# Patient Record
Sex: FEMALE | NOT HISPANIC OR LATINO
[De-identification: names, ages, dates, MRNs, and addresses within clinical notes are randomized per-mention and may not be internally consistent; named-entity substitution may affect disease eponyms.]

---

## 2017-01-02 ENCOUNTER — FORM ENCOUNTER (OUTPATIENT)
Age: 67
End: 2017-01-02

## 2017-01-03 ENCOUNTER — OUTPATIENT (OUTPATIENT)
Dept: OUTPATIENT SERVICES | Facility: HOSPITAL | Age: 67
LOS: 1 days | End: 2017-01-03
Payer: MEDICARE

## 2017-01-03 ENCOUNTER — APPOINTMENT (OUTPATIENT)
Dept: MRI IMAGING | Facility: CLINIC | Age: 67
End: 2017-01-03

## 2017-01-03 DIAGNOSIS — Z00.8 ENCOUNTER FOR OTHER GENERAL EXAMINATION: ICD-10-CM

## 2017-01-03 PROCEDURE — C8937: CPT

## 2017-01-03 PROCEDURE — C8908: CPT

## 2017-01-03 PROCEDURE — A9585: CPT

## 2017-02-06 ENCOUNTER — FORM ENCOUNTER (OUTPATIENT)
Age: 67
End: 2017-02-06

## 2017-02-07 ENCOUNTER — APPOINTMENT (OUTPATIENT)
Dept: ULTRASOUND IMAGING | Facility: CLINIC | Age: 67
End: 2017-02-07

## 2017-02-07 ENCOUNTER — OUTPATIENT (OUTPATIENT)
Dept: OUTPATIENT SERVICES | Facility: HOSPITAL | Age: 67
LOS: 1 days | End: 2017-02-07
Payer: MEDICARE

## 2017-02-07 ENCOUNTER — APPOINTMENT (OUTPATIENT)
Dept: MAMMOGRAPHY | Facility: CLINIC | Age: 67
End: 2017-02-07

## 2017-02-07 DIAGNOSIS — Z00.8 ENCOUNTER FOR OTHER GENERAL EXAMINATION: ICD-10-CM

## 2017-02-07 PROCEDURE — 77065 DX MAMMO INCL CAD UNI: CPT

## 2017-02-07 PROCEDURE — 76641 ULTRASOUND BREAST COMPLETE: CPT

## 2017-02-07 PROCEDURE — G0279: CPT

## 2017-06-07 ENCOUNTER — APPOINTMENT (OUTPATIENT)
Dept: BREAST CENTER | Facility: CLINIC | Age: 67
End: 2017-06-07

## 2017-06-07 VITALS
SYSTOLIC BLOOD PRESSURE: 122 MMHG | HEIGHT: 63 IN | BODY MASS INDEX: 29.06 KG/M2 | HEART RATE: 72 BPM | DIASTOLIC BLOOD PRESSURE: 80 MMHG | WEIGHT: 164 LBS

## 2017-08-03 ENCOUNTER — FORM ENCOUNTER (OUTPATIENT)
Age: 67
End: 2017-08-03

## 2017-08-04 ENCOUNTER — OUTPATIENT (OUTPATIENT)
Dept: OUTPATIENT SERVICES | Facility: HOSPITAL | Age: 67
LOS: 1 days | End: 2017-08-04
Payer: MEDICARE

## 2017-08-04 ENCOUNTER — APPOINTMENT (OUTPATIENT)
Dept: RADIOLOGY | Facility: CLINIC | Age: 67
End: 2017-08-04
Payer: MEDICARE

## 2017-08-04 DIAGNOSIS — Z00.8 ENCOUNTER FOR OTHER GENERAL EXAMINATION: ICD-10-CM

## 2017-08-04 PROCEDURE — 77080 DXA BONE DENSITY AXIAL: CPT

## 2017-08-04 PROCEDURE — 77080 DXA BONE DENSITY AXIAL: CPT | Mod: 26

## 2017-08-08 ENCOUNTER — FORM ENCOUNTER (OUTPATIENT)
Age: 67
End: 2017-08-08

## 2017-08-09 ENCOUNTER — APPOINTMENT (OUTPATIENT)
Dept: MAMMOGRAPHY | Facility: CLINIC | Age: 67
End: 2017-08-09
Payer: MEDICARE

## 2017-08-09 ENCOUNTER — APPOINTMENT (OUTPATIENT)
Dept: ULTRASOUND IMAGING | Facility: CLINIC | Age: 67
End: 2017-08-09
Payer: MEDICARE

## 2017-08-09 ENCOUNTER — OUTPATIENT (OUTPATIENT)
Dept: OUTPATIENT SERVICES | Facility: HOSPITAL | Age: 67
LOS: 1 days | End: 2017-08-09
Payer: MEDICARE

## 2017-08-09 ENCOUNTER — APPOINTMENT (OUTPATIENT)
Dept: HEMATOLOGY ONCOLOGY | Facility: CLINIC | Age: 67
End: 2017-08-09
Payer: MEDICARE

## 2017-08-09 VITALS
HEART RATE: 62 BPM | TEMPERATURE: 98.5 F | SYSTOLIC BLOOD PRESSURE: 144 MMHG | BODY MASS INDEX: 29.59 KG/M2 | DIASTOLIC BLOOD PRESSURE: 89 MMHG | WEIGHT: 167 LBS | HEIGHT: 63 IN

## 2017-08-09 DIAGNOSIS — Z87.898 PERSONAL HISTORY OF OTHER SPECIFIED CONDITIONS: ICD-10-CM

## 2017-08-09 DIAGNOSIS — Z00.8 ENCOUNTER FOR OTHER GENERAL EXAMINATION: ICD-10-CM

## 2017-08-09 PROCEDURE — 77065 DX MAMMO INCL CAD UNI: CPT

## 2017-08-09 PROCEDURE — 76641 ULTRASOUND BREAST COMPLETE: CPT | Mod: 26,LT

## 2017-08-09 PROCEDURE — 76641 ULTRASOUND BREAST COMPLETE: CPT

## 2017-08-09 PROCEDURE — G0206: CPT | Mod: 26,LT

## 2017-08-09 PROCEDURE — 99214 OFFICE O/P EST MOD 30 MIN: CPT

## 2017-08-09 PROCEDURE — G0279: CPT

## 2017-08-09 PROCEDURE — G0279: CPT | Mod: 26

## 2017-08-16 ENCOUNTER — APPOINTMENT (OUTPATIENT)
Dept: INFUSION THERAPY | Facility: CLINIC | Age: 67
End: 2017-08-16

## 2017-08-16 VITALS
BODY MASS INDEX: 30.12 KG/M2 | TEMPERATURE: 99 F | WEIGHT: 170 LBS | HEIGHT: 63 IN | SYSTOLIC BLOOD PRESSURE: 135 MMHG | HEART RATE: 68 BPM | DIASTOLIC BLOOD PRESSURE: 85 MMHG

## 2017-12-05 ENCOUNTER — APPOINTMENT (OUTPATIENT)
Dept: BREAST CENTER | Facility: CLINIC | Age: 67
End: 2017-12-05
Payer: MEDICARE

## 2017-12-05 VITALS
SYSTOLIC BLOOD PRESSURE: 118 MMHG | HEART RATE: 74 BPM | BODY MASS INDEX: 29.77 KG/M2 | DIASTOLIC BLOOD PRESSURE: 78 MMHG | WEIGHT: 168 LBS | HEIGHT: 63 IN

## 2017-12-05 PROCEDURE — 99213 OFFICE O/P EST LOW 20 MIN: CPT

## 2018-01-05 ENCOUNTER — RX RENEWAL (OUTPATIENT)
Age: 68
End: 2018-01-05

## 2018-01-08 ENCOUNTER — RESULT CHARGE (OUTPATIENT)
Age: 68
End: 2018-01-08

## 2018-01-29 ENCOUNTER — OTHER (OUTPATIENT)
Age: 68
End: 2018-01-29

## 2018-02-13 RX ORDER — NITROFURANTOIN (MONOHYDRATE/MACROCRYSTALS) 25; 75 MG/1; MG/1
100 CAPSULE ORAL
Qty: 14 | Refills: 0 | Status: DISCONTINUED | COMMUNITY
Start: 2018-01-05

## 2018-02-13 RX ORDER — MOMETASONE FUROATE 1 MG/G
0.1 OINTMENT TOPICAL
Qty: 15 | Refills: 0 | Status: DISCONTINUED | COMMUNITY
Start: 2017-11-01

## 2018-02-13 RX ORDER — HYDROCODONE BITARTRATE AND HOMATROPINE METHYLBROMIDE 5; 1.5 MG/5ML; MG/5ML
5-1.5 SYRUP ORAL
Qty: 240 | Refills: 0 | Status: DISCONTINUED | COMMUNITY
Start: 2018-01-09

## 2018-02-13 RX ORDER — AMOXICILLIN 500 MG/1
500 CAPSULE ORAL
Qty: 20 | Refills: 0 | Status: DISCONTINUED | COMMUNITY
Start: 2018-01-09

## 2018-02-13 RX ORDER — AZELASTINE HYDROCHLORIDE 205.5 UG/1
0.15 SPRAY, METERED NASAL
Qty: 30 | Refills: 0 | Status: DISCONTINUED | COMMUNITY
Start: 2018-01-09

## 2018-02-14 ENCOUNTER — APPOINTMENT (OUTPATIENT)
Dept: HEMATOLOGY ONCOLOGY | Facility: CLINIC | Age: 68
End: 2018-02-14
Payer: MEDICARE

## 2018-02-14 ENCOUNTER — APPOINTMENT (OUTPATIENT)
Dept: INFUSION THERAPY | Facility: CLINIC | Age: 68
End: 2018-02-14

## 2018-02-14 VITALS
HEART RATE: 69 BPM | TEMPERATURE: 98.4 F | WEIGHT: 179 LBS | DIASTOLIC BLOOD PRESSURE: 85 MMHG | HEIGHT: 63 IN | SYSTOLIC BLOOD PRESSURE: 136 MMHG | BODY MASS INDEX: 31.71 KG/M2

## 2018-02-14 PROCEDURE — 96372 THER/PROPH/DIAG INJ SC/IM: CPT | Mod: 59

## 2018-02-14 PROCEDURE — 99213 OFFICE O/P EST LOW 20 MIN: CPT | Mod: 25

## 2018-02-25 ENCOUNTER — EMERGENCY (EMERGENCY)
Facility: HOSPITAL | Age: 68
LOS: 0 days | Discharge: HOME | End: 2018-02-26
Attending: EMERGENCY MEDICINE | Admitting: EMERGENCY MEDICINE
Payer: MEDICARE

## 2018-02-25 VITALS
HEIGHT: 63 IN | SYSTOLIC BLOOD PRESSURE: 151 MMHG | HEART RATE: 75 BPM | RESPIRATION RATE: 18 BRPM | DIASTOLIC BLOOD PRESSURE: 92 MMHG | OXYGEN SATURATION: 97 % | WEIGHT: 169.98 LBS | TEMPERATURE: 98 F
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
## 2018-02-25 DIAGNOSIS — I25.2 OLD MYOCARDIAL INFARCTION: ICD-10-CM

## 2018-02-25 DIAGNOSIS — F10.120 ALCOHOL ABUSE WITH INTOXICATION, UNCOMPLICATED: ICD-10-CM

## 2018-02-25 DIAGNOSIS — Z86.73 PERSONAL HISTORY OF TRANSIENT ISCHEMIC ATTACK (TIA), AND CEREBRAL INFARCTION WITHOUT RESIDUAL DEFICITS: ICD-10-CM

## 2018-02-25 DIAGNOSIS — F10.129 ALCOHOL ABUSE WITH INTOXICATION, UNSPECIFIED: ICD-10-CM

## 2018-02-25 DIAGNOSIS — Z98.61 CORONARY ANGIOPLASTY STATUS: ICD-10-CM

## 2018-02-25 DIAGNOSIS — I25.119 ATHEROSCLEROTIC HEART DISEASE OF NATIVE CORONARY ARTERY WITH UNSPECIFIED ANGINA PECTORIS: ICD-10-CM

## 2018-02-25 LAB
APTT BLD: 33.4 SEC — SIGNIFICANT CHANGE UP (ref 27.5–37.4)
BASOPHILS # BLD AUTO: 0.1 K/UL — SIGNIFICANT CHANGE UP (ref 0–0.2)
BASOPHILS NFR BLD AUTO: 1.4 % — SIGNIFICANT CHANGE UP (ref 0–2)
EOSINOPHIL # BLD AUTO: 0.3 K/UL — SIGNIFICANT CHANGE UP (ref 0–0.5)
EOSINOPHIL NFR BLD AUTO: 4.5 % — SIGNIFICANT CHANGE UP (ref 0–6)
HCT VFR BLD CALC: 43.7 % — SIGNIFICANT CHANGE UP (ref 34.5–45)
HGB BLD-MCNC: 14 G/DL — SIGNIFICANT CHANGE UP (ref 11.5–15.5)
INR BLD: 1.1 RATIO — SIGNIFICANT CHANGE UP (ref 0.88–1.16)
LYMPHOCYTES # BLD AUTO: 1.9 K/UL — SIGNIFICANT CHANGE UP (ref 1–3.3)
LYMPHOCYTES # BLD AUTO: 27.6 % — SIGNIFICANT CHANGE UP (ref 13–44)
MCHC RBC-ENTMCNC: 31.3 PG — SIGNIFICANT CHANGE UP (ref 27–34)
MCHC RBC-ENTMCNC: 32.1 GM/DL — SIGNIFICANT CHANGE UP (ref 32–36)
MCV RBC AUTO: 97.5 FL — SIGNIFICANT CHANGE UP (ref 80–100)
MONOCYTES # BLD AUTO: 0.4 K/UL — SIGNIFICANT CHANGE UP (ref 0–0.9)
MONOCYTES NFR BLD AUTO: 6.3 % — SIGNIFICANT CHANGE UP (ref 2–14)
NEUTROPHILS # BLD AUTO: 4.2 K/UL — SIGNIFICANT CHANGE UP (ref 1.8–7.4)
NEUTROPHILS NFR BLD AUTO: 60.3 % — SIGNIFICANT CHANGE UP (ref 43–77)
PLATELET # BLD AUTO: 296 K/UL — SIGNIFICANT CHANGE UP (ref 150–400)
PROTHROM AB SERPL-ACNC: 11.9 SEC — SIGNIFICANT CHANGE UP (ref 9.8–12.7)
RBC # BLD: 4.48 M/UL — SIGNIFICANT CHANGE UP (ref 3.8–5.2)
RBC # FLD: 12.5 % — SIGNIFICANT CHANGE UP (ref 10.3–14.5)
WBC # BLD: 7 K/UL — SIGNIFICANT CHANGE UP (ref 3.8–10.5)
WBC # FLD AUTO: 7 K/UL — SIGNIFICANT CHANGE UP (ref 3.8–10.5)

## 2018-02-25 PROCEDURE — 99284 EMERGENCY DEPT VISIT MOD MDM: CPT | Mod: 25

## 2018-02-25 RX ORDER — ONDANSETRON 8 MG/1
4 TABLET, FILM COATED ORAL ONCE
Qty: 0 | Refills: 0 | Status: COMPLETED | OUTPATIENT
Start: 2018-02-25 | End: 2018-02-25

## 2018-02-25 RX ORDER — SODIUM CHLORIDE 9 MG/ML
1000 INJECTION, SOLUTION INTRAVENOUS
Qty: 0 | Refills: 0 | Status: COMPLETED | OUTPATIENT
Start: 2018-02-25 | End: 2018-02-26

## 2018-02-25 NOTE — ED ADULT NURSE NOTE - CHPI ED SYMPTOMS NEG
no weakness/no pain/no dizziness/no tingling/no fever/no chills/no numbness/no vomiting/no decreased eating/drinking/no nausea

## 2018-02-25 NOTE — ED PROVIDER NOTE - OBJECTIVE STATEMENT
Pt. is a 68 yo F with a hx of CAD, cardiac stents, MI, TIA BIB ambulance for severe alcohol intoxication.  Pt. states for the past few months she has been doing multiple shots of alcohol daily due to stress with  44 yo daughter, money, real estate, will.   at bedside worried due to her medical problems because she couldn't stand, dropped a plate in the kitchen, and had multiple episodes of vomiting.  No falls or trauma to head per .  Pt. states she also has insomnia and takes xanax for anxiety and help with sleep.  Pt. has no complaints at this time. PMJEANIE Hogan

## 2018-02-25 NOTE — ED PROVIDER NOTE - PMH
Coronary artery disease, angina presence unspecified, unspecified vessel or lesion type, unspecified whether native or transplanted heart    Non-ST elevation (NSTEMI) myocardial infarction    Transient cerebral ischemia, unspecified type

## 2018-02-25 NOTE — ED PROVIDER NOTE - CONSTITUTIONAL, MLM
normal... Well appearing, well nourished, awake, alert, oriented to person, place. Slurred speech but cooperative. Vomitus on clothing.

## 2018-02-25 NOTE — ED ADULT NURSE NOTE - OBJECTIVE STATEMENT
pt states that she drank vodka and took xanax today but states "I always do this every night and my  wants me evaluated".  Pt states that she is anxious every night and that's why she drinks every night.  pt alert at this time, vss, ambulatory with  at bedside.  Pt denies si/hi

## 2018-02-26 VITALS
DIASTOLIC BLOOD PRESSURE: 88 MMHG | HEART RATE: 77 BPM | OXYGEN SATURATION: 98 % | SYSTOLIC BLOOD PRESSURE: 133 MMHG | RESPIRATION RATE: 17 BRPM | TEMPERATURE: 98 F

## 2018-02-26 LAB
ALBUMIN SERPL ELPH-MCNC: 3.6 G/DL — SIGNIFICANT CHANGE UP (ref 3.3–5)
ALP SERPL-CCNC: 58 U/L — SIGNIFICANT CHANGE UP (ref 40–120)
ALT FLD-CCNC: 20 U/L — SIGNIFICANT CHANGE UP (ref 12–78)
ANION GAP SERPL CALC-SCNC: 7 MMOL/L — SIGNIFICANT CHANGE UP (ref 5–17)
APAP SERPL-MCNC: <2 UG/ML — LOW (ref 10–30)
AST SERPL-CCNC: 19 U/L — SIGNIFICANT CHANGE UP (ref 15–37)
BILIRUB SERPL-MCNC: 0.2 MG/DL — SIGNIFICANT CHANGE UP (ref 0.2–1.2)
BUN SERPL-MCNC: 10 MG/DL — SIGNIFICANT CHANGE UP (ref 7–23)
CALCIUM SERPL-MCNC: 8.6 MG/DL — SIGNIFICANT CHANGE UP (ref 8.5–10.1)
CHLORIDE SERPL-SCNC: 107 MMOL/L — SIGNIFICANT CHANGE UP (ref 96–108)
CK SERPL-CCNC: 81 U/L — SIGNIFICANT CHANGE UP (ref 26–192)
CO2 SERPL-SCNC: 27 MMOL/L — SIGNIFICANT CHANGE UP (ref 22–31)
CREAT SERPL-MCNC: 0.65 MG/DL — SIGNIFICANT CHANGE UP (ref 0.5–1.3)
ETHANOL SERPL-MCNC: 205 MG/DL — HIGH (ref 0–10)
GLUCOSE SERPL-MCNC: 99 MG/DL — SIGNIFICANT CHANGE UP (ref 70–99)
MAGNESIUM SERPL-MCNC: 1.8 MG/DL — SIGNIFICANT CHANGE UP (ref 1.6–2.6)
POTASSIUM SERPL-MCNC: 3.9 MMOL/L — SIGNIFICANT CHANGE UP (ref 3.5–5.3)
POTASSIUM SERPL-SCNC: 3.9 MMOL/L — SIGNIFICANT CHANGE UP (ref 3.5–5.3)
PROT SERPL-MCNC: 7.6 GM/DL — SIGNIFICANT CHANGE UP (ref 6–8.3)
SALICYLATES SERPL-MCNC: <1.7 MG/DL — LOW (ref 2.8–20)
SODIUM SERPL-SCNC: 141 MMOL/L — SIGNIFICANT CHANGE UP (ref 135–145)
TROPONIN I SERPL-MCNC: <0.015 NG/ML — SIGNIFICANT CHANGE UP (ref 0.01–0.04)

## 2018-02-26 PROCEDURE — 93010 ELECTROCARDIOGRAM REPORT: CPT

## 2018-02-26 RX ADMIN — SODIUM CHLORIDE 125 MILLILITER(S): 9 INJECTION, SOLUTION INTRAVENOUS at 00:34

## 2018-07-01 PROBLEM — H25.011 CORTICAL AGE-RELATED CATARACT OF RIGHT EYE: Status: ACTIVE | Noted: 2018-07-01

## 2018-07-02 PROBLEM — H25.011 CORTICAL AGE-RELATED CATARACT OF RIGHT EYE: Status: RESOLVED | Noted: 2018-07-01 | Resolved: 2018-07-02

## 2018-07-18 ENCOUNTER — RESULT REVIEW (OUTPATIENT)
Age: 68
End: 2018-07-18

## 2018-08-14 ENCOUNTER — APPOINTMENT (OUTPATIENT)
Dept: INFUSION THERAPY | Facility: CLINIC | Age: 68
End: 2018-08-14
Payer: MEDICARE

## 2018-08-14 VITALS
WEIGHT: 168 LBS | HEART RATE: 71 BPM | TEMPERATURE: 98.4 F | HEIGHT: 63 IN | SYSTOLIC BLOOD PRESSURE: 138 MMHG | BODY MASS INDEX: 29.77 KG/M2 | DIASTOLIC BLOOD PRESSURE: 99 MMHG

## 2018-08-14 PROCEDURE — 96372 THER/PROPH/DIAG INJ SC/IM: CPT

## 2018-08-15 LAB
25(OH)D3 SERPL-MCNC: 47.6 NG/ML
ALBUMIN SERPL ELPH-MCNC: 4.1 G/DL
ALP BLD-CCNC: 45 U/L
ALT SERPL-CCNC: 8 U/L
ANION GAP SERPL CALC-SCNC: 17 MMOL/L
AST SERPL-CCNC: 14 U/L
BILIRUB SERPL-MCNC: 0.3 MG/DL
BUN SERPL-MCNC: 18 MG/DL
CALCIUM SERPL-MCNC: 9.3 MG/DL
CHLORIDE SERPL-SCNC: 103 MMOL/L
CO2 SERPL-SCNC: 25 MMOL/L
CREAT SERPL-MCNC: 0.65 MG/DL
GLUCOSE SERPL-MCNC: 105 MG/DL
POTASSIUM SERPL-SCNC: 4.6 MMOL/L
PROT SERPL-MCNC: 6.6 G/DL
SODIUM SERPL-SCNC: 145 MMOL/L

## 2018-09-21 PROBLEM — I25.10 ATHEROSCLEROTIC HEART DISEASE OF NATIVE CORONARY ARTERY WITHOUT ANGINA PECTORIS: Chronic | Status: ACTIVE | Noted: 2018-02-25

## 2018-09-21 PROBLEM — G45.9 TRANSIENT CEREBRAL ISCHEMIC ATTACK, UNSPECIFIED: Chronic | Status: ACTIVE | Noted: 2018-02-25

## 2018-09-21 PROBLEM — I21.4 NON-ST ELEVATION (NSTEMI) MYOCARDIAL INFARCTION: Chronic | Status: ACTIVE | Noted: 2018-02-25

## 2018-10-01 ENCOUNTER — FORM ENCOUNTER (OUTPATIENT)
Age: 68
End: 2018-10-01

## 2018-10-02 ENCOUNTER — OUTPATIENT (OUTPATIENT)
Dept: OUTPATIENT SERVICES | Facility: HOSPITAL | Age: 68
LOS: 1 days | End: 2018-10-02
Payer: MEDICARE

## 2018-10-02 ENCOUNTER — APPOINTMENT (OUTPATIENT)
Dept: MAMMOGRAPHY | Facility: CLINIC | Age: 68
End: 2018-10-02
Payer: MEDICARE

## 2018-10-02 ENCOUNTER — APPOINTMENT (OUTPATIENT)
Dept: ULTRASOUND IMAGING | Facility: CLINIC | Age: 68
End: 2018-10-02
Payer: MEDICARE

## 2018-10-02 DIAGNOSIS — Z00.8 ENCOUNTER FOR OTHER GENERAL EXAMINATION: ICD-10-CM

## 2018-10-02 PROCEDURE — 77065 DX MAMMO INCL CAD UNI: CPT | Mod: 26,LT

## 2018-10-02 PROCEDURE — 76641 ULTRASOUND BREAST COMPLETE: CPT | Mod: 26,LT

## 2018-10-02 PROCEDURE — G0279: CPT

## 2018-10-02 PROCEDURE — 77065 DX MAMMO INCL CAD UNI: CPT

## 2018-10-02 PROCEDURE — G0279: CPT | Mod: 26

## 2018-10-02 PROCEDURE — 76641 ULTRASOUND BREAST COMPLETE: CPT

## 2018-11-08 ENCOUNTER — OUTPATIENT (OUTPATIENT)
Dept: OUTPATIENT SERVICES | Facility: HOSPITAL | Age: 68
LOS: 1 days | Discharge: HOME | End: 2018-11-08

## 2018-11-08 VITALS
OXYGEN SATURATION: 97 % | DIASTOLIC BLOOD PRESSURE: 68 MMHG | RESPIRATION RATE: 16 BRPM | HEART RATE: 72 BPM | SYSTOLIC BLOOD PRESSURE: 141 MMHG

## 2018-11-08 DIAGNOSIS — I25.10 ATHEROSCLEROTIC HEART DISEASE OF NATIVE CORONARY ARTERY WITHOUT ANGINA PECTORIS: ICD-10-CM

## 2018-11-08 DIAGNOSIS — Z01.818 ENCOUNTER FOR OTHER PREPROCEDURAL EXAMINATION: ICD-10-CM

## 2018-11-08 DIAGNOSIS — Z90.11 ACQUIRED ABSENCE OF RIGHT BREAST AND NIPPLE: Chronic | ICD-10-CM

## 2018-11-08 DIAGNOSIS — Z95.5 PRESENCE OF CORONARY ANGIOPLASTY IMPLANT AND GRAFT: Chronic | ICD-10-CM

## 2018-11-08 DIAGNOSIS — H26.9 UNSPECIFIED CATARACT: Chronic | ICD-10-CM

## 2018-11-08 LAB
ANION GAP SERPL CALC-SCNC: 8 MMOL/L — SIGNIFICANT CHANGE UP (ref 5–17)
BUN SERPL-MCNC: 19 MG/DL — SIGNIFICANT CHANGE UP (ref 7–23)
CALCIUM SERPL-MCNC: 8.8 MG/DL — SIGNIFICANT CHANGE UP (ref 8.5–10.1)
CHLORIDE SERPL-SCNC: 104 MMOL/L — SIGNIFICANT CHANGE UP (ref 96–108)
CO2 SERPL-SCNC: 26 MMOL/L — SIGNIFICANT CHANGE UP (ref 22–31)
CREAT SERPL-MCNC: 0.62 MG/DL — SIGNIFICANT CHANGE UP (ref 0.5–1.3)
GLUCOSE SERPL-MCNC: 86 MG/DL — SIGNIFICANT CHANGE UP (ref 70–99)
HCT VFR BLD CALC: 40.1 % — SIGNIFICANT CHANGE UP (ref 34.5–45)
HGB BLD-MCNC: 12.7 G/DL — SIGNIFICANT CHANGE UP (ref 11.5–15.5)
MCHC RBC-ENTMCNC: 28.5 PG — SIGNIFICANT CHANGE UP (ref 27–34)
MCHC RBC-ENTMCNC: 31.7 GM/DL — LOW (ref 32–36)
MCV RBC AUTO: 90.1 FL — SIGNIFICANT CHANGE UP (ref 80–100)
NRBC # BLD: 0 /100 WBCS — SIGNIFICANT CHANGE UP (ref 0–0)
PLATELET # BLD AUTO: 323 K/UL — SIGNIFICANT CHANGE UP (ref 150–400)
POTASSIUM SERPL-MCNC: 4.3 MMOL/L — SIGNIFICANT CHANGE UP (ref 3.5–5.3)
POTASSIUM SERPL-SCNC: 4.3 MMOL/L — SIGNIFICANT CHANGE UP (ref 3.5–5.3)
RBC # BLD: 4.45 M/UL — SIGNIFICANT CHANGE UP (ref 3.8–5.2)
RBC # FLD: 14.3 % — SIGNIFICANT CHANGE UP (ref 10.3–14.5)
SODIUM SERPL-SCNC: 138 MMOL/L — SIGNIFICANT CHANGE UP (ref 135–145)
WBC # BLD: 6.85 K/UL — SIGNIFICANT CHANGE UP (ref 3.8–10.5)
WBC # FLD AUTO: 6.85 K/UL — SIGNIFICANT CHANGE UP (ref 3.8–10.5)

## 2018-11-08 NOTE — H&P CARDIOLOGY - PMH
Anxiety    Breast cancer    Coronary artery disease, angina presence unspecified, unspecified vessel or lesion type, unspecified whether native or transplanted heart    HTN (hypertension)    Non-ST elevation (NSTEMI) myocardial infarction    Transient cerebral ischemia, unspecified type

## 2018-11-08 NOTE — H&P CARDIOLOGY - PSH
Cataract, bilateral    H/O total mastectomy of right breast    S/P drug eluting coronary stent placement  in 2008 and 2010

## 2018-11-08 NOTE — H&P CARDIOLOGY - RADIOLOGY RESULTS AND INTERPRETATION
Nuclear stress test (10/22/18): abnormal perfusion of mild intensity in anterior segment, EF 63% Nuclear stress test (10/22/18): abnormal perfusion of mild intensity in anterior segment, EF 63%  Cath report (4/30/2010): patent LAD stent, stent to RPDA

## 2018-11-08 NOTE — H&P CARDIOLOGY - REVIEW OF SYSTEMS
General: anxious, denies recent weight loss/fever/chills    Neurological: + light headedness, denies numbness or  sensation loss    Cardiovascular: chest pressure/ discomfort     Respiratory and Thorax: + APPLE, denies cough/wheezing    Gastrointestinal: + hiatal hernia, denies abdominal pain/diarrhea/constipation/bloody stool    Genitourinary: denies urinary frequency/urgency/ dysuria    Musculoskeletal: denies joint pain or swelling, denies restricted motion    Hematologic: denies abnormal bleeding

## 2018-11-08 NOTE — H&P CARDIOLOGY - FAMILY HISTORY
Father  Still living? Unknown  Family history of lung cancer, Age at diagnosis: Age Unknown     Mother  Still living? Unknown  Family history of breast cancer, Age at diagnosis: Age Unknown     Sibling  Still living? Unknown  Family history of bladder cancer, Age at diagnosis: Age Unknown  Family history of prostate cancer, Age at diagnosis: Age Unknown  Family history of heart disease, Age at diagnosis: Age Unknown     Grandparent  Still living? Unknown  Family history of heart disease, Age at diagnosis: Age Unknown

## 2018-11-08 NOTE — H&P CARDIOLOGY - HISTORY OF PRESENT ILLNESS
This is a 68 y.o female with PMHx of HTN, breast ca, s/p Rt, Lumpectomy/ Mastectomy/ implantation, TIA (2012, on Xarelto), ASD/ PFO, CAD, MI (in 2008), s/p PCI (x2 in 2008, x2 in 2010) presented to Dr. Hogan's office with hypertensive and c/o increasing in APPLE, light headedness, light chest pressure/ discomfort for 6-8 weeks. Recent nuclear stress test revealed abnormal perfusion in anterior segment. Pt referred to cardiac cath with possible PCI.

## 2018-11-12 ENCOUNTER — OUTPATIENT (OUTPATIENT)
Dept: OUTPATIENT SERVICES | Facility: HOSPITAL | Age: 68
LOS: 1 days | Discharge: HOME | End: 2018-11-12
Payer: MEDICARE

## 2018-11-12 VITALS
SYSTOLIC BLOOD PRESSURE: 127 MMHG | DIASTOLIC BLOOD PRESSURE: 81 MMHG | OXYGEN SATURATION: 97 % | RESPIRATION RATE: 16 BRPM | HEART RATE: 67 BPM

## 2018-11-12 VITALS
DIASTOLIC BLOOD PRESSURE: 76 MMHG | RESPIRATION RATE: 16 BRPM | SYSTOLIC BLOOD PRESSURE: 135 MMHG | OXYGEN SATURATION: 95 % | HEART RATE: 79 BPM

## 2018-11-12 DIAGNOSIS — H26.9 UNSPECIFIED CATARACT: Chronic | ICD-10-CM

## 2018-11-12 DIAGNOSIS — Z90.11 ACQUIRED ABSENCE OF RIGHT BREAST AND NIPPLE: Chronic | ICD-10-CM

## 2018-11-12 DIAGNOSIS — Z95.5 PRESENCE OF CORONARY ANGIOPLASTY IMPLANT AND GRAFT: Chronic | ICD-10-CM

## 2018-11-12 PROCEDURE — 93010 ELECTROCARDIOGRAM REPORT: CPT

## 2018-11-12 NOTE — PROGRESS NOTE ADULT - SUBJECTIVE AND OBJECTIVE BOX
s/p LHC revealing non obstructive CAD      Denies chest pain, shortness of breath, dizziness or palpitations at this time  A+Ox3  CV  Respiratory  Right groin procedure site CDI.  no bleeding, no hematoma, site soft, non tender, positive pedal pulses bilaterally    HPI:This is a 68 y.o female with PMHx of HTN, breast ca, s/p Rt, Lumpectomy/ Mastectomy/ implantation, TIA (2012, on Xarelto), ASD/ PFO, CAD, MI (in 2008), s/p PCI (x2 in 2008, x2 in 2010) presented to Dr. Hogan's office with hypertension and c/o increasing in APPLE, light headedness light chest pressure/ discomfort for 6-8 weeks. Recent nuclear stress test revealed abnormal perfusion in anterior segment. Pt referred to cardiac cath with possible PCI.       now s/p LHC revealing non obstructive CAD, patent    --vital signs, labs, diet and activity per post procedure orders  -ambulate ad trae post bedrest  -encourage PO fluids  -plan of care discussed with patient, family and MD  -Restart Xarelto in AM  -d/c after bedrest if stable  -post procedure and d/c instructions reviewed  -follow up with MD in 1-2 weeks  -Discussed therapeutic lifestyle changes to reduce risk factors such as following a cardiac diet, weight loss, maintaining a healthy weight, exercise, smoking cessation, medication compliance, and regular follow-up  with MD s/p LHC revealing non obstructive CAD      Denies chest pain, shortness of breath, dizziness or palpitations at this time  A+Ox3  CV: S1S2 reg  Respiratory: CTAB  Right groin procedure site CDI.  no bleeding, no hematoma, site soft, non tender, positive pedal pulses bilaterally    HPI:This is a 68 y.o female with PMHx of HTN, breast ca, s/p Rt, Lumpectomy/ Mastectomy/ implantation, TIA (2012, on Xarelto), ASD/ PFO, CAD, MI (in 2008), s/p PCI (x2 in 2008, x2 in 2010) presented to Dr. Hogan's office with hypertension and c/o increasing in APPLE, light headedness light chest pressure/ discomfort for 6-8 weeks. Recent nuclear stress test revealed abnormal perfusion in anterior segment. Pt referred to cardiac cath with possible PCI.       now s/p LHC revealing non obstructive CAD, patent    --vital signs, labs, diet and activity per post procedure orders  -ambulate ad trae post bedrest  -encourage PO fluids  -plan of care discussed with patient, family and MD  -Restart Xarelto in AM  -d/c after bedrest if stable  -post procedure and d/c instructions reviewed  -follow up with MD in 1-2 weeks  -Discussed therapeutic lifestyle changes to reduce risk factors such as following a cardiac diet, weight loss, maintaining a healthy weight, exercise, smoking cessation, medication compliance, and regular follow-up  with MD

## 2018-11-12 NOTE — PACU DISCHARGE NOTE - COMMENTS
Pt. verb. agreement and understanding to and teach back to written and verbal discharge instructions and medication list.   Pt. discharged  to home via private auto accompanied by .

## 2018-11-15 DIAGNOSIS — Z95.5 PRESENCE OF CORONARY ANGIOPLASTY IMPLANT AND GRAFT: ICD-10-CM

## 2018-11-15 DIAGNOSIS — I20.8 OTHER FORMS OF ANGINA PECTORIS: ICD-10-CM

## 2018-11-15 DIAGNOSIS — Z85.3 PERSONAL HISTORY OF MALIGNANT NEOPLASM OF BREAST: ICD-10-CM

## 2018-11-15 DIAGNOSIS — Z86.73 PERSONAL HISTORY OF TRANSIENT ISCHEMIC ATTACK (TIA), AND CEREBRAL INFARCTION WITHOUT RESIDUAL DEFICITS: ICD-10-CM

## 2018-11-15 DIAGNOSIS — I25.10 ATHEROSCLEROTIC HEART DISEASE OF NATIVE CORONARY ARTERY WITHOUT ANGINA PECTORIS: ICD-10-CM

## 2018-11-15 DIAGNOSIS — F41.9 ANXIETY DISORDER, UNSPECIFIED: ICD-10-CM

## 2018-11-15 DIAGNOSIS — Z87.891 PERSONAL HISTORY OF NICOTINE DEPENDENCE: ICD-10-CM

## 2018-11-15 DIAGNOSIS — I25.2 OLD MYOCARDIAL INFARCTION: ICD-10-CM

## 2018-11-15 DIAGNOSIS — I10 ESSENTIAL (PRIMARY) HYPERTENSION: ICD-10-CM

## 2018-12-18 PROBLEM — C50.919 MALIGNANT NEOPLASM OF UNSPECIFIED SITE OF UNSPECIFIED FEMALE BREAST: Chronic | Status: ACTIVE | Noted: 2018-11-08

## 2018-12-18 PROBLEM — F41.9 ANXIETY DISORDER, UNSPECIFIED: Chronic | Status: ACTIVE | Noted: 2018-11-08

## 2018-12-18 PROBLEM — I10 ESSENTIAL (PRIMARY) HYPERTENSION: Chronic | Status: ACTIVE | Noted: 2018-11-08

## 2018-12-19 ENCOUNTER — OTHER (OUTPATIENT)
Age: 68
End: 2018-12-19

## 2018-12-19 ENCOUNTER — APPOINTMENT (OUTPATIENT)
Dept: BREAST CENTER | Facility: CLINIC | Age: 68
End: 2018-12-19
Payer: MEDICARE

## 2018-12-19 VITALS
SYSTOLIC BLOOD PRESSURE: 137 MMHG | DIASTOLIC BLOOD PRESSURE: 90 MMHG | HEART RATE: 80 BPM | WEIGHT: 176 LBS | BODY MASS INDEX: 31.18 KG/M2 | HEIGHT: 63 IN

## 2018-12-19 PROCEDURE — 99213 OFFICE O/P EST LOW 20 MIN: CPT

## 2018-12-20 ENCOUNTER — APPOINTMENT (OUTPATIENT)
Dept: OTOLARYNGOLOGY | Facility: CLINIC | Age: 68
End: 2018-12-20
Payer: MEDICARE

## 2018-12-20 VITALS
BODY MASS INDEX: 31.01 KG/M2 | HEIGHT: 63 IN | DIASTOLIC BLOOD PRESSURE: 81 MMHG | HEART RATE: 80 BPM | WEIGHT: 175 LBS | SYSTOLIC BLOOD PRESSURE: 128 MMHG

## 2018-12-20 DIAGNOSIS — R06.83 SNORING: ICD-10-CM

## 2018-12-20 PROCEDURE — 99214 OFFICE O/P EST MOD 30 MIN: CPT

## 2018-12-22 ENCOUNTER — EMERGENCY (EMERGENCY)
Facility: HOSPITAL | Age: 68
LOS: 0 days | Discharge: HOME | End: 2018-12-22
Attending: EMERGENCY MEDICINE | Admitting: EMERGENCY MEDICINE
Payer: MEDICARE

## 2018-12-22 VITALS
HEART RATE: 77 BPM | SYSTOLIC BLOOD PRESSURE: 146 MMHG | RESPIRATION RATE: 17 BRPM | DIASTOLIC BLOOD PRESSURE: 97 MMHG | TEMPERATURE: 99 F | OXYGEN SATURATION: 95 %

## 2018-12-22 VITALS — WEIGHT: 179.02 LBS | HEIGHT: 63 IN

## 2018-12-22 DIAGNOSIS — Z83.3 FAMILY HISTORY OF DIABETES MELLITUS: ICD-10-CM

## 2018-12-22 DIAGNOSIS — Z95.5 PRESENCE OF CORONARY ANGIOPLASTY IMPLANT AND GRAFT: Chronic | ICD-10-CM

## 2018-12-22 DIAGNOSIS — Z79.01 LONG TERM (CURRENT) USE OF ANTICOAGULANTS: ICD-10-CM

## 2018-12-22 DIAGNOSIS — F41.9 ANXIETY DISORDER, UNSPECIFIED: ICD-10-CM

## 2018-12-22 DIAGNOSIS — Z80.0 FAMILY HISTORY OF MALIGNANT NEOPLASM OF DIGESTIVE ORGANS: ICD-10-CM

## 2018-12-22 DIAGNOSIS — Z85.3 PERSONAL HISTORY OF MALIGNANT NEOPLASM OF BREAST: ICD-10-CM

## 2018-12-22 DIAGNOSIS — Z90.11 ACQUIRED ABSENCE OF RIGHT BREAST AND NIPPLE: Chronic | ICD-10-CM

## 2018-12-22 DIAGNOSIS — R19.7 DIARRHEA, UNSPECIFIED: ICD-10-CM

## 2018-12-22 DIAGNOSIS — Z86.73 PERSONAL HISTORY OF TRANSIENT ISCHEMIC ATTACK (TIA), AND CEREBRAL INFARCTION WITHOUT RESIDUAL DEFICITS: ICD-10-CM

## 2018-12-22 DIAGNOSIS — Z90.11 ACQUIRED ABSENCE OF RIGHT BREAST AND NIPPLE: ICD-10-CM

## 2018-12-22 DIAGNOSIS — I25.10 ATHEROSCLEROTIC HEART DISEASE OF NATIVE CORONARY ARTERY WITHOUT ANGINA PECTORIS: ICD-10-CM

## 2018-12-22 DIAGNOSIS — Z80.3 FAMILY HISTORY OF MALIGNANT NEOPLASM OF BREAST: ICD-10-CM

## 2018-12-22 DIAGNOSIS — Z82.49 FAMILY HISTORY OF ISCHEMIC HEART DISEASE AND OTHER DISEASES OF THE CIRCULATORY SYSTEM: ICD-10-CM

## 2018-12-22 DIAGNOSIS — Z87.19 PERSONAL HISTORY OF OTHER DISEASES OF THE DIGESTIVE SYSTEM: ICD-10-CM

## 2018-12-22 DIAGNOSIS — H26.9 UNSPECIFIED CATARACT: Chronic | ICD-10-CM

## 2018-12-22 DIAGNOSIS — Z80.42 FAMILY HISTORY OF MALIGNANT NEOPLASM OF PROSTATE: ICD-10-CM

## 2018-12-22 DIAGNOSIS — Z79.82 LONG TERM (CURRENT) USE OF ASPIRIN: ICD-10-CM

## 2018-12-22 DIAGNOSIS — I10 ESSENTIAL (PRIMARY) HYPERTENSION: ICD-10-CM

## 2018-12-22 DIAGNOSIS — Z98.61 CORONARY ANGIOPLASTY STATUS: ICD-10-CM

## 2018-12-22 DIAGNOSIS — I25.2 OLD MYOCARDIAL INFARCTION: ICD-10-CM

## 2018-12-22 DIAGNOSIS — Z80.1 FAMILY HISTORY OF MALIGNANT NEOPLASM OF TRACHEA, BRONCHUS AND LUNG: ICD-10-CM

## 2018-12-22 LAB
ALBUMIN SERPL ELPH-MCNC: 3.4 G/DL — SIGNIFICANT CHANGE UP (ref 3.3–5)
ALP SERPL-CCNC: 50 U/L — SIGNIFICANT CHANGE UP (ref 40–120)
ALT FLD-CCNC: 19 U/L — SIGNIFICANT CHANGE UP (ref 12–78)
ANION GAP SERPL CALC-SCNC: 6 MMOL/L — SIGNIFICANT CHANGE UP (ref 5–17)
AST SERPL-CCNC: 15 U/L — SIGNIFICANT CHANGE UP (ref 15–37)
BASOPHILS # BLD AUTO: 0.04 K/UL — SIGNIFICANT CHANGE UP (ref 0–0.2)
BASOPHILS NFR BLD AUTO: 0.5 % — SIGNIFICANT CHANGE UP (ref 0–2)
BILIRUB SERPL-MCNC: 0.3 MG/DL — SIGNIFICANT CHANGE UP (ref 0.2–1.2)
BUN SERPL-MCNC: 17 MG/DL — SIGNIFICANT CHANGE UP (ref 7–23)
CALCIUM SERPL-MCNC: 8.9 MG/DL — SIGNIFICANT CHANGE UP (ref 8.5–10.1)
CHLORIDE SERPL-SCNC: 103 MMOL/L — SIGNIFICANT CHANGE UP (ref 96–108)
CO2 SERPL-SCNC: 31 MMOL/L — SIGNIFICANT CHANGE UP (ref 22–31)
CREAT SERPL-MCNC: 0.65 MG/DL — SIGNIFICANT CHANGE UP (ref 0.5–1.3)
EOSINOPHIL # BLD AUTO: 0.77 K/UL — HIGH (ref 0–0.5)
EOSINOPHIL NFR BLD AUTO: 10 % — HIGH (ref 0–6)
GLUCOSE SERPL-MCNC: 101 MG/DL — HIGH (ref 70–99)
HCT VFR BLD CALC: 40.8 % — SIGNIFICANT CHANGE UP (ref 34.5–45)
HGB BLD-MCNC: 12.9 G/DL — SIGNIFICANT CHANGE UP (ref 11.5–15.5)
IMM GRANULOCYTES NFR BLD AUTO: 0.3 % — SIGNIFICANT CHANGE UP (ref 0–1.5)
LIDOCAIN IGE QN: 82 U/L — SIGNIFICANT CHANGE UP (ref 73–393)
LYMPHOCYTES # BLD AUTO: 1.78 K/UL — SIGNIFICANT CHANGE UP (ref 1–3.3)
LYMPHOCYTES # BLD AUTO: 23.2 % — SIGNIFICANT CHANGE UP (ref 13–44)
MAGNESIUM SERPL-MCNC: 1.3 MG/DL — LOW (ref 1.6–2.6)
MCHC RBC-ENTMCNC: 28.9 PG — SIGNIFICANT CHANGE UP (ref 27–34)
MCHC RBC-ENTMCNC: 31.6 GM/DL — LOW (ref 32–36)
MCV RBC AUTO: 91.3 FL — SIGNIFICANT CHANGE UP (ref 80–100)
MONOCYTES # BLD AUTO: 0.64 K/UL — SIGNIFICANT CHANGE UP (ref 0–0.9)
MONOCYTES NFR BLD AUTO: 8.3 % — SIGNIFICANT CHANGE UP (ref 2–14)
NEUTROPHILS # BLD AUTO: 4.43 K/UL — SIGNIFICANT CHANGE UP (ref 1.8–7.4)
NEUTROPHILS NFR BLD AUTO: 57.7 % — SIGNIFICANT CHANGE UP (ref 43–77)
NRBC # BLD: 0 /100 WBCS — SIGNIFICANT CHANGE UP (ref 0–0)
PLATELET # BLD AUTO: 355 K/UL — SIGNIFICANT CHANGE UP (ref 150–400)
POTASSIUM SERPL-MCNC: 3.9 MMOL/L — SIGNIFICANT CHANGE UP (ref 3.5–5.3)
POTASSIUM SERPL-SCNC: 3.9 MMOL/L — SIGNIFICANT CHANGE UP (ref 3.5–5.3)
PROT SERPL-MCNC: 7.5 GM/DL — SIGNIFICANT CHANGE UP (ref 6–8.3)
RBC # BLD: 4.47 M/UL — SIGNIFICANT CHANGE UP (ref 3.8–5.2)
RBC # FLD: 15.2 % — HIGH (ref 10.3–14.5)
SODIUM SERPL-SCNC: 140 MMOL/L — SIGNIFICANT CHANGE UP (ref 135–145)
WBC # BLD: 7.68 K/UL — SIGNIFICANT CHANGE UP (ref 3.8–10.5)
WBC # FLD AUTO: 7.68 K/UL — SIGNIFICANT CHANGE UP (ref 3.8–10.5)

## 2018-12-22 PROCEDURE — 99285 EMERGENCY DEPT VISIT HI MDM: CPT

## 2018-12-22 RX ORDER — FAMOTIDINE 10 MG/ML
40 INJECTION INTRAVENOUS
Qty: 0 | Refills: 0 | Status: DISCONTINUED | OUTPATIENT
Start: 2018-12-22 | End: 2018-12-22

## 2018-12-22 RX ORDER — ONDANSETRON 8 MG/1
4 TABLET, FILM COATED ORAL ONCE
Qty: 0 | Refills: 0 | Status: COMPLETED | OUTPATIENT
Start: 2018-12-22 | End: 2018-12-22

## 2018-12-22 RX ORDER — DIPHENOXYLATE HCL/ATROPINE 2.5-.025MG
2 TABLET ORAL
Qty: 32 | Refills: 0
Start: 2018-12-22 | End: 2018-12-25

## 2018-12-22 RX ORDER — SODIUM CHLORIDE 9 MG/ML
1000 INJECTION INTRAMUSCULAR; INTRAVENOUS; SUBCUTANEOUS ONCE
Qty: 0 | Refills: 0 | Status: COMPLETED | OUTPATIENT
Start: 2018-12-22 | End: 2018-12-22

## 2018-12-22 RX ORDER — DIPHENOXYLATE HCL/ATROPINE 2.5-.025MG
1 TABLET ORAL ONCE
Qty: 0 | Refills: 0 | Status: DISCONTINUED | OUTPATIENT
Start: 2018-12-22 | End: 2018-12-22

## 2018-12-22 RX ORDER — MAGNESIUM SULFATE 500 MG/ML
1 VIAL (ML) INJECTION ONCE
Qty: 0 | Refills: 0 | Status: COMPLETED | OUTPATIENT
Start: 2018-12-22 | End: 2018-12-22

## 2018-12-22 RX ADMIN — Medication 100 GRAM(S): at 15:21

## 2018-12-22 RX ADMIN — ONDANSETRON 4 MILLIGRAM(S): 8 TABLET, FILM COATED ORAL at 15:16

## 2018-12-22 RX ADMIN — Medication 1 TABLET(S): at 15:39

## 2018-12-22 RX ADMIN — FAMOTIDINE 40 MILLIGRAM(S): 10 INJECTION INTRAVENOUS at 15:39

## 2018-12-22 RX ADMIN — SODIUM CHLORIDE 1000 MILLILITER(S): 9 INJECTION INTRAMUSCULAR; INTRAVENOUS; SUBCUTANEOUS at 14:30

## 2018-12-22 NOTE — ED STATDOCS - ATTENDING CONTRIBUTION TO CARE
I, Jono Corona MD,  performed the initial face to face bedside interview with this patient regarding history of present illness, review of symptoms and relevant past medical, social and family history.  I completed an independent physical examination.  I was the initial provider who evaluated this patient. I have signed out the follow up of any pending tests (i.e. labs, radiological studies) to the ACP.  I have communicated the patient’s plan of care and disposition with the ACP.

## 2018-12-22 NOTE — ED ADULT TRIAGE NOTE - CHIEF COMPLAINT QUOTE
pt ambulatory to ED c/o diarrhea nausea abd pain and dizziness for past 3 days. pt notes sx developed after she received steroid injection into her right knee.

## 2018-12-22 NOTE — ED STATDOCS - PROGRESS NOTE DETAILS
69 y/o F presents with diarrhea. Pt reports frequent loose stool over the past 2 69 y/o F presents with diarrhea. Pt reports frequent loose stool over the past 2 weeks. She has been taking immodium with minimal relief. Complaining of lower abdominal discomfort. Pt report similar sx in july, she had colonoscopy and endoscopy showing hiatal hernia and diverticulosis. Denies fever/chills, n/v, CP, SOB, rectal bleeding, urinary sx or other complaints at this time. -Siva Hinojosa PA-C Results reviewed and discussed with pt. Will dc with lomotil. Repeat abdominal exam NTTP. Discussed importance of close FU with PMD.  Pt asked to return to ED immediately for any new or concerning sx or worsening sx. Pt acknowledges and understands plan. -Siva Hinojosa PA-C

## 2018-12-22 NOTE — ED STATDOCS - NS_ ATTENDINGSCRIBEDETAILS _ED_A_ED_FT
I, Jono Corona MD,  performed the initial face to face bedside interview with this patient regarding history of present illness, review of symptoms and relevant past medical, social and family history.  I completed an independent physical examination.    The history, relevant review of systems, past medical and surgical history, medical decision making, and physical examination was documented by the scribe in my presence and I attest to the accuracy of the documentation.

## 2018-12-22 NOTE — ED ADULT NURSE NOTE - NSIMPLEMENTINTERV_GEN_ALL_ED
Implemented All Universal Safety Interventions:  [place] to call system. Call bell, personal items and telephone within reach. Instruct patient to call for assistance. Room bathroom lighting operational. Non-slip footwear when patient is off stretcher. Physically safe environment: no spills, clutter or unnecessary equipment. Stretcher in lowest position, wheels locked, appropriate side rails in place.

## 2018-12-22 NOTE — ED STATDOCS - PMH
Anxiety    Breast cancer    Coronary artery disease, angina presence unspecified, unspecified vessel or lesion type, unspecified whether native or transplanted heart    Diverticulitis    HTN (hypertension)    Non-ST elevation (NSTEMI) myocardial infarction    Transient cerebral ischemia, unspecified type

## 2018-12-22 NOTE — ED STATDOCS - OBJECTIVE STATEMENT
67 y/o female with a PMHx of breast CA, diverticulitis, NSTEMI, HTN, and CAD presents to the ED c/o diarrhea x2 weeks and abd pain. Pt states she has been experiencing episodes of diarrhea x2 weeks which started as sudden onset episodes; pt states episodes were sometimes so sudden that she could not reach a bathroom quickly enough. Pt was prescribed glucosamine for the symptoms, and reports taking Imodium over the counter without relief of symptoms. Pt still experiencing persistent diarrhea and notes approximately 10 episodes this morning. Today, pt also reports new onset 8/10 abdominal pain today. History of diverticulitis with colonoscopy in July 2018; no biopsy performed at that time. No other acute complaints at time of eval. GI: Dr. Nguyen.

## 2019-02-09 ENCOUNTER — EMERGENCY (EMERGENCY)
Facility: HOSPITAL | Age: 69
LOS: 0 days | Discharge: HOME | End: 2019-02-10
Attending: EMERGENCY MEDICINE | Admitting: EMERGENCY MEDICINE
Payer: MEDICARE

## 2019-02-09 VITALS — HEIGHT: 63 IN | WEIGHT: 149.91 LBS

## 2019-02-09 DIAGNOSIS — Z79.82 LONG TERM (CURRENT) USE OF ASPIRIN: ICD-10-CM

## 2019-02-09 DIAGNOSIS — I10 ESSENTIAL (PRIMARY) HYPERTENSION: ICD-10-CM

## 2019-02-09 DIAGNOSIS — Z85.3 PERSONAL HISTORY OF MALIGNANT NEOPLASM OF BREAST: ICD-10-CM

## 2019-02-09 DIAGNOSIS — H26.9 UNSPECIFIED CATARACT: Chronic | ICD-10-CM

## 2019-02-09 DIAGNOSIS — I25.10 ATHEROSCLEROTIC HEART DISEASE OF NATIVE CORONARY ARTERY WITHOUT ANGINA PECTORIS: ICD-10-CM

## 2019-02-09 DIAGNOSIS — R19.7 DIARRHEA, UNSPECIFIED: ICD-10-CM

## 2019-02-09 DIAGNOSIS — Z79.01 LONG TERM (CURRENT) USE OF ANTICOAGULANTS: ICD-10-CM

## 2019-02-09 DIAGNOSIS — I25.2 OLD MYOCARDIAL INFARCTION: ICD-10-CM

## 2019-02-09 DIAGNOSIS — R10.9 UNSPECIFIED ABDOMINAL PAIN: ICD-10-CM

## 2019-02-09 DIAGNOSIS — Z95.5 PRESENCE OF CORONARY ANGIOPLASTY IMPLANT AND GRAFT: Chronic | ICD-10-CM

## 2019-02-09 DIAGNOSIS — Z98.61 CORONARY ANGIOPLASTY STATUS: ICD-10-CM

## 2019-02-09 DIAGNOSIS — Z86.73 PERSONAL HISTORY OF TRANSIENT ISCHEMIC ATTACK (TIA), AND CEREBRAL INFARCTION WITHOUT RESIDUAL DEFICITS: ICD-10-CM

## 2019-02-09 DIAGNOSIS — F41.9 ANXIETY DISORDER, UNSPECIFIED: ICD-10-CM

## 2019-02-09 DIAGNOSIS — Z90.11 ACQUIRED ABSENCE OF RIGHT BREAST AND NIPPLE: Chronic | ICD-10-CM

## 2019-02-09 LAB
ALBUMIN SERPL ELPH-MCNC: 3 G/DL — LOW (ref 3.3–5)
ALP SERPL-CCNC: 54 U/L — SIGNIFICANT CHANGE UP (ref 40–120)
ALT FLD-CCNC: 16 U/L — SIGNIFICANT CHANGE UP (ref 12–78)
ANION GAP SERPL CALC-SCNC: 5 MMOL/L — SIGNIFICANT CHANGE UP (ref 5–17)
AST SERPL-CCNC: 18 U/L — SIGNIFICANT CHANGE UP (ref 15–37)
BASOPHILS # BLD AUTO: 0.03 K/UL — SIGNIFICANT CHANGE UP (ref 0–0.2)
BASOPHILS NFR BLD AUTO: 0.4 % — SIGNIFICANT CHANGE UP (ref 0–2)
BILIRUB SERPL-MCNC: 0.2 MG/DL — SIGNIFICANT CHANGE UP (ref 0.2–1.2)
BUN SERPL-MCNC: 13 MG/DL — SIGNIFICANT CHANGE UP (ref 7–23)
CALCIUM SERPL-MCNC: 7.6 MG/DL — LOW (ref 8.5–10.1)
CHLORIDE SERPL-SCNC: 103 MMOL/L — SIGNIFICANT CHANGE UP (ref 96–108)
CO2 SERPL-SCNC: 31 MMOL/L — SIGNIFICANT CHANGE UP (ref 22–31)
CREAT SERPL-MCNC: 0.84 MG/DL — SIGNIFICANT CHANGE UP (ref 0.5–1.3)
EOSINOPHIL # BLD AUTO: 0.25 K/UL — SIGNIFICANT CHANGE UP (ref 0–0.5)
EOSINOPHIL NFR BLD AUTO: 3.7 % — SIGNIFICANT CHANGE UP (ref 0–6)
GLUCOSE SERPL-MCNC: 94 MG/DL — SIGNIFICANT CHANGE UP (ref 70–99)
HCT VFR BLD CALC: 38.4 % — SIGNIFICANT CHANGE UP (ref 34.5–45)
HGB BLD-MCNC: 12.2 G/DL — SIGNIFICANT CHANGE UP (ref 11.5–15.5)
IMM GRANULOCYTES NFR BLD AUTO: 0.3 % — SIGNIFICANT CHANGE UP (ref 0–1.5)
LIDOCAIN IGE QN: 772 U/L — HIGH (ref 73–393)
LYMPHOCYTES # BLD AUTO: 1.87 K/UL — SIGNIFICANT CHANGE UP (ref 1–3.3)
LYMPHOCYTES # BLD AUTO: 27.4 % — SIGNIFICANT CHANGE UP (ref 13–44)
MAGNESIUM SERPL-MCNC: 1.4 MG/DL — LOW (ref 1.6–2.6)
MCHC RBC-ENTMCNC: 29.2 PG — SIGNIFICANT CHANGE UP (ref 27–34)
MCHC RBC-ENTMCNC: 31.8 GM/DL — LOW (ref 32–36)
MCV RBC AUTO: 91.9 FL — SIGNIFICANT CHANGE UP (ref 80–100)
MONOCYTES # BLD AUTO: 0.55 K/UL — SIGNIFICANT CHANGE UP (ref 0–0.9)
MONOCYTES NFR BLD AUTO: 8.1 % — SIGNIFICANT CHANGE UP (ref 2–14)
NEUTROPHILS # BLD AUTO: 4.11 K/UL — SIGNIFICANT CHANGE UP (ref 1.8–7.4)
NEUTROPHILS NFR BLD AUTO: 60.1 % — SIGNIFICANT CHANGE UP (ref 43–77)
NRBC # BLD: 0 /100 WBCS — SIGNIFICANT CHANGE UP (ref 0–0)
PLATELET # BLD AUTO: 297 K/UL — SIGNIFICANT CHANGE UP (ref 150–400)
POTASSIUM SERPL-MCNC: 3.2 MMOL/L — LOW (ref 3.5–5.3)
POTASSIUM SERPL-SCNC: 3.2 MMOL/L — LOW (ref 3.5–5.3)
PROT SERPL-MCNC: 7.3 GM/DL — SIGNIFICANT CHANGE UP (ref 6–8.3)
RBC # BLD: 4.18 M/UL — SIGNIFICANT CHANGE UP (ref 3.8–5.2)
RBC # FLD: 14.1 % — SIGNIFICANT CHANGE UP (ref 10.3–14.5)
SODIUM SERPL-SCNC: 139 MMOL/L — SIGNIFICANT CHANGE UP (ref 135–145)
WBC # BLD: 6.83 K/UL — SIGNIFICANT CHANGE UP (ref 3.8–10.5)
WBC # FLD AUTO: 6.83 K/UL — SIGNIFICANT CHANGE UP (ref 3.8–10.5)

## 2019-02-09 PROCEDURE — 99285 EMERGENCY DEPT VISIT HI MDM: CPT | Mod: 25

## 2019-02-09 RX ORDER — POTASSIUM CHLORIDE 20 MEQ
10 PACKET (EA) ORAL ONCE
Qty: 0 | Refills: 0 | Status: COMPLETED | OUTPATIENT
Start: 2019-02-09 | End: 2019-02-09

## 2019-02-09 RX ORDER — SODIUM CHLORIDE 9 MG/ML
1000 INJECTION INTRAMUSCULAR; INTRAVENOUS; SUBCUTANEOUS ONCE
Qty: 0 | Refills: 0 | Status: COMPLETED | OUTPATIENT
Start: 2019-02-09 | End: 2019-02-09

## 2019-02-09 RX ORDER — POTASSIUM CHLORIDE 20 MEQ
40 PACKET (EA) ORAL ONCE
Qty: 0 | Refills: 0 | Status: COMPLETED | OUTPATIENT
Start: 2019-02-09 | End: 2019-02-09

## 2019-02-09 RX ORDER — MAGNESIUM SULFATE 500 MG/ML
2 VIAL (ML) INJECTION ONCE
Qty: 0 | Refills: 0 | Status: COMPLETED | OUTPATIENT
Start: 2019-02-09 | End: 2019-02-09

## 2019-02-09 RX ADMIN — SODIUM CHLORIDE 2000 MILLILITER(S): 9 INJECTION INTRAMUSCULAR; INTRAVENOUS; SUBCUTANEOUS at 22:35

## 2019-02-09 RX ADMIN — Medication 100 MILLIEQUIVALENT(S): at 23:50

## 2019-02-09 NOTE — ED PROVIDER NOTE - ENMT, MLM
Airway patent, Nasal mucosa clear. + dry oral mucous membranes . Throat has no vesicles, no oropharyngeal exudates and uvula is midline.

## 2019-02-09 NOTE — ED ADULT NURSE NOTE - CHPI ED NUR SYMPTOMS NEG
no fever/no hematuria/no nausea/no abdominal distension/no vomiting/no dysuria/no chills/no burning urination

## 2019-02-09 NOTE — ED ADULT NURSE NOTE - OBJECTIVE STATEMENT
__ year old male/female with a past medical history of ___ on ___ presenting to the ED via EMS/walk-in triage for ___. Patient reports that symptoms are __/10,   Patient states  Negative: Syncope/LOC, fevers, chills, headache, dizziness, weakness, lethargy, vision changes, hearing changes, focal/lateralizing neurologic deficits, throat pain, chest pain, palpitations, shortness of breath, wheezing, abdominal pain, nausea, vomiting, constipation, diarrhea, urinary signs/symptoms, or edema.   Upon physical examination patient is A+Ox4/GCS 15 and conversive. PERRLA. S1S2 heard. Lung sounds clear. Abdomen soft/non-tender. FROM/CMS throughout all extremities. 68 year old female with a past medical history of CAD presenting to the ED via walk-in triage for diarrhea. Patient states that she has been having constant diarrhea for the past six days. Went to her PCP and was told to go to ER if it got worse. Patient describes the diarrhea as containing mucous, scant blood, and a liquid consistency. Patient reports decreased PO intake and the sensation of dehydration. Reports low grade fever of 100.3 at home yesterday.   Negative: Syncope/LOC, chills, headache, dizziness, weakness, lethargy, vision changes, hearing changes, focal/lateralizing neurologic deficits, throat pain, chest pain, palpitations, shortness of breath, wheezing, abdominal pain, nausea, vomiting, constipation, urinary signs/symptoms, or edema.   Upon physical examination patient is A+Ox4/GCS 15 and conversive. PERRLA. S1S2 heard. Lung sounds clear. Abdomen soft/non-tender. FROM/CMS throughout all extremities. Dry mucosal membranes noted.

## 2019-02-09 NOTE — ED PROVIDER NOTE - OBJECTIVE STATEMENT
pt presents with 5 days non bloody non biliou vomiting and diarrhea a/w abbdominal cramping after she ate a "bad soup" on monday . no blood in stool no recent abx . denies fever. denies HA or neck pain. no chest pain or sob. no overt abd pain.  no urinary f/u/d. no back pain. no motor or sensory deficits. denies illicit drug use. no recent travel. no rash. no other acute issues symptoms or concerns

## 2019-02-09 NOTE — ED PROVIDER NOTE - MEDICAL DECISION MAKING DETAILS
hydration electrolyte check reassess. feeling much better tolerating po fluids supprotive care advised return for intractable abd pain fever any overall worsening advised pcp f/u mondaty without fail

## 2019-02-09 NOTE — ED ADULT TRIAGE NOTE - CHIEF COMPLAINT QUOTE
Pt c/o abdominal cramping with +nausea, vomiting and diarrhea. Pt called her PCP Monday and was prescribed zofran PO which helped alleviate the nausea and vomiting. Pt still c/o diarrhea and abd cramping, states diarrhea is watery. Pt denies use of abx recently. No hx of C-diff. Pt states she's also feeling weak and has decreased PO intake.

## 2019-02-10 VITALS
SYSTOLIC BLOOD PRESSURE: 121 MMHG | HEART RATE: 71 BPM | DIASTOLIC BLOOD PRESSURE: 70 MMHG | OXYGEN SATURATION: 97 % | RESPIRATION RATE: 18 BRPM

## 2019-02-10 PROBLEM — K57.92 DIVERTICULITIS OF INTESTINE, PART UNSPECIFIED, WITHOUT PERFORATION OR ABSCESS WITHOUT BLEEDING: Chronic | Status: ACTIVE | Noted: 2018-12-23

## 2019-02-10 PROCEDURE — 74177 CT ABD & PELVIS W/CONTRAST: CPT | Mod: 26

## 2019-02-10 RX ADMIN — Medication 50 GRAM(S): at 00:08

## 2019-02-10 RX ADMIN — Medication 40 MILLIEQUIVALENT(S): at 00:10

## 2019-02-10 NOTE — ED ADULT NURSE REASSESSMENT NOTE - NS ED NURSE REASSESS COMMENT FT1
0000: Patient receiving ordered electrolyte replacement, awaiting CT scan. Patient had 1 BM with watery diarrhea. MD Jacobs updated, no sample ordered at this time. Patient and family updated on status and plan of care. No other change in patient's status or condition. Will continue to monitor/reassess.

## 2019-02-13 ENCOUNTER — APPOINTMENT (OUTPATIENT)
Dept: HEMATOLOGY ONCOLOGY | Facility: CLINIC | Age: 69
End: 2019-02-13
Payer: MEDICARE

## 2019-02-22 ENCOUNTER — APPOINTMENT (OUTPATIENT)
Dept: HEMATOLOGY ONCOLOGY | Facility: CLINIC | Age: 69
End: 2019-02-22
Payer: MEDICARE

## 2019-02-22 ENCOUNTER — LABORATORY RESULT (OUTPATIENT)
Age: 69
End: 2019-02-22

## 2019-02-22 ENCOUNTER — APPOINTMENT (OUTPATIENT)
Dept: INFUSION THERAPY | Facility: CLINIC | Age: 69
End: 2019-02-22

## 2019-02-22 VITALS
HEIGHT: 63 IN | SYSTOLIC BLOOD PRESSURE: 119 MMHG | HEART RATE: 80 BPM | WEIGHT: 177 LBS | BODY MASS INDEX: 31.36 KG/M2 | TEMPERATURE: 98.8 F | DIASTOLIC BLOOD PRESSURE: 79 MMHG

## 2019-02-22 LAB
HCT VFR BLD CALC: 37.6 %
HGB BLD-MCNC: 12 G/DL
MCHC RBC-ENTMCNC: 29 PG
MCHC RBC-ENTMCNC: 31.9 GM/DL
MCV RBC AUTO: 90.7 FL
PLATELET # BLD AUTO: 417 K/UL
RBC # BLD: 4.15 M/UL
RBC # FLD: 14.2 %
WBC # FLD AUTO: 5.1 K/UL

## 2019-02-22 PROCEDURE — 85025 COMPLETE CBC W/AUTO DIFF WBC: CPT

## 2019-02-22 PROCEDURE — 99215 OFFICE O/P EST HI 40 MIN: CPT | Mod: 25

## 2019-02-22 PROCEDURE — 96372 THER/PROPH/DIAG INJ SC/IM: CPT | Mod: 59

## 2019-02-22 PROCEDURE — 36415 COLL VENOUS BLD VENIPUNCTURE: CPT

## 2019-02-22 RX ORDER — OFLOXACIN OTIC 3 MG/ML
0.3 SOLUTION AURICULAR (OTIC) TWICE DAILY
Qty: 1 | Refills: 2 | Status: DISCONTINUED | COMMUNITY
Start: 2018-12-20 | End: 2019-02-22

## 2019-02-22 NOTE — HISTORY OF PRESENT ILLNESS
[Disease: _____________________] : Disease: [unfilled] [T: ___] : T[unfilled] [N: ___] : N[unfilled] [M: ___] : M[unfilled] [AJCC Stage: ____] : AJCC Stage: [unfilled] [de-identified] : Presented in Summer 2013 with an abnormal mammogram. In October 2013 she underwent right breast lumpectomy and sentinel LN biopsy, next she had re-excision and mastectomy for positive margins/ DCIS. She had stage I A disease. She started adjuvant Arimidex in March 2014, changed to Aromasin due to arthralgias.\par \par Bone density in 2017 showed osteopenia. Started Prolia in 2017. On Vit D and Calcium.  [de-identified] : ER 95 %  VT 10 %  HER 2 negative  [de-identified] : Oncotype Recurrence score 14 [de-identified] : HAd viral GE recently and stopped Aromasin at that time. Now eating normally but she has not restarted Aromasin  yet. She is finishing 5 years of AI adjuvant therapy and does not want to continue extended therapy. \par \par Overall feels well.

## 2019-02-22 NOTE — PHYSICAL EXAM
[Normal] : affect appropriate [de-identified] : looks well [de-identified] : s/p right mastectomy with implant , left breast - fibrotic scar periareolar no  masses , no axillary adenopathy

## 2019-02-22 NOTE — REVIEW OF SYSTEMS
[Patient Intake Form Reviewed] : Patient intake form was reviewed [Fatigue] : fatigue [Joint Stiffness] : joint stiffness [Negative] : Endocrine [Recent Change In Weight] : ~T no recent weight change [Hot Flashes] : no hot flashes

## 2019-02-22 NOTE — ASSESSMENT
[FreeTextEntry1] : 67 y/o woman with history of  right  breast cancer in 2013, s/p mastectomy, on adjuvant hormonal therapy since March 2014- Arimidex changed to Aromasin. She  was  tolerating Aromasin well. \par She completed 5 years  of adjuvant therapy.\par \par Long discussion- answered multiple questions re: benefits of adjuvant therapy, explained statistically minimal benefit of extended adjuvant therapy up to 7 years for low risk stage I patients. \par At this point she is not interested in continuing AI up to total 7 years. \par \par She will stop Aromasin.\par \par Continue Prolia. \par Bone density due August 2019. \par If bone density improved - might be able to stop Prolia next year.\par Return for Prolia in August.\par Follow up visit/ exam in one year.\par She also continues follow up with Dr Reddy. mammo/ sono due Oct 2019.

## 2019-02-25 LAB
ALBUMIN SERPL ELPH-MCNC: 4.2 G/DL
ALP BLD-CCNC: 59 U/L
ALT SERPL-CCNC: 15 U/L
ANION GAP SERPL CALC-SCNC: 16 MMOL/L
AST SERPL-CCNC: 17 U/L
BILIRUB SERPL-MCNC: 0.3 MG/DL
BUN SERPL-MCNC: 15 MG/DL
CALCIUM SERPL-MCNC: 9.3 MG/DL
CHLORIDE SERPL-SCNC: 103 MMOL/L
CO2 SERPL-SCNC: 25 MMOL/L
CREAT SERPL-MCNC: 0.54 MG/DL
GLUCOSE SERPL-MCNC: 110 MG/DL
POTASSIUM SERPL-SCNC: 3.6 MMOL/L
PROT SERPL-MCNC: 6.7 G/DL
SODIUM SERPL-SCNC: 144 MMOL/L

## 2019-03-28 ENCOUNTER — EMERGENCY (EMERGENCY)
Facility: HOSPITAL | Age: 69
LOS: 0 days | Discharge: HOME | End: 2019-03-28
Attending: EMERGENCY MEDICINE | Admitting: EMERGENCY MEDICINE
Payer: MEDICARE

## 2019-03-28 VITALS
OXYGEN SATURATION: 96 % | TEMPERATURE: 98 F | DIASTOLIC BLOOD PRESSURE: 79 MMHG | RESPIRATION RATE: 17 BRPM | SYSTOLIC BLOOD PRESSURE: 128 MMHG | HEART RATE: 66 BPM

## 2019-03-28 VITALS
SYSTOLIC BLOOD PRESSURE: 115 MMHG | WEIGHT: 169.98 LBS | TEMPERATURE: 97 F | HEART RATE: 62 BPM | HEIGHT: 63 IN | DIASTOLIC BLOOD PRESSURE: 74 MMHG | OXYGEN SATURATION: 97 % | RESPIRATION RATE: 16 BRPM

## 2019-03-28 DIAGNOSIS — Z90.11 ACQUIRED ABSENCE OF RIGHT BREAST AND NIPPLE: Chronic | ICD-10-CM

## 2019-03-28 DIAGNOSIS — F10.120 ALCOHOL ABUSE WITH INTOXICATION, UNCOMPLICATED: ICD-10-CM

## 2019-03-28 DIAGNOSIS — I25.10 ATHEROSCLEROTIC HEART DISEASE OF NATIVE CORONARY ARTERY WITHOUT ANGINA PECTORIS: ICD-10-CM

## 2019-03-28 DIAGNOSIS — Z79.82 LONG TERM (CURRENT) USE OF ASPIRIN: ICD-10-CM

## 2019-03-28 DIAGNOSIS — F43.23 ADJUSTMENT DISORDER WITH MIXED ANXIETY AND DEPRESSED MOOD: ICD-10-CM

## 2019-03-28 DIAGNOSIS — I25.2 OLD MYOCARDIAL INFARCTION: ICD-10-CM

## 2019-03-28 DIAGNOSIS — F32.9 MAJOR DEPRESSIVE DISORDER, SINGLE EPISODE, UNSPECIFIED: ICD-10-CM

## 2019-03-28 DIAGNOSIS — I10 ESSENTIAL (PRIMARY) HYPERTENSION: ICD-10-CM

## 2019-03-28 DIAGNOSIS — F41.9 ANXIETY DISORDER, UNSPECIFIED: ICD-10-CM

## 2019-03-28 DIAGNOSIS — H26.9 UNSPECIFIED CATARACT: Chronic | ICD-10-CM

## 2019-03-28 DIAGNOSIS — R69 ILLNESS, UNSPECIFIED: ICD-10-CM

## 2019-03-28 DIAGNOSIS — F10.929 ALCOHOL USE, UNSPECIFIED WITH INTOXICATION, UNSPECIFIED: ICD-10-CM

## 2019-03-28 DIAGNOSIS — Z95.5 PRESENCE OF CORONARY ANGIOPLASTY IMPLANT AND GRAFT: Chronic | ICD-10-CM

## 2019-03-28 LAB
ALBUMIN SERPL ELPH-MCNC: 3.4 G/DL — SIGNIFICANT CHANGE UP (ref 3.3–5)
ALP SERPL-CCNC: 66 U/L — SIGNIFICANT CHANGE UP (ref 40–120)
ALT FLD-CCNC: 19 U/L — SIGNIFICANT CHANGE UP (ref 12–78)
AMPHET UR-MCNC: NEGATIVE — SIGNIFICANT CHANGE UP
ANION GAP SERPL CALC-SCNC: 8 MMOL/L — SIGNIFICANT CHANGE UP (ref 5–17)
APAP SERPL-MCNC: 14 UG/ML — SIGNIFICANT CHANGE UP (ref 10–30)
APPEARANCE UR: CLEAR — SIGNIFICANT CHANGE UP
AST SERPL-CCNC: 23 U/L — SIGNIFICANT CHANGE UP (ref 15–37)
BACTERIA # UR AUTO: ABNORMAL
BARBITURATES UR SCN-MCNC: NEGATIVE — SIGNIFICANT CHANGE UP
BASOPHILS # BLD AUTO: 0.06 K/UL — SIGNIFICANT CHANGE UP (ref 0–0.2)
BASOPHILS NFR BLD AUTO: 0.5 % — SIGNIFICANT CHANGE UP (ref 0–2)
BENZODIAZ UR-MCNC: POSITIVE — SIGNIFICANT CHANGE UP
BILIRUB SERPL-MCNC: 0.1 MG/DL — LOW (ref 0.2–1.2)
BILIRUB UR-MCNC: NEGATIVE — SIGNIFICANT CHANGE UP
BUN SERPL-MCNC: 14 MG/DL — SIGNIFICANT CHANGE UP (ref 7–23)
CALCIUM SERPL-MCNC: 8.1 MG/DL — LOW (ref 8.5–10.1)
CHLORIDE SERPL-SCNC: 108 MMOL/L — SIGNIFICANT CHANGE UP (ref 96–108)
CO2 SERPL-SCNC: 24 MMOL/L — SIGNIFICANT CHANGE UP (ref 22–31)
COCAINE METAB.OTHER UR-MCNC: NEGATIVE — SIGNIFICANT CHANGE UP
COLOR SPEC: YELLOW — SIGNIFICANT CHANGE UP
COMMENT - URINE: SIGNIFICANT CHANGE UP
CREAT SERPL-MCNC: 0.55 MG/DL — SIGNIFICANT CHANGE UP (ref 0.5–1.3)
DIFF PNL FLD: ABNORMAL
EOSINOPHIL # BLD AUTO: 0.32 K/UL — SIGNIFICANT CHANGE UP (ref 0–0.5)
EOSINOPHIL NFR BLD AUTO: 2.8 % — SIGNIFICANT CHANGE UP (ref 0–6)
EPI CELLS # UR: SIGNIFICANT CHANGE UP
ETHANOL SERPL-MCNC: 182 MG/DL — HIGH (ref 0–10)
GLUCOSE SERPL-MCNC: 101 MG/DL — HIGH (ref 70–99)
GLUCOSE UR QL: NEGATIVE MG/DL — SIGNIFICANT CHANGE UP
HCT VFR BLD CALC: 35.9 % — SIGNIFICANT CHANGE UP (ref 34.5–45)
HGB BLD-MCNC: 11.2 G/DL — LOW (ref 11.5–15.5)
IMM GRANULOCYTES NFR BLD AUTO: 0.6 % — SIGNIFICANT CHANGE UP (ref 0–1.5)
KETONES UR-MCNC: NEGATIVE — SIGNIFICANT CHANGE UP
LEUKOCYTE ESTERASE UR-ACNC: ABNORMAL
LYMPHOCYTES # BLD AUTO: 1.64 K/UL — SIGNIFICANT CHANGE UP (ref 1–3.3)
LYMPHOCYTES # BLD AUTO: 14.4 % — SIGNIFICANT CHANGE UP (ref 13–44)
MCHC RBC-ENTMCNC: 28.9 PG — SIGNIFICANT CHANGE UP (ref 27–34)
MCHC RBC-ENTMCNC: 31.2 GM/DL — LOW (ref 32–36)
MCV RBC AUTO: 92.5 FL — SIGNIFICANT CHANGE UP (ref 80–100)
METHADONE UR-MCNC: NEGATIVE — SIGNIFICANT CHANGE UP
MONOCYTES # BLD AUTO: 0.63 K/UL — SIGNIFICANT CHANGE UP (ref 0–0.9)
MONOCYTES NFR BLD AUTO: 5.5 % — SIGNIFICANT CHANGE UP (ref 2–14)
NEUTROPHILS # BLD AUTO: 8.68 K/UL — HIGH (ref 1.8–7.4)
NEUTROPHILS NFR BLD AUTO: 76.2 % — SIGNIFICANT CHANGE UP (ref 43–77)
NITRITE UR-MCNC: NEGATIVE — SIGNIFICANT CHANGE UP
NRBC # BLD: 0 /100 WBCS — SIGNIFICANT CHANGE UP (ref 0–0)
OPIATES UR-MCNC: NEGATIVE — SIGNIFICANT CHANGE UP
PCP SPEC-MCNC: SIGNIFICANT CHANGE UP
PCP UR-MCNC: NEGATIVE — SIGNIFICANT CHANGE UP
PH UR: 5 — SIGNIFICANT CHANGE UP (ref 5–8)
PLATELET # BLD AUTO: 303 K/UL — SIGNIFICANT CHANGE UP (ref 150–400)
POTASSIUM SERPL-MCNC: 3.3 MMOL/L — LOW (ref 3.5–5.3)
POTASSIUM SERPL-SCNC: 3.3 MMOL/L — LOW (ref 3.5–5.3)
PROT SERPL-MCNC: 6.9 GM/DL — SIGNIFICANT CHANGE UP (ref 6–8.3)
PROT UR-MCNC: NEGATIVE MG/DL — SIGNIFICANT CHANGE UP
RBC # BLD: 3.88 M/UL — SIGNIFICANT CHANGE UP (ref 3.8–5.2)
RBC # FLD: 15.3 % — HIGH (ref 10.3–14.5)
RBC CASTS # UR COMP ASSIST: ABNORMAL /HPF (ref 0–4)
SALICYLATES SERPL-MCNC: <1.7 MG/DL — LOW (ref 2.8–20)
SODIUM SERPL-SCNC: 140 MMOL/L — SIGNIFICANT CHANGE UP (ref 135–145)
SP GR SPEC: 1.01 — SIGNIFICANT CHANGE UP (ref 1.01–1.02)
THC UR QL: NEGATIVE — SIGNIFICANT CHANGE UP
TSH SERPL-MCNC: 0.8 UU/ML — SIGNIFICANT CHANGE UP (ref 0.34–4.82)
UROBILINOGEN FLD QL: NEGATIVE MG/DL — SIGNIFICANT CHANGE UP
WBC # BLD: 11.4 K/UL — HIGH (ref 3.8–10.5)
WBC # FLD AUTO: 11.4 K/UL — HIGH (ref 3.8–10.5)
WBC UR QL: ABNORMAL

## 2019-03-28 PROCEDURE — 90792 PSYCH DIAG EVAL W/MED SRVCS: CPT

## 2019-03-28 PROCEDURE — 93010 ELECTROCARDIOGRAM REPORT: CPT

## 2019-03-28 PROCEDURE — 99284 EMERGENCY DEPT VISIT MOD MDM: CPT | Mod: 25

## 2019-03-28 NOTE — ED PROVIDER NOTE - CONSTITUTIONAL, MLM
normal... Well appearing, well nourished, awake, alert, oriented to person, place, time/situation and in no apparent distress.  tearful, rambling and intoxicated

## 2019-03-28 NOTE — ED PROVIDER NOTE - NSFOLLOWUPINSTRUCTIONS_ED_ALL_ED_FT
Follow-up as arranged by psychiatry    Alcohol Abuse    Alcohol intoxication occurs when the amount of alcohol that a person has consumed impairs his or her ability to mentally and physically function. Chronic alcohol consumption can also lead to a variety of health issues including neurological disease, stomach disease, heart disease, liver disease, etc. Do not drive after drinking alcohol. Drinking enough alcohol to end up in an Emergency Room suggests you may have an alcohol abuse problem. Seek help at a drug addiction center.    SEEK IMMEDIATE MEDICAL CARE IF YOU HAVE ANY OF THE FOLLOWING SYMPTOMS: seizures, vomiting blood, blood in your stool, lightheadedness/dizziness, or becoming shaky to tremulous when you stop drinking.     Depression    Depression is a mental illness that usually causes feelings of sadness, hopelessness, or helplessness. Some people with this disorder do not feel particularly sad but lose interest in doing things they used to enjoy. Major depressive disorder also can cause physical symptoms. It can interfere with work, school, relationships, and other normal everyday activities. If you were started on a medication, make sure to take exactly as prescribed and follow up with a psychiatrist.    SEEK IMMEDIATE MEDICAL CARE IF YOU HAVE ANY OF THE FOLLOWING SYMPTOMS: thoughts about hurting or killing yourself, thoughts about hurting or killing somebody else, hallucinations, or worsening depression.

## 2019-03-28 NOTE — ED PROVIDER NOTE - CARE PLAN
Principal Discharge DX:	Alcohol intoxication Principal Discharge DX:	Alcohol intoxication  Secondary Diagnosis:	Depression

## 2019-03-28 NOTE — ED BEHAVIORAL HEALTH ASSESSMENT NOTE - SUMMARY
70 yowmm, lives with  and 41 yo daughter who is  and an "alcoholic" and verbally abusive, no formal PPH, but reports "Anxiety" for which her cardiologist prescribed Xanax, Ambien for sleep and her medical meds, no h/o SA, SIB, violence or aggression, psych admissions or treatment, admits to increased alcohol use in past year, denies abuse, PMH Cardiac stents, HTN, TIA, bib E<S due to AMS and statement in ED, "I cant go on like this".  Pt denies acute psychiatric condition which would require inpt psych admission.  Denies SI/HI/AH and no evidence of luis carlos or psychosis.  Pt would benefit from substance abuse tx and follow up with psych provider for follow up assessment for depression and anxiety disorder.  Pt referred to SBIRT for referral to substance abuse tx.  Pt is not an imminent danger to self or others and is cleared psychiatrically for discharge,.

## 2019-03-28 NOTE — ED ADULT NURSE REASSESSMENT NOTE - DESCRIPTION
pt feeling stressed at home. pt states "daughter is an alcoholic and moved back home. my mother is guilty me about seeing her." pt states "I just wanted to leave reality." but denies HI/SI. 1:1 Olya at bedside. pt offered to toilet, denies. pt given food and water. awaiting for psych c/s will ctm

## 2019-03-28 NOTE — ED ADULT NURSE NOTE - OBJECTIVE STATEMENT
pt presents to ED with complaints of ETOH intoxication. pt states she took 1 ambian, 2 extra strength tylenol, drank 3 glasses of wine last night.  called 911 due to concern for overdose. pt tearful during exam by MD. pt states "I just want to escape." and "I am not trying to kill myself". denies SI and HI at this time.

## 2019-03-28 NOTE — ED ADULT NURSE REASSESSMENT NOTE - NS ED NURSE REASSESS COMMENT FT1
psych speaking with pt, pt cleared for d/c. education performed at bedside
psych c/s at bedside, pt comfortable, diet order in. will ctm

## 2019-03-28 NOTE — ED ADULT NURSE NOTE - NSIMPLEMENTINTERV_GEN_ALL_ED
Implemented All Fall Risk Interventions:  [place] to call system. Call bell, personal items and telephone within reach. Instruct patient to call for assistance. Room bathroom lighting operational. Non-slip footwear when patient is off stretcher. Physically safe environment: no spills, clutter or unnecessary equipment. Stretcher in lowest position, wheels locked, appropriate side rails in place. Provide visual cue, wrist band, yellow gown, etc. Monitor gait and stability. Monitor for mental status changes and reorient to person, place, and time. Review medications for side effects contributing to fall risk. Reinforce activity limits and safety measures with patient and family.

## 2019-03-28 NOTE — ED BEHAVIORAL HEALTH ASSESSMENT NOTE - DESCRIPTION
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

## 2019-03-28 NOTE — ED PROVIDER NOTE - OBJECTIVE STATEMENT
70 yo female with h/o HTN, CAD, MI, TIA, anxiety and depression biba.  Patient is intoxicated and a poor historian.  Patient rambling and illogical.  Reports "I can't live my life anymore"  and "I need an escape".  She is "overtired" and stressed over her daughter who is an alcoholic and her elderly mother.  She was fighting with her  and he called 911. Patient reports she took a xanax yesterday afternoon, none since.  She took an ambien last night, and then drank 3 glasses of red wine, followed by two tylenol tablets for 'arthritis' pain.  Patient denies being suicidal but also states she can't live her life any longer.

## 2019-03-28 NOTE — ED ADULT TRIAGE NOTE - CHIEF COMPLAINT QUOTE
As per EMS patient took her Ambien last night and was unable to sleep due to life stressors so she took another Ambien and drank some red wine.  concerned that patient took too much Ambien. Appears intoxicated at triage. VS stable. Denies any complaints. Denies SI or any intent to harm self.

## 2019-03-28 NOTE — ED BEHAVIORAL HEALTH ASSESSMENT NOTE - PERSONAL COLLATERAL RELATIONSHIP
Pt reports no safety concerns.  He states he called 911 because he thought she took too much Ambien

## 2019-03-28 NOTE — SBIRT NOTE. - NSSBIRTSERVICES_GEN_A_ED_FT
Provided SBIRT services: Full screen positive. Brief Intervention Performed. Screening results were reviewed with the patient and patient was provided information about healthy guidelines and potential negative consequences associated with level of risk. Motivation and readiness to reduce or stop use was discussed and goals and activities to make changes were suggested/offered.  Audit Score: 15  DAST Score: 0    Referral for complete assessment and level of care determination with [facility] program; 24/7 case management for treatment and recovery support; patient consented to participation and will be contacted by a [facility] Clinical  within the next 24 hours.

## 2019-03-28 NOTE — ED PROVIDER NOTE - CLINICAL SUMMARY MEDICAL DECISION MAKING FREE TEXT BOX
intoxicated, depressed, will medically clear, allow to sober, and [facility]

## 2019-03-28 NOTE — SBIRT NOTE. - NSSBIRTFULLSCREEN_GEN_A_ED_FT
Meeting with patient attempted however Full Screen Not Performed due to  ED Physician's Order: Consult - Psychiatry, Adult; Health  will wait for clearance from [facility] Psychiatry Team Cleared by Psych NP for SBIRT Service.

## 2019-03-28 NOTE — ED BEHAVIORAL HEALTH ASSESSMENT NOTE - RISK ASSESSMENT
min risk or self harm, no h/o self harm, no SI, in therapy, supportive family.  Risk factors, family stressors, substance abuse, maladaptive coping skills

## 2019-03-28 NOTE — ED BEHAVIORAL HEALTH ASSESSMENT NOTE - HPI (INCLUDE ILLNESS QUALITY, SEVERITY, DURATION, TIMING, CONTEXT, MODIFYING FACTORS, ASSOCIATED SIGNS AND SYMPTOMS)
70 yowmm, lives with  and 39 yo daughter who is  and an "alcoholic" and verbally abusive, no formal PPH, but reports "Anxiety" for which her cardiologist prescribed Xanax, Ambien for sleep and her medical meds, no h/o SA, SIB, violence or aggression, psych admissions or treatment, admits to increased alcohol use in past year, denies abuse, PMH Cardiac stents, HTN, TIA, bib E<S due to AMS and statement in ED, "I cant go on like this".  Pt denies SI or suicidal statement.  Admits to  SI in past, but never acted on it.  Pt lives with alcoholic daughter who just return from 3 months being away and has been verbally abusive.  Pt attends [facility] and wants daughter to take responsibility for drinking, however is managing her own anxiety with alcohol and Xanax.  Pt in therapy but does not think she is alcoholic, despite her increased intoxication.  Last night  called 911, as he reported he was worried she took too much Ambien.   denies safety concerns and supports Pt in that there is a lot of family discord at the moment, in addition to stress of Pt care taking her 97 yo mother(who has 24/7 aid).  Pt presents anxious and depressed but no acute symptoms reported.  Endorsed difficulty sleeping.  Pt denies SI/HI/AH/delusions.  Speech with mild dysarthria.   at 6am and tox pos Benzo.    Pt presented clinically sober. Well related, good eye contact, cooperative and agreeable to recommendations for substance abuse tx.

## 2019-03-28 NOTE — ED BEHAVIORAL HEALTH ASSESSMENT NOTE - SUICIDE PROTECTIVE FACTORS
Identifies reasons for living/Future oriented/Supportive social network or family/Positive therapeutic relationships/Responsibility to family and others

## 2019-06-19 ENCOUNTER — APPOINTMENT (OUTPATIENT)
Dept: DERMATOLOGY | Facility: CLINIC | Age: 69
End: 2019-06-19

## 2019-08-13 ENCOUNTER — FORM ENCOUNTER (OUTPATIENT)
Age: 69
End: 2019-08-13

## 2019-08-14 ENCOUNTER — APPOINTMENT (OUTPATIENT)
Dept: RADIOLOGY | Facility: CLINIC | Age: 69
End: 2019-08-14
Payer: MEDICARE

## 2019-08-14 ENCOUNTER — OUTPATIENT (OUTPATIENT)
Dept: OUTPATIENT SERVICES | Facility: HOSPITAL | Age: 69
LOS: 1 days | End: 2019-08-14
Payer: MEDICARE

## 2019-08-14 DIAGNOSIS — Z00.8 ENCOUNTER FOR OTHER GENERAL EXAMINATION: ICD-10-CM

## 2019-08-14 DIAGNOSIS — Z95.5 PRESENCE OF CORONARY ANGIOPLASTY IMPLANT AND GRAFT: Chronic | ICD-10-CM

## 2019-08-14 DIAGNOSIS — H26.9 UNSPECIFIED CATARACT: Chronic | ICD-10-CM

## 2019-08-14 DIAGNOSIS — Z90.11 ACQUIRED ABSENCE OF RIGHT BREAST AND NIPPLE: Chronic | ICD-10-CM

## 2019-08-14 PROCEDURE — 77080 DXA BONE DENSITY AXIAL: CPT

## 2019-08-14 PROCEDURE — 77080 DXA BONE DENSITY AXIAL: CPT | Mod: 26

## 2019-08-23 ENCOUNTER — OUTPATIENT (OUTPATIENT)
Dept: OUTPATIENT SERVICES | Facility: HOSPITAL | Age: 69
LOS: 1 days | End: 2019-08-23
Payer: MEDICARE

## 2019-08-23 ENCOUNTER — APPOINTMENT (OUTPATIENT)
Dept: INFUSION THERAPY | Facility: CLINIC | Age: 69
End: 2019-08-23

## 2019-08-23 VITALS — TEMPERATURE: 98 F | SYSTOLIC BLOOD PRESSURE: 133 MMHG | DIASTOLIC BLOOD PRESSURE: 81 MMHG | HEART RATE: 69 BPM

## 2019-08-23 DIAGNOSIS — Z95.5 PRESENCE OF CORONARY ANGIOPLASTY IMPLANT AND GRAFT: Chronic | ICD-10-CM

## 2019-08-23 DIAGNOSIS — H26.9 UNSPECIFIED CATARACT: Chronic | ICD-10-CM

## 2019-08-23 DIAGNOSIS — R69 ILLNESS, UNSPECIFIED: ICD-10-CM

## 2019-08-23 DIAGNOSIS — Z90.11 ACQUIRED ABSENCE OF RIGHT BREAST AND NIPPLE: Chronic | ICD-10-CM

## 2019-08-23 PROCEDURE — 96372 THER/PROPH/DIAG INJ SC/IM: CPT

## 2019-08-23 RX ORDER — DENOSUMAB 60 MG/ML
60 INJECTION SUBCUTANEOUS ONCE
Refills: 0 | Status: COMPLETED | OUTPATIENT
Start: 2019-08-23 | End: 2019-08-23

## 2019-08-23 RX ADMIN — DENOSUMAB 60 MILLIGRAM(S): 60 INJECTION SUBCUTANEOUS at 12:57

## 2019-08-24 DIAGNOSIS — R69 ILLNESS, UNSPECIFIED: ICD-10-CM

## 2019-08-26 DIAGNOSIS — Z79.811 LONG TERM (CURRENT) USE OF AROMATASE INHIBITORS: ICD-10-CM

## 2019-08-26 DIAGNOSIS — C50.911 MALIGNANT NEOPLASM OF UNSPECIFIED SITE OF RIGHT FEMALE BREAST: ICD-10-CM

## 2019-08-26 DIAGNOSIS — M85.80 OTHER SPECIFIED DISORDERS OF BONE DENSITY AND STRUCTURE, UNSPECIFIED SITE: ICD-10-CM

## 2019-09-18 ENCOUNTER — APPOINTMENT (OUTPATIENT)
Dept: DERMATOLOGY | Facility: CLINIC | Age: 69
End: 2019-09-18

## 2019-10-13 ENCOUNTER — EMERGENCY (EMERGENCY)
Facility: HOSPITAL | Age: 69
LOS: 0 days | Discharge: HOME | End: 2019-10-14
Attending: EMERGENCY MEDICINE
Payer: MEDICARE

## 2019-10-13 VITALS
TEMPERATURE: 99 F | SYSTOLIC BLOOD PRESSURE: 121 MMHG | HEART RATE: 68 BPM | OXYGEN SATURATION: 95 % | RESPIRATION RATE: 18 BRPM | DIASTOLIC BLOOD PRESSURE: 81 MMHG

## 2019-10-13 VITALS — WEIGHT: 169.98 LBS | HEIGHT: 63 IN

## 2019-10-13 DIAGNOSIS — Z95.5 PRESENCE OF CORONARY ANGIOPLASTY IMPLANT AND GRAFT: Chronic | ICD-10-CM
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
## 2019-10-13 DIAGNOSIS — Z86.73 PERSONAL HISTORY OF TRANSIENT ISCHEMIC ATTACK (TIA), AND CEREBRAL INFARCTION WITHOUT RESIDUAL DEFICITS: ICD-10-CM
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
## 2019-10-13 DIAGNOSIS — I25.2 OLD MYOCARDIAL INFARCTION: ICD-10-CM

## 2019-10-13 DIAGNOSIS — S30.861A INSECT BITE (NONVENOMOUS) OF ABDOMINAL WALL, INITIAL ENCOUNTER: ICD-10-CM

## 2019-10-13 DIAGNOSIS — Z79.01 LONG TERM (CURRENT) USE OF ANTICOAGULANTS: ICD-10-CM

## 2019-10-13 DIAGNOSIS — I25.10 ATHEROSCLEROTIC HEART DISEASE OF NATIVE CORONARY ARTERY WITHOUT ANGINA PECTORIS: ICD-10-CM

## 2019-10-13 DIAGNOSIS — I10 ESSENTIAL (PRIMARY) HYPERTENSION: ICD-10-CM

## 2019-10-13 DIAGNOSIS — Z79.82 LONG TERM (CURRENT) USE OF ASPIRIN: ICD-10-CM

## 2019-10-13 DIAGNOSIS — Z90.11 ACQUIRED ABSENCE OF RIGHT BREAST AND NIPPLE: Chronic | ICD-10-CM
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
## 2019-10-13 DIAGNOSIS — H26.9 UNSPECIFIED CATARACT: Chronic | ICD-10-CM

## 2019-10-13 PROCEDURE — 86753 PROTOZOA ANTIBODY NOS: CPT

## 2019-10-13 PROCEDURE — 86618 LYME DISEASE ANTIBODY: CPT

## 2019-10-13 PROCEDURE — 99283 EMERGENCY DEPT VISIT LOW MDM: CPT

## 2019-10-13 PROCEDURE — 86666 EHRLICHIA ANTIBODY: CPT | Mod: 59

## 2019-10-13 PROCEDURE — 36415 COLL VENOUS BLD VENIPUNCTURE: CPT

## 2019-10-13 NOTE — ED ADULT TRIAGE NOTE - CHIEF COMPLAINT QUOTE
tick bite to left hip. felt something earlier in day and realized it was a tick this evening. pulled out from skin 45 minutes ago

## 2019-10-14 RX ADMIN — Medication 200 MILLIGRAM(S): at 01:21

## 2019-10-14 NOTE — ED ADULT NURSE NOTE - NS ED NOTE ABUSE RESPONSE YN
----- Message from Zeina Jennings sent at 3/28/2019  8:27 AM CDT -----  Contact: Marissa- mother  Pt's mother is calling to speak with nurse in regards to pt coming in today to see doctor. Pt was seen on 03/25. Marissa states pt's symptoms are not improving. Pt is still running fever, coughing and has no appetite. Please call Marissa back at [phone].      Thanks,   Zeina Jennings     Yes

## 2019-10-14 NOTE — ED PROVIDER NOTE - PROGRESS NOTE DETAILS
Pt with engorged deer tick on for unknown period of time. Will give prophylactic dose of doxy and baseline lyme titer. Discussed s/sx that would prompt immediate return, otherwise will FU with PMD. -Siva Hinojosa PA-C

## 2019-10-14 NOTE — ED ADULT NURSE NOTE - PAIN RATING/NUMBER SCALE (0-10): ACTIVITY
No pain, no shortness of breath      VITAL:  T(C): , Max: 36.9 (10-19-17 @ 14:13)  T(F): , Max: 98.4 (10-19-17 @ 14:13)  HR: 91 (10-20-17 @ 08:45)  BP: 135/80 (10-20-17 @ 04:23)  BP(mean): --  RR: 22 (10-20-17 @ 04:23)  SpO2: 100% (10-20-17 @ 08:45)      PHYSICAL EXAM:  Constitutional: NAD, frail; lying flat on NCO2  HEENT: NCAT, DMM  Neck: Supple, No JVD  Respiratory: distant BS b/l  Cardiovascular: RRR s1s2, no m/r/g  Gastrointestinal: BS+, soft, NT/ND  Extremities: No peripheral edema b/l  Neurological: no focal deficits; strength grossly intact  Psychiatric: Normal mood, normal affect  Back: no CVAT b/l  Skin: No rashes, no nevi      LABS:                        8.8    5.55  )-----------( 189      ( 20 Oct 2017 08:24 )             29.6     Na(141)/K(3.9)/Cl(108)/HCO3(24)/BUN(46)/Cr(1.71)Glu(542)/Ca(7.9)/Mg(--)/PO4(--)    10-20 @ 08:28  Na(146)/K(4.0)/Cl(110)/HCO3(23)/BUN(51)/Cr(1.78)Glu(40)/Ca(7.6)/Mg(--)/PO4(--)    10-19 @ 09:33  Na(144)/K(5.1)/Cl(106)/HCO3(27)/BUN(50)/Cr(2.00)Glu(90)/Ca(8.8)/Mg(--)/PO4(--)    10-18 @ 07:39      Sodium, Random Urine: 36 mmol/L (10-19 @ 01:36)  Chloride, Random Urine: 74 mmol/L (10-19 @ 01:36)  Potassium, Random Urine: 49 mmol/L (10-19 @ 01:36)  Creatinine, Random Urine: 48 mg/dL (10-19 @ 01:36)        IMPRESSION: 85F w/ past breast and uterine CA, HTN, AFib, and AS-TAVR, 10/16/17 a/w hypercapnic respiratory failure/AMS, as well as NANCY     (1)Renal - 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 Prerenal, +/- component of ischemic ATN. Slowly resolving with gentle volume administration    (2)Hyperkalemia - improved    (3)Hypernatremia - improved    (4)Acid-base - primary respiratory acidosis - ?kyphoscoliosis/respiratory muscle weakness      RECOMMEND:  (1)Follow up renal ultrasound  (2)Follow up SPEP/immunofixation  (3)IVF + abx as ordered              Quinten Parker MD  [facility]  [phone] No pain, no shortness of breath      VITAL:  T(C): , Max: 36.9 (10-19-17 @ 14:13)  T(F): , Max: 98.4 (10-19-17 @ 14:13)  HR: 91 (10-20-17 @ 08:45)  BP: 135/80 (10-20-17 @ 04:23)  BP(mean): --  RR: 22 (10-20-17 @ 04:23)  SpO2: 100% (10-20-17 @ 08:45)      PHYSICAL EXAM:  Constitutional: NAD, frail; lying flat on NCO2  HEENT: NCAT, DMM  Neck: Supple, No JVD  Respiratory: distant BS b/l  Cardiovascular: RRR s1s2, no m/r/g  Gastrointestinal: BS+, soft, NT/ND  Extremities: No peripheral edema b/l  Neurological: no focal deficits; strength grossly intact  Psychiatric: Normal mood, normal affect  Back: no CVAT b/l  Skin: No rashes, no nevi      LABS:                        8.8    5.55  )-----------( 189      ( 20 Oct 2017 08:24 )             29.6     Na(141)/K(3.9)/Cl(108)/HCO3(24)/BUN(46)/Cr(1.71)Glu(542)/Ca(7.9)/Mg(--)/PO4(--)    10-20 @ 08:28  Na(146)/K(4.0)/Cl(110)/HCO3(23)/BUN(51)/Cr(1.78)Glu(40)/Ca(7.6)/Mg(--)/PO4(--)    10-19 @ 09:33  Na(144)/K(5.1)/Cl(106)/HCO3(27)/BUN(50)/Cr(2.00)Glu(90)/Ca(8.8)/Mg(--)/PO4(--)    10-18 @ 07:39      Sodium, Random Urine: 36 mmol/L (10-19 @ 01:36)  Chloride, Random Urine: 74 mmol/L (10-19 @ 01:36)  Potassium, Random Urine: 49 mmol/L (10-19 @ 01:36)  Creatinine, Random Urine: 48 mg/dL (10-19 @ 01:36)        IMPRESSION: 85F w/ past breast and uterine CA, HTN, AFib, and AS-TAVR, 10/16/17 a/w hypercapnic respiratory failure/AMS, as well as NANCY     (1)Renal - 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 Prerenal, +/- component of ischemic ATN. Slowly resolving with gentle volume administration    (2)Hyperkalemia - improved    (3)Hypernatremia - improved    (4)Acid-base - primary respiratory acidosis - ?kyphoscoliosis/respiratory muscle weakness. Pulmonary on board; on intermittent noninvasive ventilation      RECOMMEND:  (1)Follow up renal ultrasound  (2)Follow up SPEP/immunofixation  (3)IVF + abx as ordered  (4)NIV per Pulmonary            Quinten Parker MD  [facility]  [phone] No pain, no shortness of breath      VITAL:  T(C): , Max: 36.9 (10-19-17 @ 14:13)  T(F): , Max: 98.4 (10-19-17 @ 14:13)  HR: 91 (10-20-17 @ 08:45)  BP: 135/80 (10-20-17 @ 04:23)  BP(mean): --  RR: 22 (10-20-17 @ 04:23)  SpO2: 100% (10-20-17 @ 08:45)      PHYSICAL EXAM:  Constitutional: NAD, frail; lying flat on NCO2  HEENT: NCAT, DMM  Neck: Supple, No JVD  Respiratory: distant BS b/l  Cardiovascular: RRR s1s2, no m/r/g  Gastrointestinal: BS+, soft, NT/ND  Extremities: 2+ b/l LE edema  Neurological: no focal deficits; strength grossly intact  Psychiatric: Normal mood, normal affect  Back: no CVAT b/l  Skin: No rashes, no nevi      LABS:                        8.8    5.55  )-----------( 189      ( 20 Oct 2017 08:24 )             29.6     Na(141)/K(3.9)/Cl(108)/HCO3(24)/BUN(46)/Cr(1.71)Glu(542)/Ca(7.9)/Mg(--)/PO4(--)    10-20 @ 08:28  Na(146)/K(4.0)/Cl(110)/HCO3(23)/BUN(51)/Cr(1.78)Glu(40)/Ca(7.6)/Mg(--)/PO4(--)    10-19 @ 09:33  Na(144)/K(5.1)/Cl(106)/HCO3(27)/BUN(50)/Cr(2.00)Glu(90)/Ca(8.8)/Mg(--)/PO4(--)    10-18 @ 07:39      Sodium, Random Urine: 36 mmol/L (10-19 @ 01:36)  Chloride, Random Urine: 74 mmol/L (10-19 @ 01:36)  Potassium, Random Urine: 49 mmol/L (10-19 @ 01:36)  Creatinine, Random Urine: 48 mg/dL (10-19 @ 01:36)        IMPRESSION: 85F w/ past breast and uterine CA, HTN, AFib, and AS-TAVR, 10/16/17 a/w hypercapnic respiratory failure/AMS, as well as NANCY     (1)Renal - 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 Prerenal, +/- component of ischemic ATN. Slowly resolving with gentle volume administration    (2)Hyperkalemia - improved    (3)Hypernatremia - improved    (4)Acid-base - primary respiratory acidosis - ?kyphoscoliosis/respiratory muscle weakness. Pulmonary on board; on intermittent noninvasive ventilation      RECOMMEND:  (1)Follow up renal ultrasound  (2)Follow up SPEP/immunofixation  (3)IVF + abx as ordered  (4)NIV per Pulmonary  (5)Could d/c IVF at this point          Quinten Parker MD  [facility]  [phone]

## 2019-10-14 NOTE — ED PROVIDER NOTE - OBJECTIVE STATEMENT
68 y/o F presents with tickbite. Pt states this morning she felt something on the left side of her abdomen but didn't think anything of it, later in the evening she realized the area was larger. Her  saw it was a tick and removed it. Denies fever/chills, rashes, body aches, CP, SOB, or other complaints at this time. -Siva Hinojosa PA-C

## 2019-10-14 NOTE — ED ADULT NURSE NOTE - OBJECTIVE STATEMENT
Pt presents to ER c/o tick bite. Pt reports feeling tick on left hip yesterday morning and removed the tick yesterday after noon. 1cm red boarder around tick  bite site. Pt denies any complaints/fever/chills. AO x 3

## 2019-10-14 NOTE — ED PROVIDER NOTE - PHYSICAL EXAMINATION
Constitutional: NAD AAOx3  Eyes: PERRLA EOMI  Head: NCAT  Mouth: MMM  Cardiac: s1s2. rrr  Lungs: CTA B/L. No accessory muscle use  Abd: soft, nd. NTTP. no r/g/r  Neuro: CN2-12 intact  Skin: 1cm area of erythema to L lower abdomen. NTTP

## 2019-10-14 NOTE — ED PROVIDER NOTE - PATIENT PORTAL LINK FT
You can access the FollowMyHealth Patient Portal offered by [facility] by registering at the following website: [email or web address]. By joining [facility]’s FollowMyHealth portal, you will also be able to view your health information using other applications (apps) compatible with our system.

## 2019-10-14 NOTE — ED PROVIDER NOTE - ATTENDING CONTRIBUTION TO CARE
Pt seen and examined; agree with SAM Hinojosa regarding H/P and plan.  Pt first noticed the non-engroged tick this morning, engorged by tonight, pulled off at home.  Localized mild erythema, no erythema migrans.  Will send baseline tick panel and give doxy x 1.  Return and f/u d/w pt.

## 2019-10-15 ENCOUNTER — FORM ENCOUNTER (OUTPATIENT)
Age: 69
End: 2019-10-15

## 2019-10-16 ENCOUNTER — APPOINTMENT (OUTPATIENT)
Dept: ULTRASOUND IMAGING | Facility: CLINIC | Age: 69
End: 2019-10-16
Payer: MEDICARE

## 2019-10-16 ENCOUNTER — APPOINTMENT (OUTPATIENT)
Dept: MAMMOGRAPHY | Facility: CLINIC | Age: 69
End: 2019-10-16
Payer: MEDICARE

## 2019-10-16 ENCOUNTER — OUTPATIENT (OUTPATIENT)
Dept: OUTPATIENT SERVICES | Facility: HOSPITAL | Age: 69
LOS: 1 days | End: 2019-10-16
Payer: MEDICARE

## 2019-10-16 DIAGNOSIS — Z95.5 PRESENCE OF CORONARY ANGIOPLASTY IMPLANT AND GRAFT: Chronic | ICD-10-CM

## 2019-10-16 DIAGNOSIS — Z00.8 ENCOUNTER FOR OTHER GENERAL EXAMINATION: ICD-10-CM

## 2019-10-16 DIAGNOSIS — Z90.11 ACQUIRED ABSENCE OF RIGHT BREAST AND NIPPLE: Chronic | ICD-10-CM

## 2019-10-16 DIAGNOSIS — H26.9 UNSPECIFIED CATARACT: Chronic | ICD-10-CM

## 2019-10-16 LAB
A PHAGOCYTOPH IGG TITR SER IF: SIGNIFICANT CHANGE UP TITER
B BURGDOR AB SER QL IA: NEGATIVE — SIGNIFICANT CHANGE UP
B MICROTI IGG TITR SER: SIGNIFICANT CHANGE UP TITER
E CHAFFEENSIS IGG TITR SER IF: SIGNIFICANT CHANGE UP TITER

## 2019-10-16 PROCEDURE — 77063 BREAST TOMOSYNTHESIS BI: CPT | Mod: 26,52

## 2019-10-16 PROCEDURE — 77067 SCR MAMMO BI INCL CAD: CPT | Mod: 26,LT,52

## 2019-10-16 PROCEDURE — 77067 SCR MAMMO BI INCL CAD: CPT

## 2019-10-16 PROCEDURE — 76641 ULTRASOUND BREAST COMPLETE: CPT

## 2019-10-16 PROCEDURE — 76641 ULTRASOUND BREAST COMPLETE: CPT | Mod: 26,LT

## 2019-10-16 PROCEDURE — 77063 BREAST TOMOSYNTHESIS BI: CPT

## 2019-11-05 ENCOUNTER — APPOINTMENT (OUTPATIENT)
Dept: GASTROENTEROLOGY | Facility: CLINIC | Age: 69
End: 2019-11-05
Payer: MEDICARE

## 2019-11-05 VITALS
HEART RATE: 74 BPM | WEIGHT: 174 LBS | HEIGHT: 63 IN | BODY MASS INDEX: 30.83 KG/M2 | DIASTOLIC BLOOD PRESSURE: 87 MMHG | SYSTOLIC BLOOD PRESSURE: 129 MMHG

## 2019-11-05 DIAGNOSIS — K21.0 GASTRO-ESOPHAGEAL REFLUX DISEASE WITH ESOPHAGITIS: ICD-10-CM

## 2019-11-05 PROCEDURE — 99214 OFFICE O/P EST MOD 30 MIN: CPT

## 2019-11-05 PROCEDURE — 99204 OFFICE O/P NEW MOD 45 MIN: CPT

## 2019-11-06 PROBLEM — K21.0 REGURGITANT ESOPHAGITIS: Status: ACTIVE | Noted: 2019-11-06

## 2019-11-06 NOTE — REVIEW OF SYSTEMS
[Feeling Tired] : feeling tired [As Noted in HPI] : as noted in HPI [Anxiety] : anxiety [Negative] : Neurological

## 2019-11-06 NOTE — PHYSICAL EXAM
[General Appearance - Well-Appearing] : healthy appearing [Sclera] : the sclera and conjunctiva were normal [PERRL With Normal Accommodation] : pupils were equal in size, round, and reactive to light [Extraocular Movements] : extraocular movements were intact [Neck Appearance] : the appearance of the neck was normal [Neck Cervical Mass (___cm)] : no neck mass was observed [Jugular Venous Distention Increased] : there was no jugular-venous distention [Thyroid Diffuse Enlargement] : the thyroid was not enlarged [Thyroid Nodule] : there were no palpable thyroid nodules [] : no respiratory distress [Auscultation Breath Sounds / Voice Sounds] : lungs were clear to auscultation bilaterally [FreeTextEntry1] : Nonpitting ankle edema [Cervical Lymph Nodes Enlarged Posterior Bilaterally] : posterior cervical [Cervical Lymph Nodes Enlarged Anterior Bilaterally] : anterior cervical [Supraclavicular Lymph Nodes Enlarged Bilaterally] : supraclavicular [Axillary Lymph Nodes Enlarged Bilaterally] : axillary [Femoral Lymph Nodes Enlarged Bilaterally] : femoral [Inguinal Lymph Nodes Enlarged Bilaterally] : inguinal [No CVA Tenderness] : no ~M costovertebral angle tenderness [No Spinal Tenderness] : no spinal tenderness [Abnormal Walk] : normal gait [Nail Clubbing] : no clubbing  or cyanosis of the fingernails [Musculoskeletal - Swelling] : no joint swelling seen [Motor Tone] : muscle strength and tone were normal [Oriented To Time, Place, And Person] : oriented to person, place, and time

## 2019-11-06 NOTE — ASSESSMENT
[FreeTextEntry1] : Patient recently found to have new onset anemia and a occasional rectal bleeding associated with diarrhea normal colonoscopy to the CAT scan was performed which demonstrated a paraesophageal hernia large compressing on the long which likely explains her shortness of breath likely etiology on anticoagulation R. camera on erosions from the large hiatal hernia we'll schedule upper endoscopy to evaluate if negative consider a colonoscopy or sigmoidoscopy

## 2019-11-08 PROBLEM — J96.02 ACUTE HYPERCAPNIC RESPIRATORY FAILURE (HCC): Status: ACTIVE | Noted: 2019-11-08

## 2019-11-18 ENCOUNTER — APPOINTMENT (OUTPATIENT)
Dept: GASTROENTEROLOGY | Facility: AMBULATORY MEDICAL SERVICES | Age: 69
End: 2019-11-18
Payer: MEDICARE

## 2019-11-18 ENCOUNTER — RESULT REVIEW (OUTPATIENT)
Age: 69
End: 2019-11-18

## 2019-11-18 PROCEDURE — 45330 DIAGNOSTIC SIGMOIDOSCOPY: CPT

## 2019-11-18 PROCEDURE — 43239 EGD BIOPSY SINGLE/MULTIPLE: CPT

## 2019-12-13 ENCOUNTER — TRANSCRIPTION ENCOUNTER (OUTPATIENT)
Age: 69
End: 2019-12-13

## 2019-12-13 ENCOUNTER — APPOINTMENT (OUTPATIENT)
Dept: GASTROENTEROLOGY | Facility: CLINIC | Age: 69
End: 2019-12-13
Payer: MEDICARE

## 2019-12-13 VITALS
HEIGHT: 63 IN | BODY MASS INDEX: 31.18 KG/M2 | SYSTOLIC BLOOD PRESSURE: 133 MMHG | WEIGHT: 176 LBS | HEART RATE: 72 BPM | DIASTOLIC BLOOD PRESSURE: 89 MMHG

## 2019-12-13 DIAGNOSIS — K62.5 HEMORRHAGE OF ANUS AND RECTUM: ICD-10-CM

## 2019-12-13 PROCEDURE — 99214 OFFICE O/P EST MOD 30 MIN: CPT | Mod: 25

## 2019-12-13 PROCEDURE — 82274 ASSAY TEST FOR BLOOD FECAL: CPT | Mod: QW

## 2019-12-13 NOTE — ASSESSMENT
[FreeTextEntry1] : #1 large hiatal hernia with a paraesophageal component and camera on erosions continue PPI therapy b.i.d. as well as anti-reflux diet check blood work for persistent anemia prior to stopping the iron therapy

## 2019-12-13 NOTE — PHYSICAL EXAM
[General Appearance - Well-Appearing] : healthy appearing [PERRL With Normal Accommodation] : pupils were equal in size, round, and reactive to light [Sclera] : the sclera and conjunctiva were normal [Extraocular Movements] : extraocular movements were intact [Neck Cervical Mass (___cm)] : no neck mass was observed [Neck Appearance] : the appearance of the neck was normal [Thyroid Nodule] : there were no palpable thyroid nodules [Jugular Venous Distention Increased] : there was no jugular-venous distention [Thyroid Diffuse Enlargement] : the thyroid was not enlarged [Auscultation Breath Sounds / Voice Sounds] : lungs were clear to auscultation bilaterally [] : no respiratory distress [No Rectal Mass] : no rectal mass [Normal Sphincter Tone] : normal sphincter tone [Supraclavicular Lymph Nodes Enlarged Bilaterally] : supraclavicular [Cervical Lymph Nodes Enlarged Anterior Bilaterally] : anterior cervical [Cervical Lymph Nodes Enlarged Posterior Bilaterally] : posterior cervical [Inguinal Lymph Nodes Enlarged Bilaterally] : inguinal [Femoral Lymph Nodes Enlarged Bilaterally] : femoral [Axillary Lymph Nodes Enlarged Bilaterally] : axillary [No Spinal Tenderness] : no spinal tenderness [No CVA Tenderness] : no ~M costovertebral angle tenderness [Nail Clubbing] : no clubbing  or cyanosis of the fingernails [Abnormal Walk] : normal gait [Motor Tone] : muscle strength and tone were normal [Musculoskeletal - Swelling] : no joint swelling seen [Oriented To Time, Place, And Person] : oriented to person, place, and time [Occult Blood Positive] : stool was negative for occult blood [FreeTextEntry1] : Stools are black from iron Hemoccult-negative

## 2019-12-13 NOTE — HISTORY OF PRESENT ILLNESS
[FreeTextEntry1] : Recently the patient had an upper endoscopy which demonstrated a large hiatal hernia which had a\par \par Esophageal component probably compressing on the left lower lung. She was started on pantoprazole 40 b.i.d. as well as iron therapy for anemia likely secondary to bleeding from camera on erosions she has been feeling significantly better and has been avoiding eating close to that time except for one time after which he did not feel well she also has soft frequent stools which has been a component of her irritable bowel syndrome and has used Levsin p.r.n. fairly successfully

## 2019-12-18 ENCOUNTER — APPOINTMENT (OUTPATIENT)
Dept: BREAST CENTER | Facility: CLINIC | Age: 69
End: 2019-12-18
Payer: MEDICARE

## 2019-12-18 VITALS
BODY MASS INDEX: 31.4 KG/M2 | DIASTOLIC BLOOD PRESSURE: 80 MMHG | WEIGHT: 175 LBS | HEART RATE: 66 BPM | SYSTOLIC BLOOD PRESSURE: 130 MMHG | HEIGHT: 62.5 IN

## 2019-12-18 PROCEDURE — 99213 OFFICE O/P EST LOW 20 MIN: CPT

## 2019-12-18 RX ORDER — EXEMESTANE 25 MG/1
25 TABLET ORAL
Refills: 0 | Status: DISCONTINUED | COMMUNITY
End: 2019-12-18

## 2019-12-18 NOTE — HISTORY OF PRESENT ILLNESS
[FreeTextEntry1] : This is a 69 year old  female of [place], non-[place] ancestry who was diagnosed with right breast cancer in 9/2013.  She had a lumpectomy and sentinel node biopsy (3 SLN removed) at [facility]. The margin was close and she had a re-excision.  The margin was again close and she then went to Dr. Springer at [place].  She had a mastectomy with expander reconstruction. She wasn't satisfied with the appearance with the expander and she saw Dr. Osborn for another opinion.  She then had the expander exchanged with a reduction on the left side for symmetry in 10/2014.  That surgery was complicated by a hematoma on the right requiring reoperation.  She then had the right expander replaced with a left revision in January 2015. The permanent right implant was then inserted with alloderm and fat grafting in September 2015.\par \par She has chronic discomfort and tightness in the right reconstruction.  She finds it more comfortable to sleep in a sports bra.  (She would have liked to have a flap procedure, but her cardiologist did not feel it was safe).\par \par She continues on exemestane.\par \par

## 2019-12-18 NOTE — CONSULT LETTER
[Dear  ___] : Dear  [unfilled], [Courtesy Letter:] : I had the pleasure of seeing your patient, [unfilled], in my office today. [Sincerely,] : Sincerely, [DrBernie  ___] : Dr. JOAQUIN

## 2019-12-18 NOTE — DATA REVIEWED
[FreeTextEntry1] : Left mammogram and ultrasound 10/16/2019:  No mammographic or sonographic evidence of malignancy.\par \par Bilateral breast MRI 1/3/2017:  No MRI evidence of malignancy.\par \par

## 2019-12-18 NOTE — PHYSICAL EXAM
[Sclera nonicteric] : sclera nonicteric [Supple] : supple [No Supraclavicular Adenopathy] : no supraclavicular adenopathy [Clear to Auscultation Bilat] : clear to auscultation bilaterally [No Thyromegaly] : no thyromegaly [No Cervical Adenopathy] : no cervical adenopathy [Normal Sinus Rhythm] : normal sinus rhythm [Normal S1, S2] : normal S1 and S2 [No dominant masses] : no dominant masses in right breast  [Examined in the supine and seated position] : examined in the supine and seated position [No dominant masses] : no dominant masses left breast [No Nipple Retraction] : no left nipple retraction [No Nipple Discharge] : no left nipple discharge [No Axillary Lymphadenopathy] : no right axillary lymphadenopathy [Soft] : abdomen soft [No Palpable Masses] : no abdominal mass palpated [Not Tender] : non-tender [de-identified] : Implant.  Vertical scar 6:00 curved laterally. [de-identified] : Reduction pattern scars.

## 2019-12-18 NOTE — PAST MEDICAL HISTORY
[Menopause Age____] : age at menopause was [unfilled] [Menarche Age ____] : age at menarche was [unfilled] [Live Births ___] : P[unfilled]  [Total Preg ___] : G[unfilled] [Age At Live Birth ___] : Age at live birth: [unfilled] [FreeTextEntry7] : x 8 years

## 2020-01-02 ENCOUNTER — APPOINTMENT (OUTPATIENT)
Dept: DERMATOLOGY | Facility: CLINIC | Age: 70
End: 2020-01-02
Payer: MEDICARE

## 2020-01-02 DIAGNOSIS — L82.1 OTHER SEBORRHEIC KERATOSIS: ICD-10-CM

## 2020-01-02 PROCEDURE — ZZZZZ: CPT

## 2020-01-06 ENCOUNTER — RX RENEWAL (OUTPATIENT)
Age: 70
End: 2020-01-06

## 2020-01-06 DIAGNOSIS — K21.9 GASTRO-ESOPHAGEAL REFLUX DISEASE W/OUT ESOPHAGITIS: ICD-10-CM

## 2020-02-05 DIAGNOSIS — H61.21 IMPACTED CERUMEN, RIGHT EAR: ICD-10-CM

## 2020-02-05 DIAGNOSIS — H60.311 DIFFUSE OTITIS EXTERNA, RIGHT EAR: ICD-10-CM

## 2020-02-19 ENCOUNTER — OUTPATIENT (OUTPATIENT)
Dept: OUTPATIENT SERVICES | Facility: HOSPITAL | Age: 70
LOS: 1 days | Discharge: HOME | End: 2020-02-19

## 2020-02-19 DIAGNOSIS — H26.9 UNSPECIFIED CATARACT: Chronic | ICD-10-CM

## 2020-02-19 DIAGNOSIS — Z90.11 ACQUIRED ABSENCE OF RIGHT BREAST AND NIPPLE: Chronic | ICD-10-CM

## 2020-02-19 DIAGNOSIS — M85.80 OTHER SPECIFIED DISORDERS OF BONE DENSITY AND STRUCTURE, UNSPECIFIED SITE: ICD-10-CM

## 2020-02-19 DIAGNOSIS — Z95.5 PRESENCE OF CORONARY ANGIOPLASTY IMPLANT AND GRAFT: Chronic | ICD-10-CM

## 2020-02-24 ENCOUNTER — APPOINTMENT (OUTPATIENT)
Dept: INFUSION THERAPY | Facility: CLINIC | Age: 70
End: 2020-02-24

## 2020-02-24 ENCOUNTER — APPOINTMENT (OUTPATIENT)
Dept: HEMATOLOGY ONCOLOGY | Facility: CLINIC | Age: 70
End: 2020-02-24
Payer: MEDICARE

## 2020-02-24 VITALS
SYSTOLIC BLOOD PRESSURE: 150 MMHG | DIASTOLIC BLOOD PRESSURE: 93 MMHG | HEART RATE: 73 BPM | WEIGHT: 180 LBS | HEIGHT: 62 IN | BODY MASS INDEX: 33.13 KG/M2 | TEMPERATURE: 98 F

## 2020-02-24 PROCEDURE — 99214 OFFICE O/P EST MOD 30 MIN: CPT

## 2020-02-24 RX ORDER — OMEPRAZOLE 40 MG/1
40 CAPSULE, DELAYED RELEASE ORAL TWICE DAILY
Qty: 180 | Refills: 3 | Status: DISCONTINUED | COMMUNITY
Start: 2019-11-06 | End: 2020-02-24

## 2020-02-24 NOTE — REVIEW OF SYSTEMS
[Patient Intake Form Reviewed] : Patient intake form was reviewed [Negative] : Endocrine [Fatigue] : no fatigue [Hot Flashes] : no hot flashes [Recent Change In Weight] : ~T no recent weight change [Joint Stiffness] : no joint stiffness [FreeTextEntry2] : gained weight  [FreeTextEntry9] : Rt knee pains- had cortisol injection

## 2020-02-24 NOTE — HISTORY OF PRESENT ILLNESS
[Disease: _____________________] : Disease: [unfilled] [T: ___] : T[unfilled] [N: ___] : N[unfilled] [M: ___] : M[unfilled] [AJCC Stage: ____] : AJCC Stage: [unfilled] [de-identified] : Presented in Summer 2013 with an abnormal mammogram. In October 2013 she underwent right breast lumpectomy and sentinel LN biopsy, next she had re-excision and mastectomy for positive margins/ DCIS. She had stage I A disease. She started adjuvant Arimidex in March 2014, changed to Aromasin due to arthralgias.\par \par Bone density in 2017 showed osteopenia. Started Prolia in 2017. On Vit D and Calcium. \par \par Arimidex / changed to exemestane March 2014- Feb 2019. [de-identified] : ER 95 %  AR 10 %  HER 2 negative  [de-identified] : Oncotype Recurrence score 14 [de-identified] : Stopped exemestane one year ago.\par Bone density August 2019- improvement, mild osteopenia Lt fem neck.\par \par Mammo/ sono Oct 2019- OK.  Has Rx for MRI to eval Rt breast implant. \par \par Was very fatigued last year. Diagnosed with iron def anemia. EGD//colonoscopy ( Dr Nguyen)- hiatal hernia. omeprazole dose increased. On oral iron. Blood work Dec 2019- much better, anemia resolved, iron studies normal. Feels much better.

## 2020-02-24 NOTE — PHYSICAL EXAM
[Normal] : affect appropriate [de-identified] : looks well [de-identified] : s/p right mastectomy with implant , left breast - fibrotic scar periareolar no  masses , no axillary adenopathy

## 2020-02-24 NOTE — ASSESSMENT
[FreeTextEntry1] : 70 y/o woman with history of  stage I  A right  breast cancer in 2013, s/p mastectomy, s/p adjuvant hormonal therapy since March 2014- Feb 2019.\par Clinically KIZZY. Mammo/ sono Lt breast yearly.\par Continue clinical follow up with yearly exam/ PRN.\par Mild osteopenia- s/p Prolia, improved.\par \par Can stop Prolia. Exercise, weights, cont Vit D.\par Follow up bone density August 2021.\par \par Hx of SHARMAINE due to GI blleding ( ? gastritis/ hernia)- resolved. Monitored by GI / PCP.\par \par \par RV in one year/ sooner PRN.

## 2020-02-25 DIAGNOSIS — D50.8 OTHER IRON DEFICIENCY ANEMIAS: ICD-10-CM

## 2020-03-17 ENCOUNTER — APPOINTMENT (OUTPATIENT)
Dept: GASTROENTEROLOGY | Facility: CLINIC | Age: 70
End: 2020-03-17

## 2020-03-17 NOTE — ASSESSMENT
[FreeTextEntry1] : Phone conversation regarding large hiatal hernia and increased bowel movements, consistency or color of stools. Therefore, the use, Colace b.i.d., and she will make a followup in one month

## 2020-03-17 NOTE — HISTORY OF PRESENT ILLNESS
[FreeTextEntry1] : Patient with large hiatal hernia-induced anemia, secondary to roberto carlos erosions could not come in today due to her concerns of rotavirus, but she is asked to speak to me. She feels significantly better on pantoprazole b.i.d. for reflux symptoms. Her chest pain has resolved. She was given hyoscyamine in the event it were to recur. She has not had recent blood work that her CBC in December was normal

## 2020-04-14 ENCOUNTER — APPOINTMENT (OUTPATIENT)
Dept: GASTROENTEROLOGY | Facility: CLINIC | Age: 70
End: 2020-04-14

## 2020-05-20 ENCOUNTER — INPATIENT (INPATIENT)
Facility: HOSPITAL | Age: 70
LOS: 0 days | Discharge: LEFT BEFORE DISCHARGE | End: 2020-05-20
Attending: INTERNAL MEDICINE | Admitting: INTERNAL MEDICINE
Payer: MEDICARE

## 2020-05-20 VITALS
OXYGEN SATURATION: 91 % | HEART RATE: 92 BPM | RESPIRATION RATE: 18 BRPM | TEMPERATURE: 98 F | DIASTOLIC BLOOD PRESSURE: 82 MMHG | SYSTOLIC BLOOD PRESSURE: 142 MMHG

## 2020-05-20 VITALS — TEMPERATURE: 99 F | OXYGEN SATURATION: 92 % | HEART RATE: 91 BPM

## 2020-05-20 DIAGNOSIS — H26.9 UNSPECIFIED CATARACT: Chronic | ICD-10-CM

## 2020-05-20 DIAGNOSIS — Z90.11 ACQUIRED ABSENCE OF RIGHT BREAST AND NIPPLE: Chronic | ICD-10-CM

## 2020-05-20 DIAGNOSIS — R06.00 DYSPNEA, UNSPECIFIED: ICD-10-CM

## 2020-05-20 DIAGNOSIS — Z79.01 LONG TERM (CURRENT) USE OF ANTICOAGULANTS: ICD-10-CM

## 2020-05-20 DIAGNOSIS — Z95.5 PRESENCE OF CORONARY ANGIOPLASTY IMPLANT AND GRAFT: Chronic | ICD-10-CM

## 2020-05-20 DIAGNOSIS — Z20.828 CONTACT WITH AND (SUSPECTED) EXPOSURE TO OTHER VIRAL COMMUNICABLE DISEASES: ICD-10-CM

## 2020-05-20 LAB
ADD ON TEST-SPECIMEN IN LAB: SIGNIFICANT CHANGE UP
ADD ON TEST-SPECIMEN IN LAB: SIGNIFICANT CHANGE UP
ANION GAP SERPL CALC-SCNC: 7 MMOL/L — SIGNIFICANT CHANGE UP (ref 5–17)
BASOPHILS # BLD AUTO: 0.05 K/UL — SIGNIFICANT CHANGE UP (ref 0–0.2)
BASOPHILS NFR BLD AUTO: 0.5 % — SIGNIFICANT CHANGE UP (ref 0–2)
BUN SERPL-MCNC: 14 MG/DL — SIGNIFICANT CHANGE UP (ref 7–23)
CALCIUM SERPL-MCNC: 8.7 MG/DL — SIGNIFICANT CHANGE UP (ref 8.5–10.1)
CHLORIDE SERPL-SCNC: 102 MMOL/L — SIGNIFICANT CHANGE UP (ref 96–108)
CO2 SERPL-SCNC: 32 MMOL/L — HIGH (ref 22–31)
CREAT SERPL-MCNC: 0.54 MG/DL — SIGNIFICANT CHANGE UP (ref 0.5–1.3)
EOSINOPHIL # BLD AUTO: 0.3 K/UL — SIGNIFICANT CHANGE UP (ref 0–0.5)
EOSINOPHIL NFR BLD AUTO: 2.9 % — SIGNIFICANT CHANGE UP (ref 0–6)
GLUCOSE SERPL-MCNC: 107 MG/DL — HIGH (ref 70–99)
HCT VFR BLD CALC: 41 % — SIGNIFICANT CHANGE UP (ref 34.5–45)
HGB BLD-MCNC: 13.7 G/DL — SIGNIFICANT CHANGE UP (ref 11.5–15.5)
IMM GRANULOCYTES NFR BLD AUTO: 0.2 % — SIGNIFICANT CHANGE UP (ref 0–1.5)
LYMPHOCYTES # BLD AUTO: 1.23 K/UL — SIGNIFICANT CHANGE UP (ref 1–3.3)
LYMPHOCYTES # BLD AUTO: 11.9 % — LOW (ref 13–44)
MCHC RBC-ENTMCNC: 33.4 GM/DL — SIGNIFICANT CHANGE UP (ref 32–36)
MCHC RBC-ENTMCNC: 33.8 PG — SIGNIFICANT CHANGE UP (ref 27–34)
MCV RBC AUTO: 101.2 FL — HIGH (ref 80–100)
MONOCYTES # BLD AUTO: 0.61 K/UL — SIGNIFICANT CHANGE UP (ref 0–0.9)
MONOCYTES NFR BLD AUTO: 5.9 % — SIGNIFICANT CHANGE UP (ref 2–14)
NEUTROPHILS # BLD AUTO: 8.12 K/UL — HIGH (ref 1.8–7.4)
NEUTROPHILS NFR BLD AUTO: 78.6 % — HIGH (ref 43–77)
PLATELET # BLD AUTO: 269 K/UL — SIGNIFICANT CHANGE UP (ref 150–400)
POTASSIUM SERPL-MCNC: 3.6 MMOL/L — SIGNIFICANT CHANGE UP (ref 3.5–5.3)
POTASSIUM SERPL-SCNC: 3.6 MMOL/L — SIGNIFICANT CHANGE UP (ref 3.5–5.3)
RBC # BLD: 4.05 M/UL — SIGNIFICANT CHANGE UP (ref 3.8–5.2)
RBC # FLD: 12.4 % — SIGNIFICANT CHANGE UP (ref 10.3–14.5)
SODIUM SERPL-SCNC: 141 MMOL/L — SIGNIFICANT CHANGE UP (ref 135–145)
WBC # BLD: 10.33 K/UL — SIGNIFICANT CHANGE UP (ref 3.8–10.5)
WBC # FLD AUTO: 10.33 K/UL — SIGNIFICANT CHANGE UP (ref 3.8–10.5)

## 2020-05-20 PROCEDURE — 71045 X-RAY EXAM CHEST 1 VIEW: CPT | Mod: 26

## 2020-05-20 PROCEDURE — 93010 ELECTROCARDIOGRAM REPORT: CPT

## 2020-05-20 PROCEDURE — 71275 CT ANGIOGRAPHY CHEST: CPT | Mod: 26

## 2020-05-20 RX ORDER — EXEMESTANE 25 MG/1
1 TABLET, SUGAR COATED ORAL
Qty: 0 | Refills: 0 | DISCHARGE

## 2020-05-20 RX ORDER — DENOSUMAB 60 MG/ML
0 INJECTION SUBCUTANEOUS
Qty: 0 | Refills: 0 | DISCHARGE

## 2020-05-20 RX ORDER — SODIUM CHLORIDE 9 MG/ML
3 INJECTION INTRAMUSCULAR; INTRAVENOUS; SUBCUTANEOUS ONCE
Refills: 0 | Status: COMPLETED | OUTPATIENT
Start: 2020-05-20 | End: 2020-05-20

## 2020-05-20 RX ADMIN — SODIUM CHLORIDE 3 MILLILITER(S): 9 INJECTION INTRAMUSCULAR; INTRAVENOUS; SUBCUTANEOUS at 21:33

## 2020-05-20 NOTE — ED PROVIDER NOTE - PMH
Anemia    Anxiety    Breast cancer    Coronary artery disease, angina presence unspecified, unspecified vessel or lesion type, unspecified whether native or transplanted heart    Diverticulitis    HTN (hypertension)    Non-ST elevation (NSTEMI) myocardial infarction    Transient cerebral ischemia, unspecified type

## 2020-05-20 NOTE — ED ADULT TRIAGE NOTE - CHIEF COMPLAINT QUOTE
pt c/o sob , chills , anxiety since this am , temperature at home 100.1,  chronic anemia x 4 months , hx 4 stents on xarelto

## 2020-05-20 NOTE — ED ADULT NURSE REASSESSMENT NOTE - NS ED NURSE REASSESS COMMENT FT1
Pt requesting to go home, Monika VARGAS, pt will go home with pulse-ox and education given by provider.

## 2020-05-20 NOTE — ED ADULT NURSE REASSESSMENT NOTE - NS ED NURSE REASSESS COMMENT FT1
Pt declining t6o be admitted at this time,  and Monika barrett pa notified, education resources for COVID-19 given to pt with return verbal understanding of all tools given, pt understands education about decrease pulse-ox and risks associated with leaving and having low pulse-ox and still declines to stay, pt A&OX4 and ambulatory with steady gait, no observable respiratory compromise and/or difficulty observed at this time.

## 2020-05-20 NOTE — ED PROVIDER NOTE - PATIENT PORTAL LINK FT
You can access the FollowMyHealth Patient Portal offered by [facility] by registering at the following website: [email or web address]. By joining [employer]’s FollowMyHealth portal, you will also be able to view your health information using other applications (apps) compatible with our system.

## 2020-05-20 NOTE — ED PROVIDER NOTE - OBJECTIVE STATEMENT
71 y/o f with PMHx of CAD s/p stent, CVA, anemia, anxiety, breast CA s/p mastectomy, diverticulitis, HTN, NSTEMI, TIA presenting to the ED c/o APPLE, fevers for last few weeks. Intermittent temps to 100.1F. Denies cough, CP, runny nose. No known sick contacts. Sent to ER for COVID test today by PMD, Dr. Hogan.

## 2020-05-20 NOTE — ED PROVIDER NOTE - NSFOLLOWUPINSTRUCTIONS_ED_ALL_ED_FT
How to get your Coronavirus (COVID-19) Testing Results:   Please be advised that you were tested for the coronavirus (COVID-19) in the Emergency Department at [facility].  You are to maintain self-quarantine procedures for 14 days until instructed otherwise by one of our healthcare agents. Please note that the test may take up to 2-4 days to result.  If you do not hear from us within 72 hours and you'd like to check on your results, you can call on of our coronavirus specialists at [address] [place] [address] (available 24/7).  Please DO NOT call the site where you received the test to obtain your results.     Shortness of Breath    WHAT YOU NEED TO KNOW:    Shortness of breath is a feeling that you cannot get enough air when you breathe in. You may have this feeling only during activity, or all the time. Your symptoms can range from mild to severe. Shortness of breath may be a sign of a serious health condition that needs immediate care.    DISCHARGE INSTRUCTIONS:    Return to the emergency department if:     Your signs and symptoms are the same or worse within 24 hours of treatment.       The skin over your ribs or on your neck sinks in when you breathe.       You feel confused or dizzy.    Contact your healthcare provider if:     You have new or worsening symptoms.      You have questions or concerns about your condition or care.    Medicines:     Medicines may be used to treat the cause of your symptoms. You may need medicine to treat a bacterial infection or reduce anxiety. Other medicines may be used to open your airway, reduce swelling, or remove extra fluid. If you have a heart condition, you may need medicine to help your heart beat more strongly or regularly.      Take your medicine as directed. Contact your healthcare provider if you think your medicine is not helping or if you have side effects. Tell him or her if you are allergic to any medicine. Keep a list of the medicines, vitamins, and herbs you take. Include the amounts, and when and why you take them. Bring the list or the pill bottles to follow-up visits. Carry your medicine list with you in case of an emergency.    Manage shortness of breath:     Create an action plan. You and your healthcare provider can work together to create a plan for how to handle shortness of breath. The plan can include daily activities, treatment changes, and what to do if you have severe breathing problems.      Lean forward on your elbows when you sit. This helps your lungs expand and may make it easier to breathe.      Use pursed-lip breathing any time you feel short of breath. Breathe in through your nose and then slowly breathe out through your mouth with your lips slightly puckered. It should take you twice as long to breathe out as it did to breathe in.Breathe in Breathe out           Do not smoke. Nicotine and other chemicals in cigarettes and cigars can cause lung damage and make shortness of breath worse. Ask your healthcare provider for information if you currently smoke and need help to quit. E-cigarettes or smokeless tobacco still contain nicotine. Talk to your healthcare provider before you use these products.      Reach or maintain a healthy weight. Your healthcare provider can help you create a safe weight loss plan if you are overweight.      Exercise as directed. Exercise can help your lungs work more easily. Exercise can also help you lose weight if needed. Try to get at least 30 minutes of exercise most days of the week.    Follow up with your healthcare provider or specialist as directed: Write down your questions so you remember to ask them during your visits.

## 2020-05-20 NOTE — ED PROVIDER NOTE - ATTENDING CONTRIBUTION TO CARE
I, Susana Denton MD,  performed the initial face to face bedside interview with this patient regarding history of present illness, review of symptoms and relevant past medical, social and family history.  I completed an independent physical examination.  I was the initial provider who evaluated this patient. I have signed out the follow up of any pending tests (i.e. labs, radiological studies) to the ACP.  I have communicated the patient’s plan of care and disposition with the ACP.  The history, relevant review of systems, past medical and surgical history, medical decision making, and physical examination was documented by the scribe in my presence and I attest to the accuracy of the documentation.

## 2020-05-20 NOTE — ED ADULT NURSE REASSESSMENT NOTE - NS ED NURSE REASSESS COMMENT FT1
Assumed care of pt at this time from previous erasto weir, safety maintained, will continue to monitor.

## 2020-05-21 LAB — SARS-COV-2 RNA SPEC QL NAA+PROBE: SIGNIFICANT CHANGE UP

## 2020-05-23 PROBLEM — K92.2 GI BLEED: Status: ACTIVE | Noted: 2020-05-23

## 2020-05-26 DIAGNOSIS — Z11.59 ENCOUNTER FOR SCREENING FOR OTHER VIRAL DISEASES: ICD-10-CM

## 2020-05-26 DIAGNOSIS — R06.02 SHORTNESS OF BREATH: ICD-10-CM

## 2020-05-26 DIAGNOSIS — K57.90 DIVERTICULOSIS OF INTESTINE, PART UNSPECIFIED, WITHOUT PERFORATION OR ABSCESS WITHOUT BLEEDING: ICD-10-CM

## 2020-05-26 DIAGNOSIS — Z95.5 PRESENCE OF CORONARY ANGIOPLASTY IMPLANT AND GRAFT: ICD-10-CM

## 2020-05-26 DIAGNOSIS — I25.2 OLD MYOCARDIAL INFARCTION: ICD-10-CM

## 2020-05-26 DIAGNOSIS — D64.9 ANEMIA, UNSPECIFIED: ICD-10-CM

## 2020-05-26 DIAGNOSIS — I10 ESSENTIAL (PRIMARY) HYPERTENSION: ICD-10-CM

## 2020-05-26 DIAGNOSIS — Z90.10 ACQUIRED ABSENCE OF UNSPECIFIED BREAST AND NIPPLE: ICD-10-CM

## 2020-05-26 DIAGNOSIS — Z79.82 LONG TERM (CURRENT) USE OF ASPIRIN: ICD-10-CM

## 2020-05-26 DIAGNOSIS — I25.10 ATHEROSCLEROTIC HEART DISEASE OF NATIVE CORONARY ARTERY WITHOUT ANGINA PECTORIS: ICD-10-CM

## 2020-05-26 DIAGNOSIS — F41.9 ANXIETY DISORDER, UNSPECIFIED: ICD-10-CM

## 2020-05-26 DIAGNOSIS — Z86.73 PERSONAL HISTORY OF TRANSIENT ISCHEMIC ATTACK (TIA), AND CEREBRAL INFARCTION WITHOUT RESIDUAL DEFICITS: ICD-10-CM

## 2020-05-26 DIAGNOSIS — Z85.3 PERSONAL HISTORY OF MALIGNANT NEOPLASM OF BREAST: ICD-10-CM

## 2020-05-30 NOTE — ED ADULT TRIAGE NOTE - HEART RATE (BEATS/MIN)
62
[FreeTextEntry1] : Chest x-ray PA and lateral views performed in my office today showed several tiny lung nodules seen in bilateral lower lobes, no evidence of infiltrates or pleural effusions.\par

## 2020-06-02 PROBLEM — J44.1 COPD EXACERBATION (HCC): Status: ACTIVE | Noted: 2020-06-02

## 2020-06-02 PROBLEM — R09.02 HYPOXIA: Status: ACTIVE | Noted: 2020-06-02

## 2020-06-02 PROBLEM — I48.21 PERMANENT ATRIAL FIBRILLATION (HCC): Chronic | Status: ACTIVE | Noted: 2020-06-02

## 2020-06-02 PROBLEM — I50.9 CHF (CONGESTIVE HEART FAILURE) (HCC): Status: ACTIVE | Noted: 2020-06-02

## 2020-06-04 PROBLEM — D64.9 ANEMIA, UNSPECIFIED: Chronic | Status: ACTIVE | Noted: 2020-05-25

## 2020-07-01 ENCOUNTER — APPOINTMENT (OUTPATIENT)
Dept: INTERNAL MEDICINE | Facility: CLINIC | Age: 70
End: 2020-07-01
Payer: MEDICARE

## 2020-07-01 VITALS
HEART RATE: 87 BPM | OXYGEN SATURATION: 95 % | BODY MASS INDEX: 32.25 KG/M2 | DIASTOLIC BLOOD PRESSURE: 76 MMHG | SYSTOLIC BLOOD PRESSURE: 114 MMHG | TEMPERATURE: 98.8 F | HEIGHT: 63 IN | RESPIRATION RATE: 18 BRPM | WEIGHT: 182 LBS

## 2020-07-01 PROCEDURE — 99205 OFFICE O/P NEW HI 60 MIN: CPT

## 2020-07-01 NOTE — HISTORY OF PRESENT ILLNESS
[TextBox_4] : Mrs. Gillespie is a 70-year-old female who presents for initial pulmonary valuation complaining of shortness of breath for the past 2-3 years. Mrs. Gillespie states that starting approximately 2017 she developed shortness of breath with exertion. Prior to that she was able to be very active and walk her dogs at least 1 mile. She now cannot walk her dogs more than an eighth of a mile without becoming short of breath. Activities such as going up steps, going up an incline or carrying groceries, shortness of breath. Patient denies any nocturnal symptoms of cough or shortness of breath. There is no history of asthma or seasonal allergic rhinitis. Mrs. Gillespie has a remote smoking history. She went to [facility] on 5/20 because of shortness of breath. She was tested for the corona virus which was negative. CT scan of the chest shows multiple reticular nodular infiltrates and nodules predominantly in the left upper but also in the left lower lobe. The right lung is spared. She has no associated fevers or chills.

## 2020-07-01 NOTE — PHYSICAL EXAM
[No Acute Distress] : no acute distress [No Neck Mass] : no neck mass [Normal Oropharynx] : normal oropharynx [Normal Appearance] : normal appearance [No Murmurs] : no murmurs [Normal Rate/Rhythm] : normal rate/rhythm [No Resp Distress] : no resp distress [Normal S1, S2] : normal s1, s2 [No Abnormalities] : no abnormalities [Benign] : benign [No Clubbing] : no clubbing [Normal Gait] : normal gait [No Cyanosis] : no cyanosis [No Edema] : no edema [Normal Color/ Pigmentation] : normal color/ pigmentation [FROM] : FROM [No Focal Deficits] : no focal deficits [Normal Affect] : normal affect [Oriented x3] : oriented x3 [TextBox_68] : left midlung crackles

## 2020-07-01 NOTE — ASSESSMENT
[FreeTextEntry1] : Mrs. Gillespie is a 70-year-old female presents for initial pulmonary evaluation. She is complaining of progressive shortness of breath over the past 2-3 years. CT scan of the chest without contrast on 5/20 shows left-sided reticular nodular infiltrates. There is some atelectasis of the lingula. The right lung is spared. There is no significant adenopathy. These CAT scan findings seem to be more consistent with infection or inflammation. She does have a remote smoking history. Mrs. Gillespie will be put on Levaquin 500 milligrams daily x10 days. She will then wait approximately 2 weeks and repeat a CAT scan of chest. There is no significant change she may require fiberoptic bronchoscopy. Although the Mariella unilateral this could be Mycobacterium avium complex.

## 2020-07-01 NOTE — REASON FOR VISIT
[Initial] : an initial visit [Shortness of Breath] : shortness of breath [Abnormal CXR/ Chest CT] : an abnormal CXR/ chest CT

## 2020-09-28 ENCOUNTER — APPOINTMENT (OUTPATIENT)
Dept: INTERNAL MEDICINE | Facility: CLINIC | Age: 70
End: 2020-09-28

## 2020-11-07 ENCOUNTER — EMERGENCY (EMERGENCY)
Facility: HOSPITAL | Age: 70
LOS: 0 days | Discharge: HOME | End: 2020-11-07
Attending: EMERGENCY MEDICINE
Payer: MEDICARE

## 2020-11-07 VITALS
RESPIRATION RATE: 18 BRPM | OXYGEN SATURATION: 96 % | DIASTOLIC BLOOD PRESSURE: 75 MMHG | TEMPERATURE: 98 F | SYSTOLIC BLOOD PRESSURE: 133 MMHG | HEART RATE: 89 BPM

## 2020-11-07 VITALS
SYSTOLIC BLOOD PRESSURE: 120 MMHG | RESPIRATION RATE: 18 BRPM | HEART RATE: 89 BPM | HEIGHT: 64 IN | OXYGEN SATURATION: 93 % | TEMPERATURE: 98 F | DIASTOLIC BLOOD PRESSURE: 83 MMHG | WEIGHT: 184.97 LBS

## 2020-11-07 DIAGNOSIS — Z95.5 PRESENCE OF CORONARY ANGIOPLASTY IMPLANT AND GRAFT: Chronic | ICD-10-CM

## 2020-11-07 DIAGNOSIS — I10 ESSENTIAL (PRIMARY) HYPERTENSION: ICD-10-CM

## 2020-11-07 DIAGNOSIS — R51.9 HEADACHE, UNSPECIFIED: ICD-10-CM

## 2020-11-07 DIAGNOSIS — I25.10 ATHEROSCLEROTIC HEART DISEASE OF NATIVE CORONARY ARTERY WITHOUT ANGINA PECTORIS: ICD-10-CM

## 2020-11-07 DIAGNOSIS — R42 DIZZINESS AND GIDDINESS: ICD-10-CM

## 2020-11-07 DIAGNOSIS — R11.11 VOMITING WITHOUT NAUSEA: ICD-10-CM

## 2020-11-07 DIAGNOSIS — H26.9 UNSPECIFIED CATARACT: Chronic | ICD-10-CM

## 2020-11-07 DIAGNOSIS — Z86.73 PERSONAL HISTORY OF TRANSIENT ISCHEMIC ATTACK (TIA), AND CEREBRAL INFARCTION WITHOUT RESIDUAL DEFICITS: ICD-10-CM

## 2020-11-07 DIAGNOSIS — Z90.11 ACQUIRED ABSENCE OF RIGHT BREAST AND NIPPLE: Chronic | ICD-10-CM

## 2020-11-07 DIAGNOSIS — I25.2 OLD MYOCARDIAL INFARCTION: ICD-10-CM

## 2020-11-07 DIAGNOSIS — Z85.3 PERSONAL HISTORY OF MALIGNANT NEOPLASM OF BREAST: ICD-10-CM

## 2020-11-07 LAB
ALBUMIN SERPL ELPH-MCNC: 3.5 G/DL — SIGNIFICANT CHANGE UP (ref 3.3–5)
ALP SERPL-CCNC: 104 U/L — SIGNIFICANT CHANGE UP (ref 40–120)
ALT FLD-CCNC: 25 U/L — SIGNIFICANT CHANGE UP (ref 12–78)
ANION GAP SERPL CALC-SCNC: 10 MMOL/L — SIGNIFICANT CHANGE UP (ref 5–17)
AST SERPL-CCNC: 40 U/L — HIGH (ref 15–37)
BASOPHILS # BLD AUTO: 0.06 K/UL — SIGNIFICANT CHANGE UP (ref 0–0.2)
BASOPHILS NFR BLD AUTO: 0.5 % — SIGNIFICANT CHANGE UP (ref 0–2)
BILIRUB SERPL-MCNC: 0.5 MG/DL — SIGNIFICANT CHANGE UP (ref 0.2–1.2)
BUN SERPL-MCNC: 18 MG/DL — SIGNIFICANT CHANGE UP (ref 7–23)
CALCIUM SERPL-MCNC: 8.7 MG/DL — SIGNIFICANT CHANGE UP (ref 8.5–10.1)
CHLORIDE SERPL-SCNC: 107 MMOL/L — SIGNIFICANT CHANGE UP (ref 96–108)
CO2 SERPL-SCNC: 24 MMOL/L — SIGNIFICANT CHANGE UP (ref 22–31)
CREAT SERPL-MCNC: 0.53 MG/DL — SIGNIFICANT CHANGE UP (ref 0.5–1.3)
EOSINOPHIL # BLD AUTO: 0.06 K/UL — SIGNIFICANT CHANGE UP (ref 0–0.5)
EOSINOPHIL NFR BLD AUTO: 0.5 % — SIGNIFICANT CHANGE UP (ref 0–6)
GLUCOSE SERPL-MCNC: 110 MG/DL — HIGH (ref 70–99)
HCT VFR BLD CALC: 41.3 % — SIGNIFICANT CHANGE UP (ref 34.5–45)
HGB BLD-MCNC: 13.3 G/DL — SIGNIFICANT CHANGE UP (ref 11.5–15.5)
IMM GRANULOCYTES NFR BLD AUTO: 0.4 % — SIGNIFICANT CHANGE UP (ref 0–1.5)
LYMPHOCYTES # BLD AUTO: 0.98 K/UL — LOW (ref 1–3.3)
LYMPHOCYTES # BLD AUTO: 8.8 % — LOW (ref 13–44)
MCHC RBC-ENTMCNC: 32 PG — SIGNIFICANT CHANGE UP (ref 27–34)
MCHC RBC-ENTMCNC: 32.2 GM/DL — SIGNIFICANT CHANGE UP (ref 32–36)
MCV RBC AUTO: 99.3 FL — SIGNIFICANT CHANGE UP (ref 80–100)
MONOCYTES # BLD AUTO: 0.2 K/UL — SIGNIFICANT CHANGE UP (ref 0–0.9)
MONOCYTES NFR BLD AUTO: 1.8 % — LOW (ref 2–14)
NEUTROPHILS # BLD AUTO: 9.76 K/UL — HIGH (ref 1.8–7.4)
NEUTROPHILS NFR BLD AUTO: 88 % — HIGH (ref 43–77)
PLATELET # BLD AUTO: 311 K/UL — SIGNIFICANT CHANGE UP (ref 150–400)
POTASSIUM SERPL-MCNC: 3 MMOL/L — LOW (ref 3.5–5.3)
POTASSIUM SERPL-SCNC: 3 MMOL/L — LOW (ref 3.5–5.3)
PROT SERPL-MCNC: 7.3 GM/DL — SIGNIFICANT CHANGE UP (ref 6–8.3)
RBC # BLD: 4.16 M/UL — SIGNIFICANT CHANGE UP (ref 3.8–5.2)
RBC # FLD: 13.1 % — SIGNIFICANT CHANGE UP (ref 10.3–14.5)
SODIUM SERPL-SCNC: 141 MMOL/L — SIGNIFICANT CHANGE UP (ref 135–145)
TROPONIN I SERPL-MCNC: <0.015 NG/ML — SIGNIFICANT CHANGE UP (ref 0.01–0.04)
WBC # BLD: 11.1 K/UL — HIGH (ref 3.8–10.5)
WBC # FLD AUTO: 11.1 K/UL — HIGH (ref 3.8–10.5)

## 2020-11-07 PROCEDURE — U0003: CPT

## 2020-11-07 PROCEDURE — 96374 THER/PROPH/DIAG INJ IV PUSH: CPT

## 2020-11-07 PROCEDURE — 71045 X-RAY EXAM CHEST 1 VIEW: CPT

## 2020-11-07 PROCEDURE — 99284 EMERGENCY DEPT VISIT MOD MDM: CPT | Mod: 25

## 2020-11-07 PROCEDURE — 87040 BLOOD CULTURE FOR BACTERIA: CPT | Mod: 91

## 2020-11-07 PROCEDURE — 96361 HYDRATE IV INFUSION ADD-ON: CPT

## 2020-11-07 PROCEDURE — 36415 COLL VENOUS BLD VENIPUNCTURE: CPT

## 2020-11-07 PROCEDURE — 70450 CT HEAD/BRAIN W/O DYE: CPT

## 2020-11-07 PROCEDURE — 71045 X-RAY EXAM CHEST 1 VIEW: CPT | Mod: 26

## 2020-11-07 PROCEDURE — 80053 COMPREHEN METABOLIC PANEL: CPT

## 2020-11-07 PROCEDURE — 85025 COMPLETE CBC W/AUTO DIFF WBC: CPT

## 2020-11-07 PROCEDURE — 93010 ELECTROCARDIOGRAM REPORT: CPT

## 2020-11-07 PROCEDURE — 70450 CT HEAD/BRAIN W/O DYE: CPT | Mod: 26

## 2020-11-07 PROCEDURE — 99285 EMERGENCY DEPT VISIT HI MDM: CPT

## 2020-11-07 PROCEDURE — 93005 ELECTROCARDIOGRAM TRACING: CPT

## 2020-11-07 PROCEDURE — 84484 ASSAY OF TROPONIN QUANT: CPT

## 2020-11-07 RX ORDER — SODIUM CHLORIDE 9 MG/ML
1000 INJECTION INTRAMUSCULAR; INTRAVENOUS; SUBCUTANEOUS ONCE
Refills: 0 | Status: COMPLETED | OUTPATIENT
Start: 2020-11-07 | End: 2020-11-07

## 2020-11-07 RX ORDER — MECLIZINE HCL 12.5 MG
50 TABLET ORAL ONCE
Refills: 0 | Status: COMPLETED | OUTPATIENT
Start: 2020-11-07 | End: 2020-11-07

## 2020-11-07 RX ORDER — ONDANSETRON 8 MG/1
4 TABLET, FILM COATED ORAL ONCE
Refills: 0 | Status: COMPLETED | OUTPATIENT
Start: 2020-11-07 | End: 2020-11-07

## 2020-11-07 RX ORDER — POTASSIUM CHLORIDE 20 MEQ
40 PACKET (EA) ORAL ONCE
Refills: 0 | Status: COMPLETED | OUTPATIENT
Start: 2020-11-07 | End: 2020-11-07

## 2020-11-07 RX ADMIN — ONDANSETRON 4 MILLIGRAM(S): 8 TABLET, FILM COATED ORAL at 20:33

## 2020-11-07 RX ADMIN — SODIUM CHLORIDE 1000 MILLILITER(S): 9 INJECTION INTRAMUSCULAR; INTRAVENOUS; SUBCUTANEOUS at 20:25

## 2020-11-07 RX ADMIN — ONDANSETRON 4 MILLIGRAM(S): 8 TABLET, FILM COATED ORAL at 20:26

## 2020-11-07 RX ADMIN — SODIUM CHLORIDE 1000 MILLILITER(S): 9 INJECTION INTRAMUSCULAR; INTRAVENOUS; SUBCUTANEOUS at 21:25

## 2020-11-07 RX ADMIN — Medication 40 MILLIEQUIVALENT(S): at 22:39

## 2020-11-07 RX ADMIN — Medication 50 MILLIGRAM(S): at 21:09

## 2020-11-07 NOTE — ED ADULT NURSE NOTE - OBJECTIVE STATEMENT
pt brought in by ambulance c/o dizziness, SOB onset 1 hour ago. States she was tired after cleaning the kitchen, ate some pistachios and took a nap. Woke up to uncontrollable shaking. Pt states she feels like the room is spinning. Pt alert and oriented x4, anxious appearing. Pt denies fever/chills. Pt states she vomited 2x prior to arrival to ED. Cardiac monitoring in progress, EKG done, safety maintained

## 2020-11-07 NOTE — ED ADULT TRIAGE NOTE - CHIEF COMPLAINT QUOTE
Pt presents to ER c/o abd pain and vomiting. Onset of symptoms began one hour PTA. Pt reports she felt weakness in the morning

## 2020-11-07 NOTE — ED PROVIDER NOTE - PROGRESS NOTE DETAILS
pt feels much better. no longer pale. no longer nauseous or with vertigo. pt states exact same thing happened after eating pistachios approx 3 years ago. pt also states she has had a lot of anxiety lately and has had diarrhea as a result. has had low potassium before as a result of the diarrhea. pt given results of ct and labs. pt has no questions. pt to followup with pmd  after ambulation trial and pox check MD MATT

## 2020-11-07 NOTE — ED PROVIDER NOTE - OBJECTIVE STATEMENT
69 y/o female with PMHx of anemia, diverticulitis, breast CA, anxiety, HTN, N-STEMI, TIA, CAD s/p 2 stents presents to the ED BIBEMS from home c/o dizziness, SOB onset 1 hour ago. States she was tired after cleaning the kitchen, ate some pistachios and took a nap. Woke up to uncontrollable shaking. Associated dizziness, SOB. Had normal BM, no urinary complaints. States she tried to vomit but could not. Dizziness described as the room spinning, also c/o frontal HA and pain to the back of her neck. Took Tylenol at 5pm. History limited due to pt's vomiting, inability to speak. NKDA.

## 2020-11-07 NOTE — ED ADULT NURSE NOTE - NSIMPLEMENTINTERV_GEN_ALL_ED
Implemented All Fall with Harm Risk Interventions:  [place] to call system. Call bell, personal items and telephone within reach. Instruct patient to call for assistance. Room bathroom lighting operational. Non-slip footwear when patient is off stretcher. Physically safe environment: no spills, clutter or unnecessary equipment. Stretcher in lowest position, wheels locked, appropriate side rails in place. Provide visual cue, wrist band, yellow gown, etc. Monitor gait and stability. Monitor for mental status changes and reorient to person, place, and time. Review medications for side effects contributing to fall risk. Reinforce activity limits and safety measures with patient and family. Provide visual clues: red socks.

## 2020-11-07 NOTE — ED PROVIDER NOTE - PATIENT PORTAL LINK FT
You can access the FollowMyHealth Patient Portal offered by [facility] by registering at the following website: [email or web address]. By joining [employer]’s FollowMyHealth portal, you will also be able to view your health information using other applications (apps) compatible with our system.

## 2020-11-07 NOTE — ED PROVIDER NOTE - CHPI ED SYMPTOMS NEG
Blood pressures are well controlled.     Effexor refilled.     Screening labs today.     Colonoscopy ordered  - they will call with date/time of appointment.      Return in approximately 1 year, or sooner as needed for follow-up with Dr. Galo.    Clinic : [phone]  Appointment line: [phone]    
no fever

## 2020-11-07 NOTE — ED PROVIDER NOTE - CONSTITUTIONAL, MLM
normal... Well appearing, awake, alert, oriented to person, place, time/situation and in no apparent distress. pale and in moderate distress due to nausea and vertigo

## 2020-11-07 NOTE — ED ADULT NURSE NOTE - ALCOHOL PRE SCREEN (AUDIT - C)
----- Message from Juan David Gonzales MD sent at 1/22/2019  9:00 AM CST -----  Polyp was premalignant and completely resected. No need to repeat colonoscopy given age and comorbidities   Statement Selected

## 2020-11-08 LAB — SARS-COV-2 RNA SPEC QL NAA+PROBE: SIGNIFICANT CHANGE UP

## 2020-11-11 ENCOUNTER — APPOINTMENT (OUTPATIENT)
Dept: OTOLARYNGOLOGY | Facility: CLINIC | Age: 70
End: 2020-11-11
Payer: MEDICARE

## 2020-11-11 VITALS — BODY MASS INDEX: 32.78 KG/M2 | TEMPERATURE: 97.1 F | HEIGHT: 63 IN | WEIGHT: 185 LBS

## 2020-11-11 DIAGNOSIS — R42 DIZZINESS AND GIDDINESS: ICD-10-CM

## 2020-11-11 DIAGNOSIS — R26.89 OTHER ABNORMALITIES OF GAIT AND MOBILITY: ICD-10-CM

## 2020-11-11 PROCEDURE — 92557 COMPREHENSIVE HEARING TEST: CPT

## 2020-11-11 PROCEDURE — 99213 OFFICE O/P EST LOW 20 MIN: CPT | Mod: 25

## 2020-11-11 PROCEDURE — 92567 TYMPANOMETRY: CPT

## 2020-11-11 NOTE — REASON FOR VISIT
[Subsequent Evaluation] : a subsequent evaluation for [Vertigo] : vertigo [FreeTextEntry2] : dizziness

## 2020-11-11 NOTE — ASSESSMENT
[FreeTextEntry1] : exam unremarkable\par audio wnl  mild c/c tymps\par imbalance, recent single episode vertigo\par rec vng

## 2020-11-11 NOTE — PHYSICAL EXAM
[Midline] : trachea located in midline position [Normal] : no rashes [de-identified] : cerumen cleared ad [] : Romberg test is negative [de-identified] : head shake neg, gait steady

## 2020-11-13 LAB
CULTURE RESULTS: SIGNIFICANT CHANGE UP
CULTURE RESULTS: SIGNIFICANT CHANGE UP
SPECIMEN SOURCE: SIGNIFICANT CHANGE UP
SPECIMEN SOURCE: SIGNIFICANT CHANGE UP

## 2020-12-03 ENCOUNTER — OUTPATIENT (OUTPATIENT)
Dept: OUTPATIENT SERVICES | Facility: HOSPITAL | Age: 70
LOS: 1 days | End: 2020-12-03
Payer: MEDICARE

## 2020-12-03 DIAGNOSIS — H26.9 UNSPECIFIED CATARACT: Chronic | ICD-10-CM

## 2020-12-03 DIAGNOSIS — Z90.11 ACQUIRED ABSENCE OF RIGHT BREAST AND NIPPLE: Chronic | ICD-10-CM

## 2020-12-03 DIAGNOSIS — Z95.5 PRESENCE OF CORONARY ANGIOPLASTY IMPLANT AND GRAFT: Chronic | ICD-10-CM

## 2020-12-03 DIAGNOSIS — Z11.59 ENCOUNTER FOR SCREENING FOR OTHER VIRAL DISEASES: ICD-10-CM

## 2020-12-03 PROCEDURE — U0003: CPT

## 2020-12-04 ENCOUNTER — APPOINTMENT (OUTPATIENT)
Dept: OTOLARYNGOLOGY | Facility: CLINIC | Age: 70
End: 2020-12-04

## 2020-12-04 DIAGNOSIS — Z11.59 ENCOUNTER FOR SCREENING FOR OTHER VIRAL DISEASES: ICD-10-CM

## 2020-12-04 LAB — SARS-COV-2 RNA SPEC QL NAA+PROBE: SIGNIFICANT CHANGE UP

## 2020-12-06 PROBLEM — H25.012 CORTICAL AGE-RELATED CATARACT OF LEFT EYE: Status: ACTIVE | Noted: 2020-12-06

## 2020-12-07 PROBLEM — H25.012 CORTICAL AGE-RELATED CATARACT OF LEFT EYE: Status: RESOLVED | Noted: 2020-12-06 | Resolved: 2020-12-07

## 2020-12-09 ENCOUNTER — OUTPATIENT (OUTPATIENT)
Dept: OUTPATIENT SERVICES | Facility: HOSPITAL | Age: 70
LOS: 1 days | End: 2020-12-09
Payer: MEDICARE

## 2020-12-09 ENCOUNTER — RESULT REVIEW (OUTPATIENT)
Age: 70
End: 2020-12-09

## 2020-12-09 ENCOUNTER — APPOINTMENT (OUTPATIENT)
Dept: ULTRASOUND IMAGING | Facility: CLINIC | Age: 70
End: 2020-12-09
Payer: MEDICARE

## 2020-12-09 ENCOUNTER — APPOINTMENT (OUTPATIENT)
Dept: MAMMOGRAPHY | Facility: CLINIC | Age: 70
End: 2020-12-09
Payer: MEDICARE

## 2020-12-09 DIAGNOSIS — Z90.11 ACQUIRED ABSENCE OF RIGHT BREAST AND NIPPLE: Chronic | ICD-10-CM

## 2020-12-09 DIAGNOSIS — Z90.10 ACQUIRED ABSENCE OF UNSPECIFIED BREAST AND NIPPLE: ICD-10-CM

## 2020-12-09 DIAGNOSIS — Z98.890 OTHER SPECIFIED POSTPROCEDURAL STATES: ICD-10-CM

## 2020-12-09 DIAGNOSIS — Z95.5 PRESENCE OF CORONARY ANGIOPLASTY IMPLANT AND GRAFT: Chronic | ICD-10-CM

## 2020-12-09 DIAGNOSIS — H26.9 UNSPECIFIED CATARACT: Chronic | ICD-10-CM

## 2020-12-09 DIAGNOSIS — Z85.3 PERSONAL HISTORY OF MALIGNANT NEOPLASM OF BREAST: ICD-10-CM

## 2020-12-09 PROCEDURE — 77067 SCR MAMMO BI INCL CAD: CPT | Mod: 26,LT,52

## 2020-12-09 PROCEDURE — 77063 BREAST TOMOSYNTHESIS BI: CPT

## 2020-12-09 PROCEDURE — 77063 BREAST TOMOSYNTHESIS BI: CPT | Mod: 26,52

## 2020-12-09 PROCEDURE — 76641 ULTRASOUND BREAST COMPLETE: CPT | Mod: 26,LT

## 2020-12-09 PROCEDURE — 77067 SCR MAMMO BI INCL CAD: CPT

## 2020-12-09 PROCEDURE — 76641 ULTRASOUND BREAST COMPLETE: CPT

## 2021-02-05 ENCOUNTER — APPOINTMENT (OUTPATIENT)
Dept: OTOLARYNGOLOGY | Facility: CLINIC | Age: 71
End: 2021-02-05
Payer: MEDICARE

## 2021-02-05 PROCEDURE — 92567 TYMPANOMETRY: CPT

## 2021-02-05 PROCEDURE — 92540 BASIC VESTIBULAR EVALUATION: CPT

## 2021-02-05 PROCEDURE — 92537 CALORIC VSTBLR TEST W/REC: CPT

## 2021-02-08 PROBLEM — R77.8 ELEVATED TROPONIN: Status: ACTIVE | Noted: 2021-02-08

## 2021-02-08 PROBLEM — I50.30 DIASTOLIC CONGESTIVE HEART FAILURE (HCC): Status: ACTIVE | Noted: 2021-02-08

## 2021-02-08 PROBLEM — I50.9 ACUTE EXACERBATION OF CHF (CONGESTIVE HEART FAILURE) (HCC): Status: ACTIVE | Noted: 2021-02-08

## 2021-03-01 PROBLEM — R53.1 ACUTE RIGHT-SIDED WEAKNESS: Status: ACTIVE | Noted: 2021-03-01

## 2021-03-03 PROBLEM — U07.1 COVID-19: Status: ACTIVE | Noted: 2021-03-03

## 2021-03-08 ENCOUNTER — APPOINTMENT (OUTPATIENT)
Dept: HEMATOLOGY ONCOLOGY | Facility: CLINIC | Age: 71
End: 2021-03-08

## 2021-03-11 PROBLEM — U07.1 PNEUMONIA DUE TO COVID-19 VIRUS: Status: ACTIVE | Noted: 2021-03-11

## 2021-03-11 PROBLEM — I50.9 ACUTE CHF (CONGESTIVE HEART FAILURE) (HCC): Status: ACTIVE | Noted: 2021-03-11

## 2021-03-11 PROBLEM — J12.82 PNEUMONIA DUE TO COVID-19 VIRUS: Status: ACTIVE | Noted: 2021-03-11

## 2021-03-18 ENCOUNTER — APPOINTMENT (OUTPATIENT)
Dept: OTOLARYNGOLOGY | Facility: CLINIC | Age: 71
End: 2021-03-18

## 2021-03-23 ENCOUNTER — HOSPITAL ENCOUNTER (OUTPATIENT)
Dept: LAB | Age: 71
Discharge: HOME | End: 2021-03-23

## 2021-03-23 LAB
ANION GAP SERPL CALC-SCNC: 11 MMOL/L (ref 3–18)
BASOPHILS # BLD: 0 K/UL (ref 0–0.1)
BASOPHILS NFR BLD: 0 % (ref 0–2)
BUN SERPL-MCNC: 56 MG/DL (ref 7–18)
BUN/CREAT SERPL: 40 (ref 12–20)
CALCIUM SERPL-MCNC: 8.2 MG/DL (ref 8.5–10.1)
CHLORIDE SERPL-SCNC: 97 MMOL/L (ref 100–111)
CO2 SERPL-SCNC: 27 MMOL/L (ref 21–32)
CREAT SERPL-MCNC: 1.4 MG/DL (ref 0.6–1.3)
DIFFERENTIAL METHOD BLD: ABNORMAL
EOSINOPHIL # BLD: 0 K/UL (ref 0–0.4)
EOSINOPHIL NFR BLD: 0 % (ref 0–5)
ERYTHROCYTE [DISTWIDTH] IN BLOOD BY AUTOMATED COUNT: 16.2 % (ref 11.6–14.5)
GLUCOSE SERPL-MCNC: 260 MG/DL (ref 74–99)
HCT VFR BLD AUTO: 30.2 % (ref 35–45)
HGB BLD-MCNC: 9.6 G/DL (ref 12–16)
LYMPHOCYTES # BLD: 0.3 K/UL (ref 0.9–3.6)
LYMPHOCYTES NFR BLD: 3 % (ref 21–52)
MCH RBC QN AUTO: 26.4 PG (ref 24–34)
MCHC RBC AUTO-ENTMCNC: 31.8 G/DL (ref 31–37)
MCV RBC AUTO: 83.2 FL (ref 74–97)
MONOCYTES # BLD: 0.4 K/UL (ref 0.05–1.2)
MONOCYTES NFR BLD: 3 % (ref 3–10)
NEUTS SEG # BLD: 10 K/UL (ref 1.8–8)
NEUTS SEG NFR BLD: 94 % (ref 40–73)
PLATELET # BLD AUTO: 260 K/UL (ref 135–420)
PMV BLD AUTO: 9.9 FL (ref 9.2–11.8)
POTASSIUM SERPL-SCNC: 4.4 MMOL/L (ref 3.5–5.5)
RBC # BLD AUTO: 3.63 M/UL (ref 4.2–5.3)
SODIUM SERPL-SCNC: 135 MMOL/L (ref 136–145)
WBC # BLD AUTO: 10.7 K/UL (ref 4.6–13.2)

## 2021-03-23 PROCEDURE — 85025 COMPLETE CBC W/AUTO DIFF WBC: CPT

## 2021-03-23 PROCEDURE — 80048 BASIC METABOLIC PNL TOTAL CA: CPT

## 2021-04-15 PROBLEM — D62 ACUTE BLOOD LOSS ANEMIA: Status: ACTIVE | Noted: 2021-04-15

## 2021-05-01 ENCOUNTER — NON-APPOINTMENT (OUTPATIENT)
Age: 71
End: 2021-05-01

## 2021-05-04 ENCOUNTER — APPOINTMENT (OUTPATIENT)
Dept: GASTROENTEROLOGY | Facility: CLINIC | Age: 71
End: 2021-05-04
Payer: MEDICARE

## 2021-05-04 VITALS
HEART RATE: 85 BPM | BODY MASS INDEX: 31.71 KG/M2 | SYSTOLIC BLOOD PRESSURE: 131 MMHG | HEIGHT: 63 IN | WEIGHT: 179 LBS | DIASTOLIC BLOOD PRESSURE: 83 MMHG

## 2021-05-04 DIAGNOSIS — F41.9 ANXIETY DISORDER, UNSPECIFIED: ICD-10-CM

## 2021-05-04 PROCEDURE — 99214 OFFICE O/P EST MOD 30 MIN: CPT

## 2021-05-04 NOTE — PHYSICAL EXAM
[General Appearance - Well-Appearing] : healthy appearing [Sclera] : the sclera and conjunctiva were normal [PERRL With Normal Accommodation] : pupils were equal in size, round, and reactive to light [Extraocular Movements] : extraocular movements were intact [Neck Cervical Mass (___cm)] : no neck mass was observed [Neck Appearance] : the appearance of the neck was normal [Jugular Venous Distention Increased] : there was no jugular-venous distention [Thyroid Diffuse Enlargement] : the thyroid was not enlarged [Thyroid Nodule] : there were no palpable thyroid nodules [] : no respiratory distress [Auscultation Breath Sounds / Voice Sounds] : lungs were clear to auscultation bilaterally [Normal Sphincter Tone] : normal sphincter tone [No Rectal Mass] : no rectal mass [Occult Blood Positive] : stool was negative for occult blood [FreeTextEntry1] : Stools are black from iron Hemoccult-negative [Cervical Lymph Nodes Enlarged Posterior Bilaterally] : posterior cervical [Cervical Lymph Nodes Enlarged Anterior Bilaterally] : anterior cervical [Supraclavicular Lymph Nodes Enlarged Bilaterally] : supraclavicular [Axillary Lymph Nodes Enlarged Bilaterally] : axillary [Femoral Lymph Nodes Enlarged Bilaterally] : femoral [Inguinal Lymph Nodes Enlarged Bilaterally] : inguinal [No CVA Tenderness] : no ~M costovertebral angle tenderness [No Spinal Tenderness] : no spinal tenderness [Nail Clubbing] : no clubbing  or cyanosis of the fingernails [Abnormal Walk] : normal gait [Musculoskeletal - Swelling] : no joint swelling seen [Motor Tone] : muscle strength and tone were normal [Oriented To Time, Place, And Person] : oriented to person, place, and time

## 2021-05-04 NOTE — HISTORY OF PRESENT ILLNESS
[FreeTextEntry1] : She was doing quite well on Librax b.i.d., but ran out of it in January and since then has been having alternating constipation and diarrhea and anxiety as well as depression. She was able to restart it. Several days ago, and the symptoms have improved already

## 2021-05-12 ENCOUNTER — APPOINTMENT (OUTPATIENT)
Dept: BREAST CENTER | Facility: CLINIC | Age: 71
End: 2021-05-12
Payer: MEDICARE

## 2021-05-12 VITALS
HEIGHT: 62 IN | WEIGHT: 178 LBS | SYSTOLIC BLOOD PRESSURE: 120 MMHG | HEART RATE: 81 BPM | DIASTOLIC BLOOD PRESSURE: 76 MMHG | BODY MASS INDEX: 32.76 KG/M2

## 2021-05-12 PROCEDURE — 99213 OFFICE O/P EST LOW 20 MIN: CPT

## 2021-05-12 NOTE — HISTORY OF PRESENT ILLNESS
[FreeTextEntry1] : This is a 71 year old  female of [place],Pashto, non-Yazidism ancestry who was diagnosed with right breast cancer in 9/2013.  She had a lumpectomy and sentinel node biopsy (3 SLN removed) [place] [facility] The margin was close and she had a re-excision.  The margin was again close and she then went to Dr. Springer at [facility].  She had a mastectomy with expander reconstruction. She wasn't satisfied with the appearance with the expander and she saw Dr. Osborn for another opinion.  She then had the expander exchanged with a reduction on the left side for symmetry in 10/2014.  That surgery was complicated by a hematoma on the right requiring reoperation.  She then had the right expander replaced with a left revision in January 2015. The permanent right implant was then inserted with alloderm and fat grafting in September 2015.\par \par She has chronic discomfort and tightness in the right reconstruction.  She finds it more comfortable to sleep in a sports bra.  (She would have liked to have a flap procedure, but her cardiologist did not feel it was safe).\par \par \par She took exemestane until 2/2019.\par \par \par

## 2021-05-12 NOTE — PAST MEDICAL HISTORY
[Menarche Age ____] : age at menarche was [unfilled] [Menopause Age____] : age at menopause was [unfilled] [Total Preg ___] : G[unfilled] [Live Births ___] : P[unfilled]  [Age At Live Birth ___] : Age at live birth: [unfilled] [FreeTextEntry7] : x 8 years

## 2021-05-12 NOTE — PHYSICAL EXAM
[Sclera nonicteric] : sclera nonicteric [Supple] : supple [No Supraclavicular Adenopathy] : no supraclavicular adenopathy [No Cervical Adenopathy] : no cervical adenopathy [No Thyromegaly] : no thyromegaly [Clear to Auscultation Bilat] : clear to auscultation bilaterally [Normal Sinus Rhythm] : normal sinus rhythm [Normal S1, S2] : normal S1 and S2 [Examined in the supine and seated position] : examined in the supine and seated position [No dominant masses] : no dominant masses in right breast  [No dominant masses] : no dominant masses left breast [No Nipple Retraction] : no left nipple retraction [No Nipple Discharge] : no left nipple discharge [No Axillary Lymphadenopathy] : no left axillary lymphadenopathy [Soft] : abdomen soft [Not Tender] : non-tender [No Palpable Masses] : no abdominal mass palpated [de-identified] : Implant.  Vertical scar 6:00 curved laterally. [de-identified] : Reduction pattern scars.

## 2021-05-18 ENCOUNTER — OUTPATIENT (OUTPATIENT)
Dept: OUTPATIENT SERVICES | Facility: HOSPITAL | Age: 71
LOS: 1 days | Discharge: HOME | End: 2021-05-18

## 2021-05-18 DIAGNOSIS — H26.9 UNSPECIFIED CATARACT: Chronic | ICD-10-CM

## 2021-05-18 DIAGNOSIS — D50.9 IRON DEFICIENCY ANEMIA, UNSPECIFIED: ICD-10-CM

## 2021-05-18 DIAGNOSIS — Z95.5 PRESENCE OF CORONARY ANGIOPLASTY IMPLANT AND GRAFT: Chronic | ICD-10-CM

## 2021-05-18 DIAGNOSIS — M85.80 OTHER SPECIFIED DISORDERS OF BONE DENSITY AND STRUCTURE, UNSPECIFIED SITE: ICD-10-CM

## 2021-05-18 DIAGNOSIS — Z90.11 ACQUIRED ABSENCE OF RIGHT BREAST AND NIPPLE: Chronic | ICD-10-CM

## 2021-05-19 ENCOUNTER — APPOINTMENT (OUTPATIENT)
Dept: HEMATOLOGY ONCOLOGY | Facility: CLINIC | Age: 71
End: 2021-05-19
Payer: MEDICARE

## 2021-05-19 VITALS
TEMPERATURE: 97.5 F | BODY MASS INDEX: 31.83 KG/M2 | WEIGHT: 174 LBS | DIASTOLIC BLOOD PRESSURE: 77 MMHG | SYSTOLIC BLOOD PRESSURE: 121 MMHG

## 2021-05-19 DIAGNOSIS — M85.80 OTHER SPECIFIED DISORDERS OF BONE DENSITY AND STRUCTURE, UNSPECIFIED SITE: ICD-10-CM

## 2021-05-19 PROCEDURE — 99213 OFFICE O/P EST LOW 20 MIN: CPT

## 2021-05-19 NOTE — PHYSICAL EXAM
[Normal] : affect appropriate [de-identified] : looks well [de-identified] : s/p right mastectomy with implant , left breast - fibrotic scar periareolar no  masses , no axillary adenopathy

## 2021-05-19 NOTE — HISTORY OF PRESENT ILLNESS
[Disease: _____________________] : Disease: [unfilled] [T: ___] : T[unfilled] [N: ___] : N[unfilled] [M: ___] : M[unfilled] [AJCC Stage: ____] : AJCC Stage: [unfilled] [de-identified] : Presented in Summer 2013 with an abnormal mammogram. In October 2013 she underwent right breast lumpectomy and sentinel LN biopsy, next she had re-excision and mastectomy for positive margins/ DCIS. She had stage I A disease. She started adjuvant Arimidex in March 2014, changed to Aromasin due to arthralgias.\par \par Bone density in 2017 showed osteopenia. Started Prolia in 2017. On Vit D and Calcium. \par \par Arimidex / changed to exemestane March 2014- Feb 2019. Completed hormonal therapy Feb 2019  [de-identified] : ER 95 %  VT 10 %  HER 2 negative  [de-identified] : Oncotype Recurrence score 14 [de-identified] : L Mammo/ sono Dec 2020 OK.   Has Rx for MRI to eval Rt breast implant. \par \par feels well Has no complaints.

## 2021-05-19 NOTE — ASSESSMENT
[FreeTextEntry1] : 72 y/o woman with history of  stage I  A right  breast cancer in 2013, s/p mastectomy, s/p adjuvant hormonal therapy since March 2014- Feb 2019.\par \par Clinically [facility]. Mammo/ sono Lt breast yearly  due Dec 2021 .\par \par has Rx for MRI of R breast to evaluate implant.\par \par Continue clinical follow up with yearly exam/ PRN.\par Mild osteopenia- s/p Prolia, improved.\par Follow up bone density August 2021.\par Continue Vit D exercise. \par \par \par RV in one year  PRN.

## 2021-05-19 NOTE — REVIEW OF SYSTEMS
[Patient Intake Form Reviewed] : Patient intake form was reviewed [Fatigue] : no fatigue [Recent Change In Weight] : ~T no recent weight change [Joint Stiffness] : no joint stiffness [Hot Flashes] : no hot flashes [Negative] : Musculoskeletal

## 2021-05-20 DIAGNOSIS — Z85.3 PERSONAL HISTORY OF MALIGNANT NEOPLASM OF BREAST: ICD-10-CM

## 2021-05-26 ENCOUNTER — APPOINTMENT (OUTPATIENT)
Dept: OTOLARYNGOLOGY | Facility: CLINIC | Age: 71
End: 2021-05-26

## 2021-06-07 ENCOUNTER — APPOINTMENT (OUTPATIENT)
Dept: MRI IMAGING | Facility: CLINIC | Age: 71
End: 2021-06-07
Payer: MEDICARE

## 2021-06-07 ENCOUNTER — OUTPATIENT (OUTPATIENT)
Dept: OUTPATIENT SERVICES | Facility: HOSPITAL | Age: 71
LOS: 1 days | End: 2021-06-07
Payer: MEDICARE

## 2021-06-07 DIAGNOSIS — Z90.10 ACQUIRED ABSENCE OF UNSPECIFIED BREAST AND NIPPLE: ICD-10-CM

## 2021-06-07 DIAGNOSIS — Z90.11 ACQUIRED ABSENCE OF RIGHT BREAST AND NIPPLE: Chronic | ICD-10-CM

## 2021-06-07 DIAGNOSIS — Z95.5 PRESENCE OF CORONARY ANGIOPLASTY IMPLANT AND GRAFT: Chronic | ICD-10-CM

## 2021-06-07 DIAGNOSIS — Z85.3 PERSONAL HISTORY OF MALIGNANT NEOPLASM OF BREAST: ICD-10-CM

## 2021-06-07 DIAGNOSIS — H26.9 UNSPECIFIED CATARACT: Chronic | ICD-10-CM

## 2021-06-07 PROCEDURE — 77049 MRI BREAST C-+ W/CAD BI: CPT | Mod: 26,ME

## 2021-06-07 PROCEDURE — C8937: CPT

## 2021-06-07 PROCEDURE — G1004: CPT

## 2021-06-07 PROCEDURE — C8908: CPT

## 2021-06-12 NOTE — ED BEHAVIORAL HEALTH ASSESSMENT NOTE - PERCEPTIONS
OBSTETRICS TRIAGE ASSESSMENT NOTE  Lucinda Ji is a 28 y.o.  at 31w0d gestation based on LMP and dating ultrasound who has presented to maternity care via EMS for further evaluation of possible spontaneous rupture of membranes.    CC: Possible SROM    HPI: Patient states around noon today she felt leakage of fluid, she believes it was clear fluid and denies any bleeding. She states the fluid was running down her leg and she thought she was peeing. She states she was seen at clinic yesterday where it was difficult to find fetal heart rate however it was eventually found and was reassuring. Patient states she has felt fetal movement today. Her pregnancy has been complicated by hyperemesis gravidarum and persistent abdominal pain for which she has been evaluated in the ED or in OB triage at [facility]. Patient has a history of 2 prior spontaneous abortions.     PRENATAL CARE  Seen by Dr. Hand at [facility].    OB History        7    Para   1    Term   1            AB   5    Living   1       SAB   2    TAB   3    Ectopic        Multiple        Live Births   1                 PAST MEDICAL HISTORY  Past Medical History:   Diagnosis Date     Asthma      Bacterial vaginosis in pregnancy 2020     Gastritis      GBS bacteriuria 8/10/2020     Nausea & vomiting 2020     Obesity      Pregnancy 2020       PAST SURGICAL HISTORY   Past Surgical History:   Procedure Laterality Date     NO PAST SURGERIES  2020       MEDICATIONS    Current Facility-Administered Medications:      lactated Ringers, 0-500 mL/hr, Intravenous, Continuous, Hilaria Elliott MD    ALLERGIES:    SOCIAL HISTORY:   Social History     Socioeconomic History     Marital status: Single     Spouse name: Not on file     Number of children: Not on file     Years of education: Not on file     Highest education level: Not on file   Occupational History     Not on file   Social Needs     Financial resource strain:  "Not on file     Food insecurity     Worry: Not on file     Inability: Not on file     Transportation needs     Medical: Not on file     Non-medical: Not on file   Tobacco Use     Smoking status: Former Smoker     Smokeless tobacco: Never Used     Tobacco comment: last week 9-3-20   Substance and Sexual Activity     Alcohol use: Not Currently     Frequency: Never     Drug use: Not Currently     Types: Marijuana     Comment: States she stopped smoking marijuana 9/05/2020     Sexual activity: Not Currently     Partners: Male   Lifestyle     Physical activity     Days per week: Not on file     Minutes per session: Not on file     Stress: Not on file   Relationships     Social connections     Talks on phone: Not on file     Gets together: Not on file     Attends [employer] service: Not on file     Active member of club or organization: Not on file     Attends meetings of clubs or organizations: Not on file     Relationship status: Not on file     Intimate partner violence     Fear of current or ex partner: Not on file     Emotionally abused: Not on file     Physically abused: Not on file     Forced sexual activity: Not on file   Other Topics Concern     Not on file   Social History Narrative     Not on file       FAMILY HISTORY:    PHYSICAL EXAMINATION   /70 (Patient Position: Semi-hoffman, Cuff Size: Adult Regular)   Pulse 80   Resp 20   Ht 5' 6\" (1.676 m)   Wt (!) 258 lb (117 kg)   BMI 41.64 kg/m    Gen: appears comfortable in no acute distress  HEENT: Atraumatic, normocephalic, conjunctiva non-injected, sclera anicteric, moist mucosal membranes  Lungs: No increased work of breathing.  Abdomen: Gravid, non-tender  Cervix: closed  Extremities: no lower extremity edema    FETAL HEART MONITORING   Unable to attain fetal heart tones with external monitor. OB attending was called who assessed was ultrasound and heart activity was not seen. A formal ultrasound was done which confirmed no fetal heart activity. "     CONTRACTIONS  Patient endorses intermittent abdominal pain.     LAB RESULTS  Personally reviewed.  No results found for this or any previous visit (from the past 24 hour(s)).    ASSESSMENT/PLAN:   28 y.o.  at 31w0d gestation presenting to labor & delivery via EMS for possible spontaneous rupture of membranes.   1. Fetal Demise  Fetal heart tones were not able to be found with external monitor. OB was paged who came to bedside to assess with ultrasound and no heart activity was seen. A formal ultrasound was done which confirmed no fetal heart activity and confirmed fetal demise. Patient has history of previous spontaneous abortions  - ROM plus negative, although patient story concerning for SROM  - Patient would like a second opinion and wishes to go to the [facility] for a second ultrasound as she endorses feeling fetal movement  - Placed call to Labor and Delivery unit at [facility] and spoke to provider who said they will be able to see patient and would be expecting her  - Prior to patient leaving spoke to her about the risks of prolonging induction if she is ruptured, patient vocalized understanding of risk of infection, she still wished to leave to get a second opinion at [facility]     Ely Toro MD  [facility] Res[facility], PGY-1      Precepted patient with  Dr. Brennon Leija.     No abnormalities

## 2021-06-23 ENCOUNTER — APPOINTMENT (OUTPATIENT)
Dept: OTOLARYNGOLOGY | Facility: CLINIC | Age: 71
End: 2021-06-23
Payer: MEDICARE

## 2021-06-23 VITALS — HEIGHT: 63 IN | BODY MASS INDEX: 31.54 KG/M2 | WEIGHT: 178 LBS | TEMPERATURE: 97.3 F

## 2021-06-23 DIAGNOSIS — H69.83 OTHER SPECIFIED DISORDERS OF EUSTACHIAN TUBE, BILATERAL: ICD-10-CM

## 2021-06-23 DIAGNOSIS — H90.3 SENSORINEURAL HEARING LOSS, BILATERAL: ICD-10-CM

## 2021-06-23 PROCEDURE — 92567 TYMPANOMETRY: CPT

## 2021-06-23 PROCEDURE — 99213 OFFICE O/P EST LOW 20 MIN: CPT | Mod: 25

## 2021-06-23 PROCEDURE — G0268 REMOVAL OF IMPACTED WAX MD: CPT | Mod: RT

## 2021-06-23 PROCEDURE — 92557 COMPREHENSIVE HEARING TEST: CPT

## 2021-06-23 NOTE — PROCEDURE
[Cerumen Impaction] : Cerumen Impaction [FreeTextEntry6] : \par \par \par \par \par \par \par \par \par \par \par \par \par \par Large amount cerumen cleared right ear instrumentation with curettes, forceps and suction.\par Ear canals and tympanic membranes  unremarkable.\par \par dry flaking skin

## 2021-06-23 NOTE — ASSESSMENT
[FreeTextEntry1] : cerumen cleared ad\par ear eczema-mometasone ointment prn\par audio wnl\par eust tube dys\par trial fluticasone and levocitirizine

## 2021-06-23 NOTE — HISTORY OF PRESENT ILLNESS
[de-identified] : co fullness rt ear\par last rx\par continued rt ear pressure\par audio and tymp wnl 11/20\par co weeping fluid rt ear canal and dry skin

## 2021-06-25 NOTE — ED STATDOCS - CROS ED CONS ALL NEG
Final Anesthesia Post-op Assessment    Patient: Derrek Ramirez  Procedure(s) Performed: NASAL SEPTUM RECONSTRUCTION. BILATERAL SUBMUCOUS RESECTION OF TURBINATES. BILATERAL CLARIFIX CRYOTHERAPY  Anesthesia type: General    Vitals Value Taken Time   Temp 36 °C (96.8 °F) 06/25/21 1725   Pulse 90 06/25/21 1725   Resp 12 06/25/21 1725   SpO2 100 % 06/25/21 1725   /87 06/25/21 1725         Patient Location: Phase II  Post-op Vital Signs:stable  Level of Consciousness: awake, alert, participates in exam and oriented  Respiratory Status: spontaneous ventilation  Cardiovascular stable and blood pressure returned to baseline  Hydration: euvolemic  Pain Management: well controlled  Handoff: Handoff to receiving nurse was performed and questions were answered  Vomiting: none  Nausea: None  Airway Patency:patent  Post-op Assessment: awake, alert, appropriately conversant, or baseline, no complications, patient tolerated procedure well with no complications, no evidence of recall, dentition within defined limits, moving all extremities and No Corneal Abrasion  Comments: Ok to discharge home per criteria.        No complications documented.   
negative...

## 2021-08-16 ENCOUNTER — APPOINTMENT (OUTPATIENT)
Dept: RADIOLOGY | Facility: CLINIC | Age: 71
End: 2021-08-16
Payer: MEDICARE

## 2021-08-16 ENCOUNTER — OUTPATIENT (OUTPATIENT)
Dept: OUTPATIENT SERVICES | Facility: HOSPITAL | Age: 71
LOS: 1 days | End: 2021-08-16
Payer: MEDICARE

## 2021-08-16 DIAGNOSIS — Z90.11 ACQUIRED ABSENCE OF RIGHT BREAST AND NIPPLE: Chronic | ICD-10-CM

## 2021-08-16 DIAGNOSIS — Z95.5 PRESENCE OF CORONARY ANGIOPLASTY IMPLANT AND GRAFT: Chronic | ICD-10-CM

## 2021-08-16 DIAGNOSIS — M85.80 OTHER SPECIFIED DISORDERS OF BONE DENSITY AND STRUCTURE, UNSPECIFIED SITE: ICD-10-CM

## 2021-08-16 DIAGNOSIS — H26.9 UNSPECIFIED CATARACT: Chronic | ICD-10-CM

## 2021-08-16 PROCEDURE — 77085 DXA BONE DENSITY AXL VRT FX: CPT

## 2021-08-16 PROCEDURE — 77085 DXA BONE DENSITY AXL VRT FX: CPT | Mod: 26

## 2021-08-27 ENCOUNTER — APPOINTMENT (OUTPATIENT)
Dept: INTERNAL MEDICINE | Facility: CLINIC | Age: 71
End: 2021-08-27
Payer: MEDICARE

## 2021-08-27 VITALS
HEIGHT: 63 IN | BODY MASS INDEX: 31.89 KG/M2 | SYSTOLIC BLOOD PRESSURE: 120 MMHG | HEART RATE: 112 BPM | WEIGHT: 180 LBS | TEMPERATURE: 98.8 F | RESPIRATION RATE: 18 BRPM | OXYGEN SATURATION: 94 % | DIASTOLIC BLOOD PRESSURE: 80 MMHG

## 2021-08-27 DIAGNOSIS — R91.8 OTHER NONSPECIFIC ABNORMAL FINDING OF LUNG FIELD: ICD-10-CM

## 2021-08-27 PROCEDURE — 99214 OFFICE O/P EST MOD 30 MIN: CPT

## 2021-08-27 RX ORDER — LEVOCETIRIZINE DIHYDROCHLORIDE 5 MG/1
5 TABLET ORAL DAILY
Qty: 30 | Refills: 4 | Status: DISCONTINUED | COMMUNITY
Start: 2021-06-23 | End: 2021-08-27

## 2021-08-27 RX ORDER — FERROUS SULFATE 325(65) MG
325 (65 FE) TABLET ORAL
Refills: 0 | Status: DISCONTINUED | COMMUNITY
End: 2021-08-27

## 2021-08-27 RX ORDER — LEVOFLOXACIN 500 MG/1
500 TABLET, FILM COATED ORAL DAILY
Qty: 10 | Refills: 0 | Status: DISCONTINUED | COMMUNITY
Start: 2020-07-01 | End: 2021-08-27

## 2021-08-27 RX ORDER — MOMETASONE FUROATE 1 MG/G
0.1 OINTMENT TOPICAL
Qty: 1 | Refills: 2 | Status: DISCONTINUED | COMMUNITY
Start: 2021-06-23 | End: 2021-08-27

## 2021-08-27 RX ORDER — FLUTICASONE PROPIONATE 50 UG/1
50 SPRAY, METERED NASAL DAILY
Qty: 1 | Refills: 5 | Status: DISCONTINUED | COMMUNITY
Start: 2021-06-23 | End: 2021-08-27

## 2021-08-27 NOTE — HISTORY OF PRESENT ILLNESS
[TextBox_4] : Mrs. Gillespie presents for followup evaluation. She was initially seen in this office on July 1, 2020. At that time she was complaining of shortness of breath. A CT scan of the chest showed bilateral nodular infiltrates left greater than right. She was given a prescription for antibiotics and was posterior repeat CAT scan a prostitute 4 weeks later. Mrs. Gillespie did not followup after her initial visit. She now presents complaining of persistent shortness of breath with very minimal exertion. She states that activities such as walking upstairs, going up an incline or doing household chores cause shortness of breath. She had cardiac workup which was negative. Mrs. Gillespie does have a.m. 30-pack-year smoking history but stopped smoking 10 years ago. She has no hemoptysis, anorexia weight loss.

## 2021-08-27 NOTE — DISCUSSION/SUMMARY
[FreeTextEntry1] : Ms. iGllespie presents for a followup evaluation.\par \par #1. Patient has significant shortness of breath with minimal exertion. She be scheduled for complete coag function tests to be done as soon as possible.\par \par #2. Patient will have a repeat CT scan of his chest. Previous CT scan did show bilateral nodular infiltrates. There was no evidence of significant emphysema on CT scan however the patient without Pulmicort and has. She had some mild bronchiectatic changes. It is possible she may have Mycobacterium avium complex. The degree of her dyspnea is most likely related to cardiovascular deconditioning.\par \par #3. Followup in 6 weeks.

## 2021-09-07 ENCOUNTER — NON-APPOINTMENT (OUTPATIENT)
Age: 71
End: 2021-09-07

## 2021-09-10 ENCOUNTER — APPOINTMENT (OUTPATIENT)
Dept: INTERNAL MEDICINE | Facility: CLINIC | Age: 71
End: 2021-09-10
Payer: MEDICARE

## 2021-09-10 VITALS
BODY MASS INDEX: 31.4 KG/M2 | HEIGHT: 62.5 IN | SYSTOLIC BLOOD PRESSURE: 122 MMHG | OXYGEN SATURATION: 96 % | RESPIRATION RATE: 16 BRPM | DIASTOLIC BLOOD PRESSURE: 82 MMHG | WEIGHT: 175 LBS | HEART RATE: 85 BPM | TEMPERATURE: 98.2 F

## 2021-09-10 LAB — SARS-COV-2 N GENE NPH QL NAA+PROBE: NOT DETECTED

## 2021-09-10 PROCEDURE — 94729 DIFFUSING CAPACITY: CPT

## 2021-09-10 PROCEDURE — 94010 BREATHING CAPACITY TEST: CPT

## 2021-09-10 PROCEDURE — 94727 GAS DIL/WSHOT DETER LNG VOL: CPT

## 2021-09-13 ENCOUNTER — EMERGENCY (EMERGENCY)
Facility: HOSPITAL | Age: 71
LOS: 0 days | Discharge: HOME | End: 2021-09-14
Attending: EMERGENCY MEDICINE
Payer: MEDICARE

## 2021-09-13 VITALS
WEIGHT: 177.91 LBS | HEIGHT: 64 IN | DIASTOLIC BLOOD PRESSURE: 65 MMHG | SYSTOLIC BLOOD PRESSURE: 110 MMHG | TEMPERATURE: 99 F | RESPIRATION RATE: 24 BRPM | HEART RATE: 85 BPM | OXYGEN SATURATION: 92 %

## 2021-09-13 DIAGNOSIS — Z86.2 PERSONAL HISTORY OF DISEASES OF THE BLOOD AND BLOOD-FORMING ORGANS AND CERTAIN DISORDERS INVOLVING THE IMMUNE MECHANISM: ICD-10-CM

## 2021-09-13 DIAGNOSIS — Z95.5 PRESENCE OF CORONARY ANGIOPLASTY IMPLANT AND GRAFT: Chronic | ICD-10-CM

## 2021-09-13 DIAGNOSIS — Z95.5 PRESENCE OF CORONARY ANGIOPLASTY IMPLANT AND GRAFT: ICD-10-CM

## 2021-09-13 DIAGNOSIS — R07.89 OTHER CHEST PAIN: ICD-10-CM

## 2021-09-13 DIAGNOSIS — Z79.01 LONG TERM (CURRENT) USE OF ANTICOAGULANTS: ICD-10-CM

## 2021-09-13 DIAGNOSIS — R53.1 WEAKNESS: ICD-10-CM

## 2021-09-13 DIAGNOSIS — R06.02 SHORTNESS OF BREATH: ICD-10-CM

## 2021-09-13 DIAGNOSIS — Z79.899 OTHER LONG TERM (CURRENT) DRUG THERAPY: ICD-10-CM

## 2021-09-13 DIAGNOSIS — I25.10 ATHEROSCLEROTIC HEART DISEASE OF NATIVE CORONARY ARTERY WITHOUT ANGINA PECTORIS: ICD-10-CM

## 2021-09-13 DIAGNOSIS — Z86.73 PERSONAL HISTORY OF TRANSIENT ISCHEMIC ATTACK (TIA), AND CEREBRAL INFARCTION WITHOUT RESIDUAL DEFICITS: ICD-10-CM

## 2021-09-13 DIAGNOSIS — Z98.41 CATARACT EXTRACTION STATUS, RIGHT EYE: ICD-10-CM

## 2021-09-13 DIAGNOSIS — I10 ESSENTIAL (PRIMARY) HYPERTENSION: ICD-10-CM

## 2021-09-13 DIAGNOSIS — Z98.42 CATARACT EXTRACTION STATUS, LEFT EYE: ICD-10-CM

## 2021-09-13 DIAGNOSIS — Z86.59 PERSONAL HISTORY OF OTHER MENTAL AND BEHAVIORAL DISORDERS: ICD-10-CM

## 2021-09-13 DIAGNOSIS — H26.9 UNSPECIFIED CATARACT: Chronic | ICD-10-CM

## 2021-09-13 DIAGNOSIS — Z90.11 ACQUIRED ABSENCE OF RIGHT BREAST AND NIPPLE: ICD-10-CM

## 2021-09-13 DIAGNOSIS — I25.2 OLD MYOCARDIAL INFARCTION: ICD-10-CM

## 2021-09-13 DIAGNOSIS — Z20.822 CONTACT WITH AND (SUSPECTED) EXPOSURE TO COVID-19: ICD-10-CM

## 2021-09-13 DIAGNOSIS — Z79.82 LONG TERM (CURRENT) USE OF ASPIRIN: ICD-10-CM

## 2021-09-13 DIAGNOSIS — Z90.11 ACQUIRED ABSENCE OF RIGHT BREAST AND NIPPLE: Chronic | ICD-10-CM

## 2021-09-13 DIAGNOSIS — Z85.3 PERSONAL HISTORY OF MALIGNANT NEOPLASM OF BREAST: ICD-10-CM

## 2021-09-13 DIAGNOSIS — Z87.19 PERSONAL HISTORY OF OTHER DISEASES OF THE DIGESTIVE SYSTEM: ICD-10-CM

## 2021-09-13 LAB
ALBUMIN SERPL ELPH-MCNC: 3.5 G/DL — SIGNIFICANT CHANGE UP (ref 3.3–5)
ALP SERPL-CCNC: 60 U/L — SIGNIFICANT CHANGE UP (ref 40–120)
ALT FLD-CCNC: 17 U/L — SIGNIFICANT CHANGE UP (ref 12–78)
ANION GAP SERPL CALC-SCNC: 8 MMOL/L — SIGNIFICANT CHANGE UP (ref 5–17)
APTT BLD: 32.2 SEC — SIGNIFICANT CHANGE UP (ref 27.5–35.5)
AST SERPL-CCNC: 18 U/L — SIGNIFICANT CHANGE UP (ref 15–37)
BASOPHILS # BLD AUTO: 0.05 K/UL — SIGNIFICANT CHANGE UP (ref 0–0.2)
BASOPHILS NFR BLD AUTO: 0.5 % — SIGNIFICANT CHANGE UP (ref 0–2)
BILIRUB SERPL-MCNC: 0.2 MG/DL — SIGNIFICANT CHANGE UP (ref 0.2–1.2)
BUN SERPL-MCNC: 19 MG/DL — SIGNIFICANT CHANGE UP (ref 7–23)
CALCIUM SERPL-MCNC: 8.6 MG/DL — SIGNIFICANT CHANGE UP (ref 8.5–10.1)
CHLORIDE SERPL-SCNC: 103 MMOL/L — SIGNIFICANT CHANGE UP (ref 96–108)
CO2 SERPL-SCNC: 26 MMOL/L — SIGNIFICANT CHANGE UP (ref 22–31)
CREAT SERPL-MCNC: 0.7 MG/DL — SIGNIFICANT CHANGE UP (ref 0.5–1.3)
EOSINOPHIL # BLD AUTO: 0.21 K/UL — SIGNIFICANT CHANGE UP (ref 0–0.5)
EOSINOPHIL NFR BLD AUTO: 2.3 % — SIGNIFICANT CHANGE UP (ref 0–6)
GLUCOSE SERPL-MCNC: 91 MG/DL — SIGNIFICANT CHANGE UP (ref 70–99)
HCT VFR BLD CALC: 32.8 % — LOW (ref 34.5–45)
HGB BLD-MCNC: 9.7 G/DL — LOW (ref 11.5–15.5)
IMM GRANULOCYTES NFR BLD AUTO: 0.3 % — SIGNIFICANT CHANGE UP (ref 0–1.5)
INR BLD: 1.83 RATIO — HIGH (ref 0.88–1.16)
LYMPHOCYTES # BLD AUTO: 2.2 K/UL — SIGNIFICANT CHANGE UP (ref 1–3.3)
LYMPHOCYTES # BLD AUTO: 23.9 % — SIGNIFICANT CHANGE UP (ref 13–44)
MCHC RBC-ENTMCNC: 25.5 PG — LOW (ref 27–34)
MCHC RBC-ENTMCNC: 29.6 GM/DL — LOW (ref 32–36)
MCV RBC AUTO: 86.1 FL — SIGNIFICANT CHANGE UP (ref 80–100)
MONOCYTES # BLD AUTO: 0.79 K/UL — SIGNIFICANT CHANGE UP (ref 0–0.9)
MONOCYTES NFR BLD AUTO: 8.6 % — SIGNIFICANT CHANGE UP (ref 2–14)
NEUTROPHILS # BLD AUTO: 5.93 K/UL — SIGNIFICANT CHANGE UP (ref 1.8–7.4)
NEUTROPHILS NFR BLD AUTO: 64.4 % — SIGNIFICANT CHANGE UP (ref 43–77)
PLATELET # BLD AUTO: 383 K/UL — SIGNIFICANT CHANGE UP (ref 150–400)
POTASSIUM SERPL-MCNC: 3.3 MMOL/L — LOW (ref 3.5–5.3)
POTASSIUM SERPL-SCNC: 3.3 MMOL/L — LOW (ref 3.5–5.3)
PROT SERPL-MCNC: 7.3 GM/DL — SIGNIFICANT CHANGE UP (ref 6–8.3)
PROTHROM AB SERPL-ACNC: 20.6 SEC — HIGH (ref 10.6–13.6)
RBC # BLD: 3.81 M/UL — SIGNIFICANT CHANGE UP (ref 3.8–5.2)
RBC # FLD: 17.6 % — HIGH (ref 10.3–14.5)
SODIUM SERPL-SCNC: 137 MMOL/L — SIGNIFICANT CHANGE UP (ref 135–145)
WBC # BLD: 9.21 K/UL — SIGNIFICANT CHANGE UP (ref 3.8–10.5)
WBC # FLD AUTO: 9.21 K/UL — SIGNIFICANT CHANGE UP (ref 3.8–10.5)

## 2021-09-13 PROCEDURE — 99284 EMERGENCY DEPT VISIT MOD MDM: CPT | Mod: 25

## 2021-09-13 PROCEDURE — 85730 THROMBOPLASTIN TIME PARTIAL: CPT

## 2021-09-13 PROCEDURE — 71045 X-RAY EXAM CHEST 1 VIEW: CPT | Mod: 26

## 2021-09-13 PROCEDURE — 93005 ELECTROCARDIOGRAM TRACING: CPT

## 2021-09-13 PROCEDURE — 83880 ASSAY OF NATRIURETIC PEPTIDE: CPT

## 2021-09-13 PROCEDURE — 71045 X-RAY EXAM CHEST 1 VIEW: CPT

## 2021-09-13 PROCEDURE — 80053 COMPREHEN METABOLIC PANEL: CPT

## 2021-09-13 PROCEDURE — 93010 ELECTROCARDIOGRAM REPORT: CPT

## 2021-09-13 PROCEDURE — 99284 EMERGENCY DEPT VISIT MOD MDM: CPT

## 2021-09-13 PROCEDURE — 84484 ASSAY OF TROPONIN QUANT: CPT

## 2021-09-13 PROCEDURE — 85025 COMPLETE CBC W/AUTO DIFF WBC: CPT

## 2021-09-13 PROCEDURE — 36415 COLL VENOUS BLD VENIPUNCTURE: CPT

## 2021-09-13 PROCEDURE — 85610 PROTHROMBIN TIME: CPT

## 2021-09-13 RX ORDER — POTASSIUM CHLORIDE 20 MEQ
40 PACKET (EA) ORAL ONCE
Refills: 0 | Status: COMPLETED | OUTPATIENT
Start: 2021-09-13 | End: 2021-09-13

## 2021-09-13 NOTE — ED PROVIDER NOTE - OBJECTIVE STATEMENT
70y/o Female with a PMHx of Anemia, Anxiety, Breast cancer, CAD, Diverticulosis, HTN, NSTEMI, Transient cerebral ischemia, presents to the ED c/o SOB. Pt states she went to the supermarket, 30 mins later felt weak and SOB. Pt states she has been feeling this discomfort for 4 months. Pt states shes had 4 stents, TIA, and GERD  Cardiologist : Dr. Scott

## 2021-09-13 NOTE — ED PROVIDER NOTE - PATIENT PORTAL LINK FT
You can access the FollowMyHealth Patient Portal offered by [facility] by registering at the following website: [email or web address]. By joining [employer]’s FollowMyHealth portal, you will also be able to view your health information using other applications (apps) compatible with our system.

## 2021-09-13 NOTE — ED PROVIDER NOTE - NSICDXPASTSURGICALHX_GEN_ALL_CORE_FT
PAST SURGICAL HISTORY:  Cataract, bilateral     H/O total mastectomy of right breast     S/P drug eluting coronary stent placement in 2008 and 2010

## 2021-09-13 NOTE — ED ADULT TRIAGE NOTE - CHIEF COMPLAINT QUOTE
sent in by dr. leung for weakness and chest "heaviness" for past few months, " I feel foggy", decreased visual acuity for past few months intermittently, patient has recent history of anemia and low B12, ctscan last week showing linear opacities in dependent lower lobes likely to be related to aspiration pneumonia, hiatal hernia, secretion throughout trachea HX: right mastectomy with implant, left pelvocaliectasis,   patient's spouse Clarisse Gillespie home#[phone] cell#[phone]

## 2021-09-13 NOTE — ED PROVIDER NOTE - NSICDXPASTMEDICALHX_GEN_ALL_CORE_FT
PAST MEDICAL HISTORY:  Anemia     Anxiety     Breast cancer     Coronary artery disease, angina presence unspecified, unspecified vessel or lesion type, unspecified whether native or transplanted heart     Diverticulitis     HTN (hypertension)     Non-ST elevation (NSTEMI) myocardial infarction     Transient cerebral ischemia, unspecified type

## 2021-09-13 NOTE — ED PROVIDER NOTE - CARE PROVIDER_API CALL
Tam Hogan)  Cardiovascular Disease; Internal Medicine  [address], [address]  [place] [address]  Phone: [phone]  Fax: [phone]  Follow Up Time:

## 2021-09-13 NOTE — ED PROVIDER NOTE - NSFOLLOWUPINSTRUCTIONS_ED_ALL_ED_FT
please follow up with your doctor in 2-3 days.   continue with your medications as prescribed by your doctor.   return to ED for any concerns

## 2021-09-13 NOTE — ED PROVIDER NOTE - NSICDXFAMILYHX_GEN_ALL_CORE_FT
FAMILY HISTORY:  Family history of breast cancer  Family history of lung cancer    Sibling  Still living? Unknown  Family history of bladder cancer, Age at diagnosis: Age Unknown  Family history of heart disease, Age at diagnosis: Age Unknown  Family history of prostate cancer, Age at diagnosis: Age Unknown    Grandparent  Still living? Unknown  Family history of heart disease, Age at diagnosis: Age Unknown

## 2021-09-14 VITALS
DIASTOLIC BLOOD PRESSURE: 86 MMHG | RESPIRATION RATE: 18 BRPM | HEART RATE: 72 BPM | TEMPERATURE: 98 F | OXYGEN SATURATION: 95 % | SYSTOLIC BLOOD PRESSURE: 138 MMHG

## 2021-09-14 LAB — TROPONIN I SERPL-MCNC: <0.015 NG/ML — SIGNIFICANT CHANGE UP (ref 0.01–0.04)

## 2021-09-14 RX ADMIN — Medication 40 MILLIEQUIVALENT(S): at 01:16

## 2021-09-14 NOTE — ED ADULT NURSE NOTE - CHIEF COMPLAINT QUOTE
sent in by dr. leung for weakness and chest "heaviness" for past few months, " I feel foggy", decreased visual acuity for past few months intermittently, patient has recent history of anemia and low B12, ctscan last week showing linear opacities in dependent lower lobes likely to be related to aspiration pneumonia, hiatal hernia, secretion throughout trachea HX: right mastectomy with implant, left pelvocaliectasis,   patient's spouse Clarisse Gillespie home#[phone] cell#[phone]

## 2021-09-14 NOTE — ED ADULT NURSE NOTE - NSIMPLEMENTINTERV_GEN_ALL_ED
Implemented All Fall with Harm Risk Interventions:  [place] to call system. Call bell, personal items and telephone within reach. Instruct patient to call for assistance. Room bathroom lighting operational. Non-slip footwear when patient is off stretcher. Physically safe environment: no spills, clutter or unnecessary equipment. Stretcher in lowest position, wheels locked, appropriate side rails in place. Provide visual cue, wrist band, yellow gown, etc. Monitor gait and stability. Monitor for mental status changes and reorient to person, place, and time. Review medications for side effects contributing to fall risk. Reinforce activity limits and safety measures with patient and family. Provide visual clues: red socks.

## 2021-09-14 NOTE — ED ADULT NURSE NOTE - OBJECTIVE STATEMENT
patient from home as per ems and daughter at bedside patient felt dizzy and almost sycopized, patient guided to floor by family. daughter at bedside states that patient was at dialysis today and the session was ended 30 minutes early due to patient feeling dizzy. patient states that his only pain is to bilateral feet, denies any complaints of chest pain or discomfort, patient pale appearing, Dr Arreaga at bedside for eval
Pt ambulatory to ED for c/o generalized weakness. Pt states increased of fatigue with symptoms of SOB and fatigue. Denies fever, nausea or vomiting. Monitors in place. No acute distress noted at this time.

## 2021-09-19 ENCOUNTER — LABORATORY RESULT (OUTPATIENT)
Age: 71
End: 2021-09-19

## 2021-09-19 ENCOUNTER — APPOINTMENT (OUTPATIENT)
Dept: DISASTER EMERGENCY | Facility: CLINIC | Age: 71
End: 2021-09-19

## 2021-09-21 NOTE — H&P ADULT - HISTORY OF PRESENT ILLNESS
71yr old female PMH HTN, HLD, MI, CAD, valvular heart disease ASD/PFO TIA, depression with c/o intermittent SOB chest tightness, weakness with minimal exertion. She was seen at  after experiencing these same symptoms in the grocery store, but was discharged after having a normal EKG and labs.  Last PET myocardial perfusion imaging in 2019 revealed no evidence of myocardial infarction or ischemia. Pt. now referred for [facility] for further evaluation.    71yr old female PMH HTN, HLD, MI, CAD, valvular heart disease ASD/PFO TIA, depression with c/o intermittent SOB chest tightness, weakness with minimal exertion. She was seen at  on 9/13 after experiencing these same symptoms in the grocery store, but was discharged after having a normal EKG and labs.  Last PET myocardial perfusion imaging in 2019 revealed no evidence of myocardial infarction or ischemia. Pt remained with progressive symptoms and  is  now referred for [facility] for further evaluation

## 2021-09-21 NOTE — H&P ADULT - ASSESSMENT
71yr old female PMH HTN, HLD, MI, CAD, valvular heart disease ASD/PFO TIA, depression with c/o intermittent SOB chest tightness, weakness with minimal exertion. Last PET myocardial perfusion imaging in 2019 revealed no evidence of myocardial infarction or ischemia. Pt. now referred for C for further evaluation.     C risks, benefits and alternatives discussed with patient. Risk discussed included, but not limited to MI, stroke, mortality, major bleeding, arrythmia, or infection. Consent obtained and signed educational material provided. Pt. verbalizes and understands pre-procedural instructions.  ASA:  Bleeding Risk Score:  Creatinine:  GFR:     71yr old female PMH HTN, HLD, MI, CAD, valvular heart disease ASD/PFO TIA, depression with c/o intermittent SOB chest tightness, weakness with minimal exertion. Last PET myocardial perfusion imaging in 2019 revealed no evidence of myocardial infarction or ischemia. Pt. now referred for C for further evaluation.     [facility] risks, benefits and alternatives discussed with patient. Risk discussed included, but not limited to MI, stroke, mortality, major bleeding, arrythmia, or infection. Consent obtained and signed educational material provided. Pt. verbalizes and understands pre-procedural instructions.  ASA:II  Bleeding Risk Score:4.5  Creatinine:0.70  GFR: 86

## 2021-09-21 NOTE — H&P ADULT - NSHPREVIEWOFSYSTEMS_GEN_ALL_CORE
CONSTITUTIONAL: +weakness  EYES: No eye pain, visual disturbances, or discharge  ENMT:  No difficulty hearing, tinnitus, vertigo; No sinus or throat pain  NECK: No pain or stiffness  BREASTS: No pain, masses, or nipple discharge  RESPIRATORY: +shortness of breath  CARDIOVASCULAR:  +chest pain, palpitations, dizziness, or leg swelling  GASTROINTESTINAL: No abdominal or epigastric pain. No nausea, vomiting, or hematemesis; No diarrhea or constipation. No melena or hematochezia.  GENITOURINARY: No dysuria, frequency, hematuria, or incontinence  NEUROLOGICAL: No headaches, memory loss, loss of strength, numbness, or tremors  SKIN: No itching, burning, rashes, or lesions   ENDOCRINE: No heat or cold intolerance; No hair loss  MUSCULOSKELETAL: No joint pain or swelling; No muscle, back, or extremity pain  PSYCHIATRIC: No depression, anxiety, mood swings, or difficulty sleeping CONSTITUTIONAL: +weakness  EYES: No eye pain, visual disturbances, or discharge  ENMT:  No difficulty hearing, tinnitus, vertigo; No sinus or throat pain  NECK: No pain or stiffness  BREASTS: No pain, masses, or nipple discharge  RESPIRATORY: +shortness of breath on exertion  CARDIOVASCULAR:  +chest pain on exertion. Denies palpitations, dizziness, or leg swelling  GASTROINTESTINAL: No abdominal or epigastric pain. No nausea, vomiting, or hematemesis; No diarrhea or constipation. No melena or hematochezia.  GENITOURINARY: No dysuria, frequency, hematuria, or incontinence  NEUROLOGICAL: No headaches, memory loss, loss of strength, numbness, or tremors. + postural dizziness  SKIN: No itching, burning, rashes, or lesions   ENDOCRINE: No heat or cold intolerance; No hair loss  MUSCULOSKELETAL: No joint pain or swelling; No muscle, back, or extremity pain  PSYCHIATRIC: No depression, anxiety, mood swings, or difficulty sleeping

## 2021-09-21 NOTE — H&P ADULT - NSHPLABSRESULTS_GEN_ALL_CORE
Last PET myocardial perfusion imaging in 2019 revealed no evidence of myocardial infarction or ischemia.

## 2021-09-22 ENCOUNTER — OUTPATIENT (OUTPATIENT)
Dept: OUTPATIENT SERVICES | Facility: HOSPITAL | Age: 71
LOS: 1 days | Discharge: HOME | End: 2021-09-22
Payer: MEDICARE

## 2021-09-22 VITALS
RESPIRATION RATE: 16 BRPM | DIASTOLIC BLOOD PRESSURE: 64 MMHG | HEART RATE: 58 BPM | SYSTOLIC BLOOD PRESSURE: 101 MMHG | OXYGEN SATURATION: 97 %

## 2021-09-22 VITALS
WEIGHT: 175.05 LBS | OXYGEN SATURATION: 92 % | HEIGHT: 62 IN | SYSTOLIC BLOOD PRESSURE: 105 MMHG | DIASTOLIC BLOOD PRESSURE: 82 MMHG | HEART RATE: 87 BPM | TEMPERATURE: 97 F | RESPIRATION RATE: 16 BRPM

## 2021-09-22 DIAGNOSIS — Z90.11 ACQUIRED ABSENCE OF RIGHT BREAST AND NIPPLE: Chronic | ICD-10-CM

## 2021-09-22 DIAGNOSIS — I25.10 ATHEROSCLEROTIC HEART DISEASE OF NATIVE CORONARY ARTERY WITHOUT ANGINA PECTORIS: ICD-10-CM

## 2021-09-22 DIAGNOSIS — H26.9 UNSPECIFIED CATARACT: Chronic | ICD-10-CM

## 2021-09-22 DIAGNOSIS — Z95.5 PRESENCE OF CORONARY ANGIOPLASTY IMPLANT AND GRAFT: Chronic | ICD-10-CM

## 2021-09-22 PROCEDURE — C1894: CPT

## 2021-09-22 PROCEDURE — 99152 MOD SED SAME PHYS/QHP 5/>YRS: CPT

## 2021-09-22 PROCEDURE — C1769: CPT

## 2021-09-22 PROCEDURE — C1887: CPT

## 2021-09-22 PROCEDURE — 93454 CORONARY ARTERY ANGIO S&I: CPT

## 2021-09-22 NOTE — PACU DISCHARGE NOTE - COMMENTS
VS Stable. Right groin site benign. Discharge instructions reviewed with patient and she verbalizes understanding. Patient discharged home at this time and pending transport to the lobby where her  is waiting

## 2021-09-22 NOTE — PROGRESS NOTE ADULT - SUBJECTIVE AND OBJECTIVE BOX
s/p LHC revealing non obstructive CAD   Denies chest pain, shortness of breath, dizziness or palpitations at this time      T(C): 36.3 (22 Sep 2021 07:38), Max: 36.3 (22 Sep 2021 07:38)  T(F): 97.3 (22 Sep 2021 07:38), Max: 97.3 (22 Sep 2021 07:38)  HR: 59 (22 Sep 2021 10:00) (59 - 87)  BP: 104/76 (22 Sep 2021 10:00) (104/76 - 131/73)  RR: 16 (22 Sep 2021 10:00) (16 - 16)  SpO2: 96% (22 Sep 2021 10:00) (90% - 96%)    A+Ox3  CV:S1S2 reg  Respiratory: CTAB  Right groin procedure sheath pulled by RN; no bleeding, no hematoma, site soft, non tender, positive pedal pulses bilaterally    HPI:71yr old female PMH HTN, HLD, MI, CAD, valvular heart disease ASD/PFO TIA, depression with c/o intermittent SOB chest tightness, weakness with minimal exertion. She was seen at  on 9/13 after experiencing these same symptoms in the grocery store, but was discharged after having a normal EKG and labs.  Last PET myocardial perfusion imaging in 2019 revealed no evidence of myocardial infarction or ischemia. Pt remained with progressive symptoms and  is  now referred for C for further evaluation  (21 Sep 2021 16:26)    now s/p LHC revealing non obstructive CAD and patent stents    --encourage PO fluids  -plan of care discussed with patient and MD  -d/c after bedrest if stable  -post procedure and d/c instructions reviewed  -follow up with MD in 1-2 weeks  -Discussed therapeutic lifestyle changes to reduce risk factors such as following a cardiac diet, weight loss, maintaining a healthy weight, exercise, smoking cessation, medication compliance, and regular follow-up  with MD  s/p LHC revealing non obstructive CAD   Denies chest pain, shortness of breath, dizziness or palpitations at this time      T(C): 36.3 (22 Sep 2021 07:38), Max: 36.3 (22 Sep 2021 07:38)  T(F): 97.3 (22 Sep 2021 07:38), Max: 97.3 (22 Sep 2021 07:38)  HR: 59 (22 Sep 2021 10:00) (59 - 87)  BP: 104/76 (22 Sep 2021 10:00) (104/76 - 131/73)  RR: 16 (22 Sep 2021 10:00) (16 - 16)  SpO2: 96% (22 Sep 2021 10:00) (90% - 96%)    A+Ox3  CV:S1S2 reg  Respiratory: CTAB  Right groin procedure sheath pulled by RN; no bleeding, no hematoma, site soft, non tender, positive pedal pulses bilaterally    HPI:71yr old female PMH HTN, HLD, MI, CAD, valvular heart disease ASD/PFO TIA, depression with c/o intermittent SOB chest tightness, weakness with minimal exertion. She was seen at  on 9/13 after experiencing these same symptoms in the grocery store, but was discharged after having a normal EKG and labs.  Last PET myocardial perfusion imaging in 2019 revealed no evidence of myocardial infarction or ischemia. Pt remained with progressive symptoms and  is  now referred for [facility] for further evaluation  (21 Sep 2021 16:26)  On Xeralto " for TIA and hole in the heart" per patient. Last dose was 9/19    now s/p C revealing non obstructive CAD and patent stents    --encourage PO fluids  -plan of care discussed with patient and MD  -d/c after bedrest if stable  -post procedure and d/c instructions reviewed  -Resume Xarelto tomorrow  -follow up with MD in 1-2 weeks  -Discussed therapeutic lifestyle changes to reduce risk factors such as following a cardiac diet, weight loss, maintaining a healthy weight, exercise, smoking cessation, medication compliance, and regular follow-up  with MD

## 2021-09-27 DIAGNOSIS — Z79.01 LONG TERM (CURRENT) USE OF ANTICOAGULANTS: ICD-10-CM

## 2021-09-27 DIAGNOSIS — R06.02 SHORTNESS OF BREATH: ICD-10-CM

## 2021-09-27 DIAGNOSIS — Z85.828 PERSONAL HISTORY OF OTHER MALIGNANT NEOPLASM OF SKIN: ICD-10-CM

## 2021-09-27 DIAGNOSIS — I25.2 OLD MYOCARDIAL INFARCTION: ICD-10-CM

## 2021-09-27 DIAGNOSIS — E78.00 PURE HYPERCHOLESTEROLEMIA, UNSPECIFIED: ICD-10-CM

## 2021-09-27 DIAGNOSIS — Z90.49 ACQUIRED ABSENCE OF OTHER SPECIFIED PARTS OF DIGESTIVE TRACT: ICD-10-CM

## 2021-09-27 DIAGNOSIS — I10 ESSENTIAL (PRIMARY) HYPERTENSION: ICD-10-CM

## 2021-09-27 DIAGNOSIS — E61.1 IRON DEFICIENCY: ICD-10-CM

## 2021-09-27 DIAGNOSIS — Z95.5 PRESENCE OF CORONARY ANGIOPLASTY IMPLANT AND GRAFT: ICD-10-CM

## 2021-09-27 DIAGNOSIS — Z79.82 LONG TERM (CURRENT) USE OF ASPIRIN: ICD-10-CM

## 2021-09-27 DIAGNOSIS — E53.8 DEFICIENCY OF OTHER SPECIFIED B GROUP VITAMINS: ICD-10-CM

## 2021-09-27 DIAGNOSIS — Z85.3 PERSONAL HISTORY OF MALIGNANT NEOPLASM OF BREAST: ICD-10-CM

## 2021-09-27 DIAGNOSIS — I25.10 ATHEROSCLEROTIC HEART DISEASE OF NATIVE CORONARY ARTERY WITHOUT ANGINA PECTORIS: ICD-10-CM

## 2021-09-27 DIAGNOSIS — Q21.1 ATRIAL SEPTAL DEFECT: ICD-10-CM

## 2021-09-27 DIAGNOSIS — Z86.73 PERSONAL HISTORY OF TRANSIENT ISCHEMIC ATTACK (TIA), AND CEREBRAL INFARCTION WITHOUT RESIDUAL DEFICITS: ICD-10-CM

## 2021-09-27 DIAGNOSIS — Z90.11 ACQUIRED ABSENCE OF RIGHT BREAST AND NIPPLE: ICD-10-CM

## 2021-10-03 ENCOUNTER — APPOINTMENT (OUTPATIENT)
Dept: DISASTER EMERGENCY | Facility: CLINIC | Age: 71
End: 2021-10-03

## 2021-10-04 LAB — SARS-COV-2 N GENE NPH QL NAA+PROBE: NOT DETECTED

## 2021-10-06 ENCOUNTER — OUTPATIENT (OUTPATIENT)
Dept: OUTPATIENT SERVICES | Facility: HOSPITAL | Age: 71
LOS: 1 days | End: 2021-10-06
Payer: MEDICARE

## 2021-10-06 DIAGNOSIS — Z95.5 PRESENCE OF CORONARY ANGIOPLASTY IMPLANT AND GRAFT: Chronic | ICD-10-CM

## 2021-10-06 DIAGNOSIS — Z90.11 ACQUIRED ABSENCE OF RIGHT BREAST AND NIPPLE: Chronic | ICD-10-CM

## 2021-10-06 DIAGNOSIS — H26.9 UNSPECIFIED CATARACT: Chronic | ICD-10-CM

## 2021-10-06 DIAGNOSIS — R13.10 DYSPHAGIA, UNSPECIFIED: ICD-10-CM

## 2021-10-06 PROCEDURE — 74230 X-RAY XM SWLNG FUNCJ C+: CPT

## 2021-10-06 PROCEDURE — 74230 X-RAY XM SWLNG FUNCJ C+: CPT | Mod: 26

## 2021-10-06 PROCEDURE — 92611 MOTION FLUOROSCOPY/SWALLOW: CPT | Mod: GN

## 2021-10-06 NOTE — SWALLOW VFSS/MBS ASSESSMENT ADULT - COMMENTS
The patient was referred for an outpatient Modified Barium Swallowing Study(MBS) by Dr Hogan. She has a history of anxiety, anemia, CAD s/p MI-stent placement, HTN, diverticulosis, GERD, past breast cancer status post right mastectomy and prior TIA. Additionally, the patient reported episodic dyspnea on exertion. She is currently on a regular texture diet.

## 2021-10-06 NOTE — SWALLOW VFSS/MBS ASSESSMENT ADULT - DIAGNOSTIC IMPRESSIONS
THE PATIENT DEMONSTRATES FUNCTIONAL OROPHARYNGEAL SWALLOWING ABILITIES FOR AGE. NO LARYNGEAL PENETRATION, ASPIRATION, FLAVIO PHARYNGEAL RETENTION OR LARYNGOPHARYNGEAL REFLUX WERE IDENTIFIED UNDER FLUOROSCOPIC CONTROL.

## 2021-10-06 NOTE — SWALLOW VFSS/MBS ASSESSMENT ADULT - ORAL PHASE COMMENTS
Bolus formation/transfer were achieved via functional AP lingual actions and functional rotary masticatory motions that were age acceptable. No oral pooling was evident.

## 2021-10-06 NOTE — SWALLOW VFSS/MBS ASSESSMENT ADULT - ADDITIONAL INFORMATION
The patient was postured upright in a MARY chair for testing and was studied in the lateral plane. Preliminary fluoroscopy was notable for the presence of multiple cervical osteophytes.

## 2021-10-06 NOTE — SWALLOW VFSS/MBS ASSESSMENT ADULT - PHARYNGEAL PHASE COMMENTS
Swallow triggered in an acceptable time frame for age. Hyolaryngeal excursion and epiglottic retroflexion during the swallow were within functional parameters for age with adequate prandial airway protection. Cricopharyngeal sphincter opening was acceptable/functional for age as well. NO LARYNGEAL PENETRATION, ASPIRATION, FLAVIO PHARYNGEAL RETENTION OR LARYNGOPHARYNGEAL REFLUX WERE IDENTIFIED UNDER FLUOROSCOPIC CONTROL.

## 2021-10-07 ENCOUNTER — APPOINTMENT (OUTPATIENT)
Dept: GASTROENTEROLOGY | Facility: CLINIC | Age: 71
End: 2021-10-07

## 2021-10-07 DIAGNOSIS — R13.10 DYSPHAGIA, UNSPECIFIED: ICD-10-CM

## 2021-10-08 ENCOUNTER — APPOINTMENT (OUTPATIENT)
Dept: INTERNAL MEDICINE | Facility: CLINIC | Age: 71
End: 2021-10-08
Payer: MEDICARE

## 2021-10-08 VITALS
SYSTOLIC BLOOD PRESSURE: 110 MMHG | HEART RATE: 76 BPM | TEMPERATURE: 99.3 F | OXYGEN SATURATION: 97 % | BODY MASS INDEX: 31.4 KG/M2 | RESPIRATION RATE: 16 BRPM | WEIGHT: 175 LBS | HEIGHT: 62.5 IN | DIASTOLIC BLOOD PRESSURE: 73 MMHG

## 2021-10-08 DIAGNOSIS — R06.00 DYSPNEA, UNSPECIFIED: ICD-10-CM

## 2021-10-08 DIAGNOSIS — R91.8 OTHER NONSPECIFIC ABNORMAL FINDING OF LUNG FIELD: ICD-10-CM

## 2021-10-08 DIAGNOSIS — R06.89 DYSPNEA, UNSPECIFIED: ICD-10-CM

## 2021-10-08 PROCEDURE — 99214 OFFICE O/P EST MOD 30 MIN: CPT

## 2021-10-08 RX ORDER — ENALAPRIL MALEATE 20 MG/1
20 TABLET ORAL
Refills: 0 | Status: DISCONTINUED | COMMUNITY
End: 2021-10-08

## 2021-10-08 RX ORDER — CHOLESTYRAMINE 4 G/9G
4 POWDER, FOR SUSPENSION ORAL
Qty: 60 | Refills: 0 | Status: DISCONTINUED | COMMUNITY
Start: 2021-07-01 | End: 2021-10-08

## 2021-10-08 NOTE — HISTORY OF PRESENT ILLNESS
[TextBox_4] : Mrs. Gillespie presents for followup evaluation. She is she presented complaining of shortness of breath. Subsequently the patient found to be significant anemia could with low-lying stores. Complete cord function tests show mild obstructive lung disease with normal FEV1 and FVC. Diffusing capacity was disproportionately low which may be related to her anemia. It also could be indicative of pulmonary vessel disease. Patient did have a cardiac catheterization which showed nonobstructive coronary disease and patent stents.

## 2021-10-08 NOTE — DISCUSSION/SUMMARY
[FreeTextEntry1] : Mrs. Gillespie presents for followup evaluation. She had episodes of dyspnea. Patient is found to have iron deficiency anemia which is most likely related to chronic blood loss. Complete point function tests show only mild obstructive lung disease. FEV1 and FVC are normal. Diffusing capacity is decreased which may be seen in anemia. Patient will not require meter dose inhaler therapy. She is scheduled for gastroenterology evaluation which will most likely include endoscopy and colonoscopy.

## 2021-10-14 ENCOUNTER — APPOINTMENT (OUTPATIENT)
Dept: GASTROENTEROLOGY | Facility: CLINIC | Age: 71
End: 2021-10-14
Payer: MEDICARE

## 2021-10-14 VITALS
DIASTOLIC BLOOD PRESSURE: 77 MMHG | BODY MASS INDEX: 32.2 KG/M2 | HEIGHT: 62 IN | HEART RATE: 55 BPM | SYSTOLIC BLOOD PRESSURE: 111 MMHG | WEIGHT: 175 LBS

## 2021-10-14 PROCEDURE — 99214 OFFICE O/P EST MOD 30 MIN: CPT

## 2021-10-14 NOTE — PHYSICAL EXAM
[General Appearance - Well-Appearing] : healthy appearing [Sclera] : the sclera and conjunctiva were normal [PERRL With Normal Accommodation] : pupils were equal in size, round, and reactive to light [Extraocular Movements] : extraocular movements were intact [Neck Appearance] : the appearance of the neck was normal [Neck Cervical Mass (___cm)] : no neck mass was observed [Jugular Venous Distention Increased] : there was no jugular-venous distention [Thyroid Diffuse Enlargement] : the thyroid was not enlarged [Thyroid Nodule] : there were no palpable thyroid nodules [] : no respiratory distress [Auscultation Breath Sounds / Voice Sounds] : lungs were clear to auscultation bilaterally [Normal Sphincter Tone] : normal sphincter tone [No Rectal Mass] : no rectal mass [Cervical Lymph Nodes Enlarged Posterior Bilaterally] : posterior cervical [Cervical Lymph Nodes Enlarged Anterior Bilaterally] : anterior cervical [Supraclavicular Lymph Nodes Enlarged Bilaterally] : supraclavicular [Axillary Lymph Nodes Enlarged Bilaterally] : axillary [Femoral Lymph Nodes Enlarged Bilaterally] : femoral [Inguinal Lymph Nodes Enlarged Bilaterally] : inguinal [No CVA Tenderness] : no ~M costovertebral angle tenderness [No Spinal Tenderness] : no spinal tenderness [Abnormal Walk] : normal gait [Nail Clubbing] : no clubbing  or cyanosis of the fingernails [Musculoskeletal - Swelling] : no joint swelling seen [Motor Tone] : muscle strength and tone were normal [Oriented To Time, Place, And Person] : oriented to person, place, and time [Occult Blood Positive] : stool was negative for occult blood [FreeTextEntry1] : Stools are black from iron Hemoccult-negative

## 2021-10-14 NOTE — REVIEW OF SYSTEMS
[Feeling Poorly] : feeling poorly [Feeling Tired] : feeling tired [Joint Stiffness] : joint stiffness [Anxiety] : anxiety [Feelings Of Weakness] : feelings of weakness [As Noted in HPI] : as noted in HPI [Negative] : Heme/Lymph

## 2021-10-14 NOTE — HISTORY OF PRESENT ILLNESS
[FreeTextEntry1] : Patient is here today, as she has profound fatigue associated with shortness of breath and workup so far has been unrevealing. She does have an ongoing anemia on anticoagulation and baby aspirin daily in the past. There was some assumption that this was due to roberto carlos erosions and a large hiatal hernia, which may be contributing to her shortness of breath as well. She denies melena or obvious bleeding. She denies heartburn or dysphagia but does sometime to time. Waterbrash despite being on PPI therapy b.i.d. she has been having diarrhea which has subsided mostly with Librax
Statement Selected

## 2021-10-14 NOTE — ASSESSMENT
[FreeTextEntry1] : Anemia which is likely multifactorial. A combination of large hiatal hernia leading tocameron erosions, which are exacerbated by baby aspirin and anti-coagulation at the same time. She is on PPI therapy, which may result in some malabsorption of B12 and iron and her large hiatal hernia. May be contributing to her shortness of breath. Will repeat upper endoscopy for evaluation as well as a capsule endoscopy for her anemia

## 2021-10-19 ENCOUNTER — APPOINTMENT (OUTPATIENT)
Dept: GASTROENTEROLOGY | Facility: CLINIC | Age: 71
End: 2021-10-19
Payer: MEDICARE

## 2021-10-19 PROCEDURE — 91110 GI TRC IMG INTRAL ESOPH-ILE: CPT

## 2021-11-04 ENCOUNTER — APPOINTMENT (OUTPATIENT)
Dept: DISASTER EMERGENCY | Facility: CLINIC | Age: 71
End: 2021-11-04

## 2021-11-05 LAB — SARS-COV-2 N GENE NPH QL NAA+PROBE: NOT DETECTED

## 2021-11-08 ENCOUNTER — APPOINTMENT (OUTPATIENT)
Dept: GASTROENTEROLOGY | Facility: AMBULATORY MEDICAL SERVICES | Age: 71
End: 2021-11-08
Payer: MEDICARE

## 2021-11-08 ENCOUNTER — RESULT REVIEW (OUTPATIENT)
Age: 71
End: 2021-11-08

## 2021-11-08 PROCEDURE — 43239 EGD BIOPSY SINGLE/MULTIPLE: CPT

## 2021-11-30 ENCOUNTER — RX RENEWAL (OUTPATIENT)
Age: 71
End: 2021-11-30

## 2021-12-02 ENCOUNTER — RX RENEWAL (OUTPATIENT)
Age: 71
End: 2021-12-02

## 2021-12-14 ENCOUNTER — APPOINTMENT (OUTPATIENT)
Dept: OTOLARYNGOLOGY | Facility: CLINIC | Age: 71
End: 2021-12-14

## 2022-02-17 PROBLEM — K92.2 LOWER GI BLEED: Status: ACTIVE | Noted: 2022-02-17

## 2022-02-17 PROBLEM — D64.9 ANEMIA: Status: ACTIVE | Noted: 2022-02-17

## 2022-02-17 PROBLEM — Z79.01 CHRONIC ANTICOAGULATION: Status: ACTIVE | Noted: 2022-02-17

## 2022-02-17 PROBLEM — Z86.79 HISTORY OF CORONARY ARTERY DISEASE: Status: ACTIVE | Noted: 2022-02-17

## 2022-03-03 ENCOUNTER — APPOINTMENT (OUTPATIENT)
Dept: GASTROENTEROLOGY | Facility: CLINIC | Age: 72
End: 2022-03-03
Payer: MEDICARE

## 2022-03-03 VITALS
HEART RATE: 63 BPM | WEIGHT: 176 LBS | BODY MASS INDEX: 32.39 KG/M2 | HEIGHT: 62 IN | DIASTOLIC BLOOD PRESSURE: 80 MMHG | SYSTOLIC BLOOD PRESSURE: 109 MMHG

## 2022-03-03 PROCEDURE — 99214 OFFICE O/P EST MOD 30 MIN: CPT

## 2022-03-03 RX ORDER — PANTOPRAZOLE 40 MG/1
40 TABLET, DELAYED RELEASE ORAL TWICE DAILY
Qty: 180 | Refills: 3 | Status: ACTIVE | OUTPATIENT
Start: 2020-01-06

## 2022-03-03 NOTE — PHYSICAL EXAM
[General Appearance - Well-Appearing] : healthy appearing [Sclera] : the sclera and conjunctiva were normal [PERRL With Normal Accommodation] : pupils were equal in size, round, and reactive to light [Extraocular Movements] : extraocular movements were intact [Neck Appearance] : the appearance of the neck was normal [Neck Cervical Mass (___cm)] : no neck mass was observed [Jugular Venous Distention Increased] : there was no jugular-venous distention [Thyroid Diffuse Enlargement] : the thyroid was not enlarged [Thyroid Nodule] : there were no palpable thyroid nodules [Auscultation Breath Sounds / Voice Sounds] : lungs were clear to auscultation bilaterally [Heart Sounds] : normal S1 and S2 [Edema] : there was no peripheral edema [Bowel Sounds] : normal bowel sounds [Abdomen Soft] : soft [Abdomen Tenderness] : non-tender [] : no hepato-splenomegaly [Abdomen Mass (___ Cm)] : no abdominal mass palpated [Normal Sphincter Tone] : normal sphincter tone [No Rectal Mass] : no rectal mass [Cervical Lymph Nodes Enlarged Posterior Bilaterally] : posterior cervical [Cervical Lymph Nodes Enlarged Anterior Bilaterally] : anterior cervical [Supraclavicular Lymph Nodes Enlarged Bilaterally] : supraclavicular [Axillary Lymph Nodes Enlarged Bilaterally] : axillary [Femoral Lymph Nodes Enlarged Bilaterally] : femoral [Inguinal Lymph Nodes Enlarged Bilaterally] : inguinal [No CVA Tenderness] : no ~M costovertebral angle tenderness [No Spinal Tenderness] : no spinal tenderness [Abnormal Walk] : normal gait [Nail Clubbing] : no clubbing  or cyanosis of the fingernails [Musculoskeletal - Swelling] : no joint swelling seen [Oriented To Time, Place, And Person] : oriented to person, place, and time [Motor Tone] : muscle strength and tone were normal [Occult Blood Positive] : stool was negative for occult blood [FreeTextEntry1] : Stools are black from iron Hemoccult-negative

## 2022-03-03 NOTE — HISTORY OF PRESENT ILLNESS
[FreeTextEntry1] : Patient had less diarrhea on Librax, but did have incomplete evacuation persist, without diarrhea. She has been on iron therapy. Despite a normal workup, including capsule endoscopy. It is unclear whether her hemoglobin has returned to normal or not. She denies dysphasia, but does have epigastric discomfort, especially with large meals she is on pantoprazole 40 mg once a day, which he reduce from twice a day on her. She denies any rectal bleeding or melena

## 2022-03-03 NOTE — ASSESSMENT
[FreeTextEntry1] : #1 irritable bowel diarrhea predominant undercooked controlled with Librax, but insurance it is no longer, covering. We'll change to Bentyl, 10 mg b.i.d. #2 paraesophageal hiatal hernia. We'll evaluate for repair as she is very happy with having to continue these restrictions of multiple small meals and PPI therapy b.i.d.\par

## 2022-05-12 ENCOUNTER — APPOINTMENT (OUTPATIENT)
Dept: GASTROENTEROLOGY | Facility: CLINIC | Age: 72
End: 2022-05-12

## 2022-06-07 ENCOUNTER — APPOINTMENT (OUTPATIENT)
Dept: GASTROENTEROLOGY | Facility: CLINIC | Age: 72
End: 2022-06-07

## 2022-07-13 ENCOUNTER — NON-APPOINTMENT (OUTPATIENT)
Age: 72
End: 2022-07-13

## 2022-07-28 ENCOUNTER — APPOINTMENT (OUTPATIENT)
Dept: OTOLARYNGOLOGY | Facility: CLINIC | Age: 72
End: 2022-07-28

## 2022-07-28 VITALS — TEMPERATURE: 97.3 F | WEIGHT: 178 LBS | BODY MASS INDEX: 31.54 KG/M2 | HEIGHT: 63 IN

## 2022-07-28 DIAGNOSIS — H93.8X3 OTHER SPECIFIED DISORDERS OF EAR, BILATERAL: ICD-10-CM

## 2022-07-28 PROCEDURE — 99213 OFFICE O/P EST LOW 20 MIN: CPT | Mod: 25

## 2022-07-28 PROCEDURE — 69210 REMOVE IMPACTED EAR WAX UNI: CPT

## 2022-07-28 NOTE — ASSESSMENT
[FreeTextEntry1] : Patient with occ clogged ears and occ ear itch and dry eac.  Bilateral cerumen removed and tm normal after .  Does have eczema of eac.  Recommended fluocinolone ointment and followup in one year and as necessary

## 2022-07-28 NOTE — PHYSICAL EXAM
[Midline] : trachea located in midline position [Normal] : no rashes [de-identified] : dry eac bilat and bilat cerumen impaction  [de-identified] : afrer cerumen removal

## 2022-07-28 NOTE — HISTORY OF PRESENT ILLNESS
[de-identified] : Hx of occ clogged ears.  Also hx of dry skin.  Problem now for about 1-2 weeks.

## 2022-08-24 ENCOUNTER — RESULT REVIEW (OUTPATIENT)
Age: 72
End: 2022-08-24

## 2022-10-14 ENCOUNTER — APPOINTMENT (OUTPATIENT)
Dept: MAMMOGRAPHY | Facility: CLINIC | Age: 72
End: 2022-10-14

## 2022-10-14 ENCOUNTER — RESULT REVIEW (OUTPATIENT)
Age: 72
End: 2022-10-14

## 2022-10-14 ENCOUNTER — OUTPATIENT (OUTPATIENT)
Dept: OUTPATIENT SERVICES | Facility: HOSPITAL | Age: 72
LOS: 1 days | End: 2022-10-14
Payer: MEDICARE

## 2022-10-14 ENCOUNTER — APPOINTMENT (OUTPATIENT)
Dept: ULTRASOUND IMAGING | Facility: CLINIC | Age: 72
End: 2022-10-14

## 2022-10-14 DIAGNOSIS — H26.9 UNSPECIFIED CATARACT: Chronic | ICD-10-CM

## 2022-10-14 DIAGNOSIS — Z95.5 PRESENCE OF CORONARY ANGIOPLASTY IMPLANT AND GRAFT: Chronic | ICD-10-CM

## 2022-10-14 DIAGNOSIS — Z90.11 ACQUIRED ABSENCE OF RIGHT BREAST AND NIPPLE: Chronic | ICD-10-CM

## 2022-10-14 DIAGNOSIS — Z90.10 ACQUIRED ABSENCE OF UNSPECIFIED BREAST AND NIPPLE: ICD-10-CM

## 2022-10-14 PROCEDURE — 77063 BREAST TOMOSYNTHESIS BI: CPT | Mod: 26,52

## 2022-10-14 PROCEDURE — 76641 ULTRASOUND BREAST COMPLETE: CPT | Mod: 26,LT

## 2022-10-14 PROCEDURE — 77063 BREAST TOMOSYNTHESIS BI: CPT

## 2022-10-14 PROCEDURE — 77067 SCR MAMMO BI INCL CAD: CPT

## 2022-10-14 PROCEDURE — 77067 SCR MAMMO BI INCL CAD: CPT | Mod: 26,LT,52

## 2022-10-14 PROCEDURE — 76641 ULTRASOUND BREAST COMPLETE: CPT

## 2022-10-17 ENCOUNTER — NON-APPOINTMENT (OUTPATIENT)
Age: 72
End: 2022-10-17

## 2022-10-18 ENCOUNTER — NON-APPOINTMENT (OUTPATIENT)
Age: 72
End: 2022-10-18

## 2022-10-19 ENCOUNTER — OUTPATIENT (OUTPATIENT)
Dept: OUTPATIENT SERVICES | Facility: HOSPITAL | Age: 72
LOS: 1 days | End: 2022-10-19
Payer: MEDICARE

## 2022-10-19 ENCOUNTER — RESULT REVIEW (OUTPATIENT)
Age: 72
End: 2022-10-19

## 2022-10-19 VITALS
TEMPERATURE: 98 F | RESPIRATION RATE: 14 BRPM | HEART RATE: 71 BPM | HEIGHT: 62 IN | OXYGEN SATURATION: 97 % | DIASTOLIC BLOOD PRESSURE: 80 MMHG | SYSTOLIC BLOOD PRESSURE: 143 MMHG | WEIGHT: 171.96 LBS

## 2022-10-19 DIAGNOSIS — I10 ESSENTIAL (PRIMARY) HYPERTENSION: ICD-10-CM

## 2022-10-19 DIAGNOSIS — I21.4 NON-ST ELEVATION (NSTEMI) MYOCARDIAL INFARCTION: ICD-10-CM

## 2022-10-19 DIAGNOSIS — Z90.11 ACQUIRED ABSENCE OF RIGHT BREAST AND NIPPLE: Chronic | ICD-10-CM

## 2022-10-19 DIAGNOSIS — H26.9 UNSPECIFIED CATARACT: Chronic | ICD-10-CM

## 2022-10-19 DIAGNOSIS — I25.10 ATHEROSCLEROTIC HEART DISEASE OF NATIVE CORONARY ARTERY WITHOUT ANGINA PECTORIS: ICD-10-CM

## 2022-10-19 DIAGNOSIS — M25.561 PAIN IN RIGHT KNEE: ICD-10-CM

## 2022-10-19 DIAGNOSIS — Z95.5 PRESENCE OF CORONARY ANGIOPLASTY IMPLANT AND GRAFT: Chronic | ICD-10-CM

## 2022-10-19 DIAGNOSIS — Z01.818 ENCOUNTER FOR OTHER PREPROCEDURAL EXAMINATION: ICD-10-CM

## 2022-10-19 LAB
ALBUMIN SERPL ELPH-MCNC: 3.5 G/DL — SIGNIFICANT CHANGE UP (ref 3.3–5)
ANION GAP SERPL CALC-SCNC: 5 MMOL/L — SIGNIFICANT CHANGE UP (ref 5–17)
APTT BLD: 37.5 SEC — HIGH (ref 27.5–35.5)
BASOPHILS # BLD AUTO: 0.06 K/UL — SIGNIFICANT CHANGE UP (ref 0–0.2)
BASOPHILS NFR BLD AUTO: 1.2 % — SIGNIFICANT CHANGE UP (ref 0–2)
BUN SERPL-MCNC: 17 MG/DL — SIGNIFICANT CHANGE UP (ref 7–23)
CALCIUM SERPL-MCNC: 8.7 MG/DL — SIGNIFICANT CHANGE UP (ref 8.5–10.1)
CHLORIDE SERPL-SCNC: 105 MMOL/L — SIGNIFICANT CHANGE UP (ref 96–108)
CO2 SERPL-SCNC: 32 MMOL/L — HIGH (ref 22–31)
CREAT SERPL-MCNC: 0.61 MG/DL — SIGNIFICANT CHANGE UP (ref 0.5–1.3)
EGFR: 94 ML/MIN/1.73M2 — SIGNIFICANT CHANGE UP
EOSINOPHIL # BLD AUTO: 0.36 K/UL — SIGNIFICANT CHANGE UP (ref 0–0.5)
EOSINOPHIL NFR BLD AUTO: 7.3 % — HIGH (ref 0–6)
GLUCOSE SERPL-MCNC: 97 MG/DL — SIGNIFICANT CHANGE UP (ref 70–99)
HCT VFR BLD CALC: 42.5 % — SIGNIFICANT CHANGE UP (ref 34.5–45)
HGB BLD-MCNC: 13.8 G/DL — SIGNIFICANT CHANGE UP (ref 11.5–15.5)
IMM GRANULOCYTES NFR BLD AUTO: 0.2 % — SIGNIFICANT CHANGE UP (ref 0–0.9)
INR BLD: 1.74 RATIO — HIGH (ref 0.88–1.16)
LYMPHOCYTES # BLD AUTO: 1.63 K/UL — SIGNIFICANT CHANGE UP (ref 1–3.3)
LYMPHOCYTES # BLD AUTO: 32.9 % — SIGNIFICANT CHANGE UP (ref 13–44)
MCHC RBC-ENTMCNC: 31.5 PG — SIGNIFICANT CHANGE UP (ref 27–34)
MCHC RBC-ENTMCNC: 32.5 GM/DL — SIGNIFICANT CHANGE UP (ref 32–36)
MCV RBC AUTO: 97 FL — SIGNIFICANT CHANGE UP (ref 80–100)
MONOCYTES # BLD AUTO: 0.44 K/UL — SIGNIFICANT CHANGE UP (ref 0–0.9)
MONOCYTES NFR BLD AUTO: 8.9 % — SIGNIFICANT CHANGE UP (ref 2–14)
NEUTROPHILS # BLD AUTO: 2.46 K/UL — SIGNIFICANT CHANGE UP (ref 1.8–7.4)
NEUTROPHILS NFR BLD AUTO: 49.5 % — SIGNIFICANT CHANGE UP (ref 43–77)
PLATELET # BLD AUTO: 253 K/UL — SIGNIFICANT CHANGE UP (ref 150–400)
POTASSIUM SERPL-MCNC: 2.9 MMOL/L — CRITICAL LOW (ref 3.5–5.3)
POTASSIUM SERPL-SCNC: 2.9 MMOL/L — CRITICAL LOW (ref 3.5–5.3)
PROTHROM AB SERPL-ACNC: 20.3 SEC — HIGH (ref 10.5–13.4)
RBC # BLD: 4.38 M/UL — SIGNIFICANT CHANGE UP (ref 3.8–5.2)
RBC # FLD: 15.8 % — HIGH (ref 10.3–14.5)
SODIUM SERPL-SCNC: 142 MMOL/L — SIGNIFICANT CHANGE UP (ref 135–145)
WBC # BLD: 4.96 K/UL — SIGNIFICANT CHANGE UP (ref 3.8–10.5)
WBC # FLD AUTO: 4.96 K/UL — SIGNIFICANT CHANGE UP (ref 3.8–10.5)

## 2022-10-19 PROCEDURE — 93010 ELECTROCARDIOGRAM REPORT: CPT

## 2022-10-19 PROCEDURE — 87640 STAPH A DNA AMP PROBE: CPT

## 2022-10-19 PROCEDURE — 71046 X-RAY EXAM CHEST 2 VIEWS: CPT | Mod: 26

## 2022-10-19 PROCEDURE — 93005 ELECTROCARDIOGRAM TRACING: CPT

## 2022-10-19 PROCEDURE — 87641 MR-STAPH DNA AMP PROBE: CPT

## 2022-10-19 PROCEDURE — 80048 BASIC METABOLIC PNL TOTAL CA: CPT

## 2022-10-19 PROCEDURE — 86900 BLOOD TYPING SEROLOGIC ABO: CPT

## 2022-10-19 PROCEDURE — 85610 PROTHROMBIN TIME: CPT

## 2022-10-19 PROCEDURE — 85025 COMPLETE CBC W/AUTO DIFF WBC: CPT

## 2022-10-19 PROCEDURE — 85730 THROMBOPLASTIN TIME PARTIAL: CPT

## 2022-10-19 PROCEDURE — 99214 OFFICE O/P EST MOD 30 MIN: CPT | Mod: 25

## 2022-10-19 PROCEDURE — 71046 X-RAY EXAM CHEST 2 VIEWS: CPT

## 2022-10-19 PROCEDURE — 86850 RBC ANTIBODY SCREEN: CPT

## 2022-10-19 PROCEDURE — 82040 ASSAY OF SERUM ALBUMIN: CPT

## 2022-10-19 PROCEDURE — 36415 COLL VENOUS BLD VENIPUNCTURE: CPT

## 2022-10-19 PROCEDURE — 86901 BLOOD TYPING SEROLOGIC RH(D): CPT

## 2022-10-19 RX ORDER — CHLORDIAZEPOXIDE/CLIDINIUM BR 5 MG-2.5MG
0 CAPSULE ORAL
Qty: 0 | Refills: 0 | DISCHARGE

## 2022-10-19 RX ORDER — ACETAMINOPHEN 500 MG
2 TABLET ORAL
Qty: 0 | Refills: 0 | DISCHARGE

## 2022-10-19 NOTE — H&P PST ADULT - HISTORY OF PRESENT ILLNESS
73 y/o female presents to PST for scheduled right knee replacement on 11/1/22. Patient reports pain to right knee pain for several years delayed treatement due to care of mother. She states the pain has worsen over the last few years ago. Patient reports receiving steroid injection and PT with no relief.

## 2022-10-19 NOTE — H&P PST ADULT - NSICDXPASTSURGICALHX_GEN_ALL_CORE_FT
PAST SURGICAL HISTORY:  Cataract, bilateral     H/O total mastectomy of right breast     S/P drug eluting coronary stent placement in 2008 and 2010 - total x4

## 2022-10-19 NOTE — H&P PST ADULT - NSICDXPASTMEDICALHX_GEN_ALL_CORE_FT
no abdominal pain, no bloating, no constipation, no diarrhea, no nausea and no vomiting. PAST MEDICAL HISTORY:  Anemia     Anxiety     Breast cancer     Coronary artery disease, angina presence unspecified, unspecified vessel or lesion type, unspecified whether native or transplanted heart     Diverticulitis     HTN (hypertension)     Non-ST elevation (NSTEMI) myocardial infarction     Transient cerebral ischemia, unspecified type

## 2022-10-19 NOTE — H&P PST ADULT - ASSESSMENT
71 y/o female presents to PST for scheduled right knee replacement on 22.     CAPRINI SCORE    AGE RELATED RISK FACTORS                                                       MOBILITY RELATED FACTORS  [ ] Age 41-60 years                                            (1 Point)                  [ ] Bed rest                                                        (1 Point)  [ x] Age: 61-74 years                                           (2 Points)                [ ] Plaster cast                                                   (2 Points)  [ ] Age= 75 years                                              (3 Points)                 [ ] Bed bound for more than 72 hours                   (2 Points)    DISEASE RELATED RISK FACTORS                                               GENDER SPECIFIC FACTORS  [ ] Edema in the lower extremities                       (1 Point)                  [ ] Pregnancy                                                     (1 Point)  [ ] Varicose veins                                               (1 Point)                  [ ] Post-partum < 6 weeks                                   (1 Point)             [ ] BMI > 25 Kg/m2                                            (1 Point)                  [ ] Hormonal therapy  or oral contraception            (1 Point)                 [ ] Sepsis (in the previous month)                        (1 Point)                  [ ] History of pregnancy complications  [ ] Pneumonia or serious lung disease                                               [ ] Unexplained or recurrent                       (1 Point)           (in the previous month)                               (1 Point)  [ ] Abnormal pulmonary function test                     (1 Point)                 SURGERY RELATED RISK FACTORS  [ ] Acute myocardial infarction                              (1 Point)                 [ ]  Section                                            (1 Point)  [ ] Congestive heart failure (in the previous month)  (1 Point)                 [ ] Minor surgery                                                 (1 Point)   [ ] Inflammatory bowel disease                             (1 Point)                 [ ] Arthroscopic surgery                                        (2 Points)  [ ] Central venous access                                    (2 Points)                [ ] General surgery lasting more than 45 minutes   (2 Points)       [ ] Stroke (in the previous month)                          (5 Points)               [ x] Elective arthroplasty                                        (5 Points)            [ ] malignancy                                                             (2 points)                                                                                                                                 HEMATOLOGY RELATED FACTORS                                                 TRAUMA RELATED RISK FACTORS  [ ] Prior episodes of VTE                                     (3 Points)                 [ ] Fracture of the hip, pelvis, or leg                       (5 Points)  [ ] Positive family history for VTE                         (3 Points)                 [ ] Acute spinal cord injury (in the previous month)  (5 Points)  [ ] Prothrombin 26687 A                                      (3 Points)                 [ ] Paralysis  (less than 1 month)                          (5 Points)  [ ] Factor V Leiden                                             (3 Points)                 [ ] Multiple Trauma within 1 month                         (5 Points)  [ ] Lupus anticoagulants                                     (3 Points)                                                           [ ] Anticardiolipin antibodies                                (3 Points)                                                       [ ] High homocysteine in the blood                      (3 Points)                                             [ ] Other congenital or acquired thrombophilia       (3 Points)                                                [ ] Heparin induced thrombocytopenia                  (3 Points)                                          Total Score [ 7    The Caprini score indicates that this patient is at high risk for a VTE event (score 6 or greater). Surgical patients in this group will benefit from both pharmacologic prophylaxis and intermittent compression devices.  The surgical team will determine the balance between VTE risk and bleeding risk, and other clinical considerations

## 2022-10-19 NOTE — H&P PST ADULT - PROBLEM SELECTOR PLAN 2
Cardiac consult scheduled pending reports  Xarelto as per cardiology. Continue ASA.   On  the DOS  with sip of water take cardiac meds -Bystolic   Patient verbalized understanding.

## 2022-10-19 NOTE — H&P PST ADULT - HEALTH CARE MAINTENANCE
no recent travels outside of the country or states within the last 14 days, no fever, URI, SOB or recent change /loss in smell or taste   recent hx of covid19 9/2022 positive with home test

## 2022-10-19 NOTE — H&P PST ADULT - PROBLEM SELECTOR PLAN 1
Pre op and chlorhexidine instructions given and explained.  Avoid NSAIDs and OTC supplements.   Patient verbalized understanding  medical consult requested by surgeon  covid 19 swab scheduled, advised to quarantine after test

## 2022-10-20 DIAGNOSIS — Z01.818 ENCOUNTER FOR OTHER PREPROCEDURAL EXAMINATION: ICD-10-CM

## 2022-10-20 LAB
MRSA PCR RESULT.: SIGNIFICANT CHANGE UP
S AUREUS DNA NOSE QL NAA+PROBE: DETECTED

## 2022-10-20 NOTE — CHART NOTE - NSCHARTNOTEFT_GEN_A_CORE
MSSA detected from nasal swab done in [facility] 10/19/2022. Patient instructed to apply Mupirocin to nasal passage 2 times per day for 5 days starting 10/27/2022. Questions answered. Patient also with a Blood Potassium level of 2.9. Dr. Hogan and Dr. Dugan alerted by  lab. Patient placed on Potassium supplement by Dr. Hogan which she started the evening of 10/19/2022.

## 2022-10-25 ENCOUNTER — APPOINTMENT (OUTPATIENT)
Dept: BREAST CENTER | Facility: CLINIC | Age: 72
End: 2022-10-25

## 2022-10-25 VITALS
SYSTOLIC BLOOD PRESSURE: 143 MMHG | DIASTOLIC BLOOD PRESSURE: 90 MMHG | WEIGHT: 171 LBS | HEIGHT: 63 IN | HEART RATE: 71 BPM | BODY MASS INDEX: 30.3 KG/M2

## 2022-10-25 PROCEDURE — 99213 OFFICE O/P EST LOW 20 MIN: CPT

## 2022-10-25 RX ORDER — FLUOCINOLONE ACETONIDE 0.25 MG/G
0.03 OINTMENT TOPICAL TWICE DAILY
Qty: 1 | Refills: 0 | Status: DISCONTINUED | COMMUNITY
Start: 2022-07-28 | End: 2022-10-25

## 2022-10-25 RX ORDER — TRAMADOL HYDROCHLORIDE 50 MG/1
50 TABLET, COATED ORAL
Qty: 30 | Refills: 0 | Status: DISCONTINUED | COMMUNITY
Start: 2022-03-31 | End: 2022-10-25

## 2022-10-25 RX ORDER — DICYCLOMINE HYDROCHLORIDE 10 MG/1
10 CAPSULE ORAL
Qty: 120 | Refills: 1 | Status: DISCONTINUED | COMMUNITY
Start: 2022-03-03 | End: 2022-10-25

## 2022-10-25 NOTE — HISTORY OF PRESENT ILLNESS
[FreeTextEntry1] : This is a 72 year old  female of [place]namese, non-Quaker ancestry who was diagnosed with right breast cancer in 9/2013.  She had a lumpectomy and sentinel node biopsy (3 SLN removed) [place] [facility] [place] The margin was close and she had a re-excision.  The margin was again close and she then went to Dr. Springer at [place].  She had a mastectomy with expander reconstruction. She wasn't satisfied with the appearance with the expander and she saw Dr. Osborn for another opinion.  She then had the expander exchanged with a reduction on the left side for symmetry in 10/2014.  That surgery was complicated by a hematoma on the right requiring reoperation.  She then had the right expander replaced with a left revision in January 2015. The permanent right implant was then inserted with alloderm and fat grafting in September 2015.\par \par She has chronic discomfort and tightness in the right reconstruction.  She finds it more comfortable to sleep in a sports bra. It doesn't bother her as much or all the time anymore.\par \par She took exemestane until 2/2019.\par \par \par She is going to have a right knee replacement with Dr. Galvez.\par \par

## 2022-10-25 NOTE — PHYSICAL EXAM
[Sclera nonicteric] : sclera nonicteric [Supple] : supple [No Supraclavicular Adenopathy] : no supraclavicular adenopathy [No Cervical Adenopathy] : no cervical adenopathy [Clear to Auscultation Bilat] : clear to auscultation bilaterally [No Thyromegaly] : no thyromegaly [Normal Sinus Rhythm] : normal sinus rhythm [Normal S1, S2] : normal S1 and S2 [Examined in the supine and seated position] : examined in the supine and seated position [No dominant masses] : no dominant masses in right breast  [No dominant masses] : no dominant masses left breast [No Nipple Retraction] : no left nipple retraction [No Nipple Discharge] : no left nipple discharge [No Axillary Lymphadenopathy] : no left axillary lymphadenopathy [Soft] : abdomen soft [Not Tender] : non-tender [No Palpable Masses] : no abdominal mass palpated [de-identified] : Implant.  Vertical scar 6:00 curved laterally. [de-identified] : Reduction pattern scars.

## 2022-10-25 NOTE — DATA REVIEWED
[FreeTextEntry1] : Left mammogram and ultrasound  10/4/2022:  No mammographic or sonographic evidence of malignancy.\par \par Bilateral breast MRI  6/7/2021:  No MRI evidence of malignancy.\par \par

## 2022-10-29 ENCOUNTER — LABORATORY RESULT (OUTPATIENT)
Age: 72
End: 2022-10-29

## 2022-11-01 ENCOUNTER — INPATIENT (INPATIENT)
Facility: HOSPITAL | Age: 72
LOS: 1 days | Discharge: HOME | DRG: 470 | End: 2022-11-03
Attending: ORTHOPAEDIC SURGERY | Admitting: ORTHOPAEDIC SURGERY
Payer: MEDICARE

## 2022-11-01 ENCOUNTER — APPOINTMENT (OUTPATIENT)
Dept: ORTHOPEDIC SURGERY | Facility: HOSPITAL | Age: 72
End: 2022-11-01

## 2022-11-01 ENCOUNTER — TRANSCRIPTION ENCOUNTER (OUTPATIENT)
Age: 72
End: 2022-11-01

## 2022-11-01 ENCOUNTER — APPOINTMENT (OUTPATIENT)
Dept: CARDIOLOGY | Facility: CLINIC | Age: 72
End: 2022-11-01

## 2022-11-01 ENCOUNTER — RESULT REVIEW (OUTPATIENT)
Age: 72
End: 2022-11-01

## 2022-11-01 VITALS
TEMPERATURE: 99 F | HEIGHT: 62 IN | WEIGHT: 171.96 LBS | OXYGEN SATURATION: 97 % | SYSTOLIC BLOOD PRESSURE: 150 MMHG | DIASTOLIC BLOOD PRESSURE: 98 MMHG | HEART RATE: 95 BPM | RESPIRATION RATE: 16 BRPM

## 2022-11-01 DIAGNOSIS — M17.11 UNILATERAL PRIMARY OSTEOARTHRITIS, RIGHT KNEE: ICD-10-CM

## 2022-11-01 DIAGNOSIS — H26.9 UNSPECIFIED CATARACT: Chronic | ICD-10-CM

## 2022-11-01 DIAGNOSIS — Z95.5 PRESENCE OF CORONARY ANGIOPLASTY IMPLANT AND GRAFT: Chronic | ICD-10-CM

## 2022-11-01 DIAGNOSIS — Z90.11 ACQUIRED ABSENCE OF RIGHT BREAST AND NIPPLE: Chronic | ICD-10-CM

## 2022-11-01 LAB
ANION GAP SERPL CALC-SCNC: 6 MMOL/L — SIGNIFICANT CHANGE UP (ref 5–17)
BUN SERPL-MCNC: 12 MG/DL — SIGNIFICANT CHANGE UP (ref 7–23)
CALCIUM SERPL-MCNC: 8.8 MG/DL — SIGNIFICANT CHANGE UP (ref 8.5–10.1)
CHLORIDE SERPL-SCNC: 106 MMOL/L — SIGNIFICANT CHANGE UP (ref 96–108)
CO2 SERPL-SCNC: 28 MMOL/L — SIGNIFICANT CHANGE UP (ref 22–31)
CREAT SERPL-MCNC: 0.64 MG/DL — SIGNIFICANT CHANGE UP (ref 0.5–1.3)
EGFR: 93 ML/MIN/1.73M2 — SIGNIFICANT CHANGE UP
GLUCOSE SERPL-MCNC: 110 MG/DL — HIGH (ref 70–99)
HCT VFR BLD CALC: 39.2 % — SIGNIFICANT CHANGE UP (ref 34.5–45)
HGB BLD-MCNC: 12.7 G/DL — SIGNIFICANT CHANGE UP (ref 11.5–15.5)
MCHC RBC-ENTMCNC: 32.4 GM/DL — SIGNIFICANT CHANGE UP (ref 32–36)
MCHC RBC-ENTMCNC: 32.5 PG — SIGNIFICANT CHANGE UP (ref 27–34)
MCV RBC AUTO: 100.3 FL — HIGH (ref 80–100)
PLATELET # BLD AUTO: 276 K/UL — SIGNIFICANT CHANGE UP (ref 150–400)
POTASSIUM SERPL-MCNC: 4.4 MMOL/L — SIGNIFICANT CHANGE UP (ref 3.5–5.3)
POTASSIUM SERPL-SCNC: 4.4 MMOL/L — SIGNIFICANT CHANGE UP (ref 3.5–5.3)
RBC # BLD: 3.91 M/UL — SIGNIFICANT CHANGE UP (ref 3.8–5.2)
RBC # FLD: 15.6 % — HIGH (ref 10.3–14.5)
SODIUM SERPL-SCNC: 140 MMOL/L — SIGNIFICANT CHANGE UP (ref 135–145)
WBC # BLD: 8.68 K/UL — SIGNIFICANT CHANGE UP (ref 3.8–10.5)
WBC # FLD AUTO: 8.68 K/UL — SIGNIFICANT CHANGE UP (ref 3.8–10.5)

## 2022-11-01 PROCEDURE — 97116 GAIT TRAINING THERAPY: CPT | Mod: GP

## 2022-11-01 PROCEDURE — 99223 1ST HOSP IP/OBS HIGH 75: CPT

## 2022-11-01 PROCEDURE — 73560 X-RAY EXAM OF KNEE 1 OR 2: CPT | Mod: RT

## 2022-11-01 PROCEDURE — 97162 PT EVAL MOD COMPLEX 30 MIN: CPT | Mod: GP

## 2022-11-01 PROCEDURE — 20985 CPTR-ASST DIR MS PX: CPT | Mod: RT

## 2022-11-01 PROCEDURE — 88305 TISSUE EXAM BY PATHOLOGIST: CPT

## 2022-11-01 PROCEDURE — C1776: CPT

## 2022-11-01 PROCEDURE — 99221 1ST HOSP IP/OBS SF/LOW 40: CPT

## 2022-11-01 PROCEDURE — 97530 THERAPEUTIC ACTIVITIES: CPT | Mod: GP

## 2022-11-01 PROCEDURE — C1713: CPT

## 2022-11-01 PROCEDURE — 73560 X-RAY EXAM OF KNEE 1 OR 2: CPT | Mod: 26,RT

## 2022-11-01 PROCEDURE — 82962 GLUCOSE BLOOD TEST: CPT

## 2022-11-01 PROCEDURE — 27447 TOTAL KNEE ARTHROPLASTY: CPT | Mod: AS

## 2022-11-01 PROCEDURE — 27447 TOTAL KNEE ARTHROPLASTY: CPT

## 2022-11-01 PROCEDURE — 80048 BASIC METABOLIC PNL TOTAL CA: CPT

## 2022-11-01 PROCEDURE — 36415 COLL VENOUS BLD VENIPUNCTURE: CPT

## 2022-11-01 PROCEDURE — 88305 TISSUE EXAM BY PATHOLOGIST: CPT | Mod: 26

## 2022-11-01 PROCEDURE — 85027 COMPLETE CBC AUTOMATED: CPT

## 2022-11-01 RX ORDER — OXYCODONE HYDROCHLORIDE 5 MG/1
5 TABLET ORAL EVERY 4 HOURS
Refills: 0 | Status: DISCONTINUED | OUTPATIENT
Start: 2022-11-01 | End: 2022-11-03

## 2022-11-01 RX ORDER — SENNA PLUS 8.6 MG/1
2 TABLET ORAL AT BEDTIME
Refills: 0 | Status: DISCONTINUED | OUTPATIENT
Start: 2022-11-01 | End: 2022-11-03

## 2022-11-01 RX ORDER — RIVAROXABAN 15 MG-20MG
1 KIT ORAL
Qty: 0 | Refills: 0 | DISCHARGE

## 2022-11-01 RX ORDER — HEPARIN SODIUM 5000 [USP'U]/ML
5000 INJECTION INTRAVENOUS; SUBCUTANEOUS ONCE
Refills: 0 | Status: COMPLETED | OUTPATIENT
Start: 2022-11-02 | End: 2022-11-02

## 2022-11-01 RX ORDER — NEBIVOLOL HYDROCHLORIDE 5 MG/1
10 TABLET ORAL DAILY
Refills: 0 | Status: DISCONTINUED | OUTPATIENT
Start: 2022-11-01 | End: 2022-11-03

## 2022-11-01 RX ORDER — SODIUM CHLORIDE 9 MG/ML
1000 INJECTION, SOLUTION INTRAVENOUS
Refills: 0 | Status: DISCONTINUED | OUTPATIENT
Start: 2022-11-01 | End: 2022-11-01

## 2022-11-01 RX ORDER — ACETAMINOPHEN 500 MG
975 TABLET ORAL EVERY 8 HOURS
Refills: 0 | Status: DISCONTINUED | OUTPATIENT
Start: 2022-11-01 | End: 2022-11-03

## 2022-11-01 RX ORDER — PANTOPRAZOLE SODIUM 20 MG/1
40 TABLET, DELAYED RELEASE ORAL ONCE
Refills: 0 | Status: COMPLETED | OUTPATIENT
Start: 2022-11-01 | End: 2022-11-01

## 2022-11-01 RX ORDER — POLYETHYLENE GLYCOL 3350 17 G/17G
17 POWDER, FOR SOLUTION ORAL
Qty: 1 | Refills: 0
Start: 2022-11-01

## 2022-11-01 RX ORDER — AMLODIPINE BESYLATE 2.5 MG/1
2.5 TABLET ORAL DAILY
Refills: 0 | Status: DISCONTINUED | OUTPATIENT
Start: 2022-11-01 | End: 2022-11-02

## 2022-11-01 RX ORDER — HYDROMORPHONE HYDROCHLORIDE 2 MG/ML
0.5 INJECTION INTRAMUSCULAR; INTRAVENOUS; SUBCUTANEOUS EVERY 4 HOURS
Refills: 0 | Status: DISCONTINUED | OUTPATIENT
Start: 2022-11-01 | End: 2022-11-03

## 2022-11-01 RX ORDER — TRANEXAMIC ACID 100 MG/ML
1000 INJECTION, SOLUTION INTRAVENOUS ONCE
Refills: 0 | Status: DISCONTINUED | OUTPATIENT
Start: 2022-11-01 | End: 2022-11-01

## 2022-11-01 RX ORDER — PANTOPRAZOLE SODIUM 20 MG/1
40 TABLET, DELAYED RELEASE ORAL
Refills: 0 | Status: DISCONTINUED | OUTPATIENT
Start: 2022-11-01 | End: 2022-11-03

## 2022-11-01 RX ORDER — ACETAMINOPHEN 500 MG
975 TABLET ORAL ONCE
Refills: 0 | Status: COMPLETED | OUTPATIENT
Start: 2022-11-01 | End: 2022-11-01

## 2022-11-01 RX ORDER — OXYCODONE HYDROCHLORIDE 5 MG/1
5 TABLET ORAL ONCE
Refills: 0 | Status: DISCONTINUED | OUTPATIENT
Start: 2022-11-01 | End: 2022-11-01

## 2022-11-01 RX ORDER — ONDANSETRON 8 MG/1
8 TABLET, FILM COATED ORAL EVERY 8 HOURS
Refills: 0 | Status: DISCONTINUED | OUTPATIENT
Start: 2022-11-01 | End: 2022-11-03

## 2022-11-01 RX ORDER — PROCHLORPERAZINE MALEATE 5 MG
10 TABLET ORAL EVERY 8 HOURS
Refills: 0 | Status: DISCONTINUED | OUTPATIENT
Start: 2022-11-01 | End: 2022-11-03

## 2022-11-01 RX ORDER — CEFAZOLIN SODIUM 1 G
2000 VIAL (EA) INJECTION EVERY 8 HOURS
Refills: 0 | Status: COMPLETED | OUTPATIENT
Start: 2022-11-01 | End: 2022-11-02

## 2022-11-01 RX ORDER — SENNA PLUS 8.6 MG/1
1 TABLET ORAL
Qty: 7 | Refills: 0
Start: 2022-11-01 | End: 2022-11-07

## 2022-11-01 RX ORDER — ONDANSETRON 8 MG/1
4 TABLET, FILM COATED ORAL ONCE
Refills: 0 | Status: DISCONTINUED | OUTPATIENT
Start: 2022-11-01 | End: 2022-11-01

## 2022-11-01 RX ORDER — FENTANYL CITRATE 50 UG/ML
50 INJECTION INTRAVENOUS
Refills: 0 | Status: DISCONTINUED | OUTPATIENT
Start: 2022-11-01 | End: 2022-11-01

## 2022-11-01 RX ORDER — FERROUS SULFATE 325(65) MG
325 TABLET ORAL DAILY
Refills: 0 | Status: DISCONTINUED | OUTPATIENT
Start: 2022-11-01 | End: 2022-11-03

## 2022-11-01 RX ORDER — PANTOPRAZOLE SODIUM 20 MG/1
1 TABLET, DELAYED RELEASE ORAL
Qty: 0 | Refills: 0 | DISCHARGE

## 2022-11-01 RX ORDER — PANTOPRAZOLE SODIUM 20 MG/1
1 TABLET, DELAYED RELEASE ORAL
Qty: 30 | Refills: 0
Start: 2022-11-01 | End: 2022-11-30

## 2022-11-01 RX ORDER — OXYCODONE HYDROCHLORIDE 5 MG/1
10 TABLET ORAL EVERY 4 HOURS
Refills: 0 | Status: DISCONTINUED | OUTPATIENT
Start: 2022-11-01 | End: 2022-11-03

## 2022-11-01 RX ORDER — FOLIC ACID 0.8 MG
1 TABLET ORAL DAILY
Refills: 0 | Status: DISCONTINUED | OUTPATIENT
Start: 2022-11-01 | End: 2022-11-03

## 2022-11-01 RX ORDER — POLYETHYLENE GLYCOL 3350 17 G/17G
17 POWDER, FOR SOLUTION ORAL AT BEDTIME
Refills: 0 | Status: DISCONTINUED | OUTPATIENT
Start: 2022-11-01 | End: 2022-11-03

## 2022-11-01 RX ORDER — RIVAROXABAN 15 MG-20MG
20 KIT ORAL
Refills: 0 | Status: DISCONTINUED | OUTPATIENT
Start: 2022-11-02 | End: 2022-11-03

## 2022-11-01 RX ORDER — PANTOPRAZOLE SODIUM 20 MG/1
1 TABLET, DELAYED RELEASE ORAL
Qty: 30 | Refills: 0 | DISCHARGE
Start: 2022-11-01 | End: 2022-11-30

## 2022-11-01 RX ORDER — ACETAMINOPHEN 500 MG
2 TABLET ORAL
Qty: 84 | Refills: 0
Start: 2022-11-01 | End: 2022-11-14

## 2022-11-01 RX ORDER — ALPRAZOLAM 0.25 MG
1 TABLET ORAL
Qty: 0 | Refills: 0 | DISCHARGE

## 2022-11-01 RX ORDER — ASCORBIC ACID 60 MG
500 TABLET,CHEWABLE ORAL
Refills: 0 | Status: DISCONTINUED | OUTPATIENT
Start: 2022-11-01 | End: 2022-11-03

## 2022-11-01 RX ORDER — ATORVASTATIN CALCIUM 80 MG/1
40 TABLET, FILM COATED ORAL AT BEDTIME
Refills: 0 | Status: DISCONTINUED | OUTPATIENT
Start: 2022-11-01 | End: 2022-11-03

## 2022-11-01 RX ORDER — DEXAMETHASONE 0.5 MG/5ML
8 ELIXIR ORAL ONCE
Refills: 0 | Status: COMPLETED | OUTPATIENT
Start: 2022-11-02 | End: 2022-11-02

## 2022-11-01 RX ORDER — CELECOXIB 200 MG/1
200 CAPSULE ORAL EVERY 12 HOURS
Refills: 0 | Status: DISCONTINUED | OUTPATIENT
Start: 2022-11-02 | End: 2022-11-03

## 2022-11-01 RX ORDER — ALPRAZOLAM 0.25 MG
1 TABLET ORAL THREE TIMES A DAY
Refills: 0 | Status: DISCONTINUED | OUTPATIENT
Start: 2022-11-01 | End: 2022-11-03

## 2022-11-01 RX ORDER — TRANEXAMIC ACID 100 MG/ML
1 INJECTION, SOLUTION INTRAVENOUS ONCE
Refills: 0 | Status: DISCONTINUED | OUTPATIENT
Start: 2022-11-01 | End: 2022-11-03

## 2022-11-01 RX ORDER — SODIUM CHLORIDE 9 MG/ML
1000 INJECTION, SOLUTION INTRAVENOUS
Refills: 0 | Status: DISCONTINUED | OUTPATIENT
Start: 2022-11-01 | End: 2022-11-03

## 2022-11-01 RX ORDER — OXYCODONE HYDROCHLORIDE 5 MG/1
1 TABLET ORAL
Qty: 30 | Refills: 0
Start: 2022-11-01 | End: 2022-11-05

## 2022-11-01 RX ADMIN — Medication 10 MILLIGRAM(S): at 22:58

## 2022-11-01 RX ADMIN — PANTOPRAZOLE SODIUM 40 MILLIGRAM(S): 20 TABLET, DELAYED RELEASE ORAL at 12:19

## 2022-11-01 RX ADMIN — Medication 100 MILLIGRAM(S): at 21:20

## 2022-11-01 RX ADMIN — SODIUM CHLORIDE 75 MILLILITER(S): 9 INJECTION, SOLUTION INTRAVENOUS at 17:26

## 2022-11-01 RX ADMIN — Medication 975 MILLIGRAM(S): at 22:58

## 2022-11-01 RX ADMIN — HYDROMORPHONE HYDROCHLORIDE 0.5 MILLIGRAM(S): 2 INJECTION INTRAMUSCULAR; INTRAVENOUS; SUBCUTANEOUS at 22:04

## 2022-11-01 RX ADMIN — Medication 975 MILLIGRAM(S): at 12:20

## 2022-11-01 RX ADMIN — Medication 500 MILLIGRAM(S): at 22:05

## 2022-11-01 RX ADMIN — Medication 975 MILLIGRAM(S): at 12:19

## 2022-11-01 RX ADMIN — HYDROMORPHONE HYDROCHLORIDE 0.5 MILLIGRAM(S): 2 INJECTION INTRAMUSCULAR; INTRAVENOUS; SUBCUTANEOUS at 22:19

## 2022-11-01 NOTE — DISCHARGE NOTE PROVIDER - NSDCHHASSISTDEVIC_GEN_ALL_CORE_FT
ataxic gait secondary to right total knee replacement  Ataxic gait secondary to recent Right Total Knee Arthroplasty

## 2022-11-01 NOTE — DISCHARGE NOTE PROVIDER - NSDCCPGOAL_GEN_ALL_CORE_FT
To get better and follow your care plan as instructed. Improved pain, Improved function, Return to ADLs

## 2022-11-01 NOTE — DISCHARGE NOTE PROVIDER - NSDCMRMEDTOKEN_GEN_ALL_CORE_FT
acetaminophen 500 mg oral tablet: 2 tab(s) orally every 8 hours, As Needed -for mild pain MDD:6  Ambien 10 mg oral tablet: 1 tab(s) orally once a day (at bedtime), As Needed  amLODIPine 2.5 mg oral tablet: 1 tab(s) orally once a day  aspirin 81 mg oral tablet: 1 tab(s) orally once a day  Bystolic 10 mg oral tablet: 1 tab(s) orally once a day  Calcium 500+D oral tablet, chewable: 1 tab(s) orally 2 times a day  Crestor 10 mg oral tablet: 1 tab(s) orally once a day (at bedtime)  enalapril 10 mg oral tablet: 1 tab(s) orally 2 times a day  ferrous sulfate 325 mg (65 mg elemental iron) oral tablet: 1 tab(s) orally 2 times a day  MiraLax oral powder for reconstitution: 17 gram(s) orally once a day   oxyCODONE 5 mg oral tablet: 1 tab(s) orally every 4 hours, As Needed -for severe pain MDD:6  pantoprazole 40 mg oral delayed release tablet: 1 tab(s) orally once a day   pantoprazole 40 mg oral delayed release tablet: 1 tab(s) orally once a day  senna oral tablet: 1 tab(s) orally once a day   Vitamin B-12 1000 mcg/mL injectable solution: injectable once a week  Vitamin D2 2000 intl units oral capsule: 1 cap(s) orally once a day  Xanax 1 mg oral tablet: 1 tab(s) orally 3 times a day, As Needed  Xarelto 20 mg oral tablet: 1 tab(s) orally once a day (in the evening)       acetaminophen 500 mg oral tablet: 2 tab(s) orally every 8 hours, As Needed -for mild pain MDD:6  Ambien 10 mg oral tablet: 1 tab(s) orally once a day (at bedtime), As Needed  amLODIPine 2.5 mg oral tablet: 1 tab(s) orally once a day  aspirin 81 mg oral tablet: 1 tab(s) orally once a day  Bystolic 10 mg oral tablet: 1 tab(s) orally once a day  Calcium 500+D oral tablet, chewable: 1 tab(s) orally 2 times a day  Crestor 10 mg oral tablet: 1 tab(s) orally once a day (at bedtime)  enalapril 10 mg oral tablet: 1 tab(s) orally 2 times a day  ferrous sulfate 325 mg (65 mg elemental iron) oral tablet: 1 tab(s) orally 2 times a day  MiraLax oral powder for reconstitution: 17 gram(s) orally once a day   oxyCODONE 5 mg oral tablet: 1 tab(s) orally every 4 hours, As Needed -for severe pain MDD:6  pantoprazole 40 mg oral delayed release tablet: 1 tab(s) orally once a day   pantoprazole 40 mg oral delayed release tablet: 1 tab(s) orally once a day  senna oral tablet: 1 tab(s) orally once a day   Vitamin B-12 1000 mcg/mL injectable solution: injectable once a week  Vitamin D2 2000 intl units oral capsule: 1 cap(s) orally once a day  Xanax 1 mg oral tablet: 1 tab(s) orally 3 times a day, As Needed; DO NOT TAKE with Narcotic pain medication  Xarelto 20 mg oral tablet: 1 tab(s) orally once a day (in the evening)       acetaminophen 500 mg oral tablet: 2 tab(s) orally every 8 hours, As Needed -for mild pain MDD:6  amLODIPine 2.5 mg oral tablet: 1 tab(s) orally once a day  Bystolic 10 mg oral tablet: 1 tab(s) orally once a day  Crestor 10 mg oral tablet: 1 tab(s) orally once a day (at bedtime)  enalapril 10 mg oral tablet: 1 tab(s) orally 2 times a day  ferrous sulfate 325 mg (65 mg elemental iron) oral tablet: 1 tab(s) orally 2 times a day  MiraLax oral powder for reconstitution: 17 gram(s) orally once a day   oxyCODONE 5 mg oral tablet: 1 tab(s) orally every 4 hours, As Needed -for severe pain MDD:6  pantoprazole 40 mg oral delayed release tablet: 1 tab(s) orally once a day   senna oral tablet: 1 tab(s) orally once a day   Vitamin B-12 1000 mcg/mL injectable solution: injectable once a week  Vitamin D2 2000 intl units oral capsule: 1 cap(s) orally once a day  Xanax 1 mg oral tablet: 1 tab(s) orally 3 times a day, As Needed; DO NOT TAKE with Narcotic pain medication   acetaminophen 500 mg oral tablet: 2 tab(s) orally every 8 hours   amLODIPine 2.5 mg oral tablet: 1 tab(s) orally once a day  Bystolic 10 mg oral tablet: 1 tab(s) orally once a day  Crestor 10 mg oral tablet: 1 tab(s) orally once a day (at bedtime)  enalapril 10 mg oral tablet: 1 tab(s) orally 2 times a day  ferrous sulfate 325 mg (65 mg elemental iron) oral tablet: 1 tab(s) orally 2 times a day  MiraLax oral powder for reconstitution: 17 gram(s) orally once a day   oxyCODONE 5 mg oral tablet: 1 tab(s) orally every 4 hours, As Needed -for severe pain MDD:6  pantoprazole 40 mg oral delayed release tablet: 1 tab(s) orally once a day   senna oral tablet: 1 tab(s) orally once a day   Vitamin B-12 1000 mcg/mL injectable solution: injectable once a week  Vitamin D2 2000 intl units oral capsule: 1 cap(s) orally once a day  Xanax 1 mg oral tablet: 1 tab(s) orally 3 times a day, As Needed; DO NOT TAKE with Narcotic pain medication   acetaminophen 500 mg oral tablet: 2 tab(s) orally every 8 hours   amLODIPine 2.5 mg oral tablet: 1 tab(s) orally once a day  Bystolic 10 mg oral tablet: 1 tab(s) orally once a day  Crestor 10 mg oral tablet: 1 tab(s) orally once a day (at bedtime)  enalapril 10 mg oral tablet: 1 tab(s) orally 2 times a day  ferrous sulfate 325 mg (65 mg elemental iron) oral tablet: 1 tab(s) orally 2 times a day  MiraLax oral powder for reconstitution: 17 gram(s) orally once a day   oxyCODONE 5 mg oral tablet: 1 tab(s) orally every 4 hours, As Needed -for severe pain MDD:6  pantoprazole 40 mg oral delayed release tablet: 1 tab(s) orally once a day   rivaroxaban 20 mg oral tablet: 1 tab(s) orally once a day with dinner always take with food   senna oral tablet: 1 tab(s) orally once a day   Vitamin B-12 1000 mcg/mL injectable solution: injectable once a week  Vitamin D2 2000 intl units oral capsule: 1 cap(s) orally once a day  Xanax 1 mg oral tablet: 1 tab(s) orally 3 times a day, As Needed; DO NOT TAKE with Narcotic pain medication

## 2022-11-01 NOTE — CONSULT NOTE ADULT - ASSESSMENT
this is a 72 year ol female with pmh: MI X2, TIA  and PFA on Xarelto , osteoarthritis,, now s/p rt tkr on 11-1-2022. Pt has high thrombosis risk and requries anticoagulation prophylaxis.       plan:   Heparin 5,000 units 6am tomorrow then resume her xarelto 20mg po daily with dinner.  :daily cbc/bmp  :le venodynes  :oob as per ortho  Thanks for consult will f/u.

## 2022-11-01 NOTE — DISCHARGE NOTE PROVIDER - NSDCACTIVITY_GEN_ALL_CORE
Walking - Indoors allowed/Walking - Outdoors allowed/Follow Instructions Provided by your Surgical Team Showering allowed/Stairs allowed/Walking - Indoors allowed/No heavy lifting/straining/Walking - Outdoors allowed/Follow Instructions Provided by your Surgical Team

## 2022-11-01 NOTE — PROGRESS NOTE ADULT - ASSESSMENT
A: 72F s/p Right TKA     P;  WBAT RLE   therapy   DVT ppx  post op abx  venodynes  incentive spirometer  pain control   follow labs

## 2022-11-01 NOTE — DISCHARGE NOTE PROVIDER - NSDCFUADDINST_GEN_ALL_CORE_FT
Discharge Instructions for right Total Knee Arthroplasty:    1. ACTIVITY: WBAT, Rolling walker, Daily PT. Gentle ROM 0-full as tolerated. Walk plenty.  Knee Immobilizer at night time only for 3 weeks.  2. CALL FOR: fever over 101, wound redness, drainage or open area, calf pain/calf swelling.  3. BANDAGE: Change dressing to a new Mepilex Ag bandage POD7 (11/8/22). May change sooner if dressing saturated or falling off. DO NOT REMOVE BANDAGE TO CHECK WOUND ON INTAKE.  4. SHOWER: Okay to shower. Do not scrub over dressing or submerge. No baths or hot tub.   5. STAPLES: RN Remove Staples POD14 (11/15/22).  6. DVT PE PROPHYLAXIS: Managed by Anticoag Team. See Med Rec.  7. GI: Continue Protonix daily while on Anticoagulant. an eRx has been sent to your pharmacy.  8. LABS:  INR if on Coumadin.  9. FOLLOW UP: Dr. Dugan in 1 month. Call to schedule.  10. MEDICATIONS:  If going home, eRX sent to your pharmacy for .   11.**Call office if medications not covered under your insurance, especially BLOOD CLOT PREVENTION/anticoagulant medication.    2-point gait Discharge Instructions for Right Total Knee Arthroplasty:    1. ACTIVITY: WBAT, Rolling walker, Daily PT. Gentle ROM 0-full as tolerated. Walk plenty.  Knee Immobilizer at night time only for 3 weeks.  2. CALL FOR: fever over 101, wound redness, drainage or open area, calf pain/calf swelling.  3. BANDAGE: Change dressing to a new Mepilex Ag bandage POD7 (11/8/22). May change sooner if dressing saturated or falling off. DO NOT REMOVE BANDAGE TO CHECK WOUND ON INTAKE.  4. SHOWER: Okay to shower. Do not scrub over dressing or submerge. No baths or hot tub.   5. STAPLES: RN Remove Staples POD14 (11/15/22).  6. DVT PE PROPHYLAXIS: Managed by Anticoag Team. See Med Rec.  7. GI: Continue Protonix daily while on Anticoagulant. an eRx has been sent to your pharmacy.  8. LABS:  INR if on Coumadin.  9. FOLLOW UP: Dr. Dugan in 1 month. Call to schedule.  10. MEDICATIONS:  If going home, eRX sent to your pharmacy for .   11.**Call office if medications not covered under your insurance, especially BLOOD CLOT PREVENTION/anticoagulant medication.

## 2022-11-01 NOTE — PHYSICAL THERAPY INITIAL EVALUATION ADULT - MODALITIES TREATMENT COMMENTS
Mobility portion of exam deferred due to no sensation or motor control in arthur BLEs due to nn block. Pt educated on total knee replacement movement quality, falls risk reduction, therex review and the overall impact on the pt's ADLs and safety.

## 2022-11-01 NOTE — CONSULT NOTE ADULT - SUBJECTIVE AND OBJECTIVE BOX
HPI:  This is a 71 y/o female presents to [facility] for scheduled right knee replacement on 22. Patient reports pain to right knee pain for several years delayed treatement due to care of mother. She states the pain has worsen over the last few years ago. Patient reports receiving steroid injection and PT with no relief.      Patient is a 72y old  Female who presents with a chief complaint of right knee OA, Planned right TKR (2022 15:27) pt s/p rt tkr on 2022      Consulted by Dr. Danyel Dugan  for VTE prophylaxis, risk stratification, and anticoagulation management.    PAST MEDICAL & SURGICAL HISTORY:  Coronary artery disease, angina presence unspecified, unspecified vessel or lesion type, unspecified whether native or transplanted heart  PFO on xarelto     Transient cerebral ischemia, unspecified type      Non-ST elevation (NSTEMI) myocardial infarction      HTN (hypertension)      Anxiety      Breast cancer      Diverticulitis      Anemia      S/P drug eluting coronary stent placement  in  and  - total x4      H/O total mastectomy of right breast      Cataract, bilateral          FAMILY HISTORY:  Family history of lung cancer    Family history of breast cancer    Family history of bladder cancer (Sibling)    Family history of prostate cancer (Sibling)    Family history of heart disease (Sibling, Grandparent)  brother and grand mother        Interval Note:   1022  Pt seen at bedside in PACU.  Discussed why she is on Xarelto and she states she had 2 mis, tia and a PFO and her MD  put her on Xarelto.  Discussed resuming her Xarelto tomorrow pm and heparin in the a.m. she has no concerns.          CAPRINI SCORE  AGE RELATED RISK FACTORS                                                       MOBILITY RELATED FACTORS  [ ] Age 41-60 years                                            (1 Point)                  [ ] Bed rest                                                        (1 Point)  [x ] Age: 61-74 years                                           (2 Points)                [ ] Plaster cast                                                   (2 Points)  [ ] Age= 75 years                                              (3 Points)                 [ ] Bed bound for more than 72 hours                   (2 Points)    DISEASE RELATED RISK FACTORS                                               GENDER SPECIFIC FACTORS  [ ] Edema in the lower extremities                       (1 Point)           [ ] Pregnancy                                                            (1 Point)  [ ] Varicose veins                                               (1 Point)                  [ ] Post-partum < 6 weeks                                      (1 Point)             [x ] BMI > 25 Kg/m2                                            (1 Point)                  [ ] Hormonal therapy or oral contraception       (1 Point)                 [ ] Sepsis (in the previous month)                        (1 Point)             [ ] History of pregnancy complications                (1Point)  [ ] Pneumonia or serious lung disease                                             [ ] Unexplained or recurrent  (=/>3), premature                                 (In the previous month)                               (1 Point)                birth with toxemia or growth-restricted infant (1 Point)  [ ] Abnormal pulmonary function test            (1 Point)                                   SURGERY RELATED RISK FACTORS  [ ] Acute myocardial infarction                       (1 Point)                  [ ]  Section                                         (1 Point)  [ ] Congestive heart failure (in the previous month) (1 Point)   [ ] Minor surgery   lasting <45 minutes       (1 Point)   [ ] Inflammatory bowel disease                             (1 Point)          [ ] Arthroscopic surgery                                  (2 Points)  [ ] Central venous access                                    (2 Points)            [ ] General surgery lasting >45 minutes      (2 Points)       [ ] Stroke (in the previous month)                  (5 Points)            [ x] Elective major lower extremity arthroplasty (5 Points)                                   [x  ] Malignancy (present or past include skin melanoma                                          but exclude  basal skin cell)    (2 points)                                      TRAUMA RELATED RISK FACTORS                HEMATOLOGY RELATED FACTORS                                  [ ] Fracture of the hip, pelvis, or leg                       (5 Points)  [ ] Prior episodes of VTE                                     (3 Points)          [ ] Acute spinal cord injury (in the previous month)  (5 Points)  [ ] Positive family history for VTE                         (3 Points)       [ ] Paralysis (less than 1 month)                          (5 Points)  [ ] Prothrombin 24335 A                                      (3 Points)         [ ] Multiple Trauma (within 1month)                 (5Points)                                                                                                                                                                [ ] Factor V Leiden                                          (3 Points)                                OTHER RISK FACTORS                          [ ] Lupus anticoagulants                                     (3 Points)                       [ ] BMI > 40                          (1 Point)                                                         [ ] Anticardiolipin antibodies                                (3 Points)                   [ ] Smoking                              (1Point)                                                [ ] High homocysteine in the blood                      (3 Points)                [  ] Diabetes requiring insulin (1point)                         [ ] Other congenital or acquired thrombophilia       (3 Points)          [  ] Chemotherapy                   (1 Point)  [ ] Heparin induced thrombocytopenia                  (3 Points)             [  ] Blood Transfusion                (1 point)                                                                                                             Total Score [  10 ]                                                                                                                                                                                                                                                                                                                                                                                                                                            IMPROVE Bleeding Risk Score: 1.5    Falls Risk:   High (x  )  Mod (  )  Low (  )  crcl: 94.1         cr:   .64       BMI  31.5           EBL:  150 ml  Time procedure    : starts: 1407             :ends: 15:16          Denies any personal or familial history of clotting or bleeding disorders.    Allergies    No Known Allergies    Intolerances        REVIEW OF SYSTEMS    (  )Fever	     (  )Constipation	(  )SOB				(  )Headache	(  )Dysuria  (  )Chills	     (  )Melena	(  )Dyspnea present on exertion	                    (  )Dizziness                    (  )Polyuria  (  )Nausea	     (  )Hematochezia	(  )Cough			                    (  )Syncope   	(  )Hematuria  (  )Vomiting    (  )Chest Pain	(  )Wheezing			(  )Weakness  (  )Diarrhea     (  )Palpitations	(  )Anorexia			(  )Myalgia  (x) knee pain    Pertinent positives in HPI and daily subjective.  All other ROS negative.      Vital Signs Last 24 Hrs  T(C): 36.3 (2022 15:28), Max: 37.1 (2022 11:49)  T(F): 97.3 (2022 15:28), Max: 98.7 (2022 11:49)  HR: 73 (2022 16:45) (65 - 95)  BP: 119/67 (2022 16:45) (111/71 - 150/98)  BP(mean): --  RR: 18 (2022 16:45) (14 - 18)  SpO2: 96% (2022 16:45) (96% - 97%)    Parameters below as of 2022 16:15  Patient On (Oxygen Delivery Method): nasal cannula  O2 Flow (L/min): 2      PHYSICAL EXAM:    Constitutional: Appears Well    Neurological: A& O x 3    Skin: Warm    Respiratory and Thorax: normal effort; Breath sounds: normal; No rales/wheezing/rhonchi  	  Cardiovascular: S1, S2, regular, NMBR	    Gastrointestinal: BS + x 4Q, nontender	    Genitourinary:  Bladder nondistended, nontender    Musculoskeletal:   General Right:   no muscle/joint tenderness,   normal tone, no joint swelling,   ROM: limited	    General Left:   no muscle/joint tenderness,   normal tone, no joint swelling,   ROM: full        Knee:  Right: Incision: ; Dressing CDI; decreased sensation      Lower extrems:   Right: no calf tenderness              negative mart's sign               + pedal pulses    Left:   no calf tenderness              negative mart's sign               + pedal pulses                          12.7   8.68  )-----------( 276      ( 2022 15:55 )             39.2           140  |  106  |  12  ----------------------------<  110<H>  4.4   |  28  |  0.64    Ca    8.8      2022 15:55        				    MEDICATIONS  (STANDING):  amLODIPine   Tablet 2.5 milliGRAM(s) Oral daily  atorvastatin 40 milliGRAM(s) Oral at bedtime  enalapril 10 milliGRAM(s) Oral two times a day  lactated ringers. 1000 milliLiter(s) IV Continuous <Continuous>  lactated ringers. 1000 milliLiter(s) IV Continuous <Continuous>  nebivolol 10 milliGRAM(s) Oral daily  tranexamic acid 2% Topical Irrigation 1 Application(s) IntraArticular once          DVT Prophylaxis:  LMWH                   (  )  Heparin SQ           (x one  )  Coumadin             (  )  Xarelto                  (x  )  Eliquis                   (  )  Venodynes           (x  )  Ambulation          (  x)  UFH                       (  )  Contraindicated  (  )  EC Aspirin             (  )

## 2022-11-01 NOTE — CONSULT NOTE ADULT - SUBJECTIVE AND OBJECTIVE BOX
PCP:  Dr Bradley Hogan    CHIEF COMPLAINT: Right knee OA    HISTORY OF THE PRESENT ILLNESS: this is a 71 yo female with PMH of anemia, anxiety, breast Ca, CAD, diverticulitis, HTN, HLD, NSTEMI, TIA and OA of her right knee,  with progressive pain with ambulation for many years.  Now states the pain has worsened over the last few years.  She tried and failed steroid injections and Physical therapy, bith of which only provided temporary relief.  She has now opted for Orthopedic surgical management.  Pt is seen in PACU, awake, alert, IN NAD, denies any CP or SOB.  We are consulted for medical management    PAST MEDICAL HISTORY: as above    PAST SURGICAL HISTORY: B/L cataracts, right breast mastectomy, OMAR xtent x 4, last one in 2010    FAMILY HISTORY:   breast ca and lung Ca, bladder Ca, CAD and prostate Ca    SOCIAL HISTORY:  former smoker x 15 years, quit 10 years ago, social alcohol, no drugs    ALLERGIES: NKDA    HOME MEDS:  see med rec    REVIEW OF SYSTEMS:   All 10 systems reviewed in detailed and found to be negative with the exception of what has already been described above    MEDICATIONS  (STANDING):  amLODIPine   Tablet 2.5 milliGRAM(s) Oral daily  atorvastatin 40 milliGRAM(s) Oral at bedtime  enalapril 10 milliGRAM(s) Oral two times a day  nebivolol 10 milliGRAM(s) Oral daily  tranexamic acid 2% Topical Irrigation 1 Application(s) IntraArticular once    MEDICATIONS  (PRN):  ALPRAZolam 1 milliGRAM(s) Oral three times a day PRN anxiety      VITALS:  T(F): 98.7 (11-01-22 @ 11:49), Max: 98.7 (11-01-22 @ 11:49)  HR: 95 (11-01-22 @ 11:49) (95 - 95)  BP: 150/98 (11-01-22 @ 11:49) (150/98 - 150/98)  RR: 16 (11-01-22 @ 11:49) (16 - 16)  SpO2: 97% (11-01-22 @ 11:49) (97% - 97%)  Wt(kg): --    I&O's Summary      CAPILLARY BLOOD GLUCOSE      POCT Blood Glucose.: 91 mg/dL (01 Nov 2022 14:00)  POCT Blood Glucose.: 98 mg/dL (01 Nov 2022 11:36)    PHYSICAL EXAM:    GENERAL: Comfortable, no acute distress   HEAD:  Normocephalic, atraumatic  EYES: EOMI, PERRLA  HEENT: Moist mucous membranes  NECK: Supple, No JVD  NERVOUS SYSTEM:  Alert & Oriented X3, Motor Strength 5/5 B/L upper and lower extremities  CHEST/LUNG: Clear to auscultation bilaterally  HEART: Regular rate and rhythm  ABDOMEN: Soft, non tender, Nondistended, Bowel sounds present  GENITOURINARY: Voiding, no palpable bladder  EXTREMITIES:   No clubbing, cyanosis, or edema  MUSCULOSKELETAL- right knee dsg c/d/i  SKIN-no rash      LABS: pending        IMPRESSION: 71 yo female with above pmh a/w:  # OA right knee  # S/P right TKR    POD#0  pain control  PT eval  Bowel regimen  IVF's  Finish ABXs  regular diet  PPI  VTE prophylaxis  monitor CBC/BMP    H/O SHARMAINE  on iron supplements  preop HH WNL      # HTN  cont amlodipine 2.5 mg po daily, nebivolol 10 mg po daily, enalapril 10 mg po daily  pt at high risk for fluctuations in B/P 2/2 anesthesia, pain, hypovolemia and use of narcotics, placing pt at high risk for MI/CVA  monitor B/P closely  adjust anti-htn's accordingly    # HLD  cont statin    # CAD with stents x 4, h/o NSTEMI   cont asa, statin    #anxiety  cont xanax 1 mg po daily    # Vte prophylaxis  AC consult  xarelto  Venodynes  INC mobility      Thank you for the consult, will follow         PCP:  Dr Bradley Hogan    CHIEF COMPLAINT: Right knee OA    HISTORY OF THE PRESENT ILLNESS: this is a 73 yo female with PMH of anemia, anxiety from upper GIB, tx with Protonix bid and iron supps, breast Ca, CAD, diverticulitis, HTN, HLD, NSTEMI x2, TIA, found to have an ASD/PFO, not repaired, placed on Xarelto, last dose Friday 10/28 night,  valvular disease and OA of her right knee,  with progressive pain with ambulation for many years.  Now states the pain has worsened over the last few years.  She tried and failed steroid injections and Physical therapy, bith of which only provided temporary relief.  She has now opted for Orthopedic surgical management.  Pt is seen in PACU, awake, alert, IN NAD, denies any CP or SOB.  We are consulted for medical management    PAST MEDICAL HISTORY: as above    PAST SURGICAL HISTORY: B/L cataracts, right breast mastectomy, OMAR xtent x 4, last one in 2010    FAMILY HISTORY:   breast ca and lung Ca, bladder Ca, CAD and prostate Ca    SOCIAL HISTORY:  former smoker x 15 years, quit 10 years ago, social alcohol, no drugs    ALLERGIES: NKDA    HOME MEDS:  see med rec    REVIEW OF SYSTEMS:   All 10 systems reviewed in detailed and found to be negative with the exception of what has already been described above    MEDICATIONS  (STANDING):  amLODIPine   Tablet 2.5 milliGRAM(s) Oral daily  atorvastatin 40 milliGRAM(s) Oral at bedtime  enalapril 10 milliGRAM(s) Oral two times a day  nebivolol 10 milliGRAM(s) Oral daily  tranexamic acid 2% Topical Irrigation 1 Application(s) IntraArticular once    MEDICATIONS  (PRN):  ALPRAZolam 1 milliGRAM(s) Oral three times a day PRN anxiety      VITALS:  T(F): 98.7 (11-01-22 @ 11:49), Max: 98.7 (11-01-22 @ 11:49)  HR: 95 (11-01-22 @ 11:49) (95 - 95)  BP: 150/98 (11-01-22 @ 11:49) (150/98 - 150/98)  RR: 16 (11-01-22 @ 11:49) (16 - 16)  SpO2: 97% (11-01-22 @ 11:49) (97% - 97%)  Wt(kg): --    I&O's Summary      CAPILLARY BLOOD GLUCOSE      POCT Blood Glucose.: 91 mg/dL (01 Nov 2022 14:00)  POCT Blood Glucose.: 98 mg/dL (01 Nov 2022 11:36)    PHYSICAL EXAM:    GENERAL: Comfortable, no acute distress   HEAD:  Normocephalic, atraumatic  EYES: EOMI, PERRLA  HEENT: Moist mucous membranes  NECK: Supple, No JVD  NERVOUS SYSTEM:  Alert & Oriented X3, Motor Strength 5/5 B/L upper and lower extremities  CHEST/LUNG: Clear to auscultation bilaterally  HEART: Regular rate and rhythm  ABDOMEN: Soft, non tender, Nondistended, Bowel sounds present  GENITOURINARY: Voiding, no palpable bladder  EXTREMITIES:   No clubbing, cyanosis, or edema  MUSCULOSKELETAL- right knee dsg c/d/i  SKIN-no rash      LABS: pending        IMPRESSION: 73 yo female with above pmh a/w:  # OA right knee  # S/P right TKR    POD#0  pain control  PT eval  Bowel regimen  IVF's  Finish ABXs  regular diet  PPI  VTE prophylaxis  monitor CBC/BMP    H/O SHARMAINE  on iron supplements  preop HH WNL      # HTN  cont amlodipine 2.5 mg po daily, nebivolol 10 mg po daily, enalapril 10 mg po daily  pt at high risk for fluctuations in B/P 2/2 anesthesia, pain, hypovolemia and use of narcotics, placing pt at high risk for MI/CVA  monitor B/P closely  adjust anti-htn's accordingly    # HLD  cont statin    # CAD with stents x 4, h/o NSTEMI   cont asa, statin    #H/o TIA/ASD/PFO  ASD and PFO not repaired  cont Xarelto when ok with AC team and Ortho    #anxiety  cont xanax 1 mg po daily    # Vte prophylaxis  AC consult  xarelto  Venodynes  INC mobility      Thank you for the consult, will follow         PCP:  Dr Bradley Hogan    CHIEF COMPLAINT: Right knee OA    HISTORY OF THE PRESENT ILLNESS: this is a 71 yo female with PMH of anemia, anxiety, upper GIB, tx with Protonix bid and iron supps, breast Ca, CAD, diverticulitis, HTN, HLD, NSTEMI x2, TIA, found to have an ASD/PFO, not repaired, placed on Xarelto, last dose Friday 10/28 night,  valvular disease and OA of her right knee,  with progressive pain with ambulation for many years.  Now states the pain has worsened over the last few years.  She tried and failed steroid injections and Physical therapy, bith of which only provided temporary relief.  She has now opted for Orthopedic surgical management.  Pt is seen in PACU, awake, alert, IN NAD, denies any CP or SOB.  We are consulted for medical management    PAST MEDICAL HISTORY: as above    PAST SURGICAL HISTORY: B/L cataracts, right breast mastectomy, OMAR xtent x 4, last one in 2010    FAMILY HISTORY:   breast ca and lung Ca, bladder Ca, CAD and prostate Ca    SOCIAL HISTORY:  former smoker x 15 years, quit 10 years ago, social alcohol, no drugs    ALLERGIES: NKDA    HOME MEDS:  see med rec    REVIEW OF SYSTEMS:   All 10 systems reviewed in detailed and found to be negative with the exception of what has already been described above    MEDICATIONS  (STANDING):  amLODIPine   Tablet 2.5 milliGRAM(s) Oral daily  atorvastatin 40 milliGRAM(s) Oral at bedtime  enalapril 10 milliGRAM(s) Oral two times a day  nebivolol 10 milliGRAM(s) Oral daily  tranexamic acid 2% Topical Irrigation 1 Application(s) IntraArticular once    MEDICATIONS  (PRN):  ALPRAZolam 1 milliGRAM(s) Oral three times a day PRN anxiety      VITALS:  T(F): 98.7 (11-01-22 @ 11:49), Max: 98.7 (11-01-22 @ 11:49)  HR: 95 (11-01-22 @ 11:49) (95 - 95)  BP: 150/98 (11-01-22 @ 11:49) (150/98 - 150/98)  RR: 16 (11-01-22 @ 11:49) (16 - 16)  SpO2: 97% (11-01-22 @ 11:49) (97% - 97%)  Wt(kg): --    I&O's Summary      CAPILLARY BLOOD GLUCOSE      POCT Blood Glucose.: 91 mg/dL (01 Nov 2022 14:00)  POCT Blood Glucose.: 98 mg/dL (01 Nov 2022 11:36)    PHYSICAL EXAM:    GENERAL: Comfortable, no acute distress   HEAD:  Normocephalic, atraumatic  EYES: EOMI, PERRLA  HEENT: Moist mucous membranes  NECK: Supple, No JVD  NERVOUS SYSTEM:  Alert & Oriented X3, Motor Strength 5/5 B/L upper and lower extremities  CHEST/LUNG: Clear to auscultation bilaterally  HEART: Regular rate and rhythm  ABDOMEN: Soft, non tender, Nondistended, Bowel sounds present  GENITOURINARY: Voiding, no palpable bladder  EXTREMITIES:   No clubbing, cyanosis, or edema  MUSCULOSKELETAL- right knee dsg c/d/i  SKIN-no rash      LABS: pending        IMPRESSION: 71 yo female with above pmh a/w:  # OA right knee  # S/P right TKR    POD#0  pain control  PT eval  Bowel regimen  IVF's  Finish ABXs  regular diet  PPI  VTE prophylaxis  monitor CBC/BMP    H/O SHARMAINE  on iron supplements  preop HH WNL      # HTN  cont amlodipine 2.5 mg po daily, nebivolol 10 mg po daily, enalapril 10 mg po daily  pt at high risk for fluctuations in B/P 2/2 anesthesia, pain, hypovolemia and use of narcotics, placing pt at high risk for MI/CVA  monitor B/P closely  adjust anti-htn's accordingly    # HLD  cont statin    # CAD with stents x 4, h/o NSTEMI   cont asa, statin    #H/o TIA/ASD/PFO  ASD and PFO not repaired  cont Xarelto when ok with AC team and Ortho    #anxiety  cont xanax 1 mg po daily    # Vte prophylaxis  AC consult  xarelto  Venodynes  INC mobility      Thank you for the consult, will follow

## 2022-11-01 NOTE — PATIENT PROFILE ADULT - FALL HARM RISK - HARM RISK INTERVENTIONS

## 2022-11-01 NOTE — CONSULT NOTE ADULT - NS ATTEND AMEND GEN_ALL_CORE FT
Patient is seen and examined at bedside with SHIRA Ash  73yo/F underwent RT TKR  Ortho following  PT as tolerated  Pain meds prn  Monitor HH postop  Incentive spirometry  Bowel regimen  Agree with above assessment and plan

## 2022-11-01 NOTE — PHYSICAL THERAPY INITIAL EVALUATION ADULT - LIVES WITH, PROFILE
2 story home with 2 [address] through garage with grab bars. pt has ability to stay on ground floor with bedroom and full bath./spouse

## 2022-11-01 NOTE — PHYSICAL THERAPY INITIAL EVALUATION ADULT - PERTINENT HX OF CURRENT PROBLEM, REHAB EVAL
73 yo female with PMH of anemia, anxiety from upper GIB, tx with Protonix bid and iron supps, breast Ca, CAD, diverticulitis, HTN, HLD, NSTEMI x2, TIA, found to have an ASD/PFO, not repaired, placed on Xarelto, last dose Friday 10/28 night,  valvular disease and OA of her right knee,  with progressive pain with ambulation for many years.  Now states the pain has worsened over the last few years.  She tried and failed steroid injections and Physical therapy, both of which only provided temporary relief.  She has now opted for Orthopedic surgical management.

## 2022-11-01 NOTE — DISCHARGE NOTE PROVIDER - HOSPITAL COURSE
H&P:  Pt is a72y Female     Now s/p Right Total Knee Arthroplasty. Pt is afebrile with stable vital signs. Pain is controlled. Alert and Oriented. Exam intact EHL FHL TA GS, +DP. Dressing is clean and dry.    Hospital Course:  Patient presented to [facility] medically cleared for elective Knee Replacement Surgery, having failed outpatient conservative management. Prophylactic antibiotics were started before the procedure and continued for 24 hours. Pt received an Adductor canal block in the OR for analgesia per anesthesia protocol. They were admitted after surgery to the orthopedic floor.   There were no complications during the hospital stay. All home medications were continued.     Routine consults were obtained from the Anticoagulation Team for DVT/PE prophylaxis, from Physical Therapy for twice daily PT, and from the Hospitalist for Medical Co-management. Patient was placed on anticoagulation.  Pertinent home medications were continued.  Daily labs were followed.      On POD 0 pt was stable overnight. No major events post op. Pt received twice daily PT. The plan is for DC to home with home PT. The orthopedic Attending is aware and agrees.    **POD1 DC DOCUMENTAION** (Keep or Delete depending on scenario)  The patient had no post-operative complications and clinically progressed faster than anticipated. The following condition(s) were actively treated during the hospital stay:  Diabetes  HTN  BMI>=35  Cardiac/Cardiovascular disease (MI, CAD, HF, Stroke, Cardiac Arrhythmia)  Pulmonary disease  Sleep Apnea  End stage renal disease  Hx of cirrhosis  Depression, Anxiety, hx of mental illness  Hx of DVT/VTE  Chronic pain, requiring narcotics  The patient met criteria for sicharge before the 2nd night of the stay. The patient was appropriately and safely discharged home with home PT.    ******INCOMPLETE NOTE IN PREPARATION FOR DISPO PLANNING*******  ******INCOMPLETE NOTE IN PREPARATION FOR DISPO PLANNING*******  ******INCOMPLETE NOTE IN PREPARATION FOR DISPO PLANNING*******     H&P:  Pt is a72y Female     Now s/p Right Total Knee Arthroplasty. Pt is afebrile with stable vital signs. Pain is controlled. Alert and Oriented. Exam intact EHL FHL TA GS, +DP. Dressing is clean and dry.    Hospital Course:  Patient presented to Health system medically cleared for elective Knee Replacement Surgery, having failed outpatient conservative management. Prophylactic antibiotics were started before the procedure and continued for 24 hours. Pt received an Adductor canal block in the OR for analgesia per anesthesia protocol. They were admitted after surgery to the orthopedic floor.   There were no complications during the hospital stay. All home medications were continued.     Routine consults were obtained from the Anticoagulation Team for DVT/PE prophylaxis, from Physical Therapy for twice daily PT, and from the Hospitalist for Medical Co-management. Patient was placed on anticoagulation.  Pertinent home medications were continued.  Daily labs were followed.      On POD 0 pt was stable overnight. No major events post op. Pt received twice daily PT. The plan is for DC to home with home PT. The orthopedic Attending is aware and agrees.    The patient had no post-operative complications and clinically progressed faster than anticipated. The following condition(s) were actively treated during the hospital stay:  Diabetes  HTN  BMI>=35  Cardiac/Cardiovascular disease (MI, CAD, HF, Stroke, Cardiac Arrhythmia)  Pulmonary disease  Sleep Apnea  End stage renal disease  Hx of cirrhosis  Depression, Anxiety, hx of mental illness  Hx of DVT/VTE  Chronic pain, requiring narcotics  The patient met criteria for discharge before the 2nd night of the stay. The patient was appropriately and safely discharged home with home PT.     H&P:  Pt is a72y Female     Now s/p Right Total Knee Arthroplasty. Pt is afebrile with stable vital signs. Pain is controlled. Alert and Oriented. Exam intact EHL FHL TA GS, +DP. Dressing is clean and dry.    Hospital Course:  Patient presented to [facility] medically cleared for elective Right Knee Replacement Surgery, having failed outpatient conservative management. Prophylactic antibiotics were started before the procedure and continued for 24 hours. Pt received an Adductor canal block in the OR for analgesia per anesthesia protocol. They were admitted after surgery to the orthopedic floor.   There were no complications during the hospital stay. All home medications were continued.     Routine consults were obtained from the Anticoagulation Team for DVT/PE prophylaxis, from Physical Therapy for twice daily PT, and from the Hospitalist for Medical Co-management. Patient was placed on anticoagulation.  Pertinent home medications were continued.  Daily labs were followed.      On POD 0 pt was stable overnight. No major events post op. Pt received twice daily PT. The plan is for DC to home with home PT. The orthopedic Attending is aware and agrees.        The patient had no post-operative complications and clinically progressed faster than anticipated. The following condition(s) were actively treated during the hospital stay:  HTN  Hx NSTEMI s/p Stent  Hx Breast Ca s/p Mastectomy  Anxiety  The patient met criteria for discharge before the 2nd night of the stay. The patient was appropriately and safely discharged home with home PT.     H&P:  Pt is a72y Female   PAST MEDICAL & SURGICAL HISTORY:  Coronary artery disease, angina presence unspecified, unspecified vessel or lesion type, unspecified whether native or transplanted heart      Transient cerebral ischemia, unspecified type      Non-ST elevation (NSTEMI) myocardial infarction      HTN (hypertension)      Anxiety      Breast cancer      Diverticulitis      Anemia      S/P drug eluting coronary stent placement  in 2008 and 2010 - total x4      H/O total mastectomy of right breast      Cataract, bilateral        Now s/p Right Total Knee Arthroplasty. Pt is afebrile with stable vital signs. Pain is controlled. Alert and Oriented. Exam intact EHL FHL TA GS, +DP. Dressing is clean and dry.    Hospital Course:  Patient presented to [facility] medically cleared for elective Right Knee Replacement Surgery, having failed outpatient conservative management. Prophylactic antibiotics were started before the procedure and continued for 24 hours. Pt received an Adductor canal block in the OR for analgesia per anesthesia protocol. They were admitted after surgery to the orthopedic floor.   There were no complications during the hospital stay. All home medications were continued.     Routine consults were obtained from the Anticoagulation Team for DVT/PE prophylaxis, from Physical Therapy for twice daily PT, and from the Hospitalist for Medical Co-management. Patient was placed on anticoagulation.  Pertinent home medications were continued.  Daily labs were followed.      On POD 0 pt was stable overnight. No major events post op. Pt received twice daily PT. The plan is for DC to home with home PT. The orthopedic Attending is aware and agrees.

## 2022-11-02 ENCOUNTER — TRANSCRIPTION ENCOUNTER (OUTPATIENT)
Age: 72
End: 2022-11-02

## 2022-11-02 LAB
ANION GAP SERPL CALC-SCNC: 8 MMOL/L — SIGNIFICANT CHANGE UP (ref 5–17)
BUN SERPL-MCNC: 20 MG/DL — SIGNIFICANT CHANGE UP (ref 7–23)
CALCIUM SERPL-MCNC: 8.6 MG/DL — SIGNIFICANT CHANGE UP (ref 8.5–10.1)
CHLORIDE SERPL-SCNC: 102 MMOL/L — SIGNIFICANT CHANGE UP (ref 96–108)
CO2 SERPL-SCNC: 26 MMOL/L — SIGNIFICANT CHANGE UP (ref 22–31)
CREAT SERPL-MCNC: 0.62 MG/DL — SIGNIFICANT CHANGE UP (ref 0.5–1.3)
EGFR: 93 ML/MIN/1.73M2 — SIGNIFICANT CHANGE UP
GLUCOSE SERPL-MCNC: 107 MG/DL — HIGH (ref 70–99)
HCT VFR BLD CALC: 33.7 % — LOW (ref 34.5–45)
HGB BLD-MCNC: 11.1 G/DL — LOW (ref 11.5–15.5)
MCHC RBC-ENTMCNC: 32.6 PG — SIGNIFICANT CHANGE UP (ref 27–34)
MCHC RBC-ENTMCNC: 32.9 GM/DL — SIGNIFICANT CHANGE UP (ref 32–36)
MCV RBC AUTO: 99.1 FL — SIGNIFICANT CHANGE UP (ref 80–100)
PLATELET # BLD AUTO: 257 K/UL — SIGNIFICANT CHANGE UP (ref 150–400)
POTASSIUM SERPL-MCNC: 3.8 MMOL/L — SIGNIFICANT CHANGE UP (ref 3.5–5.3)
POTASSIUM SERPL-SCNC: 3.8 MMOL/L — SIGNIFICANT CHANGE UP (ref 3.5–5.3)
RBC # BLD: 3.4 M/UL — LOW (ref 3.8–5.2)
RBC # FLD: 15.7 % — HIGH (ref 10.3–14.5)
SODIUM SERPL-SCNC: 136 MMOL/L — SIGNIFICANT CHANGE UP (ref 135–145)
WBC # BLD: 10.99 K/UL — HIGH (ref 3.8–10.5)
WBC # FLD AUTO: 10.99 K/UL — HIGH (ref 3.8–10.5)

## 2022-11-02 PROCEDURE — 99231 SBSQ HOSP IP/OBS SF/LOW 25: CPT

## 2022-11-02 PROCEDURE — 99233 SBSQ HOSP IP/OBS HIGH 50: CPT

## 2022-11-02 RX ORDER — RIVAROXABAN 15 MG-20MG
1 KIT ORAL
Qty: 0 | Refills: 0 | DISCHARGE

## 2022-11-02 RX ORDER — ACETAMINOPHEN 500 MG
2 TABLET ORAL
Qty: 84 | Refills: 0
Start: 2022-11-02 | End: 2022-11-15

## 2022-11-02 RX ORDER — PANTOPRAZOLE SODIUM 20 MG/1
1 TABLET, DELAYED RELEASE ORAL
Qty: 0 | Refills: 0 | DISCHARGE

## 2022-11-02 RX ORDER — RIVAROXABAN 15 MG-20MG
1 KIT ORAL
Refills: 0 | DISCHARGE
Start: 2022-11-02

## 2022-11-02 RX ORDER — ZOLPIDEM TARTRATE 10 MG/1
1 TABLET ORAL
Qty: 0 | Refills: 0 | DISCHARGE

## 2022-11-02 RX ORDER — ASPIRIN/CALCIUM CARB/MAGNESIUM 324 MG
1 TABLET ORAL
Qty: 0 | Refills: 0 | DISCHARGE

## 2022-11-02 RX ORDER — RIVAROXABAN 15 MG-20MG
1 KIT ORAL
Qty: 0 | Refills: 0 | DISCHARGE
Start: 2022-11-02

## 2022-11-02 RX ADMIN — CELECOXIB 200 MILLIGRAM(S): 200 CAPSULE ORAL at 11:06

## 2022-11-02 RX ADMIN — Medication 975 MILLIGRAM(S): at 21:16

## 2022-11-02 RX ADMIN — Medication 1 MILLIGRAM(S): at 11:06

## 2022-11-02 RX ADMIN — Medication 975 MILLIGRAM(S): at 06:06

## 2022-11-02 RX ADMIN — NEBIVOLOL HYDROCHLORIDE 10 MILLIGRAM(S): 5 TABLET ORAL at 11:07

## 2022-11-02 RX ADMIN — Medication 325 MILLIGRAM(S): at 11:06

## 2022-11-02 RX ADMIN — Medication 500 MILLIGRAM(S): at 11:06

## 2022-11-02 RX ADMIN — HYDROMORPHONE HYDROCHLORIDE 0.5 MILLIGRAM(S): 2 INJECTION INTRAMUSCULAR; INTRAVENOUS; SUBCUTANEOUS at 05:55

## 2022-11-02 RX ADMIN — CELECOXIB 200 MILLIGRAM(S): 200 CAPSULE ORAL at 21:16

## 2022-11-02 RX ADMIN — Medication 1 TABLET(S): at 11:06

## 2022-11-02 RX ADMIN — ATORVASTATIN CALCIUM 40 MILLIGRAM(S): 80 TABLET, FILM COATED ORAL at 21:16

## 2022-11-02 RX ADMIN — RIVAROXABAN 20 MILLIGRAM(S): KIT at 17:05

## 2022-11-02 RX ADMIN — OXYCODONE HYDROCHLORIDE 10 MILLIGRAM(S): 5 TABLET ORAL at 21:16

## 2022-11-02 RX ADMIN — HYDROMORPHONE HYDROCHLORIDE 0.5 MILLIGRAM(S): 2 INJECTION INTRAMUSCULAR; INTRAVENOUS; SUBCUTANEOUS at 03:59

## 2022-11-02 RX ADMIN — Medication 101.6 MILLIGRAM(S): at 06:06

## 2022-11-02 RX ADMIN — HEPARIN SODIUM 5000 UNIT(S): 5000 INJECTION INTRAVENOUS; SUBCUTANEOUS at 06:07

## 2022-11-02 RX ADMIN — CELECOXIB 200 MILLIGRAM(S): 200 CAPSULE ORAL at 12:25

## 2022-11-02 RX ADMIN — OXYCODONE HYDROCHLORIDE 10 MILLIGRAM(S): 5 TABLET ORAL at 17:05

## 2022-11-02 RX ADMIN — SODIUM CHLORIDE 75 MILLILITER(S): 9 INJECTION, SOLUTION INTRAVENOUS at 04:11

## 2022-11-02 RX ADMIN — Medication 100 MILLIGRAM(S): at 06:06

## 2022-11-02 RX ADMIN — PANTOPRAZOLE SODIUM 40 MILLIGRAM(S): 20 TABLET, DELAYED RELEASE ORAL at 06:07

## 2022-11-02 RX ADMIN — Medication 1 MILLIGRAM(S): at 22:28

## 2022-11-02 NOTE — DISCHARGE NOTE NURSING/CASE MANAGEMENT/SOCIAL WORK - FLU SEASON?
"BMI for Adults  What is BMI?  Body mass index (BMI) is a number that is calculated from a person's weight and height. BMI can help estimate how much of a person's weight is composed of fat. BMI does not measure body fat directly. Rather, it is an alternative to procedures that directly measure body fat, which can be difficult and expensive.  BMI can help identify people who may be at higher risk for certain medical problems.  What are BMI measurements used for?  BMI is used as a screening tool to identify possible weight problems. It helps determine whether a person is obese, overweight, a healthy weight, or underweight.  BMI is useful for:  · Identifying a weight problem that may be related to a medical condition or may increase the risk for medical problems.  · Promoting changes, such as changes in diet and exercise, to help reach a healthy weight. BMI screening can be repeated to see if these changes are working.  How is BMI calculated?  BMI involves measuring your weight in relation to your height. Both height and weight are measured, and the BMI is calculated from those numbers. This can be done either in English ([place] or metric measurements. Note that charts and online BMI calculators are available to help you find your BMI quickly and easily without having to do these calculations yourself.  To calculate your BMI in English ([place] measurements:    1. Measure your weight in pounds (lb).  2. Multiply the number of pounds by 703.  ? For example, for a person who weighs 180 lb, multiply that number by 703, which equals 126,540.  3. Measure your height in inches. Then multiply that number by itself to get a measurement called \"inches squared.\"  ? For example, for a person who is 70 inches tall, the \"inches squared\" measurement is 70 inches x 70 inches, which equals 4,900 inches squared.  4. Divide the total from step 2 (number of lb x 703) by the total from step 3 (inches squared): 126,540 ÷ 4,900 = 25.8. This is " "your BMI.  To calculate your BMI in metric measurements:  1. Measure your weight in kilograms (kg).  2. Measure your height in meters (m). Then multiply that number by itself to get a measurement called \"meters squared.\"  ? For example, for a person who is 1.75 m tall, the \"meters squared\" measurement is 1.75 m x 1.75 m, which is equal to 3.1 meters squared.  3. Divide the number of kilograms (your weight) by the meters squared number. In this example: 70 ÷ 3.1 = 22.6. This is your BMI.  What do the results mean?  BMI charts are used to identify whether you are underweight, normal weight, overweight, or obese. The following guidelines will be used:  · Underweight: BMI less than 18.5.  · Normal weight: BMI between 18.5 and 24.9.  · Overweight: BMI between 25 and 29.9.  · Obese: BMI of 30 or above.  Keep these notes in mind:  · Weight includes both fat and muscle, so someone with a muscular build, such as an athlete, may have a BMI that is higher than 24.9. In cases like these, BMI is not an accurate measure of body fat.  · To determine if excess body fat is the cause of a BMI of 25 or higher, further assessments may need to be done by a health care provider.  · BMI is usually interpreted in the same way for men and women.  Where to find more information  For more information about BMI, including tools to quickly calculate your BMI, go to these websites:  · Centers for Disease Control and Prevention: www.cdc.gov  · American Heart Association: www.heart.org  · National Heart, Lung, and Blood [place]: www.nhlbi.nih.gov  Summary  · Body mass index (BMI) is a number that is calculated from a person's weight and height.  · BMI may help estimate how much of a person's weight is composed of fat. BMI can help identify those who may be at higher risk for certain medical problems.  · BMI can be measured using English measurements or metric measurements.  · BMI charts are used to identify whether you are underweight, normal " weight, overweight, or obese.  This information is not intended to replace advice given to you by your health care provider. Make sure you discuss any questions you have with your health care provider.  Document Revised: 09/09/2020 Document Reviewed: 07/17/2020  Elsevier Patient Education © 2021 [employer]     Yes...

## 2022-11-02 NOTE — PROGRESS NOTE ADULT - ASSESSMENT
this is a 72 year ol female with pmh: MI X2, TIA  and PFA on Xarelto , osteoarthritis,, now s/p rt tkr on 11-1-2022. Pt has high thrombosis risk and requries anticoagulation prophylaxis.       plan:   : resume her xarelto 20mg po daily with dinner.  :daily cbc/bmp  :le venodynes  :oob as per ortho  : will f/u.    Dispo:Home

## 2022-11-02 NOTE — DISCHARGE NOTE NURSING/CASE MANAGEMENT/SOCIAL WORK - PATIENT PORTAL LINK FT
You can access the FollowMyHealth Patient Portal offered by [facility] by registering at the following website: [email or web address]. By joining [facility]’s FollowMyHealth portal, you will also be able to view your health information using other applications (apps) compatible with our system.

## 2022-11-03 VITALS
HEART RATE: 69 BPM | DIASTOLIC BLOOD PRESSURE: 58 MMHG | SYSTOLIC BLOOD PRESSURE: 106 MMHG | TEMPERATURE: 98 F | OXYGEN SATURATION: 95 % | RESPIRATION RATE: 17 BRPM

## 2022-11-03 LAB
ANION GAP SERPL CALC-SCNC: 8 MMOL/L — SIGNIFICANT CHANGE UP (ref 5–17)
BUN SERPL-MCNC: 31 MG/DL — HIGH (ref 7–23)
CALCIUM SERPL-MCNC: 8.8 MG/DL — SIGNIFICANT CHANGE UP (ref 8.5–10.1)
CHLORIDE SERPL-SCNC: 102 MMOL/L — SIGNIFICANT CHANGE UP (ref 96–108)
CO2 SERPL-SCNC: 27 MMOL/L — SIGNIFICANT CHANGE UP (ref 22–31)
CREAT SERPL-MCNC: 0.63 MG/DL — SIGNIFICANT CHANGE UP (ref 0.5–1.3)
EGFR: 93 ML/MIN/1.73M2 — SIGNIFICANT CHANGE UP
GLUCOSE SERPL-MCNC: 95 MG/DL — SIGNIFICANT CHANGE UP (ref 70–99)
HCT VFR BLD CALC: 31.9 % — LOW (ref 34.5–45)
HGB BLD-MCNC: 10.4 G/DL — LOW (ref 11.5–15.5)
MCHC RBC-ENTMCNC: 32.1 PG — SIGNIFICANT CHANGE UP (ref 27–34)
MCHC RBC-ENTMCNC: 32.6 GM/DL — SIGNIFICANT CHANGE UP (ref 32–36)
MCV RBC AUTO: 98.5 FL — SIGNIFICANT CHANGE UP (ref 80–100)
PLATELET # BLD AUTO: 258 K/UL — SIGNIFICANT CHANGE UP (ref 150–400)
POTASSIUM SERPL-MCNC: 3.6 MMOL/L — SIGNIFICANT CHANGE UP (ref 3.5–5.3)
POTASSIUM SERPL-SCNC: 3.6 MMOL/L — SIGNIFICANT CHANGE UP (ref 3.5–5.3)
RBC # BLD: 3.24 M/UL — LOW (ref 3.8–5.2)
RBC # FLD: 15.6 % — HIGH (ref 10.3–14.5)
SODIUM SERPL-SCNC: 137 MMOL/L — SIGNIFICANT CHANGE UP (ref 135–145)
WBC # BLD: 10.34 K/UL — SIGNIFICANT CHANGE UP (ref 3.8–10.5)
WBC # FLD AUTO: 10.34 K/UL — SIGNIFICANT CHANGE UP (ref 3.8–10.5)

## 2022-11-03 PROCEDURE — 99232 SBSQ HOSP IP/OBS MODERATE 35: CPT

## 2022-11-03 PROCEDURE — 99231 SBSQ HOSP IP/OBS SF/LOW 25: CPT

## 2022-11-03 RX ORDER — SODIUM CHLORIDE 9 MG/ML
1000 INJECTION, SOLUTION INTRAVENOUS ONCE
Refills: 0 | Status: DISCONTINUED | OUTPATIENT
Start: 2022-11-03 | End: 2022-11-03

## 2022-11-03 RX ADMIN — NEBIVOLOL HYDROCHLORIDE 10 MILLIGRAM(S): 5 TABLET ORAL at 11:02

## 2022-11-03 RX ADMIN — CELECOXIB 200 MILLIGRAM(S): 200 CAPSULE ORAL at 11:29

## 2022-11-03 RX ADMIN — Medication 975 MILLIGRAM(S): at 14:07

## 2022-11-03 RX ADMIN — Medication 1 TABLET(S): at 11:03

## 2022-11-03 RX ADMIN — Medication 325 MILLIGRAM(S): at 11:02

## 2022-11-03 RX ADMIN — OXYCODONE HYDROCHLORIDE 10 MILLIGRAM(S): 5 TABLET ORAL at 05:17

## 2022-11-03 RX ADMIN — OXYCODONE HYDROCHLORIDE 10 MILLIGRAM(S): 5 TABLET ORAL at 04:47

## 2022-11-03 RX ADMIN — CELECOXIB 200 MILLIGRAM(S): 200 CAPSULE ORAL at 11:00

## 2022-11-03 RX ADMIN — OXYCODONE HYDROCHLORIDE 10 MILLIGRAM(S): 5 TABLET ORAL at 12:45

## 2022-11-03 RX ADMIN — Medication 500 MILLIGRAM(S): at 11:01

## 2022-11-03 RX ADMIN — Medication 975 MILLIGRAM(S): at 04:47

## 2022-11-03 RX ADMIN — PANTOPRAZOLE SODIUM 40 MILLIGRAM(S): 20 TABLET, DELAYED RELEASE ORAL at 04:47

## 2022-11-03 RX ADMIN — Medication 1 MILLIGRAM(S): at 09:15

## 2022-11-03 NOTE — PROGRESS NOTE ADULT - ASSESSMENT
this is a 72 year ol female with pmh: MI X2, TIA  and PFA on Xarelto , osteoarthritis,, now s/p rt tkr on 11-1-2022. Pt has high thrombosis risk and requries anticoagulation prophylaxis.       plan:   : Continue xarelto 20mg po daily with dinner.  :daily cbc/bmp  :le venodynes  :oob as per ortho  : will f/u.    Dispo:Home Today

## 2022-11-03 NOTE — PROGRESS NOTE ADULT - SUBJECTIVE AND OBJECTIVE BOX
PCP:  Dr Bradley Hogan    CHIEF COMPLAINT: Right knee OA    HISTORY OF THE PRESENT ILLNESS: this is a 73 yo female with PMH of anemia, anxiety, upper GIB, tx with Protonix bid and iron supps, breast Ca, CAD, diverticulitis, HTN, HLD, NSTEMI x2, TIA, found to have an ASD/PFO, not repaired, placed on Xarelto, last dose Friday 10/28 night,  valvular disease and OA of her right knee, with progressive pain with ambulation for many years.  Now states the pain has worsened over the last few years.  She tried and failed steroid injections and Physical therapy, bith of which only provided temporary relief.  She has now opted for Orthopedic surgical management.  S/p Right TKR  We are consulted for medical management    11/3/22- up and ambulated with PT, going home today    REVIEW OF SYSTEMS:   All 10 systems reviewed in detailed and found to be negative with the exception of what has already been described above    MEDICATIONS  (STANDING):  acetaminophen     Tablet .. 975 milliGRAM(s) Oral every 8 hours  ascorbic acid 500 milliGRAM(s) Oral two times a day  atorvastatin 40 milliGRAM(s) Oral at bedtime  celecoxib 200 milliGRAM(s) Oral every 12 hours  ferrous    sulfate 325 milliGRAM(s) Oral daily  folic acid 1 milliGRAM(s) Oral daily  lactated ringers. 1000 milliLiter(s) (75 mL/Hr) IV Continuous <Continuous>  multivitamin 1 Tablet(s) Oral daily  nebivolol 10 milliGRAM(s) Oral daily  pantoprazole    Tablet 40 milliGRAM(s) Oral before breakfast  polyethylene glycol 3350 17 Gram(s) Oral at bedtime  rivaroxaban 20 milliGRAM(s) Oral with dinner  senna 2 Tablet(s) Oral at bedtime  tranexamic acid 2% Topical Irrigation 1 Application(s) IntraArticular once    MEDICATIONS  (PRN):  ALPRAZolam 1 milliGRAM(s) Oral three times a day PRN anxiety  HYDROmorphone  Injectable 0.5 milliGRAM(s) IV Push every 4 hours PRN Severe Pain (7 - 10)  ondansetron Injectable 8 milliGRAM(s) IV Push every 8 hours PRN Nausea and/or Vomiting  oxyCODONE    IR 5 milliGRAM(s) Oral every 4 hours PRN Mild Pain (1 - 3)  oxyCODONE    IR 10 milliGRAM(s) Oral every 4 hours PRN Moderate Pain (4 - 6)  prochlorperazine   Injectable 10 milliGRAM(s) IV Push every 8 hours PRN Nausea and/or Vomiting    Vital Signs Last 24 Hrs  T(C): 37.1 (03 Nov 2022 08:29), Max: 37.1 (02 Nov 2022 20:07)  T(F): 98.8 (03 Nov 2022 08:29), Max: 98.8 (03 Nov 2022 00:25)  HR: 70 (03 Nov 2022 08:29) (56 - 70)  BP: 138/71 (03 Nov 2022 08:29) (130/67 - 138/71)  BP(mean): --  RR: 17 (03 Nov 2022 08:29) (16 - 17)  SpO2: 95% (03 Nov 2022 08:29) (94% - 96%)    Parameters below as of 03 Nov 2022 08:29  Patient On (Oxygen Delivery Method): room air    PHYSICAL EXAM:  GENERAL: Comfortable, no acute distress   HEAD:  Normocephalic, atraumatic  EYES: EOMI, PERRLA  HEENT: Moist mucous membranes  NECK: Supple, No JVD  NERVOUS SYSTEM:  Alert & Oriented X3, Motor Strength 5/5 B/L upper and lower extremities  CHEST/LUNG: Clear to auscultation bilaterally  HEART: Regular rate and rhythm  ABDOMEN: Soft, non tender, Nondistended, Bowel sounds present  GENITOURINARY: Voiding, no palpable bladder  EXTREMITIES:   No clubbing, cyanosis, or edema  MUSCULOSKELETAL- right knee dsg c/d/i  SKIN-no rash    LABS:                                    10.4   10.34 )-----------( 258      ( 03 Nov 2022 08:05 )             31.9     03 Nov 2022 08:05    137    |  102    |  31     ----------------------------<  95     3.6     |  27     |  0.63     Ca    8.8        03 Nov 2022 08:05    CAPILLARY BLOOD GLUCOSE  POCT Blood Glucose.: 119 mg/dL (02 Nov 2022 21:20)    IMPRESSION: 73 yo female with above pmh a/w:  # OA right knee  # S/P right TKR  POD#2  pain control  PT eval  Bowel regimen  Incentive spirometry    #acute blood loss anemia postop with H/O SHARMAINE  on iron supplements  from surgery  no need for transfusions presently  monitor HH    #Leukocytosis  likely reactive  afebrile, no s/s of infn  monitor wbc    # HTN  better today  cont home meds    # HLD  cont statin    # CAD with stents x 4, h/o NSTEMI   cont asa, statin    #H/o TIA/ASD/PFO  ASD and PFO not repaired  resume xarelto    #anxiety  cont xanax tid prn    # Vte prophylaxis  AC consult  xarelto  Venodynes  INC mobility    dispo: home with home PT today      
  PCP:  Dr Bradley Hogan    CHIEF COMPLAINT: Right knee OA    HISTORY OF THE PRESENT ILLNESS: this is a 73 yo female with PMH of anemia, anxiety, upper GIB, tx with Protonix bid and iron supps, breast Ca, CAD, diverticulitis, HTN, HLD, NSTEMI x2, TIA, found to have an ASD/PFO, not repaired, placed on Xarelto, last dose Friday 10/28 night,  valvular disease and OA of her right knee, with progressive pain with ambulation for many years.  Now states the pain has worsened over the last few years.  She tried and failed steroid injections and Physical therapy, bith of which only provided temporary relief.  She has now opted for Orthopedic surgical management.  S/p Right TKR  We are consulted for medical management    seen in room, just getting up with PT, awake, alert, pain tolerable, no cp or sob, fadia po no n/v    REVIEW OF SYSTEMS:   All 10 systems reviewed in detailed and found to be negative with the exception of what has already been described above    MEDICATIONS  (STANDING):  acetaminophen     Tablet .. 975 milliGRAM(s) Oral every 8 hours  ascorbic acid 500 milliGRAM(s) Oral two times a day  atorvastatin 40 milliGRAM(s) Oral at bedtime  celecoxib 200 milliGRAM(s) Oral every 12 hours  ferrous    sulfate 325 milliGRAM(s) Oral daily  folic acid 1 milliGRAM(s) Oral daily  lactated ringers. 1000 milliLiter(s) (75 mL/Hr) IV Continuous <Continuous>  multivitamin 1 Tablet(s) Oral daily  nebivolol 10 milliGRAM(s) Oral daily  pantoprazole    Tablet 40 milliGRAM(s) Oral before breakfast  polyethylene glycol 3350 17 Gram(s) Oral at bedtime  rivaroxaban 20 milliGRAM(s) Oral with dinner  senna 2 Tablet(s) Oral at bedtime  tranexamic acid 2% Topical Irrigation 1 Application(s) IntraArticular once    MEDICATIONS  (PRN):  ALPRAZolam 1 milliGRAM(s) Oral three times a day PRN anxiety  HYDROmorphone  Injectable 0.5 milliGRAM(s) IV Push every 4 hours PRN Severe Pain (7 - 10)  ondansetron Injectable 8 milliGRAM(s) IV Push every 8 hours PRN Nausea and/or Vomiting  oxyCODONE    IR 5 milliGRAM(s) Oral every 4 hours PRN Mild Pain (1 - 3)  oxyCODONE    IR 10 milliGRAM(s) Oral every 4 hours PRN Moderate Pain (4 - 6)  prochlorperazine   Injectable 10 milliGRAM(s) IV Push every 8 hours PRN Nausea and/or Vomiting    Vital Signs Last 24 Hrs  T(C): 37.2 (11-02-22 @ 08:42), Max: 37.4 (11-01-22 @ 21:45)  T(F): 98.9 (11-02-22 @ 08:42), Max: 99.3 (11-01-22 @ 21:45)  HR: 68 (11-02-22 @ 08:42) (65 - 91)  BP: 125/69 (11-02-22 @ 08:42) (104/76 - 137/68)  BP(mean): 88 (11-01-22 @ 21:45) (88 - 88)  RR: 16 (11-02-22 @ 08:42) (14 - 20)  SpO2: 95% (11-02-22 @ 08:42) (94% - 99%)    CAPILLARY BLOOD GLUCOSE    POCT Blood Glucose.: 90 mg/dL (11.01.22 @ 15:33)   POCT Blood Glucose.: 91 mg/dL (01 Nov 2022 14:00)  POCT Blood Glucose.: 98 mg/dL (01 Nov 2022 11:36)    PHYSICAL EXAM:    GENERAL: Comfortable, no acute distress   HEAD:  Normocephalic, atraumatic  EYES: EOMI, PERRLA  HEENT: Moist mucous membranes  NECK: Supple, No JVD  NERVOUS SYSTEM:  Alert & Oriented X3, Motor Strength 5/5 B/L upper and lower extremities  CHEST/LUNG: Clear to auscultation bilaterally  HEART: Regular rate and rhythm  ABDOMEN: Soft, non tender, Nondistended, Bowel sounds present  GENITOURINARY: Voiding, no palpable bladder  EXTREMITIES:   No clubbing, cyanosis, or edema  MUSCULOSKELETAL- right knee dsg c/d/i  SKIN-no rash      LABS:                         11.1   10.99 )-----------( 257      ( 02 Nov 2022 08:32 )             33.7       11-02    136  |  102  |  20  ----------------------------<  107<H>  3.8   |  26  |  0.62    Ca    8.6      02 Nov 2022 08:32    IMPRESSION: 73 yo female with above pmh a/w:  # OA right knee  # S/P right TKR    POD#1  pain control  PT eval  Bowel regimen  IVF's  Finish ABXs  regular diet  PPI  VTE prophylaxis  monitor CBC/BMP    #acute blood loss anemia with H/O SHARMAINE  on iron supplements  from surgery  no need for transfusions presently  monitor HH    #Leukocytosis  likely reactive  afebrile, no s/s of infn  monitor wbc    # HTN  was borderline hypotensive this am  cont  nebivolol 10 mg po daily, hold ccb/ace-i for now  pt at high risk for fluctuations in B/P 2/2 anesthesia, pain, hypovolemia and use of narcotics, placing pt at high risk for MI/CVA  monitor B/P closely  adjust anti-htn's accordingly    # HLD  cont statin    # CAD with stents x 4, h/o NSTEMI   cont asa, statin    #H/o TIA/ASD/PFO  ASD and PFO not repaired  resume xarelto    #anxiety  cont xanax tid prn    # Vte prophylaxis  AC consult  xarelto  Venodynes  INC mobility      dispo: home tomorrow      
Orthopedics     Pt seen and examined at the bedside. Pain is well controlled at this time, no concerns at this time.     Vital Signs Last 24 Hrs  T(C): 37.1 (11-03-22 @ 00:25), Max: 37.2 (11-02-22 @ 08:42)  T(F): 98.8 (11-03-22 @ 00:25), Max: 98.9 (11-02-22 @ 08:42)  HR: 56 (11-03-22 @ 00:25) (56 - 68)  BP: 130/67 (11-03-22 @ 00:25) (125/69 - 130/73)  BP(mean): --  RR: 17 (11-03-22 @ 00:25) (16 - 17)  SpO2: 96% (11-03-22 @ 00:25) (94% - 96%)                        11.1   10.99 )-----------( 257      ( 02 Nov 2022 08:32 )             33.7     02 Nov 2022 08:32    136    |  102    |  20     ----------------------------<  107    3.8     |  26     |  0.62     Ca    8.6        02 Nov 2022 08:32          Exam:  GEN: NAD, awake and alert.  RLE  Dressing clean and dry  +EHL FHL TA GS  SILT L2-S1  +DP  Calf soft and nontender, compartments soft and compressible.      A/P:  72yF s/p R Total Knee Arthroplasty POD #2    -Pain control prn  - DVT ppx  - WBAT/PT/OOB as tolerated   - FU am labs  - Med mgmt, continue home meds.  -Begin D/C planning if stable and  progressing well with PT.   -Dispo: likely home today
  HPI:  This is a 71 y/o female presents to [facility] for scheduled right knee replacement on 22. Patient reports pain to right knee pain for several years delayed treatement due to care of mother. She states the pain has worsen over the last few years ago. Patient reports receiving steroid injection and PT with no relief.      Patient is a 72y old  Female who presents with a chief complaint of right knee OA, Planned right TKR (2022 15:27) pt s/p rt tkr on 2022      Consulted by Dr. Danyel Dugan  for VTE prophylaxis, risk stratification, and anticoagulation management.    PAST MEDICAL & SURGICAL HISTORY:  Coronary artery disease, angina presence unspecified, unspecified vessel or lesion type, unspecified whether native or transplanted heart  PFO on xarelto     Transient cerebral ischemia, unspecified type      Non-ST elevation (NSTEMI) myocardial infarction      HTN (hypertension)      Anxiety      Breast cancer      Diverticulitis      Anemia      S/P drug eluting coronary stent placement  in  and  - total x4      H/O total mastectomy of right breast      Cataract, bilateral          FAMILY HISTORY:  Family history of lung cancer    Family history of breast cancer    Family history of bladder cancer (Sibling)    Family history of prostate cancer (Sibling)    Family history of heart disease (Sibling, Grandparent)  brother and grand mother        Interval Note:   1022  Pt seen at bedside in PACU.  Discussed why she is on Xarelto and she states she had 2 mis, tia and a PFO and her MD  put her on Xarelto.  Discussed resuming her Xarelto tomorrow pm and heparin in the a.m. she has no concerns.    1022  Pt seen at bedside on 2 [address],  Discussed the resumption of her home med  Xarelto today , denies any concerns, pending PT today possible home on Friday.    11-3-1022  Pt seen at bedside on [address], ambulated with PT, Discussed  xarelto no concerns Home today        CAPRINI SCORE  AGE RELATED RISK FACTORS                                                       MOBILITY RELATED FACTORS  [ ] Age 41-60 years                                            (1 Point)                  [ ] Bed rest                                                        (1 Point)  [x ] Age: 61-74 years                                           (2 Points)                [ ] Plaster cast                                                   (2 Points)  [ ] Age= 75 years                                              (3 Points)                 [ ] Bed bound for more than 72 hours                   (2 Points)    DISEASE RELATED RISK FACTORS                                               GENDER SPECIFIC FACTORS  [ ] Edema in the lower extremities                       (1 Point)           [ ] Pregnancy                                                            (1 Point)  [ ] Varicose veins                                               (1 Point)                  [ ] Post-partum < 6 weeks                                      (1 Point)             [x ] BMI > 25 Kg/m2                                            (1 Point)                  [ ] Hormonal therapy or oral contraception       (1 Point)                 [ ] Sepsis (in the previous month)                        (1 Point)             [ ] History of pregnancy complications                (1Point)  [ ] Pneumonia or serious lung disease                                             [ ] Unexplained or recurrent  (=/>3), premature                                 (In the previous month)                               (1 Point)                birth with toxemia or growth-restricted infant (1 Point)  [ ] Abnormal pulmonary function test            (1 Point)                                   SURGERY RELATED RISK FACTORS  [ ] Acute myocardial infarction                       (1 Point)                  [ ]  Section                                         (1 Point)  [ ] Congestive heart failure (in the previous month) (1 Point)   [ ] Minor surgery   lasting <45 minutes       (1 Point)   [ ] Inflammatory bowel disease                             (1 Point)          [ ] Arthroscopic surgery                                  (2 Points)  [ ] Central venous access                                    (2 Points)            [ ] General surgery lasting >45 minutes      (2 Points)       [ ] Stroke (in the previous month)                  (5 Points)            [ x] Elective major lower extremity arthroplasty (5 Points)                                   [x  ] Malignancy (present or past include skin melanoma                                          but exclude  basal skin cell)    (2 points)                                      TRAUMA RELATED RISK FACTORS                HEMATOLOGY RELATED FACTORS                                  [ ] Fracture of the hip, pelvis, or leg                       (5 Points)  [ ] Prior episodes of VTE                                     (3 Points)          [ ] Acute spinal cord injury (in the previous month)  (5 Points)  [ ] Positive family history for VTE                         (3 Points)       [ ] Paralysis (less than 1 month)                          (5 Points)  [ ] Prothrombin 80308 A                                      (3 Points)         [ ] Multiple Trauma (within 1month)                 (5Points)                                                                                                                                                                [ ] Factor V Leiden                                          (3 Points)                                OTHER RISK FACTORS                          [ ] Lupus anticoagulants                                     (3 Points)                       [ ] BMI > 40                          (1 Point)                                                         [ ] Anticardiolipin antibodies                                (3 Points)                   [ ] Smoking                              (1Point)                                                [ ] High homocysteine in the blood                      (3 Points)                [  ] Diabetes requiring insulin (1point)                         [ ] Other congenital or acquired thrombophilia       (3 Points)          [  ] Chemotherapy                   (1 Point)  [ ] Heparin induced thrombocytopenia                  (3 Points)             [  ] Blood Transfusion                (1 point)                                                                                                             Total Score [  10 ]                                                                                                                                                                                                                                                                                                                                                                                                                                            IMPROVE Bleeding Risk Score: 1.5    Falls Risk:   High (x  )  Mod (  )  Low (  )  crcl: 94.1         cr:   .64       BMI  31.5           EBL:  150 ml  Time procedure    : starts: 1407             :ends: 15:16          Denies any personal or familial history of clotting or bleeding disorders.    Allergies    No Known Allergies    Intolerances        REVIEW OF SYSTEMS    (  )Fever	     (  )Constipation	(  )SOB				(  )Headache	(  )Dysuria  (  )Chills	     (  )Melena	(  )Dyspnea present on exertion	                    (  )Dizziness                    (  )Polyuria  (  )Nausea	     (  )Hematochezia	(  )Cough			                    (  )Syncope   	(  )Hematuria  (  )Vomiting    (  )Chest Pain	(  )Wheezing			(  )Weakness  (  )Diarrhea     (  )Palpitations	(  )Anorexia			(  )Myalgia  (x) knee pain    Pertinent positives in HPI and daily subjective.  All other ROS negative.    Vital Signs Last 24 Hrs  T(C): 37.1 (2022 08:29), Max: 37.1 (2022 20:07)  T(F): 98.8 (2022 08:29), Max: 98.8 (2022 00:25)  HR: 70 (2022 08:29) (56 - 70)  BP: 138/71 (2022 08:29) (130/67 - 138/71)  BP(mean): --  RR: 17 (2022 08:29) (16 - 17)  SpO2: 95% (2022 08:29) (94% - 96%)  PHYSICAL EXAM:    Constitutional: Appears Well    Neurological: A& O x 3    Skin: Warm    Respiratory and Thorax: normal effort; Breath sounds: normal; No rales/wheezing/rhonchi  	  Cardiovascular: S1, S2, regular, NMBR	    Gastrointestinal: BS + x 4Q, nontender	    Genitourinary:  Bladder nondistended, nontender    Musculoskeletal:   General Right:   no muscle/joint tenderness,   normal tone, no joint swelling,   ROM: limited	    General Left:   no muscle/joint tenderness,   normal tone, no joint swelling,   ROM: full        Knee:  Right: Incision: ; Dressing CDI; decreased sensation      Lower extrems:   Right: no calf tenderness              negative mart's sign               + pedal pulses    Left:   no calf tenderness              negative mart's sign               + pedal pulses                        10.4   10.34 )-----------( 258      ( 2022 08:05 )             31.9       11-03    137  |  102  |  31<H>  ----------------------------<  95  3.6   |  27  |  0.63    Ca    8.8      2022 08:05                                  11.1   10.99 )-----------( 257      ( 2022 08:32 )             33.7       11-    136  |  102  |  20  ----------------------------<  107<H>  3.8   |  26  |  0.62    Ca    8.6      2022 08:32                            12.7   8.68  )-----------( 276      ( 2022 15:55 )             39.2       11    140  |  106  |  12  ----------------------------<  110<H>  4.4   |  28  |  0.64    Ca    8.8      2022 15:55    MEDICATIONS  (STANDING):  acetaminophen     Tablet .. 975 milliGRAM(s) Oral every 8 hours  ascorbic acid 500 milliGRAM(s) Oral two times a day  atorvastatin 40 milliGRAM(s) Oral at bedtime  celecoxib 200 milliGRAM(s) Oral every 12 hours  ferrous    sulfate 325 milliGRAM(s) Oral daily  folic acid 1 milliGRAM(s) Oral daily  lactated ringers. 1000 milliLiter(s) (75 mL/Hr) IV Continuous <Continuous>  multivitamin 1 Tablet(s) Oral daily  nebivolol 10 milliGRAM(s) Oral daily  pantoprazole    Tablet 40 milliGRAM(s) Oral before breakfast  polyethylene glycol 3350 17 Gram(s) Oral at bedtime  rivaroxaban 20 milliGRAM(s) Oral with dinner  senna 2 Tablet(s) Oral at bedtime  tranexamic acid 2% Topical Irrigation 1 Application(s) IntraArticular once          DVT Prophylaxis:  LMWH                   (  )  Heparin SQ           (  )  Coumadin             (  )  Xarelto                  (x  )  Eliquis                   (  )  Venodynes           (x  )  Ambulation          (  x)  UFH                       (  )  Contraindicated  (  )  EC Aspirin             (  )        
  HPI:  This is a 71 y/o female presents to [facility] for scheduled right knee replacement on 22. Patient reports pain to right knee pain for several years delayed treatement due to care of mother. She states the pain has worsen over the last few years ago. Patient reports receiving steroid injection and PT with no relief.      Patient is a 72y old  Female who presents with a chief complaint of right knee OA, Planned right TKR (2022 15:27) pt s/p rt tkr on 2022      Consulted by Dr. Danyel Dugan  for VTE prophylaxis, risk stratification, and anticoagulation management.    PAST MEDICAL & SURGICAL HISTORY:  Coronary artery disease, angina presence unspecified, unspecified vessel or lesion type, unspecified whether native or transplanted heart  PFO on xarelto     Transient cerebral ischemia, unspecified type      Non-ST elevation (NSTEMI) myocardial infarction      HTN (hypertension)      Anxiety      Breast cancer      Diverticulitis      Anemia      S/P drug eluting coronary stent placement  in  and  - total x4      H/O total mastectomy of right breast      Cataract, bilateral          FAMILY HISTORY:  Family history of lung cancer    Family history of breast cancer    Family history of bladder cancer (Sibling)    Family history of prostate cancer (Sibling)    Family history of heart disease (Sibling, Grandparent)  brother and grand mother        Interval Note:   1022  Pt seen at bedside in PACU.  Discussed why she is on Xarelto and she states she had 2 mis, tia and a PFO and her MD  put her on Xarelto.  Discussed resuming her Xarelto tomorrow pm and heparin in the a.m. she has no concerns.    1022  Pt seen at bedside on [address],  Discussed the resumption of her home med  Xarelto today , denies any concerns, pending PT today possible home on Friday.          CAPRINI SCORE  AGE RELATED RISK FACTORS                                                       MOBILITY RELATED FACTORS  [ ] Age 41-60 years                                            (1 Point)                  [ ] Bed rest                                                        (1 Point)  [x ] Age: 61-74 years                                           (2 Points)                [ ] Plaster cast                                                   (2 Points)  [ ] Age= 75 years                                              (3 Points)                 [ ] Bed bound for more than 72 hours                   (2 Points)    DISEASE RELATED RISK FACTORS                                               GENDER SPECIFIC FACTORS  [ ] Edema in the lower extremities                       (1 Point)           [ ] Pregnancy                                                            (1 Point)  [ ] Varicose veins                                               (1 Point)                  [ ] Post-partum < 6 weeks                                      (1 Point)             [x ] BMI > 25 Kg/m2                                            (1 Point)                  [ ] Hormonal therapy or oral contraception       (1 Point)                 [ ] Sepsis (in the previous month)                        (1 Point)             [ ] History of pregnancy complications                (1Point)  [ ] Pneumonia or serious lung disease                                             [ ] Unexplained or recurrent  (=/>3), premature                                 (In the previous month)                               (1 Point)                birth with toxemia or growth-restricted infant (1 Point)  [ ] Abnormal pulmonary function test            (1 Point)                                   SURGERY RELATED RISK FACTORS  [ ] Acute myocardial infarction                       (1 Point)                  [ ]  Section                                         (1 Point)  [ ] Congestive heart failure (in the previous month) (1 Point)   [ ] Minor surgery   lasting <45 minutes       (1 Point)   [ ] Inflammatory bowel disease                             (1 Point)          [ ] Arthroscopic surgery                                  (2 Points)  [ ] Central venous access                                    (2 Points)            [ ] General surgery lasting >45 minutes      (2 Points)       [ ] Stroke (in the previous month)                  (5 Points)            [ x] Elective major lower extremity arthroplasty (5 Points)                                   [x  ] Malignancy (present or past include skin melanoma                                          but exclude  basal skin cell)    (2 points)                                      TRAUMA RELATED RISK FACTORS                HEMATOLOGY RELATED FACTORS                                  [ ] Fracture of the hip, pelvis, or leg                       (5 Points)  [ ] Prior episodes of VTE                                     (3 Points)          [ ] Acute spinal cord injury (in the previous month)  (5 Points)  [ ] Positive family history for VTE                         (3 Points)       [ ] Paralysis (less than 1 month)                          (5 Points)  [ ] Prothrombin 61287 A                                      (3 Points)         [ ] Multiple Trauma (within 1month)                 (5Points)                                                                                                                                                                [ ] Factor V Leiden                                          (3 Points)                                OTHER RISK FACTORS                          [ ] Lupus anticoagulants                                     (3 Points)                       [ ] BMI > 40                          (1 Point)                                                         [ ] Anticardiolipin antibodies                                (3 Points)                   [ ] Smoking                              (1Point)                                                [ ] High homocysteine in the blood                      (3 Points)                [  ] Diabetes requiring insulin (1point)                         [ ] Other congenital or acquired thrombophilia       (3 Points)          [  ] Chemotherapy                   (1 Point)  [ ] Heparin induced thrombocytopenia                  (3 Points)             [  ] Blood Transfusion                (1 point)                                                                                                             Total Score [  10 ]                                                                                                                                                                                                                                                                                                                                                                                                                                            IMPROVE Bleeding Risk Score: 1.5    Falls Risk:   High (x  )  Mod (  )  Low (  )  crcl: 94.1         cr:   .64       BMI  31.5           EBL:  150 ml  Time procedure    : starts: 1407             :ends: 15:16          Denies any personal or familial history of clotting or bleeding disorders.    Allergies    No Known Allergies    Intolerances        REVIEW OF SYSTEMS    (  )Fever	     (  )Constipation	(  )SOB				(  )Headache	(  )Dysuria  (  )Chills	     (  )Melena	(  )Dyspnea present on exertion	                    (  )Dizziness                    (  )Polyuria  (  )Nausea	     (  )Hematochezia	(  )Cough			                    (  )Syncope   	(  )Hematuria  (  )Vomiting    (  )Chest Pain	(  )Wheezing			(  )Weakness  (  )Diarrhea     (  )Palpitations	(  )Anorexia			(  )Myalgia  (x) knee pain    Pertinent positives in HPI and daily subjective.  All other ROS negative.      Vital Signs Last 24 Hrs  T(C): 37.2 (2022 08:42), Max: 37.4 (2022 21:45)  T(F): 98.9 (2022 08:42), Max: 99.3 (2022 21:45)  HR: 68 (2022 08:42) (65 - 95)  BP: 125/69 (2022 08:42) (104/76 - 150/98)  BP(mean): 88 (2022 21:45) (88 - 88)  RR: 16 (2022 08:42) (14 - 20)  SpO2: 95% (2022 08:42) (94% - 99%)    PHYSICAL EXAM:    Constitutional: Appears Well    Neurological: A& O x 3    Skin: Warm    Respiratory and Thorax: normal effort; Breath sounds: normal; No rales/wheezing/rhonchi  	  Cardiovascular: S1, S2, regular, NMBR	    Gastrointestinal: BS + x 4Q, nontender	    Genitourinary:  Bladder nondistended, nontender    Musculoskeletal:   General Right:   no muscle/joint tenderness,   normal tone, no joint swelling,   ROM: limited	    General Left:   no muscle/joint tenderness,   normal tone, no joint swelling,   ROM: full        Knee:  Right: Incision: ; Dressing CDI; decreased sensation      Lower extrems:   Right: no calf tenderness              negative mart's sign               + pedal pulses    Left:   no calf tenderness              negative mart's sign               + pedal pulses                            11.1   10.99 )-----------( 257      ( 2022 08:32 )             33.7       11-02    136  |  102  |  20  ----------------------------<  107<H>  3.8   |  26  |  0.62    Ca    8.6      2022 08:32                            12.7   8.68  )-----------( 276      ( 2022 15:55 )             39.2       11    140  |  106  |  12  ----------------------------<  110<H>  4.4   |  28  |  0.64    Ca    8.8      2022 15:55    MEDICATIONS  (STANDING):  acetaminophen     Tablet .. 975 milliGRAM(s) Oral every 8 hours  ascorbic acid 500 milliGRAM(s) Oral two times a day  atorvastatin 40 milliGRAM(s) Oral at bedtime  celecoxib 200 milliGRAM(s) Oral every 12 hours  ferrous    sulfate 325 milliGRAM(s) Oral daily  folic acid 1 milliGRAM(s) Oral daily  lactated ringers. 1000 milliLiter(s) (75 mL/Hr) IV Continuous <Continuous>  multivitamin 1 Tablet(s) Oral daily  nebivolol 10 milliGRAM(s) Oral daily  pantoprazole    Tablet 40 milliGRAM(s) Oral before breakfast  polyethylene glycol 3350 17 Gram(s) Oral at bedtime  rivaroxaban 20 milliGRAM(s) Oral with dinner  senna 2 Tablet(s) Oral at bedtime  tranexamic acid 2% Topical Irrigation 1 Application(s) IntraArticular once          DVT Prophylaxis:  LMWH                   (  )  Heparin SQ           (  )  Coumadin             (  )  Xarelto                  (x  )  Eliquis                   (  )  Venodynes           (x  )  Ambulation          (  x)  UFH                       (  )  Contraindicated  (  )  EC Aspirin             (  )        
Patient seen and examined at bedside.  No acute complaints at this time. Pain well controlled. Denies chest pain, shortness of breath, nausea or vomiting.       Vital Signs Last 24 Hrs  T(C): 37.2 (11-02-22 @ 00:23), Max: 37.4 (11-01-22 @ 21:45)  T(F): 98.9 (11-02-22 @ 00:23), Max: 99.3 (11-01-22 @ 21:45)  HR: 74 (11-02-22 @ 00:23) (65 - 95)  BP: 109/68 (11-02-22 @ 00:23) (109/68 - 150/98)  BP(mean): 88 (11-01-22 @ 21:45) (88 - 88)  RR: 18 (11-02-22 @ 00:23) (14 - 20)  SpO2: 94% (11-02-22 @ 00:23) (94% - 99%)      LABS:                          12.7   8.68  )-----------( 276      ( 01 Nov 2022 15:55 )             39.2     11-01    140  |  106  |  12  ----------------------------<  110<H>  4.4   |  28  |  0.64    Ca    8.8      01 Nov 2022 15:55      Exam:  General: NAD, resting comfortably in bed  RLE:   Dressing in place C/D/I  SCD in place bilaterally  No calf tenderness   TA/EHL/FHL/GSC+  SILT L2-S1  DP/PT 2+        A/P:  72y f s/p R TKA POD#1    -PT/OT   -WBAT  -Pain Control as needed  -DVT ppx per AC team: Xarelto  -Continue perioperative abx x 24 hours  -FU AM Labs  -Rest, ice, compress and elevate the extremity as we needed  -Incentive Spirometry  -Medical management appreciated  -Dispo to home per PT eval  -Discussed above with Dr. Dugan, who agrees with plan
pt seen at bedside in PACU resting comfortable in NAD< denies N.T RLE, min pain to right knee conrtrolled with medication, no other complaints                          12.7   8.68  )-----------( 276      ( 01 Nov 2022 15:55 )             39.2       Pe right knee   dressing c/d/i   compartments soft non tender  FROM ankle and toes   + EHL FHL GS TA   SILT   DP intact

## 2022-11-06 DIAGNOSIS — F41.9 ANXIETY DISORDER, UNSPECIFIED: ICD-10-CM

## 2022-11-06 DIAGNOSIS — Z85.3 PERSONAL HISTORY OF MALIGNANT NEOPLASM OF BREAST: ICD-10-CM

## 2022-11-06 DIAGNOSIS — M17.11 UNILATERAL PRIMARY OSTEOARTHRITIS, RIGHT KNEE: ICD-10-CM

## 2022-11-06 DIAGNOSIS — I25.10 ATHEROSCLEROTIC HEART DISEASE OF NATIVE CORONARY ARTERY WITHOUT ANGINA PECTORIS: ICD-10-CM

## 2022-11-06 DIAGNOSIS — I10 ESSENTIAL (PRIMARY) HYPERTENSION: ICD-10-CM

## 2022-11-06 DIAGNOSIS — D50.9 IRON DEFICIENCY ANEMIA, UNSPECIFIED: ICD-10-CM

## 2022-11-06 DIAGNOSIS — I25.2 OLD MYOCARDIAL INFARCTION: ICD-10-CM

## 2022-11-07 ENCOUNTER — LABORATORY RESULT (OUTPATIENT)
Age: 72
End: 2022-11-07

## 2022-11-08 ENCOUNTER — EMERGENCY (EMERGENCY)
Facility: HOSPITAL | Age: 72
LOS: 0 days | Discharge: HOME | End: 2022-11-08
Attending: EMERGENCY MEDICINE
Payer: MEDICARE

## 2022-11-08 VITALS
SYSTOLIC BLOOD PRESSURE: 148 MMHG | HEART RATE: 99 BPM | WEIGHT: 169.98 LBS | OXYGEN SATURATION: 96 % | DIASTOLIC BLOOD PRESSURE: 83 MMHG | RESPIRATION RATE: 20 BRPM | TEMPERATURE: 99 F | HEIGHT: 62 IN

## 2022-11-08 VITALS
OXYGEN SATURATION: 95 % | HEART RATE: 77 BPM | TEMPERATURE: 98 F | SYSTOLIC BLOOD PRESSURE: 140 MMHG | DIASTOLIC BLOOD PRESSURE: 74 MMHG | RESPIRATION RATE: 18 BRPM

## 2022-11-08 DIAGNOSIS — Z86.2 PERSONAL HISTORY OF DISEASES OF THE BLOOD AND BLOOD-FORMING ORGANS AND CERTAIN DISORDERS INVOLVING THE IMMUNE MECHANISM: ICD-10-CM

## 2022-11-08 DIAGNOSIS — R11.2 NAUSEA WITH VOMITING, UNSPECIFIED: ICD-10-CM

## 2022-11-08 DIAGNOSIS — Z95.5 PRESENCE OF CORONARY ANGIOPLASTY IMPLANT AND GRAFT: Chronic | ICD-10-CM

## 2022-11-08 DIAGNOSIS — E87.6 HYPOKALEMIA: ICD-10-CM

## 2022-11-08 DIAGNOSIS — Z96.651 PRESENCE OF RIGHT ARTIFICIAL KNEE JOINT: ICD-10-CM

## 2022-11-08 DIAGNOSIS — H26.9 UNSPECIFIED CATARACT: ICD-10-CM

## 2022-11-08 DIAGNOSIS — R10.9 UNSPECIFIED ABDOMINAL PAIN: ICD-10-CM

## 2022-11-08 DIAGNOSIS — Z87.19 PERSONAL HISTORY OF OTHER DISEASES OF THE DIGESTIVE SYSTEM: ICD-10-CM

## 2022-11-08 DIAGNOSIS — Z90.11 ACQUIRED ABSENCE OF RIGHT BREAST AND NIPPLE: ICD-10-CM

## 2022-11-08 DIAGNOSIS — F41.9 ANXIETY DISORDER, UNSPECIFIED: ICD-10-CM

## 2022-11-08 DIAGNOSIS — I10 ESSENTIAL (PRIMARY) HYPERTENSION: ICD-10-CM

## 2022-11-08 DIAGNOSIS — I25.2 OLD MYOCARDIAL INFARCTION: ICD-10-CM

## 2022-11-08 DIAGNOSIS — Z20.822 CONTACT WITH AND (SUSPECTED) EXPOSURE TO COVID-19: ICD-10-CM

## 2022-11-08 DIAGNOSIS — H26.9 UNSPECIFIED CATARACT: Chronic | ICD-10-CM

## 2022-11-08 DIAGNOSIS — Z90.11 ACQUIRED ABSENCE OF RIGHT BREAST AND NIPPLE: Chronic | ICD-10-CM

## 2022-11-08 DIAGNOSIS — Z85.3 PERSONAL HISTORY OF MALIGNANT NEOPLASM OF BREAST: ICD-10-CM

## 2022-11-08 DIAGNOSIS — I25.119 ATHEROSCLEROTIC HEART DISEASE OF NATIVE CORONARY ARTERY WITH UNSPECIFIED ANGINA PECTORIS: ICD-10-CM

## 2022-11-08 LAB
ALBUMIN SERPL ELPH-MCNC: 3.1 G/DL — LOW (ref 3.3–5)
ALP SERPL-CCNC: 61 U/L — SIGNIFICANT CHANGE UP (ref 40–120)
ALT FLD-CCNC: 19 U/L — SIGNIFICANT CHANGE UP (ref 12–78)
ANION GAP SERPL CALC-SCNC: 5 MMOL/L — SIGNIFICANT CHANGE UP (ref 5–17)
ANION GAP SERPL CALC-SCNC: 7 MMOL/L — SIGNIFICANT CHANGE UP (ref 5–17)
APPEARANCE UR: CLEAR — SIGNIFICANT CHANGE UP
APTT BLD: 27 SEC — LOW (ref 27.5–35.5)
AST SERPL-CCNC: 22 U/L — SIGNIFICANT CHANGE UP (ref 15–37)
BASOPHILS # BLD AUTO: 0 K/UL — SIGNIFICANT CHANGE UP (ref 0–0.2)
BASOPHILS NFR BLD AUTO: 0 % — SIGNIFICANT CHANGE UP (ref 0–2)
BILIRUB SERPL-MCNC: 0.8 MG/DL — SIGNIFICANT CHANGE UP (ref 0.2–1.2)
BILIRUB UR-MCNC: NEGATIVE — SIGNIFICANT CHANGE UP
BUN SERPL-MCNC: 11 MG/DL — SIGNIFICANT CHANGE UP (ref 7–23)
BUN SERPL-MCNC: 15 MG/DL — SIGNIFICANT CHANGE UP (ref 7–23)
CALCIUM SERPL-MCNC: 7.9 MG/DL — LOW (ref 8.5–10.1)
CALCIUM SERPL-MCNC: 8.6 MG/DL — SIGNIFICANT CHANGE UP (ref 8.5–10.1)
CHLORIDE SERPL-SCNC: 100 MMOL/L — SIGNIFICANT CHANGE UP (ref 96–108)
CHLORIDE SERPL-SCNC: 104 MMOL/L — SIGNIFICANT CHANGE UP (ref 96–108)
CO2 SERPL-SCNC: 30 MMOL/L — SIGNIFICANT CHANGE UP (ref 22–31)
CO2 SERPL-SCNC: 30 MMOL/L — SIGNIFICANT CHANGE UP (ref 22–31)
COLOR SPEC: YELLOW — SIGNIFICANT CHANGE UP
CREAT SERPL-MCNC: 0.48 MG/DL — LOW (ref 0.5–1.3)
CREAT SERPL-MCNC: 0.55 MG/DL — SIGNIFICANT CHANGE UP (ref 0.5–1.3)
CRP SERPL-MCNC: 52 MG/L — HIGH
DIFF PNL FLD: NEGATIVE — SIGNIFICANT CHANGE UP
EGFR: 96 ML/MIN/1.73M2 — SIGNIFICANT CHANGE UP
EGFR: 99 ML/MIN/1.73M2 — SIGNIFICANT CHANGE UP
EOSINOPHIL # BLD AUTO: 0 K/UL — SIGNIFICANT CHANGE UP (ref 0–0.5)
EOSINOPHIL NFR BLD AUTO: 0 % — SIGNIFICANT CHANGE UP (ref 0–6)
ERYTHROCYTE [SEDIMENTATION RATE] IN BLOOD: 44 MM/HR — HIGH (ref 0–20)
GLUCOSE SERPL-MCNC: 102 MG/DL — HIGH (ref 70–99)
GLUCOSE SERPL-MCNC: 120 MG/DL — HIGH (ref 70–99)
GLUCOSE UR QL: NEGATIVE — SIGNIFICANT CHANGE UP
HCT VFR BLD CALC: 34.3 % — LOW (ref 34.5–45)
HGB BLD-MCNC: 11.2 G/DL — LOW (ref 11.5–15.5)
INR BLD: 1.48 RATIO — HIGH (ref 0.88–1.16)
KETONES UR-MCNC: ABNORMAL
LACTATE SERPL-SCNC: 2.2 MMOL/L — HIGH (ref 0.7–2)
LEUKOCYTE ESTERASE UR-ACNC: NEGATIVE — SIGNIFICANT CHANGE UP
LIDOCAIN IGE QN: 87 U/L — SIGNIFICANT CHANGE UP (ref 73–393)
LYMPHOCYTES # BLD AUTO: 0.18 K/UL — LOW (ref 1–3.3)
LYMPHOCYTES # BLD AUTO: 8 % — LOW (ref 13–44)
MCHC RBC-ENTMCNC: 32.3 PG — SIGNIFICANT CHANGE UP (ref 27–34)
MCHC RBC-ENTMCNC: 32.7 GM/DL — SIGNIFICANT CHANGE UP (ref 32–36)
MCV RBC AUTO: 98.8 FL — SIGNIFICANT CHANGE UP (ref 80–100)
MONOCYTES # BLD AUTO: 0.09 K/UL — SIGNIFICANT CHANGE UP (ref 0–0.9)
MONOCYTES NFR BLD AUTO: 4 % — SIGNIFICANT CHANGE UP (ref 2–14)
NEUTROPHILS # BLD AUTO: 1.99 K/UL — SIGNIFICANT CHANGE UP (ref 1.8–7.4)
NEUTROPHILS NFR BLD AUTO: 88 % — HIGH (ref 43–77)
NITRITE UR-MCNC: NEGATIVE — SIGNIFICANT CHANGE UP
NRBC # BLD: SIGNIFICANT CHANGE UP /100 WBCS (ref 0–0)
PH UR: 6.5 — SIGNIFICANT CHANGE UP (ref 5–8)
PLATELET # BLD AUTO: 260 K/UL — SIGNIFICANT CHANGE UP (ref 150–400)
POTASSIUM SERPL-MCNC: 2.5 MMOL/L — CRITICAL LOW (ref 3.5–5.3)
POTASSIUM SERPL-MCNC: 3.6 MMOL/L — SIGNIFICANT CHANGE UP (ref 3.5–5.3)
POTASSIUM SERPL-SCNC: 2.5 MMOL/L — CRITICAL LOW (ref 3.5–5.3)
POTASSIUM SERPL-SCNC: 3.6 MMOL/L — SIGNIFICANT CHANGE UP (ref 3.5–5.3)
PROT SERPL-MCNC: 7 GM/DL — SIGNIFICANT CHANGE UP (ref 6–8.3)
PROT UR-MCNC: NEGATIVE — SIGNIFICANT CHANGE UP
PROTHROM AB SERPL-ACNC: 17.2 SEC — HIGH (ref 10.5–13.4)
RAPID RVP RESULT: SIGNIFICANT CHANGE UP
RBC # BLD: 3.47 M/UL — LOW (ref 3.8–5.2)
RBC # FLD: 15.5 % — HIGH (ref 10.3–14.5)
SARS-COV-2 RNA SPEC QL NAA+PROBE: SIGNIFICANT CHANGE UP
SODIUM SERPL-SCNC: 137 MMOL/L — SIGNIFICANT CHANGE UP (ref 135–145)
SODIUM SERPL-SCNC: 139 MMOL/L — SIGNIFICANT CHANGE UP (ref 135–145)
SP GR SPEC: 1.01 — SIGNIFICANT CHANGE UP (ref 1.01–1.02)
TROPONIN I, HIGH SENSITIVITY RESULT: 7.82 NG/L — SIGNIFICANT CHANGE UP
UROBILINOGEN FLD QL: NEGATIVE — SIGNIFICANT CHANGE UP
WBC # BLD: 2.26 K/UL — LOW (ref 3.8–10.5)
WBC # FLD AUTO: 2.26 K/UL — LOW (ref 3.8–10.5)

## 2022-11-08 PROCEDURE — 71045 X-RAY EXAM CHEST 1 VIEW: CPT

## 2022-11-08 PROCEDURE — 86140 C-REACTIVE PROTEIN: CPT

## 2022-11-08 PROCEDURE — 96375 TX/PRO/DX INJ NEW DRUG ADDON: CPT

## 2022-11-08 PROCEDURE — 93005 ELECTROCARDIOGRAM TRACING: CPT

## 2022-11-08 PROCEDURE — 85652 RBC SED RATE AUTOMATED: CPT

## 2022-11-08 PROCEDURE — 99285 EMERGENCY DEPT VISIT HI MDM: CPT | Mod: 25

## 2022-11-08 PROCEDURE — 87086 URINE CULTURE/COLONY COUNT: CPT

## 2022-11-08 PROCEDURE — 86901 BLOOD TYPING SEROLOGIC RH(D): CPT

## 2022-11-08 PROCEDURE — 80048 BASIC METABOLIC PNL TOTAL CA: CPT

## 2022-11-08 PROCEDURE — 96376 TX/PRO/DX INJ SAME DRUG ADON: CPT

## 2022-11-08 PROCEDURE — 36415 COLL VENOUS BLD VENIPUNCTURE: CPT

## 2022-11-08 PROCEDURE — 85610 PROTHROMBIN TIME: CPT

## 2022-11-08 PROCEDURE — 86850 RBC ANTIBODY SCREEN: CPT

## 2022-11-08 PROCEDURE — 81003 URINALYSIS AUTO W/O SCOPE: CPT

## 2022-11-08 PROCEDURE — 85730 THROMBOPLASTIN TIME PARTIAL: CPT

## 2022-11-08 PROCEDURE — 96374 THER/PROPH/DIAG INJ IV PUSH: CPT

## 2022-11-08 PROCEDURE — 85025 COMPLETE CBC W/AUTO DIFF WBC: CPT

## 2022-11-08 PROCEDURE — 93010 ELECTROCARDIOGRAM REPORT: CPT

## 2022-11-08 PROCEDURE — 83605 ASSAY OF LACTIC ACID: CPT

## 2022-11-08 PROCEDURE — 87077 CULTURE AEROBIC IDENTIFY: CPT

## 2022-11-08 PROCEDURE — 80053 COMPREHEN METABOLIC PANEL: CPT

## 2022-11-08 PROCEDURE — 87040 BLOOD CULTURE FOR BACTERIA: CPT

## 2022-11-08 PROCEDURE — 74177 CT ABD & PELVIS W/CONTRAST: CPT | Mod: MA

## 2022-11-08 PROCEDURE — 84484 ASSAY OF TROPONIN QUANT: CPT

## 2022-11-08 PROCEDURE — 74177 CT ABD & PELVIS W/CONTRAST: CPT | Mod: 26,MA

## 2022-11-08 PROCEDURE — 71045 X-RAY EXAM CHEST 1 VIEW: CPT | Mod: 26

## 2022-11-08 PROCEDURE — 99284 EMERGENCY DEPT VISIT MOD MDM: CPT

## 2022-11-08 PROCEDURE — 86900 BLOOD TYPING SEROLOGIC ABO: CPT

## 2022-11-08 PROCEDURE — 87186 SC STD MICRODIL/AGAR DIL: CPT

## 2022-11-08 PROCEDURE — 83690 ASSAY OF LIPASE: CPT

## 2022-11-08 PROCEDURE — 0225U NFCT DS DNA&RNA 21 SARSCOV2: CPT

## 2022-11-08 RX ORDER — POTASSIUM CHLORIDE 20 MEQ
10 PACKET (EA) ORAL
Refills: 0 | Status: COMPLETED | OUTPATIENT
Start: 2022-11-08 | End: 2022-11-08

## 2022-11-08 RX ORDER — FAMOTIDINE 10 MG/ML
20 INJECTION INTRAVENOUS ONCE
Refills: 0 | Status: COMPLETED | OUTPATIENT
Start: 2022-11-08 | End: 2022-11-08

## 2022-11-08 RX ORDER — ONDANSETRON 8 MG/1
1 TABLET, FILM COATED ORAL
Qty: 6 | Refills: 0
Start: 2022-11-08 | End: 2022-11-09

## 2022-11-08 RX ORDER — SODIUM CHLORIDE 9 MG/ML
1000 INJECTION INTRAMUSCULAR; INTRAVENOUS; SUBCUTANEOUS ONCE
Refills: 0 | Status: COMPLETED | OUTPATIENT
Start: 2022-11-08 | End: 2022-11-08

## 2022-11-08 RX ORDER — MORPHINE SULFATE 50 MG/1
4 CAPSULE, EXTENDED RELEASE ORAL ONCE
Refills: 0 | Status: DISCONTINUED | OUTPATIENT
Start: 2022-11-08 | End: 2022-11-08

## 2022-11-08 RX ORDER — ACETAMINOPHEN 500 MG
650 TABLET ORAL ONCE
Refills: 0 | Status: COMPLETED | OUTPATIENT
Start: 2022-11-08 | End: 2022-11-08

## 2022-11-08 RX ORDER — ONDANSETRON 8 MG/1
4 TABLET, FILM COATED ORAL ONCE
Refills: 0 | Status: COMPLETED | OUTPATIENT
Start: 2022-11-08 | End: 2022-11-08

## 2022-11-08 RX ADMIN — SODIUM CHLORIDE 1000 MILLILITER(S): 9 INJECTION INTRAMUSCULAR; INTRAVENOUS; SUBCUTANEOUS at 06:29

## 2022-11-08 RX ADMIN — FAMOTIDINE 20 MILLIGRAM(S): 10 INJECTION INTRAVENOUS at 04:05

## 2022-11-08 RX ADMIN — SODIUM CHLORIDE 1000 MILLILITER(S): 9 INJECTION INTRAMUSCULAR; INTRAVENOUS; SUBCUTANEOUS at 04:05

## 2022-11-08 RX ADMIN — Medication 100 MILLIEQUIVALENT(S): at 08:43

## 2022-11-08 RX ADMIN — ONDANSETRON 4 MILLIGRAM(S): 8 TABLET, FILM COATED ORAL at 04:05

## 2022-11-08 RX ADMIN — Medication 650 MILLIGRAM(S): at 11:05

## 2022-11-08 RX ADMIN — Medication 100 MILLIEQUIVALENT(S): at 06:22

## 2022-11-08 RX ADMIN — MORPHINE SULFATE 4 MILLIGRAM(S): 50 CAPSULE, EXTENDED RELEASE ORAL at 04:38

## 2022-11-08 RX ADMIN — Medication 100 MILLIEQUIVALENT(S): at 11:04

## 2022-11-08 NOTE — ED ADULT NURSE NOTE - OBJECTIVE STATEMENT
Pt. BIBEMS from home c/o dizziness, nausea, and vomiting. Pt. states she was just recently d/c from . Denies CP/SOB at this time. R knee replacement a few days ago.

## 2022-11-08 NOTE — ED PROVIDER NOTE - NSFOLLOWUPINSTRUCTIONS_ED_ALL_ED_FT
Rest.  Drink more oral fluids, small amounts frequently.  Follow up next 2 days with your own doctor: call today for appointment.      Nausea / Vomiting    Nausea is the feeling that you have to vomit. As nausea gets worse, it can lead to vomiting. Vomiting puts you at an increased risk for dehydration. Older adults and people with other diseases or a weak immune system are at higher risk for dehydration. Drink clear fluids in small but frequent amounts as tolerated. Eat bland, easy-to-digest foods in small amounts as tolerated.    SEEK IMMEDIATE MEDICAL CARE IF YOU HAVE ANY OF THE FOLLOWING SYMPTOMS: fever, inability to keep sufficient fluids down, black or bloody vomitus, black or bloody stools, lightheadedness/dizziness, chest pain, severe headache, rash, shortness of breath, cold or clammy skin, confusion, pain with urination, or any signs of dehydration.            Hypokalemia    WHAT YOU NEED TO KNOW:    Hypokalemia is a low level of potassium in your blood. Potassium helps control how your muscles, heart, and digestive system work. Hypokalemia occurs when your body loses too much potassium or does not absorb enough from food.     DISCHARGE INSTRUCTIONS:    Return to the emergency department if:   •You cannot move your arm or leg.      •You have a fast or irregular heartbeat.      •You are too tired or weak to stand up.      Contact your healthcare provider if:   •You are vomiting, or you have diarrhea.      •You have numbness or tingling in your arms or legs.      •Your symptoms do not go away or they get worse.      •You have questions or concerns about your condition or care.      Medicines:   •Potassium will be given to bring your potassium levels back to normal.      •Take your medicine as directed. Contact your healthcare provider if you think your medicine is not helping or if you have side effects. Tell your provider if you are allergic to any medicine. Keep a list of the medicines, vitamins, and herbs you take. Include the amounts, and when and why you take them. Bring the list or the pill bottles to follow-up visits. Carry your medicine list with you in case of an emergency.      Eat foods that are high in potassium: Foods that are high in potassium include bananas, oranges, tomatoes, potatoes, and avocado. Rajan beans, turkey, salmon, lean beef, yogurt, and milk are also high in potassium. Ask your healthcare provider or dietitian for more information about foods that are high in potassium.     Follow up with your healthcare provider as directed: Write down your questions so you remember to ask them during your visits.

## 2022-11-08 NOTE — ED ADULT TRIAGE NOTE - HISTORY OF COVID-19 VACCINATION
"              After Visit Summary   9/7/2017    Myrtle Aguilera    MRN: [id number]           Patient Information     Date Of Birth          1978        Visit Information        Provider Department      9/7/2017 6:30 PM Frank Lainez MD [facility]        Today's Diagnoses     Orthopedic aftercare    -  1       Follow-ups after your visit        Follow-up notes from your care team     Return in about 1 year (around 9/7/2018).      Who to contact     Please call your clinic at [phone] to:    Ask questions about your health    Make or cancel appointments    Discuss your medicines    Learn about your test results    Speak to your doctor   If you have compliments or concerns about an experience at your clinic, or if you wish to file a complaint, please contact [facility] Patient Relations at [phone] or email us at [email or web address]         Additional Information About Your Visit        MyChart Information     [employer] gives you secure access to your electronic health record. If you see a primary care provider, you can also send messages to your care team and make appointments. If you have questions, please call your primary care clinic.  If you do not have a primary care provider, please call [phone] and they will assist you.      [employer] is an electronic gateway that provides easy, online access to your medical records. With [employer], you can request a clinic appointment, read your test results, renew a prescription or communicate with your care team.     To access your existing account, please contact your [facility] or call [phone] for assistance.        Care EveryWhere ID     This is your Care EveryWhere ID. This could be used by other organizations to access your [facility] medical records  [id number]        Your Vitals Were     Height BMI (Body Mass Index)                1.835 m (6' 0.25\") 23.58 kg/m2 "           Blood Pressure from Last 3 Encounters:   06/08/17 124/78    Weight from Last 3 Encounters:   09/07/17 79.4 kg (175 lb 1.6 oz)   07/27/17 80.2 kg (176 lb 14.4 oz)   06/08/17 78.5 kg (173 lb)              Today, you had the following     No orders found for display       Primary Care Provider    None Specified       No primary provider on file.        Equal Access to Services     JUAN DAVID THORNE : Hadii aad ku hadheideo Soigor, waaxda luqadaha, qaybta kaalmada adeegyada, kamryn henryashleyjoseph judd . So [facility] [phone].    ATENCIÓN: Si habla español, tiene a alexander disposición servicios gratuitos de asistencia lingüística. Llame al [phone].    We comply with applicable federal civil rights laws and [place] laws. We do not discriminate on the basis of race, color, national origin, age, disability sex, sexual orientation or gender identity.            Thank you!     Thank you for choosing [facility]  for your care. Our goal is always to provide you with excellent care. Hearing back from our patients is one way we can continue to improve our services. Please take a few minutes to complete the written survey that you may receive in the mail after your visit with us. Thank you!             Your Updated Medication List - Protect others around you: Learn how to safely use, store and throw away your medicines at www.disposemymeds.org.          This list is accurate as of: 9/7/17  7:56 PM.  Always use your most recent med list.                   Brand Name Dispense Instructions for use Diagnosis    drospirenone-ethinyl estradiol 3-0.03 MG per tablet    CORY          FLUTICASONE PROPIONATE (NASAL) NA           MULTI VITAMIN PO              Vaccine status unknown

## 2022-11-08 NOTE — ED CLERICAL - NS ED CARE COORDINATION INFORMATION
This patient is eligible for (or currently enrolled in) an outpatient care management program available through [employer]. This program can coordinate outpatient follow up and assist the patient in accessing a variety of outpatient resources.  If discharged from the ED, the patient will be contacted to see if any additional resources are needed.                                                                                    Please call the Nurse Clinical Call Center at [phone] with any questions or for assistance in discharge planning.

## 2022-11-08 NOTE — ED PROVIDER NOTE - PATIENT PORTAL LINK FT
You can access the FollowMyHealth Patient Portal offered by [facility] by registering at the following website: [email or web address]. By joining [facility]’s FollowMyHealth portal, you will also be able to view your health information using other applications (apps) compatible with our system.

## 2022-11-08 NOTE — ED ADULT TRIAGE NOTE - RESPIRATORY RATE (BREATHS/MIN)
Timoteo Thapa (Self)  Phone 1: [phone] (
20

## 2022-11-08 NOTE — ED PROVIDER NOTE - PROGRESS NOTE DETAILS
pt with hypokalemia will replace with iv as pt is still vomiting, will get ct r/o obstruction cannot tolerate po contrast at this time, so to am doctor results of ctap r/o obstruction,  repeat potassium after 3 k riders and po tolerance YONG Chavez DO CC:  Rpt K improved to 3.6, from 2.5.  Rpt lactate normal.  Informed pt tolerated po trial, + stable for DC home

## 2022-11-08 NOTE — ED PROVIDER NOTE - CLINICAL SUMMARY MEDICAL DECISION MAKING FREE TEXT BOX
pt with abd n/v will get labs, give zofran, pepcid and ivf will consider ctap if pt does not improve

## 2022-11-08 NOTE — ED ADULT TRIAGE NOTE - CHIEF COMPLAINT QUOTE
Pt. BIBEMS from home c/o dizziness, nausea, and vomiting. Pt. states she was just recently d/c from . Denies CP/SOB at this time

## 2022-11-08 NOTE — ED ADULT NURSE REASSESSMENT NOTE - NS ED NURSE REASSESS COMMENT FT1
pt A&O x4. provided ice chips, water, crackers. tolerating PO  up to date on plan of care. awaiting completion of potassium infusion, repeat potassium level. pt A&O x4. provided ice chips, water, crackers. tolerating PO intake. pt temperature 100.6, pt is 1 week post op knee replacement. MD Contino aware, will discuss with ortho. up to date on plan of care. awaiting completion of potassium infusion, repeat potassium level, ortho.

## 2022-11-08 NOTE — ED PROVIDER NOTE - PHYSICAL EXAMINATION
Gen:  Well appearing in NAD  Head:  NC/AT  HEENT: pupils perrl,no pharyngeal erythema, uvula midline  Cardiac: S1S2, RRR  Abd: Soft, gen ttp  Resp: No distress, CTA   musculoskeletal:: no deformities, no swelling, strength +5/+5  Skin: warm and dry as visualized, no rashes  Neuro: HENDERSON, aao x 4  Psych:alert, cooperative, appropriate mood and affect for situation

## 2022-11-10 LAB
-  AMIKACIN: SIGNIFICANT CHANGE UP
-  AMIKACIN: SIGNIFICANT CHANGE UP
-  AMOXICILLIN/CLAVULANIC ACID: SIGNIFICANT CHANGE UP
-  AMOXICILLIN/CLAVULANIC ACID: SIGNIFICANT CHANGE UP
-  AMPICILLIN/SULBACTAM: SIGNIFICANT CHANGE UP
-  AMPICILLIN/SULBACTAM: SIGNIFICANT CHANGE UP
-  AMPICILLIN: SIGNIFICANT CHANGE UP
-  AMPICILLIN: SIGNIFICANT CHANGE UP
-  AZTREONAM: SIGNIFICANT CHANGE UP
-  AZTREONAM: SIGNIFICANT CHANGE UP
-  CEFAZOLIN: SIGNIFICANT CHANGE UP
-  CEFAZOLIN: SIGNIFICANT CHANGE UP
-  CEFEPIME: SIGNIFICANT CHANGE UP
-  CEFEPIME: SIGNIFICANT CHANGE UP
-  CEFOXITIN: SIGNIFICANT CHANGE UP
-  CEFOXITIN: SIGNIFICANT CHANGE UP
-  CEFTRIAXONE: SIGNIFICANT CHANGE UP
-  CEFTRIAXONE: SIGNIFICANT CHANGE UP
-  CIPROFLOXACIN: SIGNIFICANT CHANGE UP
-  CIPROFLOXACIN: SIGNIFICANT CHANGE UP
-  ERTAPENEM: SIGNIFICANT CHANGE UP
-  ERTAPENEM: SIGNIFICANT CHANGE UP
-  GENTAMICIN: SIGNIFICANT CHANGE UP
-  GENTAMICIN: SIGNIFICANT CHANGE UP
-  IMIPENEM: SIGNIFICANT CHANGE UP
-  LEVOFLOXACIN: SIGNIFICANT CHANGE UP
-  LEVOFLOXACIN: SIGNIFICANT CHANGE UP
-  MEROPENEM: SIGNIFICANT CHANGE UP
-  MEROPENEM: SIGNIFICANT CHANGE UP
-  NITROFURANTOIN: SIGNIFICANT CHANGE UP
-  NITROFURANTOIN: SIGNIFICANT CHANGE UP
-  PIPERACILLIN/TAZOBACTAM: SIGNIFICANT CHANGE UP
-  PIPERACILLIN/TAZOBACTAM: SIGNIFICANT CHANGE UP
-  TOBRAMYCIN: SIGNIFICANT CHANGE UP
-  TOBRAMYCIN: SIGNIFICANT CHANGE UP
-  TRIMETHOPRIM/SULFAMETHOXAZOLE: SIGNIFICANT CHANGE UP
-  TRIMETHOPRIM/SULFAMETHOXAZOLE: SIGNIFICANT CHANGE UP
CULTURE RESULTS: SIGNIFICANT CHANGE UP
METHOD TYPE: SIGNIFICANT CHANGE UP
METHOD TYPE: SIGNIFICANT CHANGE UP
ORGANISM # SPEC MICROSCOPIC CNT: SIGNIFICANT CHANGE UP
SPECIMEN SOURCE: SIGNIFICANT CHANGE UP

## 2022-11-10 NOTE — ED POST DISCHARGE NOTE - RESULT SUMMARY
Prelim Urine Cx with Proteus mirabilis and e. coli.  No Abx started.  F.u sens and call to start Abx.  WAQAS Leon PA-C

## 2022-11-10 NOTE — ED POST DISCHARGE NOTE - DETAILS
pt sent Keflex to cover Proteus and E Coli and will fu with PMD and knows to return to ED for any worsening. WAQAS VARGAS

## 2022-11-11 RX ORDER — CEPHALEXIN 500 MG
1 CAPSULE ORAL
Qty: 21 | Refills: 0
Start: 2022-11-11 | End: 2022-11-17

## 2022-11-14 ENCOUNTER — NON-APPOINTMENT (OUTPATIENT)
Age: 72
End: 2022-11-14

## 2022-11-14 DIAGNOSIS — S93.411A SPRAIN OF CALCANEOFIBULAR LIGAMENT OF RIGHT ANKLE, INITIAL ENCOUNTER: ICD-10-CM

## 2022-11-14 DIAGNOSIS — Z82.61 FAMILY HISTORY OF ARTHRITIS: ICD-10-CM

## 2022-11-14 DIAGNOSIS — M25.562 PAIN IN LEFT KNEE: ICD-10-CM

## 2022-11-14 DIAGNOSIS — Z82.69 FAMILY HISTORY OF OTHER DISEASES OF THE MUSCULOSKELETAL SYSTEM AND CONNECTIVE TISSUE: ICD-10-CM

## 2022-11-14 DIAGNOSIS — M25.561 PAIN IN RIGHT KNEE: ICD-10-CM

## 2022-11-14 DIAGNOSIS — S93.412A SPRAIN OF CALCANEOFIBULAR LIGAMENT OF LEFT ANKLE, INITIAL ENCOUNTER: ICD-10-CM

## 2022-11-14 RX ORDER — ENALAPRIL MALEATE 10 MG/1
10 TABLET ORAL TWICE DAILY
Refills: 0 | Status: ACTIVE | COMMUNITY
Start: 2021-07-12

## 2022-11-14 RX ORDER — NEBIVOLOL HYDROCHLORIDE 5 MG/1
5 TABLET ORAL DAILY
Refills: 0 | Status: ACTIVE | COMMUNITY

## 2022-11-14 RX ORDER — AMLODIPINE BESYLATE 2.5 MG/1
2.5 TABLET ORAL DAILY
Refills: 0 | Status: ACTIVE | COMMUNITY

## 2022-11-14 RX ORDER — ROSUVASTATIN CALCIUM 10 MG/1
10 TABLET, FILM COATED ORAL DAILY
Refills: 0 | Status: ACTIVE | COMMUNITY

## 2022-12-05 ENCOUNTER — APPOINTMENT (OUTPATIENT)
Dept: ORTHOPEDIC SURGERY | Facility: CLINIC | Age: 72
End: 2022-12-05

## 2022-12-05 VITALS — BODY MASS INDEX: 30.3 KG/M2 | HEIGHT: 63 IN | WEIGHT: 171 LBS

## 2022-12-05 PROCEDURE — 73564 X-RAY EXAM KNEE 4 OR MORE: CPT | Mod: RT

## 2022-12-05 PROCEDURE — 99024 POSTOP FOLLOW-UP VISIT: CPT

## 2022-12-05 NOTE — DATA REVIEWED
[FreeTextEntry1] : X-rays done in the office today of the right knee 4 view show a right total knee prosthesis in normal alignment in good position with no signs of loosening or wear.  There is no tumors masses or calcifications seen.

## 2022-12-05 NOTE — IMAGING
[de-identified] : Examination of the right knee reveals limited range of motion from 5 to 95 degrees.  The scar is well-healed there is no signs of infection or DVT.  There is no effusion.  There is no warmth.  Tracking is normal.

## 2022-12-05 NOTE — DISCUSSION/SUMMARY
[de-identified] : Plan at this time is to continue physical therapy 3 times a week.  She will ice the knee and take the oxycodone as necessary and I will see her back in the office in 6 weeks.

## 2022-12-05 NOTE — ASSESSMENT
[FreeTextEntry1] : She comes in today follow-up on her right knee.  She now month out from a right total knee replacement.  She is doing well.  She still has a fair amount of pain and requires oxycodone and needs a cane to get around but she is definitely improving.  He is currently in physical therapy.

## 2022-12-05 NOTE — HISTORY OF PRESENT ILLNESS
[8] : 8 [Dull/Aching] : dull/aching [Localized] : localized [Tingling] : tingling [Retired] : Work status: retired [] : This patient has had an injection before: no [FreeTextEntry1] : R Knee [FreeTextEntry3] : 11/1/22 [FreeTextEntry5] : 73 Y/O F eval R Knee S/P R TKR on above date pt states recovery is difficult with pain and swelling persisting  [de-identified] : PT  [de-identified] : 11/1/22 [de-identified] : LILLY LEROY

## 2022-12-20 ENCOUNTER — TRANSCRIPTION ENCOUNTER (OUTPATIENT)
Age: 72
End: 2022-12-20

## 2023-01-03 ENCOUNTER — TRANSCRIPTION ENCOUNTER (OUTPATIENT)
Age: 73
End: 2023-01-03

## 2023-01-17 ENCOUNTER — TRANSCRIPTION ENCOUNTER (OUTPATIENT)
Age: 73
End: 2023-01-17

## 2023-01-18 ENCOUNTER — APPOINTMENT (OUTPATIENT)
Dept: ORTHOPEDIC SURGERY | Facility: CLINIC | Age: 73
End: 2023-01-18
Payer: MEDICARE

## 2023-01-18 VITALS — HEIGHT: 63 IN | BODY MASS INDEX: 30.3 KG/M2 | WEIGHT: 171 LBS

## 2023-01-18 PROCEDURE — 99024 POSTOP FOLLOW-UP VISIT: CPT

## 2023-01-18 RX ORDER — NEBIVOLOL HYDROCHLORIDE 10 MG/1
10 TABLET ORAL
Qty: 30 | Refills: 0 | Status: COMPLETED | COMMUNITY
Start: 2017-12-09 | End: 2023-01-18

## 2023-01-18 RX ORDER — GLUC/MSM/COLGN2/HYAL/ANTIARTH3 375-375-20
TABLET ORAL
Refills: 0 | Status: COMPLETED | COMMUNITY
End: 2023-01-18

## 2023-01-18 RX ORDER — ASPIRIN ENTERIC COATED TABLETS 81 MG 81 MG/1
81 TABLET, DELAYED RELEASE ORAL DAILY
Refills: 0 | Status: COMPLETED | COMMUNITY
End: 2023-01-18

## 2023-01-18 RX ORDER — POTASSIUM CHLORIDE 1500 MG/1
20 TABLET, EXTENDED RELEASE ORAL
Qty: 30 | Refills: 0 | Status: COMPLETED | COMMUNITY
Start: 2022-10-19 | End: 2023-01-18

## 2023-01-18 RX ORDER — OXYCODONE 5 MG/1
5 TABLET ORAL
Qty: 42 | Refills: 0 | Status: COMPLETED | COMMUNITY
Start: 2022-12-05 | End: 2023-01-18

## 2023-01-18 RX ORDER — PNV NO.95/FERROUS FUM/FOLIC AC 28MG-0.8MG
325 TABLET ORAL DAILY
Refills: 0 | Status: COMPLETED | COMMUNITY
End: 2023-01-18

## 2023-01-18 RX ORDER — POLYETHYLENE GLYCOL-3350 AND ELECTROLYTES WITH FLAVOR PACK 240; 5.84; 2.98; 6.72; 22.72 G/278.26G; G/278.26G; G/278.26G; G/278.26G; G/278.26G
240 POWDER, FOR SOLUTION ORAL
Qty: 1 | Refills: 0 | Status: COMPLETED | COMMUNITY
Start: 2021-10-14 | End: 2023-01-18

## 2023-01-18 RX ORDER — CHLORDIAZEPOXIDE HYDROCHLORIDE AND CLIDINIUM BROMIDE 5; 2.5 MG/1; MG/1
5-2.5 CAPSULE, GELATIN COATED ORAL TWICE DAILY
Qty: 60 | Refills: 2 | Status: COMPLETED | COMMUNITY
Start: 2020-06-24 | End: 2023-01-18

## 2023-01-19 RX ORDER — OXYCODONE 5 MG/1
5 TABLET ORAL
Qty: 42 | Refills: 0 | Status: ACTIVE | COMMUNITY
Start: 2023-01-19 | End: 1900-01-01

## 2023-01-19 NOTE — ASSESSMENT
[FreeTextEntry1] : Patient comes in today follow-up on her right knee.  She is now 2-1/2 months out from her right total knee replacement performed on November 1, 2023.  Overall she is doing well with her home physical therapy.  She still has pain requiring oxycodone once a day.  She is walking better with her cane.  Her recovery is slow but progressive.

## 2023-01-19 NOTE — HISTORY OF PRESENT ILLNESS
[4] : 4 [3] : 3 [Dull/Aching] : dull/aching [Localized] : localized [Tingling] : tingling [Retired] : Work status: retired [] : This patient has had an injection before: no [FreeTextEntry1] : R Knee [FreeTextEntry3] : 11/1/22 [FreeTextEntry5] : 71 Y/O F eval R Knee S/P R TKR on above date pt states recovery has improved since last visit  [de-identified] : PT  [de-identified] : 11/1/22 [de-identified] : LILLY LEROY

## 2023-01-19 NOTE — IMAGING
[de-identified] : Examination of the right lower extremity reveals normal neurovascular exam.  Examination of the right knee reveals range of motion from 0 to 115 degrees.  Her scar is well-healed with no signs of infection or DVT.  She has no effusion.  She has no redness rashes or deformity.  Examination of the right hip is normal with full painless range of motion.

## 2023-01-19 NOTE — DISCUSSION/SUMMARY
[de-identified] : Plan at this time is continue physical therapy at home.  She will get 1 more prescription for oxycodone and then wean off of it.  I will see her back in 2 months to check on her progress.

## 2023-01-31 ENCOUNTER — TRANSCRIPTION ENCOUNTER (OUTPATIENT)
Age: 73
End: 2023-01-31

## 2023-03-22 ENCOUNTER — APPOINTMENT (OUTPATIENT)
Dept: ORTHOPEDIC SURGERY | Facility: CLINIC | Age: 73
End: 2023-03-22
Payer: MEDICARE

## 2023-03-22 VITALS — HEIGHT: 63 IN | WEIGHT: 171 LBS | BODY MASS INDEX: 30.3 KG/M2

## 2023-03-22 PROCEDURE — 99213 OFFICE O/P EST LOW 20 MIN: CPT

## 2023-03-22 RX ORDER — CYCLOBENZAPRINE HYDROCHLORIDE 10 MG/1
10 TABLET, FILM COATED ORAL 3 TIMES DAILY
Qty: 42 | Refills: 0 | Status: ACTIVE | COMMUNITY
Start: 2023-03-22 | End: 1900-01-01

## 2023-03-22 NOTE — ASSESSMENT
[FreeTextEntry1] : Patient comes in today follow-up on her right knee.  She is now 4 and half months out from her right total knee replacement done November 1, 2022.  She is definitely showing signs of improvement.  She had about 2 weeks where she had to miss physical therapy after the death of her mother.  She is now been doing some work around the house going up and down stairs and she has some difficulty with that.  Overall however she is improving.

## 2023-03-22 NOTE — HISTORY OF PRESENT ILLNESS
[3] : 3 [2] : 2 [Dull/Aching] : dull/aching [Localized] : localized [Tingling] : tingling [Intermittent] : intermittent [Retired] : Work status: retired [] : This patient has had an injection before: no [FreeTextEntry1] : R Knee [FreeTextEntry3] : 11/1/22 [FreeTextEntry5] : 71 y/o F presents for f/u eval. of Rt. knee. Pt reports she is recovering well, pain levels have decreased. Stiffness persists. [de-identified] : Dr. Dugan [de-identified] : 1/18/2023 [de-identified] : 11/1/2022

## 2023-03-22 NOTE — DISCUSSION/SUMMARY
[de-identified] : Plan at this time is to resume physical therapy 2-3 times a week.  She will take Flexeril for the muscle spasms.  We will see her back in 3 months and final follow-up.

## 2023-03-22 NOTE — IMAGING
[de-identified] : Examination of the right lower extremity feels normal neurovascular exam.  Examination of right knee reveals well-healed midline scar.  Range of motion 0 to 125 degrees with normal tracking.  There is no crepitation or instability.  There is no point tenderness.  There is no redness rashes or effusion.  There is no signs of infection or DVT.

## 2023-03-29 NOTE — H&P PST ADULT - LAB RESULTS AND INTERPRETATION
Msg didn't save yesterday - TCP   cbc, bmp, type and screen, ptt/inr, a1c, mrsa, albumin  done today in PST

## 2023-06-13 NOTE — HISTORY OF PRESENT ILLNESS
[de-identified] : rt ear plugging 2 weeks ago\par 2 weeks dizziness\par acute vertigo 5 d ago lasted several hours now off balance\par no tinnitus, hx cerumen, covid test neg\par to [facility], ct head reported neg\par general weakness several months and fatigue,balance not good x several months\par to primary None

## 2023-06-21 ENCOUNTER — APPOINTMENT (OUTPATIENT)
Dept: ORTHOPEDIC SURGERY | Facility: CLINIC | Age: 73
End: 2023-06-21
Payer: MEDICARE

## 2023-06-21 VITALS — HEIGHT: 62.5 IN | WEIGHT: 170 LBS | BODY MASS INDEX: 30.5 KG/M2

## 2023-06-21 PROCEDURE — 99213 OFFICE O/P EST LOW 20 MIN: CPT

## 2023-06-21 NOTE — HISTORY OF PRESENT ILLNESS
[4] : 4 [3] : 3 [Dull/Aching] : dull/aching [Localized] : localized [Tingling] : tingling [Retired] : Work status: retired [] : This patient has had an injection before: no [FreeTextEntry1] : R Knee [FreeTextEntry3] : 11/1/22 [FreeTextEntry5] : 73 Y/O F eval R Knee S/P R TKR on above date pt states recovery has improved since last visit  [de-identified] : PT  [de-identified] : 11/1/22 [de-identified] : LILLY LEROY

## 2023-06-21 NOTE — ASSESSMENT
[FreeTextEntry1] : The patient is 8 months from her right total knee replacement done November 1, 2022.  She is definitely showing signs of improvement. The patient has stopped PT and is otherwise doing alright. She denies new trauma. She was recently diagnosed as anemic and she feels that this is contributing to her overall fatigue and pain. She does have mild numbness to the lateral incision. She is able to complete all ADLs as needed with minimal relief. She has not needed pain medications\par \par Right knee exam: Neurovascularly intact. Sensation intact. Examination of right knee reveals well-healed midline scar.  Range of motion 0 to 125 degrees with normal tracking.  There is no crepitation or instability.  There is no point tenderness.  There is no redness rashes or effusion.  There is no signs of infection or DVT.\par \par The patient is doing well. She is 8 months from a right TKA. She will finish her treatment for anemia. At that time I do think fatigue and activity level will improve. She will follow up as needed.  clear

## 2023-08-24 ENCOUNTER — OUTPATIENT (OUTPATIENT)
Dept: OUTPATIENT SERVICES | Facility: HOSPITAL | Age: 73
LOS: 1 days | Discharge: HOME | End: 2023-08-24

## 2023-08-24 ENCOUNTER — APPOINTMENT (OUTPATIENT)
Dept: GASTROENTEROLOGY | Facility: CLINIC | Age: 73
End: 2023-08-24
Payer: MEDICARE

## 2023-08-24 VITALS
WEIGHT: 165 LBS | DIASTOLIC BLOOD PRESSURE: 68 MMHG | BODY MASS INDEX: 29.6 KG/M2 | SYSTOLIC BLOOD PRESSURE: 108 MMHG | HEIGHT: 62.5 IN

## 2023-08-24 DIAGNOSIS — Z95.5 PRESENCE OF CORONARY ANGIOPLASTY IMPLANT AND GRAFT: Chronic | ICD-10-CM

## 2023-08-24 DIAGNOSIS — Z90.11 ACQUIRED ABSENCE OF RIGHT BREAST AND NIPPLE: Chronic | ICD-10-CM

## 2023-08-24 DIAGNOSIS — M85.80 OTHER SPECIFIED DISORDERS OF BONE DENSITY AND STRUCTURE, UNSPECIFIED SITE: ICD-10-CM

## 2023-08-24 DIAGNOSIS — H26.9 UNSPECIFIED CATARACT: Chronic | ICD-10-CM

## 2023-08-24 PROCEDURE — 99214 OFFICE O/P EST MOD 30 MIN: CPT

## 2023-08-24 RX ORDER — PNV NO.95/FERROUS FUM/FOLIC AC 28MG-0.8MG
TABLET ORAL
Refills: 0 | Status: DISCONTINUED | COMMUNITY
End: 2023-08-24

## 2023-08-24 RX ORDER — ZOLPIDEM TARTRATE 10 MG/1
10 TABLET, FILM COATED ORAL
Refills: 0 | Status: DISCONTINUED | COMMUNITY
Start: 2017-08-09 | End: 2023-08-24

## 2023-08-24 RX ORDER — OXYCODONE 5 MG/1
5 TABLET ORAL
Qty: 42 | Refills: 0 | Status: DISCONTINUED | COMMUNITY
Start: 2022-11-21 | End: 2023-08-24

## 2023-08-24 RX ORDER — TOBRAMYCIN AND DEXAMETHASONE 3; 1 MG/ML; MG/ML
0.3-0.1 SUSPENSION/ DROPS OPHTHALMIC
Qty: 5 | Refills: 0 | Status: ACTIVE | COMMUNITY
Start: 2023-08-16

## 2023-08-24 NOTE — REASON FOR VISIT
[Consultation] : a consultation visit [Spouse] : spouse [FreeTextEntry1] : history of IBS and history of iron deficiency anemia

## 2023-08-24 NOTE — PHYSICAL EXAM

## 2023-08-24 NOTE — HISTORY OF PRESENT ILLNESS
[FreeTextEntry1] : Ms. KALEB HAMMOND is a 73 year old female with history of irritable bowel syndrome for several years.  Patient notes alternating constipation and diarrhea.  Patient has been under increasing stress and her symptoms have worsened.  She is currently not taking any therapy for IBS.  Patient had extensive work-up for iron deficiency anemia and was found to have a paraesophageal hernia with Stan's erosions.  Patient has no complaints of dysphagia.  Recent blood test show progressive iron deficiency.  Patient is very anxious and depressed and feels tired.

## 2023-08-24 NOTE — ASSESSMENT
[FreeTextEntry1] : 74 yo female with history of IBS with alternating constipation and diarrhea and iron deficiency anemia.  Patient is concerned about the etiology of her iron deficiency.  Empiric trial of dicyclomine.  Patient scheduled to see hematologist him and may well need IV in a infusions of iron.  Arrange colonoscopy and endoscopy to look for possible source of bleeding.  Patient will need Cardiology clearance to hold Xarelto prior to procedure.

## 2023-08-25 ENCOUNTER — APPOINTMENT (OUTPATIENT)
Dept: HEMATOLOGY ONCOLOGY | Facility: CLINIC | Age: 73
End: 2023-08-25

## 2023-08-25 ENCOUNTER — APPOINTMENT (OUTPATIENT)
Dept: HEMATOLOGY ONCOLOGY | Facility: CLINIC | Age: 73
End: 2023-08-25
Payer: MEDICARE

## 2023-08-25 VITALS
BODY MASS INDEX: 29.7 KG/M2 | HEART RATE: 82 BPM | DIASTOLIC BLOOD PRESSURE: 79 MMHG | WEIGHT: 165 LBS | SYSTOLIC BLOOD PRESSURE: 122 MMHG | RESPIRATION RATE: 17 BRPM | OXYGEN SATURATION: 98 % | TEMPERATURE: 98.4 F

## 2023-08-25 PROCEDURE — 99214 OFFICE O/P EST MOD 30 MIN: CPT

## 2023-08-28 ENCOUNTER — APPOINTMENT (OUTPATIENT)
Dept: INFUSION THERAPY | Facility: CLINIC | Age: 73
End: 2023-08-28

## 2023-08-29 DIAGNOSIS — D50.9 IRON DEFICIENCY ANEMIA, UNSPECIFIED: ICD-10-CM

## 2023-08-30 NOTE — HISTORY OF PRESENT ILLNESS
Pt called in regards Simvastatin, he stated unable to  Rx due to insurance wanting 90 day supply. I have called in Simvastatin 10mg for 90 day supply at [facility] on [address], I have left a voicemail to pt letting him know per pharmacy mediation should be ready either today at the end of the day or first thing tomorrow.   
[FreeTextEntry1] : Patient had an exam with her cardiologist because of worsening fatigue and found to have a hemoglobin of 8 she is on blood thinners she has also had some rectal bleeding associated with diarrhea colonoscopy one year ago did not show evidence of colitis or colonic lesions she does report having regurgitation and some chest discomfort with shortness of breath while walking or sitting

## 2023-08-31 ENCOUNTER — APPOINTMENT (OUTPATIENT)
Dept: INFUSION THERAPY | Facility: CLINIC | Age: 73
End: 2023-08-31

## 2023-08-31 VITALS
BODY MASS INDEX: 29.59 KG/M2 | OXYGEN SATURATION: 98 % | DIASTOLIC BLOOD PRESSURE: 68 MMHG | WEIGHT: 164.38 LBS | RESPIRATION RATE: 18 BRPM | SYSTOLIC BLOOD PRESSURE: 123 MMHG | TEMPERATURE: 98.4 F | HEART RATE: 97 BPM

## 2023-09-07 ENCOUNTER — APPOINTMENT (OUTPATIENT)
Dept: INFUSION THERAPY | Facility: CLINIC | Age: 73
End: 2023-09-07

## 2023-10-02 ENCOUNTER — APPOINTMENT (OUTPATIENT)
Dept: HEMATOLOGY ONCOLOGY | Facility: CLINIC | Age: 73
End: 2023-10-02

## 2023-10-04 DIAGNOSIS — Z20.822 CONTACT WITH AND (SUSPECTED) EXPOSURE TO COVID-19: ICD-10-CM

## 2023-10-04 DIAGNOSIS — H61.23 IMPACTED CERUMEN, BILATERAL: ICD-10-CM

## 2023-10-04 DIAGNOSIS — H61.21 IMPACTED CERUMEN, RIGHT EAR: ICD-10-CM

## 2023-10-05 ENCOUNTER — RESULT REVIEW (OUTPATIENT)
Age: 73
End: 2023-10-05

## 2023-10-05 ENCOUNTER — APPOINTMENT (OUTPATIENT)
Dept: HEMATOLOGY ONCOLOGY | Facility: CLINIC | Age: 73
End: 2023-10-05

## 2023-10-05 LAB
BASOPHILS # BLD AUTO: 0.06 K/UL — SIGNIFICANT CHANGE UP (ref 0–0.2)
BASOPHILS NFR BLD AUTO: 1.6 % — SIGNIFICANT CHANGE UP (ref 0–2)
EOSINOPHIL # BLD AUTO: 0.28 K/UL — SIGNIFICANT CHANGE UP (ref 0–0.5)
EOSINOPHIL NFR BLD AUTO: 7.5 % — HIGH (ref 0–6)
FERRITIN SERPL-MCNC: 317 NG/ML — SIGNIFICANT CHANGE UP (ref 13–330)
FOLATE SERPL-MCNC: 6.2 NG/ML — SIGNIFICANT CHANGE UP
HCT VFR BLD CALC: 37.4 % — SIGNIFICANT CHANGE UP (ref 34.5–45)
HGB BLD-MCNC: 12.1 G/DL — SIGNIFICANT CHANGE UP (ref 11.5–15.5)
IMM GRANULOCYTES NFR BLD AUTO: 0.3 % — SIGNIFICANT CHANGE UP (ref 0–0.9)
IRON SATN MFR SERPL: 141 UG/DL — SIGNIFICANT CHANGE UP (ref 30–160)
IRON SATN MFR SERPL: 49 % — SIGNIFICANT CHANGE UP (ref 14–50)
LYMPHOCYTES # BLD AUTO: 1.47 K/UL — SIGNIFICANT CHANGE UP (ref 1–3.3)
LYMPHOCYTES # BLD AUTO: 39.3 % — SIGNIFICANT CHANGE UP (ref 13–44)
MCHC RBC-ENTMCNC: 30.5 PG — SIGNIFICANT CHANGE UP (ref 27–34)
MCHC RBC-ENTMCNC: 32.4 GM/DL — SIGNIFICANT CHANGE UP (ref 32–36)
MCV RBC AUTO: 94.2 FL — SIGNIFICANT CHANGE UP (ref 80–100)
MONOCYTES # BLD AUTO: 0.35 K/UL — SIGNIFICANT CHANGE UP (ref 0–0.9)
MONOCYTES NFR BLD AUTO: 9.4 % — SIGNIFICANT CHANGE UP (ref 2–14)
NEUTROPHILS # BLD AUTO: 1.57 K/UL — LOW (ref 1.8–7.4)
NEUTROPHILS NFR BLD AUTO: 41.9 % — LOW (ref 43–77)
NRBC # BLD: 0 /100 WBCS — SIGNIFICANT CHANGE UP (ref 0–0)
PLATELET # BLD AUTO: 288 K/UL — SIGNIFICANT CHANGE UP (ref 150–400)
RBC # BLD: 3.97 M/UL — SIGNIFICANT CHANGE UP (ref 3.8–5.2)
RBC # FLD: 21.9 % — HIGH (ref 10.3–14.5)
TIBC SERPL-MCNC: 290 UG/DL — SIGNIFICANT CHANGE UP (ref 220–430)
UIBC SERPL-MCNC: 149 UG/DL — SIGNIFICANT CHANGE UP (ref 110–370)
VIT B12 SERPL-MCNC: 463 PG/ML — SIGNIFICANT CHANGE UP (ref 232–1245)
WBC # BLD: 3.74 K/UL — LOW (ref 3.8–10.5)
WBC # FLD AUTO: 3.74 K/UL — LOW (ref 3.8–10.5)

## 2023-10-06 ENCOUNTER — NON-APPOINTMENT (OUTPATIENT)
Age: 73
End: 2023-10-06

## 2023-10-06 LAB — ERYTHROCYTE [SEDIMENTATION RATE] IN BLOOD: 23 MM/HR — HIGH (ref 0–20)

## 2023-10-17 NOTE — ED PROVIDER NOTE - PROGRESS NOTE DETAILS
69 y/o f with PMHx of CAD s/p stent, CVA, anemia, presenting to the ED initially for COVID car swab, sent inside when found to have O2 sat of 91%. Today felt very cold, took temp which was 100.1F prompting call to PCP, Dr. Hogan, who told her to come here. Also c/o SOB when walking up stairs. Reports she is very anxious about having COVID and spreading it to 98 y/o mother. Pt has hx of anemia, supposed to have blood work done but too scared to go to lab due to covid, requesting labs be drawn here today. When interviewing pt, O2 sat on monitor 94-97% on room air. Pt denies SOB or CP currently. Physical exam is benign. Will obtain CXR, covid swab, basic labs, likely DC home.  - Bisi Tavera. CTA chest with multiple patchy nodules in the left upper lobe are likely infectious/inflammatory in etiology. Similar process is seen to a lesser extent in the left lower lobe.  Pt walked with O2 monitor, sat dropped to 89% on RA, at rest O2 sat 91-94% on RA, will admit pt for further evaluation  Bisi Tavera PA-C Congruent after being admitted pt decided she would like to go home instead, risks explained, including death, pt advised that she must sign an ama form before leaving, pt verbalized understanding and agreed, self quarantine advised until pt receives results   Bisi Tavera PA-C after being admitted pt decided she would like to go home instead, risks explained, including death, pt has full mental capacity to make decisions. pt advised that she must sign an ama form before leaving, pt verbalized understanding and agreed, self quarantine advised until pt receives results, strict return precautions given, will give pt a pulse oximeter for home  Bisi Tavera PA-C

## 2023-10-18 ENCOUNTER — APPOINTMENT (OUTPATIENT)
Dept: MAMMOGRAPHY | Facility: CLINIC | Age: 73
End: 2023-10-18
Payer: MEDICARE

## 2023-10-18 ENCOUNTER — RESULT REVIEW (OUTPATIENT)
Age: 73
End: 2023-10-18

## 2023-10-18 ENCOUNTER — APPOINTMENT (OUTPATIENT)
Dept: ULTRASOUND IMAGING | Facility: CLINIC | Age: 73
End: 2023-10-18
Payer: MEDICARE

## 2023-10-18 ENCOUNTER — OUTPATIENT (OUTPATIENT)
Dept: OUTPATIENT SERVICES | Facility: HOSPITAL | Age: 73
LOS: 1 days | End: 2023-10-18
Payer: MEDICARE

## 2023-10-18 DIAGNOSIS — Z90.11 ACQUIRED ABSENCE OF RIGHT BREAST AND NIPPLE: Chronic | ICD-10-CM

## 2023-10-18 DIAGNOSIS — H26.9 UNSPECIFIED CATARACT: Chronic | ICD-10-CM

## 2023-10-18 DIAGNOSIS — Z90.10 ACQUIRED ABSENCE OF UNSPECIFIED BREAST AND NIPPLE: ICD-10-CM

## 2023-10-18 DIAGNOSIS — Z85.3 PERSONAL HISTORY OF MALIGNANT NEOPLASM OF BREAST: ICD-10-CM

## 2023-10-18 DIAGNOSIS — Z95.5 PRESENCE OF CORONARY ANGIOPLASTY IMPLANT AND GRAFT: Chronic | ICD-10-CM

## 2023-10-18 PROCEDURE — 76641 ULTRASOUND BREAST COMPLETE: CPT | Mod: 26,LT,GY

## 2023-10-18 PROCEDURE — 77067 SCR MAMMO BI INCL CAD: CPT | Mod: 26,LT,52

## 2023-10-18 PROCEDURE — 77063 BREAST TOMOSYNTHESIS BI: CPT | Mod: 26,52

## 2023-10-18 PROCEDURE — 77067 SCR MAMMO BI INCL CAD: CPT

## 2023-10-18 PROCEDURE — 77063 BREAST TOMOSYNTHESIS BI: CPT

## 2023-10-18 PROCEDURE — 76641 ULTRASOUND BREAST COMPLETE: CPT

## 2023-11-02 ENCOUNTER — NON-APPOINTMENT (OUTPATIENT)
Age: 73
End: 2023-11-02

## 2023-11-02 ENCOUNTER — APPOINTMENT (OUTPATIENT)
Dept: GASTROENTEROLOGY | Facility: AMBULATORY MEDICAL SERVICES | Age: 73
End: 2023-11-02
Payer: MEDICARE

## 2023-11-02 ENCOUNTER — RESULT REVIEW (OUTPATIENT)
Age: 73
End: 2023-11-02

## 2023-11-02 PROCEDURE — 43239 EGD BIOPSY SINGLE/MULTIPLE: CPT | Mod: GC,59

## 2023-11-02 PROCEDURE — 45378 DIAGNOSTIC COLONOSCOPY: CPT | Mod: GC

## 2023-11-09 ENCOUNTER — OUTPATIENT (OUTPATIENT)
Dept: OUTPATIENT SERVICES | Facility: HOSPITAL | Age: 73
LOS: 1 days | Discharge: HOME | End: 2023-11-09

## 2023-11-09 DIAGNOSIS — D50.9 IRON DEFICIENCY ANEMIA, UNSPECIFIED: ICD-10-CM

## 2023-11-09 DIAGNOSIS — Z90.11 ACQUIRED ABSENCE OF RIGHT BREAST AND NIPPLE: Chronic | ICD-10-CM

## 2023-11-09 DIAGNOSIS — Z95.5 PRESENCE OF CORONARY ANGIOPLASTY IMPLANT AND GRAFT: Chronic | ICD-10-CM

## 2023-11-09 DIAGNOSIS — H26.9 UNSPECIFIED CATARACT: Chronic | ICD-10-CM

## 2023-11-10 ENCOUNTER — RESULT REVIEW (OUTPATIENT)
Age: 73
End: 2023-11-10

## 2023-11-10 ENCOUNTER — APPOINTMENT (OUTPATIENT)
Dept: HEMATOLOGY ONCOLOGY | Facility: CLINIC | Age: 73
End: 2023-11-10

## 2023-11-10 LAB
BASOPHILS # BLD AUTO: 0.07 K/UL — SIGNIFICANT CHANGE UP (ref 0–0.2)
BASOPHILS # BLD AUTO: 0.07 K/UL — SIGNIFICANT CHANGE UP (ref 0–0.2)
BASOPHILS NFR BLD AUTO: 1.5 % — SIGNIFICANT CHANGE UP (ref 0–2)
BASOPHILS NFR BLD AUTO: 1.5 % — SIGNIFICANT CHANGE UP (ref 0–2)
EOSINOPHIL # BLD AUTO: 0.3 K/UL — SIGNIFICANT CHANGE UP (ref 0–0.5)
EOSINOPHIL # BLD AUTO: 0.3 K/UL — SIGNIFICANT CHANGE UP (ref 0–0.5)
EOSINOPHIL NFR BLD AUTO: 6.6 % — HIGH (ref 0–6)
EOSINOPHIL NFR BLD AUTO: 6.6 % — HIGH (ref 0–6)
FERRITIN SERPL-MCNC: 59 NG/ML — SIGNIFICANT CHANGE UP (ref 13–330)
FERRITIN SERPL-MCNC: 59 NG/ML — SIGNIFICANT CHANGE UP (ref 13–330)
HCT VFR BLD CALC: 40.5 % — SIGNIFICANT CHANGE UP (ref 34.5–45)
HCT VFR BLD CALC: 40.5 % — SIGNIFICANT CHANGE UP (ref 34.5–45)
HGB BLD-MCNC: 13.2 G/DL — SIGNIFICANT CHANGE UP (ref 11.5–15.5)
HGB BLD-MCNC: 13.2 G/DL — SIGNIFICANT CHANGE UP (ref 11.5–15.5)
IMM GRANULOCYTES NFR BLD AUTO: 0.2 % — SIGNIFICANT CHANGE UP (ref 0–0.9)
IMM GRANULOCYTES NFR BLD AUTO: 0.2 % — SIGNIFICANT CHANGE UP (ref 0–0.9)
IRON SATN MFR SERPL: 23 % — SIGNIFICANT CHANGE UP (ref 14–50)
IRON SATN MFR SERPL: 23 % — SIGNIFICANT CHANGE UP (ref 14–50)
IRON SATN MFR SERPL: 85 UG/DL — SIGNIFICANT CHANGE UP (ref 30–160)
IRON SATN MFR SERPL: 85 UG/DL — SIGNIFICANT CHANGE UP (ref 30–160)
LYMPHOCYTES # BLD AUTO: 1.84 K/UL — SIGNIFICANT CHANGE UP (ref 1–3.3)
LYMPHOCYTES # BLD AUTO: 1.84 K/UL — SIGNIFICANT CHANGE UP (ref 1–3.3)
LYMPHOCYTES # BLD AUTO: 40.6 % — SIGNIFICANT CHANGE UP (ref 13–44)
LYMPHOCYTES # BLD AUTO: 40.6 % — SIGNIFICANT CHANGE UP (ref 13–44)
MCHC RBC-ENTMCNC: 32.5 PG — SIGNIFICANT CHANGE UP (ref 27–34)
MCHC RBC-ENTMCNC: 32.5 PG — SIGNIFICANT CHANGE UP (ref 27–34)
MCHC RBC-ENTMCNC: 32.6 GM/DL — SIGNIFICANT CHANGE UP (ref 32–36)
MCHC RBC-ENTMCNC: 32.6 GM/DL — SIGNIFICANT CHANGE UP (ref 32–36)
MCV RBC AUTO: 99.8 FL — SIGNIFICANT CHANGE UP (ref 80–100)
MCV RBC AUTO: 99.8 FL — SIGNIFICANT CHANGE UP (ref 80–100)
MONOCYTES # BLD AUTO: 0.47 K/UL — SIGNIFICANT CHANGE UP (ref 0–0.9)
MONOCYTES # BLD AUTO: 0.47 K/UL — SIGNIFICANT CHANGE UP (ref 0–0.9)
MONOCYTES NFR BLD AUTO: 10.4 % — SIGNIFICANT CHANGE UP (ref 2–14)
MONOCYTES NFR BLD AUTO: 10.4 % — SIGNIFICANT CHANGE UP (ref 2–14)
NEUTROPHILS # BLD AUTO: 1.84 K/UL — SIGNIFICANT CHANGE UP (ref 1.8–7.4)
NEUTROPHILS # BLD AUTO: 1.84 K/UL — SIGNIFICANT CHANGE UP (ref 1.8–7.4)
NEUTROPHILS NFR BLD AUTO: 40.7 % — LOW (ref 43–77)
NEUTROPHILS NFR BLD AUTO: 40.7 % — LOW (ref 43–77)
NRBC # BLD: 0 /100 WBCS — SIGNIFICANT CHANGE UP (ref 0–0)
NRBC # BLD: 0 /100 WBCS — SIGNIFICANT CHANGE UP (ref 0–0)
PLATELET # BLD AUTO: 335 K/UL — SIGNIFICANT CHANGE UP (ref 150–400)
PLATELET # BLD AUTO: 335 K/UL — SIGNIFICANT CHANGE UP (ref 150–400)
RBC # BLD: 4.06 M/UL — SIGNIFICANT CHANGE UP (ref 3.8–5.2)
RBC # BLD: 4.06 M/UL — SIGNIFICANT CHANGE UP (ref 3.8–5.2)
RBC # FLD: 14.9 % — HIGH (ref 10.3–14.5)
RBC # FLD: 14.9 % — HIGH (ref 10.3–14.5)
TIBC SERPL-MCNC: 362 UG/DL — SIGNIFICANT CHANGE UP (ref 220–430)
TIBC SERPL-MCNC: 362 UG/DL — SIGNIFICANT CHANGE UP (ref 220–430)
UIBC SERPL-MCNC: 277 UG/DL — SIGNIFICANT CHANGE UP (ref 110–370)
UIBC SERPL-MCNC: 277 UG/DL — SIGNIFICANT CHANGE UP (ref 110–370)
WBC # BLD: 4.53 K/UL — SIGNIFICANT CHANGE UP (ref 3.8–10.5)
WBC # BLD: 4.53 K/UL — SIGNIFICANT CHANGE UP (ref 3.8–10.5)
WBC # FLD AUTO: 4.53 K/UL — SIGNIFICANT CHANGE UP (ref 3.8–10.5)
WBC # FLD AUTO: 4.53 K/UL — SIGNIFICANT CHANGE UP (ref 3.8–10.5)

## 2023-11-11 LAB
ERYTHROCYTE [SEDIMENTATION RATE] IN BLOOD: 43 MM/HR — HIGH (ref 0–20)
ERYTHROCYTE [SEDIMENTATION RATE] IN BLOOD: 43 MM/HR — HIGH (ref 0–20)

## 2023-11-20 ENCOUNTER — APPOINTMENT (OUTPATIENT)
Dept: INFUSION THERAPY | Facility: CLINIC | Age: 73
End: 2023-11-20

## 2023-11-20 VITALS
TEMPERATURE: 97.8 F | SYSTOLIC BLOOD PRESSURE: 108 MMHG | OXYGEN SATURATION: 98 % | WEIGHT: 168 LBS | RESPIRATION RATE: 16 BRPM | HEART RATE: 97 BPM | HEIGHT: 62 IN | BODY MASS INDEX: 30.91 KG/M2 | DIASTOLIC BLOOD PRESSURE: 73 MMHG

## 2023-11-28 ENCOUNTER — APPOINTMENT (OUTPATIENT)
Dept: GASTROENTEROLOGY | Facility: CLINIC | Age: 73
End: 2023-11-28
Payer: MEDICARE

## 2023-11-28 VITALS — WEIGHT: 167 LBS | HEIGHT: 62 IN | BODY MASS INDEX: 30.73 KG/M2

## 2023-11-28 DIAGNOSIS — K58.9 IRRITABLE BOWEL SYNDROME W/OUT DIARRHEA: ICD-10-CM

## 2023-11-28 PROCEDURE — 99214 OFFICE O/P EST MOD 30 MIN: CPT

## 2023-11-28 RX ORDER — DICYCLOMINE HYDROCHLORIDE 20 MG/1
20 TABLET ORAL
Qty: 90 | Refills: 1 | Status: ACTIVE | COMMUNITY
Start: 2023-11-28 | End: 1900-01-01

## 2023-12-19 ENCOUNTER — APPOINTMENT (OUTPATIENT)
Dept: DERMATOLOGY | Facility: CLINIC | Age: 73
End: 2023-12-19
Payer: MEDICARE

## 2023-12-19 ENCOUNTER — NON-APPOINTMENT (OUTPATIENT)
Age: 73
End: 2023-12-19

## 2023-12-19 DIAGNOSIS — Z12.83 ENCOUNTER FOR SCREENING FOR MALIGNANT NEOPLASM OF SKIN: ICD-10-CM

## 2023-12-19 PROCEDURE — 17110 DESTRUCTION B9 LES UP TO 14: CPT

## 2023-12-19 PROCEDURE — 99203 OFFICE O/P NEW LOW 30 MIN: CPT | Mod: 25

## 2023-12-19 NOTE — PHYSICAL EXAM
[FreeTextEntry3] : AAOx3, pleasant, NAD, no visual lymphadenopathy hair, scalp, face, nose, eyelids, ears, lips, oropharynx, neck, chest, abdomen, back, right arm, left arm, nails, and hands examined with all normal findings, pertinent findings include:  multiple benign nevi and lentigines + inflamed, waxy, keratotic papules on face x 5

## 2023-12-19 NOTE — ASSESSMENT
[FreeTextEntry1] : #benign findings as above  ISK The specified lesions were treated with liquid nitrogen cryotherapy.  Discussed risks including pain, blistering, crusting, discoloration, recurrence.  plan for LN2 on back; discussed fraxel for face; revisit after LN2

## 2023-12-21 ENCOUNTER — RESULT REVIEW (OUTPATIENT)
Age: 73
End: 2023-12-21

## 2023-12-21 ENCOUNTER — APPOINTMENT (OUTPATIENT)
Dept: HEMATOLOGY ONCOLOGY | Facility: CLINIC | Age: 73
End: 2023-12-21

## 2023-12-21 LAB
BASOPHILS # BLD AUTO: 0.05 K/UL — SIGNIFICANT CHANGE UP (ref 0–0.2)
BASOPHILS # BLD AUTO: 0.05 K/UL — SIGNIFICANT CHANGE UP (ref 0–0.2)
BASOPHILS NFR BLD AUTO: 1.1 % — SIGNIFICANT CHANGE UP (ref 0–2)
BASOPHILS NFR BLD AUTO: 1.1 % — SIGNIFICANT CHANGE UP (ref 0–2)
EOSINOPHIL # BLD AUTO: 0.26 K/UL — SIGNIFICANT CHANGE UP (ref 0–0.5)
EOSINOPHIL # BLD AUTO: 0.26 K/UL — SIGNIFICANT CHANGE UP (ref 0–0.5)
EOSINOPHIL NFR BLD AUTO: 5.8 % — SIGNIFICANT CHANGE UP (ref 0–6)
EOSINOPHIL NFR BLD AUTO: 5.8 % — SIGNIFICANT CHANGE UP (ref 0–6)
ERYTHROCYTE [SEDIMENTATION RATE] IN BLOOD: 45 MM/HR — HIGH (ref 0–20)
ERYTHROCYTE [SEDIMENTATION RATE] IN BLOOD: 45 MM/HR — HIGH (ref 0–20)
HCT VFR BLD CALC: 35.2 % — SIGNIFICANT CHANGE UP (ref 34.5–45)
HCT VFR BLD CALC: 35.2 % — SIGNIFICANT CHANGE UP (ref 34.5–45)
HGB BLD-MCNC: 11.3 G/DL — LOW (ref 11.5–15.5)
HGB BLD-MCNC: 11.3 G/DL — LOW (ref 11.5–15.5)
IMM GRANULOCYTES NFR BLD AUTO: 0.2 % — SIGNIFICANT CHANGE UP (ref 0–0.9)
IMM GRANULOCYTES NFR BLD AUTO: 0.2 % — SIGNIFICANT CHANGE UP (ref 0–0.9)
LYMPHOCYTES # BLD AUTO: 1.43 K/UL — SIGNIFICANT CHANGE UP (ref 1–3.3)
LYMPHOCYTES # BLD AUTO: 1.43 K/UL — SIGNIFICANT CHANGE UP (ref 1–3.3)
LYMPHOCYTES # BLD AUTO: 31.8 % — SIGNIFICANT CHANGE UP (ref 13–44)
LYMPHOCYTES # BLD AUTO: 31.8 % — SIGNIFICANT CHANGE UP (ref 13–44)
MCHC RBC-ENTMCNC: 32.1 GM/DL — SIGNIFICANT CHANGE UP (ref 32–36)
MCHC RBC-ENTMCNC: 32.1 GM/DL — SIGNIFICANT CHANGE UP (ref 32–36)
MCHC RBC-ENTMCNC: 32.5 PG — SIGNIFICANT CHANGE UP (ref 27–34)
MCHC RBC-ENTMCNC: 32.5 PG — SIGNIFICANT CHANGE UP (ref 27–34)
MCV RBC AUTO: 101.1 FL — HIGH (ref 80–100)
MCV RBC AUTO: 101.1 FL — HIGH (ref 80–100)
MONOCYTES # BLD AUTO: 0.48 K/UL — SIGNIFICANT CHANGE UP (ref 0–0.9)
MONOCYTES # BLD AUTO: 0.48 K/UL — SIGNIFICANT CHANGE UP (ref 0–0.9)
MONOCYTES NFR BLD AUTO: 10.7 % — SIGNIFICANT CHANGE UP (ref 2–14)
MONOCYTES NFR BLD AUTO: 10.7 % — SIGNIFICANT CHANGE UP (ref 2–14)
NEUTROPHILS # BLD AUTO: 2.27 K/UL — SIGNIFICANT CHANGE UP (ref 1.8–7.4)
NEUTROPHILS # BLD AUTO: 2.27 K/UL — SIGNIFICANT CHANGE UP (ref 1.8–7.4)
NEUTROPHILS NFR BLD AUTO: 50.4 % — SIGNIFICANT CHANGE UP (ref 43–77)
NEUTROPHILS NFR BLD AUTO: 50.4 % — SIGNIFICANT CHANGE UP (ref 43–77)
NRBC # BLD: 0 /100 WBCS — SIGNIFICANT CHANGE UP (ref 0–0)
NRBC # BLD: 0 /100 WBCS — SIGNIFICANT CHANGE UP (ref 0–0)
PLATELET # BLD AUTO: 321 K/UL — SIGNIFICANT CHANGE UP (ref 150–400)
PLATELET # BLD AUTO: 321 K/UL — SIGNIFICANT CHANGE UP (ref 150–400)
RBC # BLD: 3.48 M/UL — LOW (ref 3.8–5.2)
RBC # BLD: 3.48 M/UL — LOW (ref 3.8–5.2)
RBC # FLD: 13.6 % — SIGNIFICANT CHANGE UP (ref 10.3–14.5)
RBC # FLD: 13.6 % — SIGNIFICANT CHANGE UP (ref 10.3–14.5)
WBC # BLD: 4.5 K/UL — SIGNIFICANT CHANGE UP (ref 3.8–10.5)
WBC # BLD: 4.5 K/UL — SIGNIFICANT CHANGE UP (ref 3.8–10.5)
WBC # FLD AUTO: 4.5 K/UL — SIGNIFICANT CHANGE UP (ref 3.8–10.5)
WBC # FLD AUTO: 4.5 K/UL — SIGNIFICANT CHANGE UP (ref 3.8–10.5)

## 2023-12-21 NOTE — REASON FOR VISIT
[Follow-Up Visit] : a follow-up [FreeTextEntry2] : previously seen for history of breast cancer now with iron def anemia

## 2023-12-21 NOTE — REVIEW OF SYSTEMS
[Patient Intake Form Reviewed] : Patient intake form was reviewed [Negative] : Endocrine [SOB on Exertion] : shortness of breath during exertion [Anxiety] : anxiety [Depression] : depression [Fatigue] : fatigue [Recent Change In Weight] : ~T no recent weight change [Shortness Of Breath] : no shortness of breath [Joint Stiffness] : no joint stiffness [Hot Flashes] : no hot flashes [FreeTextEntry7] : black stools since she started iron   diarrhea and constipation

## 2023-12-21 NOTE — HISTORY OF PRESENT ILLNESS
[Disease: _____________________] : Disease: [unfilled] [T: ___] : T[unfilled] [N: ___] : N[unfilled] [M: ___] : M[unfilled] [AJCC Stage: ____] : AJCC Stage: [unfilled] [de-identified] : L Mammo/ sono Dec 2020 OK.   Has Rx for MRI to eval Rt breast implant. \par  \par  feels well Has no complaints.  [de-identified] : Presented in Summer 2013 with an abnormal mammogram. In October 2013 she underwent right breast lumpectomy and sentinel LN biopsy, next she had re-excision and mastectomy for positive margins/ DCIS. She had stage I A disease. She started adjuvant Arimidex in March 2014, changed to Aromasin due to arthralgias. Bone density in 2017 showed osteopenia. s/p Prolia in 2017. On Vit D and Calcium.  Arimidex / changed to exemestane March 2014- Feb 2019. Completed hormonal therapy Feb 2019. Follows with Dr Reddy for breast exam. monitoring.  2023: Worsening  anemia  with progressive iron deficiency  noted earlier this year  Started iron tablets but not tolerating ( stomach upset/ constipation) Very fatigued and depressed. Seen by GI  EGD  showed  paraesophageal hernia with Stan's erosion. Saw Dr Perea recently. Protonix 40 mg twice a day Repeat EGD and colonoscopy planned.  [de-identified] : ER 95 %  MI 10 %  HER 2 negative  [de-identified] : Oncotype Recurrence score 14

## 2023-12-21 NOTE — ASSESSMENT
[FreeTextEntry1] : 72 y/o woman with history of  stage I  A right  breast cancer in 2013, s/p mastectomy, s/p adjuvant hormonal therapy since March 2014- Feb 2019. Follows with Dr Reddy.  Mammo/ sono Lt breast yearly  due October 2023.  New iron deficiency anemia. Symptomatic. Does not tolerate oral iron. GI w/up in progress. On protonix  bid.  Will give iv iron - discussed potential adverse reaction to iv iron  Plan for feraheme x 3 doses - start in next few days ( ASAP).  Exam 4 weeks after last ferahema.

## 2023-12-21 NOTE — RESULTS/DATA
[FreeTextEntry1] : labs done in [facility]  July 20232  ferritin 11 iron sat 3 %  Hgb 8.7  plts 572  8/22/23 Hgb 9.1

## 2023-12-21 NOTE — PHYSICAL EXAM
[Normal] : affect appropriate [de-identified] : s/p right mastectomy with implant , left breast - fibrotic scar periareolar no  masses , no axillary adenopathy [Restricted in physically strenuous activity but ambulatory and able to carry out work of a light or sedentary nature] : Status 1- Restricted in physically strenuous activity but ambulatory and able to carry out work of a light or sedentary nature, e.g., light house work, office work [de-identified] : pale  [de-identified] : anxious depressed

## 2023-12-22 LAB
FERRITIN SERPL-MCNC: 56 NG/ML — SIGNIFICANT CHANGE UP (ref 13–330)
FERRITIN SERPL-MCNC: 56 NG/ML — SIGNIFICANT CHANGE UP (ref 13–330)
IRON SATN MFR SERPL: 11 % — LOW (ref 14–50)
IRON SATN MFR SERPL: 11 % — LOW (ref 14–50)
IRON SATN MFR SERPL: 36 UG/DL — SIGNIFICANT CHANGE UP (ref 30–160)
IRON SATN MFR SERPL: 36 UG/DL — SIGNIFICANT CHANGE UP (ref 30–160)
TIBC SERPL-MCNC: 317 UG/DL — SIGNIFICANT CHANGE UP (ref 220–430)
TIBC SERPL-MCNC: 317 UG/DL — SIGNIFICANT CHANGE UP (ref 220–430)
UIBC SERPL-MCNC: 280 UG/DL — SIGNIFICANT CHANGE UP (ref 110–370)
UIBC SERPL-MCNC: 280 UG/DL — SIGNIFICANT CHANGE UP (ref 110–370)

## 2024-01-02 ENCOUNTER — APPOINTMENT (OUTPATIENT)
Dept: BREAST CENTER | Facility: CLINIC | Age: 74
End: 2024-01-02
Payer: MEDICARE

## 2024-01-02 VITALS
HEIGHT: 62 IN | BODY MASS INDEX: 30.73 KG/M2 | SYSTOLIC BLOOD PRESSURE: 151 MMHG | HEART RATE: 85 BPM | DIASTOLIC BLOOD PRESSURE: 98 MMHG | WEIGHT: 167 LBS

## 2024-01-02 DIAGNOSIS — Z08 ENCOUNTER FOR FOLLOW-UP EXAMINATION AFTER COMPLETED TREATMENT FOR MALIGNANT NEOPLASM: ICD-10-CM

## 2024-01-02 DIAGNOSIS — Z85.3 PERSONAL HISTORY OF MALIGNANT NEOPLASM OF BREAST: ICD-10-CM

## 2024-01-02 DIAGNOSIS — Z90.10 ACQUIRED ABSENCE OF UNSPECIFIED BREAST AND NIPPLE: ICD-10-CM

## 2024-01-02 DIAGNOSIS — Z85.3 ENCOUNTER FOR FOLLOW-UP EXAMINATION AFTER COMPLETED TREATMENT FOR MALIGNANT NEOPLASM: ICD-10-CM

## 2024-01-02 PROCEDURE — 99213 OFFICE O/P EST LOW 20 MIN: CPT

## 2024-01-02 RX ORDER — EZETIMIBE 10 MG/1
10 TABLET ORAL
Qty: 90 | Refills: 0 | Status: ACTIVE | COMMUNITY
Start: 2023-12-18

## 2024-01-02 RX ORDER — ZOLPIDEM TARTRATE 12.5 MG/1
12.5 TABLET, EXTENDED RELEASE ORAL
Qty: 30 | Refills: 0 | Status: ACTIVE | COMMUNITY
Start: 2023-11-28

## 2024-01-02 NOTE — HISTORY OF PRESENT ILLNESS
[FreeTextEntry1] : This is a 73 year old  female of U[place],Green[place]ic,Ind[place]ian, non-Catholic ancestry who was diagnosed with right breast cancer in 9/2013.  She had a lumpectomy and sentinel node biopsy (3 SLN removed) at [facility]. The margin was close and she had a re-excision.  The margin was again close and she then went to Dr. Springer at [place].  She had a mastectomy with expander reconstruction. She wasn't satisfied with the appearance with the expander and she saw Dr. Osborn for another opinion.  She then had the expander exchanged with a reduction on the left side for symmetry in 10/2014.  That surgery was complicated by a hematoma on the right requiring reoperation.  She then had the right expander replaced with a left revision in January 2015. The permanent right implant was then inserted with alloderm and fat grafting in September 2015.  She has chronic discomfort and tightness in the right reconstruction.  She finds it more comfortable to sleep in a sports bra. It doesn't bother her as much or all the time anymore.  She took exemestane until 2/2019.  She had a right knee replacement last year.  She is undergoing iron infusions for anemia and has had a GI workup.   She lost her mother this year at 89.

## 2024-01-02 NOTE — PHYSICAL EXAM
[Sclera nonicteric] : sclera nonicteric [Supple] : supple [No Supraclavicular Adenopathy] : no supraclavicular adenopathy [No Cervical Adenopathy] : no cervical adenopathy [No Thyromegaly] : no thyromegaly [Clear to Auscultation Bilat] : clear to auscultation bilaterally [Normal Sinus Rhythm] : normal sinus rhythm [Normal S1, S2] : normal S1 and S2 [Examined in the supine and seated position] : examined in the supine and seated position [No dominant masses] : no dominant masses in right breast  [No dominant masses] : no dominant masses left breast [No Nipple Retraction] : no left nipple retraction [No Nipple Discharge] : no left nipple discharge [No Axillary Lymphadenopathy] : no left axillary lymphadenopathy [Soft] : abdomen soft [Not Tender] : non-tender [No Palpable Masses] : no abdominal mass palpated [EOMI] : extra ocular movement intact [de-identified] : Implant.  Vertical scar 6:00 curved laterally. [de-identified] : Reduction pattern scars.

## 2024-01-02 NOTE — DATA REVIEWED
[FreeTextEntry1] : Left mammogram  and ultrasound 10/18/2023:  No mammographic or sonographic evidence of malignancy.  Bilateral breast MRI  6/7/2021:  No MRI evidence of malignancy.

## 2024-01-03 ENCOUNTER — APPOINTMENT (OUTPATIENT)
Dept: OTOLARYNGOLOGY | Facility: CLINIC | Age: 74
End: 2024-01-03
Payer: MEDICARE

## 2024-01-03 VITALS — HEIGHT: 63 IN | BODY MASS INDEX: 30.12 KG/M2 | WEIGHT: 170 LBS

## 2024-01-03 DIAGNOSIS — H60.543 ACUTE ECZEMATOID OTITIS EXTERNA, BILATERAL: ICD-10-CM

## 2024-01-03 DIAGNOSIS — J31.0 CHRONIC RHINITIS: ICD-10-CM

## 2024-01-03 PROCEDURE — 69210 REMOVE IMPACTED EAR WAX UNI: CPT

## 2024-01-03 PROCEDURE — 99213 OFFICE O/P EST LOW 20 MIN: CPT | Mod: 25

## 2024-01-03 RX ORDER — FLUOCINOLONE ACETONIDE 0.25 MG/G
0.03 OINTMENT TOPICAL
Qty: 1 | Refills: 1 | Status: ACTIVE | COMMUNITY
Start: 2024-01-03 | End: 1900-01-01

## 2024-01-03 RX ORDER — AZELASTINE HYDROCHLORIDE 137 UG/1
0.1 SPRAY, METERED NASAL TWICE DAILY
Qty: 1 | Refills: 3 | Status: ACTIVE | COMMUNITY
Start: 2024-01-03 | End: 1900-01-01

## 2024-01-03 NOTE — HISTORY OF PRESENT ILLNESS
[de-identified] : uri 2 weeks ago co nasal congestion persisting as plugging co scaling left ear hx cerumen

## 2024-01-03 NOTE — PROCEDURE
[Cerumen Impaction] : Cerumen Impaction [FreeTextEntry6] : Indication: ear plugging and discomfort Large amount cerumen cleared left and right ear instrumentation with curettes, forceps and suction. Ear canals and tympanic membranes  unremarkable.dry skin

## 2024-01-08 ENCOUNTER — APPOINTMENT (OUTPATIENT)
Dept: INFUSION THERAPY | Facility: CLINIC | Age: 74
End: 2024-01-08

## 2024-01-08 VITALS
HEIGHT: 63 IN | DIASTOLIC BLOOD PRESSURE: 83 MMHG | HEART RATE: 80 BPM | SYSTOLIC BLOOD PRESSURE: 134 MMHG | OXYGEN SATURATION: 94 % | WEIGHT: 168 LBS | RESPIRATION RATE: 16 BRPM | BODY MASS INDEX: 29.77 KG/M2 | TEMPERATURE: 98 F

## 2024-01-14 ENCOUNTER — OUTPATIENT (OUTPATIENT)
Dept: OUTPATIENT SERVICES | Facility: HOSPITAL | Age: 74
LOS: 1 days | Discharge: HOME | End: 2024-01-14

## 2024-01-14 DIAGNOSIS — D50.9 IRON DEFICIENCY ANEMIA, UNSPECIFIED: ICD-10-CM

## 2024-01-14 DIAGNOSIS — Z90.11 ACQUIRED ABSENCE OF RIGHT BREAST AND NIPPLE: Chronic | ICD-10-CM

## 2024-01-14 DIAGNOSIS — H26.9 UNSPECIFIED CATARACT: Chronic | ICD-10-CM

## 2024-01-14 DIAGNOSIS — Z95.5 PRESENCE OF CORONARY ANGIOPLASTY IMPLANT AND GRAFT: Chronic | ICD-10-CM

## 2024-01-15 PROBLEM — Z85.9 HISTORY OF MALIGNANT NEOPLASM: Status: RESOLVED | Noted: 2022-11-14 | Resolved: 2024-01-15

## 2024-01-15 PROBLEM — Z79.899 ON ANTINEOPLASTIC CHEMOTHERAPY: Status: RESOLVED | Noted: 2023-10-04 | Resolved: 2024-01-15

## 2024-01-15 PROBLEM — Z85.828 HISTORY OF BASAL CELL CARCINOMA OF SKIN: Status: RESOLVED | Noted: 2020-01-03 | Resolved: 2024-01-15

## 2024-01-15 PROBLEM — Z01.818 PREOP TESTING: Status: RESOLVED | Noted: 2021-09-19 | Resolved: 2024-01-15

## 2024-01-17 ENCOUNTER — APPOINTMENT (OUTPATIENT)
Dept: INFUSION THERAPY | Facility: CLINIC | Age: 74
End: 2024-01-17
Payer: MEDICARE

## 2024-01-17 ENCOUNTER — APPOINTMENT (OUTPATIENT)
Dept: HEMATOLOGY ONCOLOGY | Facility: CLINIC | Age: 74
End: 2024-01-17
Payer: MEDICARE

## 2024-01-17 VITALS
WEIGHT: 170 LBS | BODY MASS INDEX: 30.12 KG/M2 | TEMPERATURE: 97.3 F | OXYGEN SATURATION: 94 % | DIASTOLIC BLOOD PRESSURE: 84 MMHG | RESPIRATION RATE: 16 BRPM | HEART RATE: 78 BPM | SYSTOLIC BLOOD PRESSURE: 135 MMHG | HEIGHT: 63 IN

## 2024-01-17 DIAGNOSIS — D50.0 IRON DEFICIENCY ANEMIA SECONDARY TO BLOOD LOSS (CHRONIC): ICD-10-CM

## 2024-01-17 DIAGNOSIS — Z79.01 LONG TERM (CURRENT) USE OF ANTICOAGULANTS: ICD-10-CM

## 2024-01-17 DIAGNOSIS — Z79.899 OTHER LONG TERM (CURRENT) DRUG THERAPY: ICD-10-CM

## 2024-01-17 DIAGNOSIS — Z85.828 PERSONAL HISTORY OF OTHER MALIGNANT NEOPLASM OF SKIN: ICD-10-CM

## 2024-01-17 DIAGNOSIS — Z01.818 ENCOUNTER FOR OTHER PREPROCEDURAL EXAMINATION: ICD-10-CM

## 2024-01-17 DIAGNOSIS — K44.9 DIAPHRAGMATIC HERNIA WITHOUT OBSTRUCTION OR GANGRENE: ICD-10-CM

## 2024-01-17 DIAGNOSIS — Z51.81 ENCOUNTER FOR THERAPEUTIC DRUG LVL MONITORING: ICD-10-CM

## 2024-01-17 DIAGNOSIS — Z79.899 ENCOUNTER FOR THERAPEUTIC DRUG LVL MONITORING: ICD-10-CM

## 2024-01-17 DIAGNOSIS — Z85.9 PERSONAL HISTORY OF MALIGNANT NEOPLASM, UNSPECIFIED: ICD-10-CM

## 2024-01-17 PROCEDURE — 99214 OFFICE O/P EST MOD 30 MIN: CPT

## 2024-01-17 RX ORDER — DICYCLOMINE HYDROCHLORIDE 10 MG/1
10 CAPSULE ORAL 3 TIMES DAILY
Qty: 90 | Refills: 4 | Status: DISCONTINUED | COMMUNITY
Start: 2023-08-24 | End: 2024-01-17

## 2024-01-17 NOTE — PHYSICAL EXAM
[Restricted in physically strenuous activity but ambulatory and able to carry out work of a light or sedentary nature] : Status 1- Restricted in physically strenuous activity but ambulatory and able to carry out work of a light or sedentary nature, e.g., light house work, office work [de-identified] : pale  [de-identified] : anxious depressed  [Ambulatory and capable of all self care but unable to carry out any work activities] : Status 2- Ambulatory and capable of all self care but unable to carry out any work activities. Up and about more than 50% of waking hours [Normal] : affect appropriate [de-identified] : anicteric

## 2024-01-17 NOTE — REVIEW OF SYSTEMS
[Patient Intake Form Reviewed] : Patient intake form was reviewed [Fatigue] : fatigue [SOB on Exertion] : shortness of breath during exertion [Anxiety] : anxiety [Depression] : depression [Recent Change In Weight] : ~T no recent weight change [Shortness Of Breath] : no shortness of breath [Hot Flashes] : no hot flashes [Joint Stiffness] : no joint stiffness [Negative] : Allergic/Immunologic [FreeTextEntry5] : chest pressure after she climbs stairs [FreeTextEntry7] : black stools since she started iron   diarrhea and constipation  [FreeTextEntry9] : in her right knee

## 2024-01-17 NOTE — ASSESSMENT
[FreeTextEntry1] : Patient is a 73 y.o. with a recurrent SHARMAINE. s/p Feraheme 8/2023 -> 4 infusions, now again completing Feraheme x 2. GI eval up to date. Hx of Stan's erosion, but most recent evaluation of 11/10/23 with large hiatal hernia, else ess unremarkable.  There must be bleeding with this amount of IV iron requirements.  Etiology most likely due to chronic low volume GI bleeding due to Xarelto.  Capsule endoscopy pending.  Patient needs to be on Xarelto due to hx of neuro and cardiac events.  OK to continue IV iron PRN as long as endoscopies do not show any suspicion for malignancy or anything that can be cauterized. Patient concerned about ongoing fatigue.   Likely multifactorial - Was advised to f/u with cardiology as there was concern that she may need an angiogram.  She was interested in exercising - recommended she get cardiac clearance 1st, especially since she has chest pressure with climbing stairs.  For Feraheme #2/2 today - repeat labs in 1 month. Then will continue to follow closely. Goal is to have Ferritin level >100, TSAT >20%.    Dr. Bradley Hogan (PCP) Dr. Lake Landry (GI) Dr. Chrissie Pereira (Breast Surgery) Dr. Danyel Dugan (Ortho) Dr. Hector Corona (Derm) Dr. Hector Montenegro (Pulm)

## 2024-01-17 NOTE — HISTORY OF PRESENT ILLNESS
[de-identified] : KALEB HAMMOND is a 73 y.o. F with a PMH significant for HTN, HLD, GERD, and an ER+, HER2-, T1c,N0, stage I low risk BC (Recurrence score 14) s/p right breast lumpectomy and sentinel LN biopsy, next she had re-excision and mastectomy for positive margins/ DCIS, and then AI x 5 years completed 2/2019, who we are following for an iron deficiency anemia.   8/22/23 -   Hgb: 9.1, MCV: 86.2, Ferritin: 8, TSAT: 5%, B12: >2000, Folate: 11.2. Was started on PO iron but was unable to tolerate (stomach upset/ constipation). Very fatigued and depressed. Feraheme x 3 10/5/23 - (labs 1 month after) - Hgb: 12.1, MCV: 94, Ferritin: 317, TSAT: 49%, ESR: 23, B12: 463, Folate: 6.2 11/2/23 - EGD/Colonoscopy (Dr. Landry) - Large hiatal hernia, moderate left sided diverticulosis, internal hemorrhoids. Prior EGD showed paraesophageal hernia with Stan's erosion. 11/10/23 - Hgb: 13.2, MCV: 99.8,   Ferritin: 59, TSAT: 23%, ESR: 43 -> Feraheme x 1 [de-identified] : 12/21/23 - Hgb: 11.3, MCV: 101.1, Ferritin: 56, TSAT: 11%, ESR: 45 -> this was 1 month after Feraheme x 1, and she has had a total of Feraheme x 4 since 8/28/23.  Was advised to come for Feraheme x 2. Today #2/2.  Reports she is very fatigued. States she lives in a colonial house with 13 stairs - she is OOB and has chest pressure by the time she gets to the top.  She reports the last time she saw her cardiologist he told her that one of her coronary arteries had a bit more plaque and that she may need to have an angiogram done soon.  She has not gone back for ~ 6 months.  She was also told her fatigue may be due to deconditioning and was wondering if she should try and exercise.  Still needs to schedule capsule endoscopy.  Has not had any obvious bleeding, bruising.   Denies any other changes in her family, medical, or social history since her last visit of 8/25/23.

## 2024-01-23 ENCOUNTER — APPOINTMENT (OUTPATIENT)
Dept: DERMATOLOGY | Facility: CLINIC | Age: 74
End: 2024-01-23
Payer: MEDICARE

## 2024-01-23 PROCEDURE — 99213 OFFICE O/P EST LOW 20 MIN: CPT | Mod: 25

## 2024-01-23 PROCEDURE — 17111 DESTRUCTION B9 LESIONS 15/>: CPT

## 2024-01-23 NOTE — ASSESSMENT
[FreeTextEntry1] : #benign findings as above  ISK The specified lesions were treated with liquid nitrogen cryotherapy.  Discussed risks including pain, blistering, crusting, discoloration, recurrence.  discussed unrealistic expecations; discussed unable to remove all lesions even if they itch; will treat sveral at a time; discussed new ones will form  plan for LN2 on back; discussed fraxel for face; revisit after LN2

## 2024-01-23 NOTE — PHYSICAL EXAM
[FreeTextEntry3] : AAOx3, pleasant, NAD, no visual lymphadenopathy hair, scalp, face, nose, eyelids, ears, lips, oropharynx, neck, chest, abdomen, back, right arm, left arm, nails, and hands examined with all normal findings, pertinent findings include:  multiple benign nevi and lentigines + inflamed, waxy, keratotic papules on face x 5, chest x16

## 2024-02-12 ENCOUNTER — APPOINTMENT (OUTPATIENT)
Dept: GASTROENTEROLOGY | Facility: CLINIC | Age: 74
End: 2024-02-12
Payer: MEDICARE

## 2024-02-12 PROCEDURE — 91110 GI TRC IMG INTRAL ESOPH-ILE: CPT

## 2024-02-15 ENCOUNTER — RESULT REVIEW (OUTPATIENT)
Age: 74
End: 2024-02-15

## 2024-02-15 ENCOUNTER — APPOINTMENT (OUTPATIENT)
Dept: HEMATOLOGY ONCOLOGY | Facility: CLINIC | Age: 74
End: 2024-02-15

## 2024-02-15 LAB
BASOPHILS # BLD AUTO: 0.08 K/UL — SIGNIFICANT CHANGE UP (ref 0–0.2)
BASOPHILS NFR BLD AUTO: 1.5 % — SIGNIFICANT CHANGE UP (ref 0–2)
EOSINOPHIL # BLD AUTO: 0.23 K/UL — SIGNIFICANT CHANGE UP (ref 0–0.5)
EOSINOPHIL NFR BLD AUTO: 4.4 % — SIGNIFICANT CHANGE UP (ref 0–6)
HCT VFR BLD CALC: 42 % — SIGNIFICANT CHANGE UP (ref 34.5–45)
HGB BLD-MCNC: 14.2 G/DL — SIGNIFICANT CHANGE UP (ref 11.5–15.5)
IMM GRANULOCYTES NFR BLD AUTO: 0.4 % — SIGNIFICANT CHANGE UP (ref 0–0.9)
LYMPHOCYTES # BLD AUTO: 2.04 K/UL — SIGNIFICANT CHANGE UP (ref 1–3.3)
LYMPHOCYTES # BLD AUTO: 38.8 % — SIGNIFICANT CHANGE UP (ref 13–44)
MCHC RBC-ENTMCNC: 33.4 PG — SIGNIFICANT CHANGE UP (ref 27–34)
MCHC RBC-ENTMCNC: 33.8 GM/DL — SIGNIFICANT CHANGE UP (ref 32–36)
MCV RBC AUTO: 98.8 FL — SIGNIFICANT CHANGE UP (ref 80–100)
MONOCYTES # BLD AUTO: 0.45 K/UL — SIGNIFICANT CHANGE UP (ref 0–0.9)
MONOCYTES NFR BLD AUTO: 8.6 % — SIGNIFICANT CHANGE UP (ref 2–14)
NEUTROPHILS # BLD AUTO: 2.44 K/UL — SIGNIFICANT CHANGE UP (ref 1.8–7.4)
NEUTROPHILS NFR BLD AUTO: 46.3 % — SIGNIFICANT CHANGE UP (ref 43–77)
NRBC # BLD: 0 /100 WBCS — SIGNIFICANT CHANGE UP (ref 0–0)
PLATELET # BLD AUTO: 283 K/UL — SIGNIFICANT CHANGE UP (ref 150–400)
RBC # BLD: 4.25 M/UL — SIGNIFICANT CHANGE UP (ref 3.8–5.2)
RBC # FLD: 15.6 % — HIGH (ref 10.3–14.5)
WBC # BLD: 5.26 K/UL — SIGNIFICANT CHANGE UP (ref 3.8–10.5)
WBC # FLD AUTO: 5.26 K/UL — SIGNIFICANT CHANGE UP (ref 3.8–10.5)

## 2024-02-16 LAB
ERYTHROCYTE [SEDIMENTATION RATE] IN BLOOD: 48 MM/HR — HIGH (ref 0–20)
FERRITIN SERPL-MCNC: 206 NG/ML — SIGNIFICANT CHANGE UP (ref 13–330)
FOLATE SERPL-MCNC: 13.6 NG/ML — SIGNIFICANT CHANGE UP
IRON SATN MFR SERPL: 33 % — SIGNIFICANT CHANGE UP (ref 14–50)
IRON SATN MFR SERPL: 92 UG/DL — SIGNIFICANT CHANGE UP (ref 30–160)
TIBC SERPL-MCNC: 277 UG/DL — SIGNIFICANT CHANGE UP (ref 220–430)
UIBC SERPL-MCNC: 185 UG/DL — SIGNIFICANT CHANGE UP (ref 110–370)
VIT B12 SERPL-MCNC: 544 PG/ML — SIGNIFICANT CHANGE UP (ref 232–1245)

## 2024-02-22 ENCOUNTER — RX RENEWAL (OUTPATIENT)
Age: 74
End: 2024-02-22

## 2024-02-22 RX ORDER — CHLORDIAZEPOXIDE HYDROCHLORIDE AND CLIDINIUM BROMIDE 5; 2.5 MG/1; MG/1
5-2.5 CAPSULE, GELATIN COATED ORAL TWICE DAILY
Qty: 60 | Refills: 2 | Status: ACTIVE | COMMUNITY
Start: 2023-11-28 | End: 1900-01-01

## 2024-02-26 ENCOUNTER — INPATIENT (INPATIENT)
Facility: HOSPITAL | Age: 74
LOS: 3 days | Discharge: HOME | DRG: 193 | End: 2024-03-01
Attending: STUDENT IN AN ORGANIZED HEALTH CARE EDUCATION/TRAINING PROGRAM | Admitting: INTERNAL MEDICINE
Payer: MEDICARE

## 2024-02-26 VITALS
WEIGHT: 171.96 LBS | HEART RATE: 84 BPM | HEIGHT: 63 IN | DIASTOLIC BLOOD PRESSURE: 74 MMHG | TEMPERATURE: 98 F | OXYGEN SATURATION: 91 % | RESPIRATION RATE: 18 BRPM | SYSTOLIC BLOOD PRESSURE: 131 MMHG

## 2024-02-26 DIAGNOSIS — H26.9 UNSPECIFIED CATARACT: Chronic | ICD-10-CM

## 2024-02-26 DIAGNOSIS — Z90.11 ACQUIRED ABSENCE OF RIGHT BREAST AND NIPPLE: Chronic | ICD-10-CM

## 2024-02-26 DIAGNOSIS — R07.9 CHEST PAIN, UNSPECIFIED: ICD-10-CM

## 2024-02-26 DIAGNOSIS — Z95.5 PRESENCE OF CORONARY ANGIOPLASTY IMPLANT AND GRAFT: Chronic | ICD-10-CM

## 2024-02-26 LAB
ALBUMIN SERPL ELPH-MCNC: 3.7 G/DL — SIGNIFICANT CHANGE UP (ref 3.3–5)
ALP SERPL-CCNC: 66 U/L — SIGNIFICANT CHANGE UP (ref 40–120)
ALT FLD-CCNC: 39 U/L — SIGNIFICANT CHANGE UP (ref 12–78)
ANION GAP SERPL CALC-SCNC: 5 MMOL/L — SIGNIFICANT CHANGE UP (ref 5–17)
AST SERPL-CCNC: 42 U/L — HIGH (ref 15–37)
BASOPHILS # BLD AUTO: 0.08 K/UL — SIGNIFICANT CHANGE UP (ref 0–0.2)
BASOPHILS NFR BLD AUTO: 0.5 % — SIGNIFICANT CHANGE UP (ref 0–2)
BILIRUB SERPL-MCNC: 0.7 MG/DL — SIGNIFICANT CHANGE UP (ref 0.2–1.2)
BUN SERPL-MCNC: 16 MG/DL — SIGNIFICANT CHANGE UP (ref 7–23)
CALCIUM SERPL-MCNC: 9 MG/DL — SIGNIFICANT CHANGE UP (ref 8.5–10.1)
CHLORIDE SERPL-SCNC: 100 MMOL/L — SIGNIFICANT CHANGE UP (ref 96–108)
CO2 SERPL-SCNC: 33 MMOL/L — HIGH (ref 22–31)
CREAT SERPL-MCNC: 0.82 MG/DL — SIGNIFICANT CHANGE UP (ref 0.5–1.3)
EGFR: 75 ML/MIN/1.73M2 — SIGNIFICANT CHANGE UP
EOSINOPHIL # BLD AUTO: 0.01 K/UL — SIGNIFICANT CHANGE UP (ref 0–0.5)
EOSINOPHIL NFR BLD AUTO: 0.1 % — SIGNIFICANT CHANGE UP (ref 0–6)
GLUCOSE SERPL-MCNC: 111 MG/DL — HIGH (ref 70–99)
HCT VFR BLD CALC: 40.9 % — SIGNIFICANT CHANGE UP (ref 34.5–45)
HGB BLD-MCNC: 13.7 G/DL — SIGNIFICANT CHANGE UP (ref 11.5–15.5)
IMM GRANULOCYTES NFR BLD AUTO: 0.3 % — SIGNIFICANT CHANGE UP (ref 0–0.9)
LYMPHOCYTES # BLD AUTO: 1.24 K/UL — SIGNIFICANT CHANGE UP (ref 1–3.3)
LYMPHOCYTES # BLD AUTO: 8.2 % — LOW (ref 13–44)
MCHC RBC-ENTMCNC: 33.5 GM/DL — SIGNIFICANT CHANGE UP (ref 32–36)
MCHC RBC-ENTMCNC: 34 PG — SIGNIFICANT CHANGE UP (ref 27–34)
MCV RBC AUTO: 101.5 FL — HIGH (ref 80–100)
MONOCYTES # BLD AUTO: 0.58 K/UL — SIGNIFICANT CHANGE UP (ref 0–0.9)
MONOCYTES NFR BLD AUTO: 3.8 % — SIGNIFICANT CHANGE UP (ref 2–14)
NEUTROPHILS # BLD AUTO: 13.17 K/UL — HIGH (ref 1.8–7.4)
NEUTROPHILS NFR BLD AUTO: 87.1 % — HIGH (ref 43–77)
PLATELET # BLD AUTO: 279 K/UL — SIGNIFICANT CHANGE UP (ref 150–400)
POTASSIUM SERPL-MCNC: 3.5 MMOL/L — SIGNIFICANT CHANGE UP (ref 3.5–5.3)
POTASSIUM SERPL-SCNC: 3.5 MMOL/L — SIGNIFICANT CHANGE UP (ref 3.5–5.3)
PROT SERPL-MCNC: 7.6 GM/DL — SIGNIFICANT CHANGE UP (ref 6–8.3)
RBC # BLD: 4.03 M/UL — SIGNIFICANT CHANGE UP (ref 3.8–5.2)
RBC # FLD: 15.4 % — HIGH (ref 10.3–14.5)
SODIUM SERPL-SCNC: 138 MMOL/L — SIGNIFICANT CHANGE UP (ref 135–145)
TROPONIN I, HIGH SENSITIVITY RESULT: 5.2 NG/L — SIGNIFICANT CHANGE UP
WBC # BLD: 15.13 K/UL — HIGH (ref 3.8–10.5)
WBC # FLD AUTO: 15.13 K/UL — HIGH (ref 3.8–10.5)

## 2024-02-26 PROCEDURE — 85610 PROTHROMBIN TIME: CPT

## 2024-02-26 PROCEDURE — 99285 EMERGENCY DEPT VISIT HI MDM: CPT | Mod: FS

## 2024-02-26 PROCEDURE — 83036 HEMOGLOBIN GLYCOSYLATED A1C: CPT

## 2024-02-26 PROCEDURE — 83735 ASSAY OF MAGNESIUM: CPT

## 2024-02-26 PROCEDURE — 97163 PT EVAL HIGH COMPLEX 45 MIN: CPT | Mod: GP

## 2024-02-26 PROCEDURE — 36415 COLL VENOUS BLD VENIPUNCTURE: CPT

## 2024-02-26 PROCEDURE — 85025 COMPLETE CBC W/AUTO DIFF WBC: CPT

## 2024-02-26 PROCEDURE — 84484 ASSAY OF TROPONIN QUANT: CPT

## 2024-02-26 PROCEDURE — 93010 ELECTROCARDIOGRAM REPORT: CPT

## 2024-02-26 PROCEDURE — 85379 FIBRIN DEGRADATION QUANT: CPT

## 2024-02-26 PROCEDURE — 87040 BLOOD CULTURE FOR BACTERIA: CPT

## 2024-02-26 PROCEDURE — 73562 X-RAY EXAM OF KNEE 3: CPT | Mod: LT

## 2024-02-26 PROCEDURE — 80053 COMPREHEN METABOLIC PANEL: CPT

## 2024-02-26 PROCEDURE — 97116 GAIT TRAINING THERAPY: CPT | Mod: GP

## 2024-02-26 PROCEDURE — 84100 ASSAY OF PHOSPHORUS: CPT

## 2024-02-26 PROCEDURE — 86803 HEPATITIS C AB TEST: CPT

## 2024-02-26 PROCEDURE — 80061 LIPID PANEL: CPT

## 2024-02-26 PROCEDURE — 80048 BASIC METABOLIC PNL TOTAL CA: CPT

## 2024-02-26 PROCEDURE — 71275 CT ANGIOGRAPHY CHEST: CPT | Mod: MC

## 2024-02-26 PROCEDURE — 85730 THROMBOPLASTIN TIME PARTIAL: CPT

## 2024-02-26 PROCEDURE — 0225U NFCT DS DNA&RNA 21 SARSCOV2: CPT

## 2024-02-26 PROCEDURE — 93970 EXTREMITY STUDY: CPT

## 2024-02-26 PROCEDURE — 93005 ELECTROCARDIOGRAM TRACING: CPT

## 2024-02-26 PROCEDURE — 87449 NOS EACH ORGANISM AG IA: CPT

## 2024-02-26 PROCEDURE — 71045 X-RAY EXAM CHEST 1 VIEW: CPT | Mod: 26

## 2024-02-26 RX ORDER — PREGABALIN 225 MG/1
0 CAPSULE ORAL
Qty: 0 | Refills: 0 | DISCHARGE

## 2024-02-26 RX ORDER — AMLODIPINE BESYLATE 2.5 MG/1
1 TABLET ORAL
Qty: 0 | Refills: 0 | DISCHARGE

## 2024-02-26 RX ORDER — ROSUVASTATIN CALCIUM 5 MG/1
1 TABLET ORAL
Refills: 0 | DISCHARGE

## 2024-02-26 RX ORDER — NEBIVOLOL HYDROCHLORIDE 5 MG/1
1 TABLET ORAL
Qty: 0 | Refills: 0 | DISCHARGE

## 2024-02-26 RX ORDER — CHLORDIAZEPOXIDE/CLIDINIUM BR 5 MG-2.5MG
1 CAPSULE ORAL
Refills: 0 | DISCHARGE

## 2024-02-26 RX ORDER — ALPRAZOLAM 0.25 MG
1 TABLET ORAL
Qty: 0 | Refills: 0 | DISCHARGE

## 2024-02-26 RX ORDER — FERROUS SULFATE 325(65) MG
1 TABLET ORAL
Qty: 0 | Refills: 0 | DISCHARGE

## 2024-02-26 RX ORDER — ONDANSETRON 8 MG/1
4 TABLET, FILM COATED ORAL EVERY 6 HOURS
Refills: 0 | Status: DISCONTINUED | OUTPATIENT
Start: 2024-02-26 | End: 2024-02-27

## 2024-02-26 RX ORDER — EZETIMIBE 10 MG/1
1 TABLET ORAL
Refills: 0 | DISCHARGE

## 2024-02-26 RX ORDER — ERGOCALCIFEROL 1.25 MG/1
1 CAPSULE ORAL
Refills: 0 | DISCHARGE

## 2024-02-26 RX ORDER — ZOLPIDEM TARTRATE 10 MG/1
1 TABLET ORAL
Refills: 0 | DISCHARGE

## 2024-02-26 RX ORDER — ASPIRIN/CALCIUM CARB/MAGNESIUM 324 MG
162 TABLET ORAL ONCE
Refills: 0 | Status: COMPLETED | OUTPATIENT
Start: 2024-02-26 | End: 2024-02-26

## 2024-02-26 RX ORDER — ASPIRIN/CALCIUM CARB/MAGNESIUM 324 MG
1 TABLET ORAL
Refills: 0 | DISCHARGE

## 2024-02-26 RX ORDER — CALCIUM CARBONATE 500(1250)
1 TABLET ORAL
Refills: 0 | DISCHARGE

## 2024-02-26 NOTE — ED ADULT NURSE NOTE - OBJECTIVE STATEMENT
Patient has intermittent chest pressure since Saturday. PMH: 4 stents, myocardial infarction x 2. 20 G IV inserted into left A/C. Cardiac monitor in place.

## 2024-02-26 NOTE — ED STATDOCS - OBJECTIVE STATEMENT
74 yo female w/PMHx anemia, diverticulitis, breast CA, TIA, HTN, NSTEMI, and CAD s/p 2 stents presents to the ED c/o chest pain for the last few days worsening today when she woke up this morning. Pt was sent to the ED to be admitted by erythema cardiologist. Pt had chest pain that radiated to her back, shoulders, and neck which was new. Pt states when she takes a deep breath she has an increase in pressure in her chest. No fever, cough. Pt states she had some ankle swelling a few days ago but that it has since subsided. Pt is on Xarelto and 1 baby ASA a day. 74 yo female w/PMHx anemia, diverticulitis, breast CA, TIA, HTN, NSTEMI, and CAD s/p 2 stents presents to the ED c/o chest pain for the last few days worsening today when she woke up this morning. Pt was sent to the ED to be admitted for cardiac cath. Pain is non exertional, constant, non pleuritic, non radiating. Pt states when she takes a deep breath she has an increase in pressure in her chest. No fever, cough. Pt states she had some ankle swelling a few days ago but that it has since subsided. Pt is on Xarelto and 1 baby ASA a day.

## 2024-02-26 NOTE — ED STATDOCS - PROGRESS NOTE DETAILS
Spoke with dr. Rose, will admit patient for CP r/o ACS. ~Nii Goldstein PA-C PA: Patient is a 74 yo female with PMHx of anemia, diverticulitis, breast CA, TIA, HTN, NSTEMI, and CAD s/p 2 stents who presents to E c/o chest pain radiating to back, left shoulder and neck area for the last few days worsening today when she woke up this morning. Pt was sent to the ED to be admitted by her cardiologist dr. Hogan. Pt is on Xarelto and 1 baby ASA a day. ~Nii Goldstein PA-C

## 2024-02-26 NOTE — PHARMACOTHERAPY INTERVENTION NOTE - COMMENTS
Medication reconciliation completed.  Reviewed Medication list and confirmed med allergies with patient; confirmed with Dr. First Medoumar.

## 2024-02-26 NOTE — ED ADULT TRIAGE NOTE - CHIEF COMPLAINT QUOTE
Pt presented to te ER with c/o chest pain for the past few days worsening today. Pt went to his cardiologist office and was told to come to the ER. Pt has a hx of MI and TIA.

## 2024-02-26 NOTE — ED STATDOCS - PHYSICAL EXAMINATION
Constitutional: well appearing, NAD AAOx3  Eyes: EOMI, PERRL  Head: Normocephalic atraumatic  Mouth: no airway obstruction, posterior oropharynx clear without erythema or exudate  Neck: supple  Cardiac: regular rate and rhythm, no MRG  Resp: Lungs CTAB  GI: Abd s/nt/nd  Neuro: CN2-12 intact, strength 5/5x4, sensation grossly intact  Skin: No rashes Attending: Constitutional: well appearing, NAD AAOx3  Eyes: EOMI, PERRL  Head: Normocephalic atraumatic  Mouth: no airway obstruction, posterior oropharynx clear without erythema or exudate  Neck: supple  Cardiac: regular rate and rhythm, no MRG  Resp: Lungs CTAB  GI: Abd s/nt/nd  Neuro: CN2-12 intact, strength 5/5x4, sensation grossly intact  Skin: No rashes

## 2024-02-26 NOTE — ED STATDOCS - ATTENDING APP SHARED VISIT CONTRIBUTION OF CARE
I, Yun Elizabeth DO,  performed the initial face to face bedside interview with this patient regarding history of present illness, review of symptoms and relevant past medical, social and family history.  I completed an independent physical examination.  I was the initial provider who evaluated this patient.   I personally saw the patient and performed a substantive portion of the visit including all aspects of the medical decision making.  I have signed out the follow up of any pending tests (i.e. labs, radiological studies) to the [facility].  I have communicated the patient´s plan of care and disposition with the [facility].  The history, relevant review of systems, past medical and surgical history, medical decision making, and physical examination was documented by the scribe in my presence and I attest to the accuracy of the documentation.

## 2024-02-26 NOTE — ED STATDOCS - CLINICAL SUMMARY MEDICAL DECISION MAKING FREE TEXT BOX
Plan to do KEG, CXR, labs, and admit for cardiac catheterization. Plan to do KEG, CXR, labs, and admit for cardiac catheterization.    Spoke with dr. Rose, will admit patient for CP r/o ACS. ~Nii Goldstein PA-C

## 2024-02-27 LAB
A1C WITH ESTIMATED AVERAGE GLUCOSE RESULT: 4.9 % — SIGNIFICANT CHANGE UP (ref 4–5.6)
ADD ON TEST-SPECIMEN IN LAB: SIGNIFICANT CHANGE UP
ALBUMIN SERPL ELPH-MCNC: 3.3 G/DL — SIGNIFICANT CHANGE UP (ref 3.3–5)
ALP SERPL-CCNC: 64 U/L — SIGNIFICANT CHANGE UP (ref 40–120)
ALT FLD-CCNC: 51 U/L — SIGNIFICANT CHANGE UP (ref 12–78)
ANION GAP SERPL CALC-SCNC: 6 MMOL/L — SIGNIFICANT CHANGE UP (ref 5–17)
APTT BLD: 25.5 SEC — SIGNIFICANT CHANGE UP (ref 24.5–35.6)
APTT BLD: 35.7 SEC — HIGH (ref 24.5–35.6)
AST SERPL-CCNC: 64 U/L — HIGH (ref 15–37)
BASOPHILS # BLD AUTO: 0.05 K/UL — SIGNIFICANT CHANGE UP (ref 0–0.2)
BASOPHILS NFR BLD AUTO: 0.3 % — SIGNIFICANT CHANGE UP (ref 0–2)
BILIRUB SERPL-MCNC: 0.9 MG/DL — SIGNIFICANT CHANGE UP (ref 0.2–1.2)
BUN SERPL-MCNC: 18 MG/DL — SIGNIFICANT CHANGE UP (ref 7–23)
CALCIUM SERPL-MCNC: 8.8 MG/DL — SIGNIFICANT CHANGE UP (ref 8.5–10.1)
CHLORIDE SERPL-SCNC: 101 MMOL/L — SIGNIFICANT CHANGE UP (ref 96–108)
CHOLEST SERPL-MCNC: 128 MG/DL — SIGNIFICANT CHANGE UP
CO2 SERPL-SCNC: 31 MMOL/L — SIGNIFICANT CHANGE UP (ref 22–31)
CREAT SERPL-MCNC: 0.74 MG/DL — SIGNIFICANT CHANGE UP (ref 0.5–1.3)
D DIMER BLD IA.RAPID-MCNC: 791 NG/ML DDU — HIGH
EGFR: 84 ML/MIN/1.73M2 — SIGNIFICANT CHANGE UP
EOSINOPHIL # BLD AUTO: 0.09 K/UL — SIGNIFICANT CHANGE UP (ref 0–0.5)
EOSINOPHIL NFR BLD AUTO: 0.6 % — SIGNIFICANT CHANGE UP (ref 0–6)
ESTIMATED AVERAGE GLUCOSE: 94 MG/DL — SIGNIFICANT CHANGE UP (ref 68–114)
GLUCOSE SERPL-MCNC: 111 MG/DL — HIGH (ref 70–99)
HCT VFR BLD CALC: 39.3 % — SIGNIFICANT CHANGE UP (ref 34.5–45)
HCV AB S/CO SERPL IA: 0.09 S/CO — SIGNIFICANT CHANGE UP (ref 0–0.99)
HCV AB SERPL-IMP: SIGNIFICANT CHANGE UP
HDLC SERPL-MCNC: 84 MG/DL — SIGNIFICANT CHANGE UP
HGB BLD-MCNC: 13.1 G/DL — SIGNIFICANT CHANGE UP (ref 11.5–15.5)
IMM GRANULOCYTES NFR BLD AUTO: 0.3 % — SIGNIFICANT CHANGE UP (ref 0–0.9)
INR BLD: 1.89 RATIO — HIGH (ref 0.85–1.18)
INR BLD: 2.17 RATIO — HIGH (ref 0.85–1.18)
LIPID PNL WITH DIRECT LDL SERPL: 28 MG/DL — SIGNIFICANT CHANGE UP
LYMPHOCYTES # BLD AUTO: 1.91 K/UL — SIGNIFICANT CHANGE UP (ref 1–3.3)
LYMPHOCYTES # BLD AUTO: 12.9 % — LOW (ref 13–44)
MAGNESIUM SERPL-MCNC: 1.2 MG/DL — LOW (ref 1.6–2.6)
MCHC RBC-ENTMCNC: 33.3 GM/DL — SIGNIFICANT CHANGE UP (ref 32–36)
MCHC RBC-ENTMCNC: 33.3 PG — SIGNIFICANT CHANGE UP (ref 27–34)
MCV RBC AUTO: 100 FL — SIGNIFICANT CHANGE UP (ref 80–100)
MONOCYTES # BLD AUTO: 0.7 K/UL — SIGNIFICANT CHANGE UP (ref 0–0.9)
MONOCYTES NFR BLD AUTO: 4.7 % — SIGNIFICANT CHANGE UP (ref 2–14)
NEUTROPHILS # BLD AUTO: 11.98 K/UL — HIGH (ref 1.8–7.4)
NEUTROPHILS NFR BLD AUTO: 81.2 % — HIGH (ref 43–77)
NON HDL CHOLESTEROL: 44 MG/DL — SIGNIFICANT CHANGE UP
PHOSPHATE SERPL-MCNC: 2.5 MG/DL — SIGNIFICANT CHANGE UP (ref 2.5–4.5)
PLATELET # BLD AUTO: 272 K/UL — SIGNIFICANT CHANGE UP (ref 150–400)
POTASSIUM SERPL-MCNC: 3 MMOL/L — LOW (ref 3.5–5.3)
POTASSIUM SERPL-SCNC: 3 MMOL/L — LOW (ref 3.5–5.3)
PROT SERPL-MCNC: 7.2 GM/DL — SIGNIFICANT CHANGE UP (ref 6–8.3)
PROTHROM AB SERPL-ACNC: 21 SEC — HIGH (ref 9.5–13)
PROTHROM AB SERPL-ACNC: 24 SEC — HIGH (ref 9.5–13)
RBC # BLD: 3.93 M/UL — SIGNIFICANT CHANGE UP (ref 3.8–5.2)
RBC # FLD: 15.8 % — HIGH (ref 10.3–14.5)
SODIUM SERPL-SCNC: 138 MMOL/L — SIGNIFICANT CHANGE UP (ref 135–145)
TRIGL SERPL-MCNC: 88 MG/DL — SIGNIFICANT CHANGE UP
TROPONIN I, HIGH SENSITIVITY RESULT: 4.27 NG/L — SIGNIFICANT CHANGE UP
TROPONIN I, HIGH SENSITIVITY RESULT: 6.23 NG/L — SIGNIFICANT CHANGE UP
WBC # BLD: 14.78 K/UL — HIGH (ref 3.8–10.5)
WBC # FLD AUTO: 14.78 K/UL — HIGH (ref 3.8–10.5)

## 2024-02-27 PROCEDURE — 99223 1ST HOSP IP/OBS HIGH 75: CPT

## 2024-02-27 PROCEDURE — 93010 ELECTROCARDIOGRAM REPORT: CPT

## 2024-02-27 PROCEDURE — 71275 CT ANGIOGRAPHY CHEST: CPT | Mod: 26

## 2024-02-27 RX ORDER — CHOLECALCIFEROL (VITAMIN D3) 125 MCG
2000 CAPSULE ORAL DAILY
Refills: 0 | Status: DISCONTINUED | OUTPATIENT
Start: 2024-02-27 | End: 2024-03-01

## 2024-02-27 RX ORDER — CALCIUM CARBONATE 500(1250)
1 TABLET ORAL
Refills: 0 | Status: DISCONTINUED | OUTPATIENT
Start: 2024-02-27 | End: 2024-03-01

## 2024-02-27 RX ORDER — MAGNESIUM SULFATE 500 MG/ML
1 VIAL (ML) INJECTION ONCE
Refills: 0 | Status: COMPLETED | OUTPATIENT
Start: 2024-02-27 | End: 2024-02-27

## 2024-02-27 RX ORDER — ATORVASTATIN CALCIUM 80 MG/1
40 TABLET, FILM COATED ORAL AT BEDTIME
Refills: 0 | Status: DISCONTINUED | OUTPATIENT
Start: 2024-02-27 | End: 2024-03-01

## 2024-02-27 RX ORDER — AMLODIPINE BESYLATE 2.5 MG/1
2.5 TABLET ORAL DAILY
Refills: 0 | Status: DISCONTINUED | OUTPATIENT
Start: 2024-02-27 | End: 2024-03-01

## 2024-02-27 RX ORDER — SODIUM CHLORIDE 9 MG/ML
2000 INJECTION INTRAMUSCULAR; INTRAVENOUS; SUBCUTANEOUS
Refills: 0 | Status: DISCONTINUED | OUTPATIENT
Start: 2024-02-27 | End: 2024-03-01

## 2024-02-27 RX ORDER — RIVAROXABAN 15 MG-20MG
20 KIT ORAL DAILY
Refills: 0 | Status: DISCONTINUED | OUTPATIENT
Start: 2024-02-27 | End: 2024-02-27

## 2024-02-27 RX ORDER — POTASSIUM CHLORIDE 20 MEQ
40 PACKET (EA) ORAL
Refills: 0 | Status: COMPLETED | OUTPATIENT
Start: 2024-02-27 | End: 2024-02-27

## 2024-02-27 RX ORDER — SODIUM CHLORIDE 9 MG/ML
500 INJECTION INTRAMUSCULAR; INTRAVENOUS; SUBCUTANEOUS
Refills: 0 | Status: DISCONTINUED | OUTPATIENT
Start: 2024-02-27 | End: 2024-03-01

## 2024-02-27 RX ORDER — ZOLPIDEM TARTRATE 10 MG/1
5 TABLET ORAL AT BEDTIME
Refills: 0 | Status: DISCONTINUED | OUTPATIENT
Start: 2024-02-27 | End: 2024-03-01

## 2024-02-27 RX ORDER — ALPRAZOLAM 0.25 MG
1 TABLET ORAL ONCE
Refills: 0 | Status: DISCONTINUED | OUTPATIENT
Start: 2024-02-27 | End: 2024-02-27

## 2024-02-27 RX ORDER — RIVAROXABAN 15 MG-20MG
20 KIT ORAL
Refills: 0 | Status: DISCONTINUED | OUTPATIENT
Start: 2024-02-27 | End: 2024-03-01

## 2024-02-27 RX ORDER — ASPIRIN/CALCIUM CARB/MAGNESIUM 324 MG
81 TABLET ORAL DAILY
Refills: 0 | Status: DISCONTINUED | OUTPATIENT
Start: 2024-02-27 | End: 2024-03-01

## 2024-02-27 RX ORDER — NEBIVOLOL HYDROCHLORIDE 5 MG/1
10 TABLET ORAL DAILY
Refills: 0 | Status: DISCONTINUED | OUTPATIENT
Start: 2024-02-27 | End: 2024-03-01

## 2024-02-27 RX ORDER — ALPRAZOLAM 0.25 MG
1 TABLET ORAL THREE TIMES A DAY
Refills: 0 | Status: DISCONTINUED | OUTPATIENT
Start: 2024-02-27 | End: 2024-03-01

## 2024-02-27 RX ORDER — AZITHROMYCIN 500 MG/1
500 TABLET, FILM COATED ORAL EVERY 24 HOURS
Refills: 0 | Status: DISCONTINUED | OUTPATIENT
Start: 2024-02-27 | End: 2024-03-01

## 2024-02-27 RX ORDER — PANTOPRAZOLE SODIUM 20 MG/1
40 TABLET, DELAYED RELEASE ORAL
Refills: 0 | Status: DISCONTINUED | OUTPATIENT
Start: 2024-02-27 | End: 2024-03-01

## 2024-02-27 RX ORDER — CEFTRIAXONE 500 MG/1
1000 INJECTION, POWDER, FOR SOLUTION INTRAMUSCULAR; INTRAVENOUS EVERY 24 HOURS
Refills: 0 | Status: DISCONTINUED | OUTPATIENT
Start: 2024-02-27 | End: 2024-03-01

## 2024-02-27 RX ADMIN — AMLODIPINE BESYLATE 2.5 MILLIGRAM(S): 2.5 TABLET ORAL at 21:28

## 2024-02-27 RX ADMIN — AZITHROMYCIN 255 MILLIGRAM(S): 500 TABLET, FILM COATED ORAL at 18:25

## 2024-02-27 RX ADMIN — NEBIVOLOL HYDROCHLORIDE 10 MILLIGRAM(S): 5 TABLET ORAL at 21:29

## 2024-02-27 RX ADMIN — Medication 10 MILLIGRAM(S): at 21:28

## 2024-02-27 RX ADMIN — Medication 1 TABLET(S): at 21:28

## 2024-02-27 RX ADMIN — RIVAROXABAN 20 MILLIGRAM(S): KIT at 18:26

## 2024-02-27 RX ADMIN — Medication 1 MILLIGRAM(S): at 00:42

## 2024-02-27 RX ADMIN — CEFTRIAXONE 1000 MILLIGRAM(S): 500 INJECTION, POWDER, FOR SOLUTION INTRAMUSCULAR; INTRAVENOUS at 16:31

## 2024-02-27 RX ADMIN — SODIUM CHLORIDE 1000 MILLILITER(S): 9 INJECTION INTRAMUSCULAR; INTRAVENOUS; SUBCUTANEOUS at 16:30

## 2024-02-27 RX ADMIN — ATORVASTATIN CALCIUM 40 MILLIGRAM(S): 80 TABLET, FILM COATED ORAL at 21:27

## 2024-02-27 RX ADMIN — Medication 40 MILLIEQUIVALENT(S): at 16:32

## 2024-02-27 RX ADMIN — Medication 10 MILLIGRAM(S): at 10:29

## 2024-02-27 RX ADMIN — Medication 162 MILLIGRAM(S): at 00:42

## 2024-02-27 RX ADMIN — Medication 40 MILLIEQUIVALENT(S): at 14:12

## 2024-02-27 RX ADMIN — Medication 2000 UNIT(S): at 21:29

## 2024-02-27 RX ADMIN — Medication 81 MILLIGRAM(S): at 21:28

## 2024-02-27 RX ADMIN — Medication 100 GRAM(S): at 14:12

## 2024-02-27 NOTE — H&P ADULT - ASSESSMENT
74 y/o F w/ PMH of anemia, diverticulitis, breast cancer, TIA, CAD s/p PCI, anxiety, HTN, ASD / PFO, a-fib (on Xarelto), p/w CP    *CP w/ h/o CAD s/p PCI   -ASA  -Troponins negative x 2  -Cardio consult  -EKG: NSR 73 bpm   -Tele monitoring  -Echo  -Check D-dimer     *Leukocytosis   -No signs / symptoms of infection reported  -Recheck WBC in AM to check for improvement  -Check UA   -F/u CXR report     *H/o Anemia / diverticulitis / breast cancer / TIA / anxiety / ASD / PFO / a-fib   -C/w home meds and f/u outpatient for further management if conditions remain stable during hospitalization    *DVT ppx   -On Xarelto       >75 mins required for admission    74 y/o F w/ PMH of anemia, diverticulitis, breast cancer, TIA, CAD s/p PCI, anxiety, HTN, ASD / PFO, a-fib (on Xarelto), p/w CP    *CP w/ h/o CAD s/p PCI   -ASA  -Troponins negative x 2  -Cardio consult  -EKG: NSR 73 bpm   -Tele monitoring  -Echo  -Check D-dimer     *Leukocytosis   -No signs / symptoms of infection reported  -Recheck WBC in AM to check for improvement  -Check UA   -F/u CXR report     *H/o Anemia / diverticulitis / breast cancer / TIA / anxiety / ASD / PFO / a-fib   -C/w home meds and f/u outpatient for further management if conditions remain stable during hospitalization    *DVT ppx   -On Xarelto     >75 mins required for admission

## 2024-02-27 NOTE — PATIENT PROFILE ADULT - FALL HARM RISK - HARM RISK INTERVENTIONS

## 2024-02-27 NOTE — PACU DISCHARGE NOTE - NS MD DISCHARGE NOTE DISCHARGE
Impression: S/P CE IOL OD - . Presence of intraocular lens  Z96.1. Excellent post op course   Post operative instructions reviewed - Condition is improving - Cataract OS. Plan: Patient is healing well status post CE IOL. I stressed importance of patient avoiding rubbing the eye, staying out of swimming pools, hot tubs and saunas for 10 days. Patient should continue to wear shield at bedtime for 1 week. I explained post-op medication schedule with patient. Patient will continue with Antibiotic 3 times per day for 7 days then discontinue, Steriod 4 times per day x 7 days then decrease to 2 times per day and NSAID 1 times per day x7 days and then discontinue.  Pred-Keto-Oflox OD QID Floor

## 2024-02-27 NOTE — ED ADULT NURSE REASSESSMENT NOTE - NS ED NURSE REASSESS COMMENT FT1
Received report from MARIBELL Diez. Pt A&Ox4, resting comfortably. Pt denies any pain or discomfort. VS as charted, respirations equal and unlabored. Pending bed placement. Pt updated on plan of care, verbalized understanding. Safety measures maintained, stretcher locked and in lowest position, both side rails up. No acute distress noted. Pt remains on tele monitor.

## 2024-02-27 NOTE — CONSULT NOTE ADULT - ASSESSMENT
patient with chest pains similar to prior CAD with abnormal EKG (old) but negative troponin level and elevated WBC and D-dimer,  CXR with no acute infiltrate on preliminary  reading  1-CAD-will get cardiac cath today-d/w Dr. Kuo-no AC today  2-IF negative cath-consider CT of chest as D-dimer elevated  will follow

## 2024-02-27 NOTE — H&P ADULT - HISTORY OF PRESENT ILLNESS
74 y/o F w/ PMH of anemia, diverticulitis, breast cancer, TIA, CAD s/p PCI, anxiety, HTN, ASD / PFO, a-fib (on Xarelto), p/w CP. Patient has been heen having intermittent CP for ~ 1 week. However, CP got worse yesterday morning. CP is substernal, pressure in quality and radiates to back / neck and B/L shoulders. Pain is worse with deep breathing. States pain has improved since yesterday, but is still there. Denies diophoresis, SOB, nausea, vomiting, fever, chills, cough, runny nose, sore throat, dysuria, increased frequency, abdominal pain, diarrhea. States she had a stress test March of last year, and was told it looked a bit worse compared to previous one.     PSH: cataract surgery, R breast mastectomy, PCI, R TKR     Social Hx: Tobacco - quit 20 years ago, etoh - occasionally, drugs - denies     Family Hx: Heart disease, brease cancer, lung cancer, prostate cancer
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
## 2024-02-27 NOTE — CONSULT NOTE ADULT - SUBJECTIVE AND OBJECTIVE BOX
CHIEF COMPLAINT: Patient is a 73y old  Female who presents with a chief complaint of CP (27 Feb 2024 05:38)      HPI:  72 y/o F w/ PMH of anemia, diverticulitis, breast cancer, TIA, CAD s/p PCI, anxiety, HTN, ASD / PFO, a-fib (on Xarelto), p/w CP. Patient has been heen having intermittent CP for ~ 1 week. However, CP got worse yesterday morning. CP is substernal, pressure in quality and radiates to back / neck and B/L shoulders. Pain is worse with deep breathing. States pain has improved since yesterday, but is still there. Denies diophoresis, SOB, nausea, vomiting, fever, chills, cough, runny nose, sore throat, dysuria, increased frequency, abdominal pain, diarrhea. States she had a stress test March of last year, and was told it looked a bit worse compared to previous one.     Patient with left sided chest tightness radiating t shoulder and back for "a while" worse with exertion.   Was recently worked up for anemia-resolved with iron and GI w/u including endoscopy and colonoscopy-normal.  Patient with 4 stents.   PAfib-on xarelto-did not take sunday and not given in ER on monday.   EKG with nonspecific changes (old) and troponin negative.  D-dimer +.  WBC elevated.      PSH: cataract surgery, R breast mastectomy, PCI, R TKR     Social Hx: Tobacco - quit 20 years ago, etoh - occasionally, drugs - denies     Family Hx: Heart disease, brease cancer, lung cancer, prostate cancer     (27 Feb 2024 05:38)      PMHx: PAST MEDICAL & SURGICAL HISTORY:  Coronary artery disease, angina presence unspecified, unspecified vessel or lesion type, unspecified whether native or transplanted heart      Transient cerebral ischemia, unspecified type      Non-ST elevation (NSTEMI) myocardial infarction      HTN (hypertension)      Anxiety      Breast cancer      Diverticulitis      Anemia      S/P drug eluting coronary stent placement  in 2008 and 2010 - total x4      H/O total mastectomy of right breast      Cataract, bilateral            Soc Hx:       Allergies: Allergies    No Known Allergies    Intolerances          REVIEW OF SYSTEMS:    CONSTITUTIONAL: No weakness, fevers or chills  EYES/ENT: No visual changes;  No vertigo or throat pain   NECK: No pain or stiffness  RESPIRATORY: No cough, wheezing, hemoptysis; No shortness of breath  CARDIOVASCULAR: No chest pain or palpitations  GASTROINTESTINAL: No abdominal or epigastric pain. No nausea, vomiting, or hematemesis; No diarrhea or constipation. No melena or hematochezia.  GENITOURINARY: No dysuria, frequency or hematuria  NEUROLOGICAL: No numbness or weakness  SKIN: No itching, burning, rashes, or lesions   All other review of systems is negative unless indicated above    Vital Signs Last 24 Hrs  T(C): 36.8 (27 Feb 2024 06:57), Max: 37.1 (27 Feb 2024 06:20)  T(F): 98.3 (27 Feb 2024 06:57), Max: 98.7 (27 Feb 2024 06:20)  HR: 87 (27 Feb 2024 06:57) (81 - 87)  BP: 125/80 (27 Feb 2024 06:57) (125/80 - 138/70)  BP(mean): 88 (27 Feb 2024 06:20) (88 - 92)  RR: 18 (27 Feb 2024 06:57) (17 - 18)  SpO2: 92% (27 Feb 2024 06:57) (91% - 94%)    Parameters below as of 27 Feb 2024 06:57  Patient On (Oxygen Delivery Method): room air        I&O's Summary      CAPILLARY BLOOD GLUCOSE          PHYSICAL EXAM:   Constitutional: NAD, awake and alert, well-developed  HEENT: PERR, EOMI, Normal Hearing, MMM  Neck: Soft and supple, No LAD, No JVD  Respiratory: Breath sounds are clear bilaterally, No wheezing, rales or rhonchi  Cardiovascular: S1 and S2, regular rate and rhythm, no Murmurs, gallops or rubs  Gastrointestinal: Bowel Sounds present, soft, nontender, nondistended, no guarding, no rebound  Extremities: No peripheral edema  Vascular: 2+ peripheral pulses  Neurological: A/O x 3, no focal deficits  Musculoskeletal: 5/5 strength b/l upper and lower extremities  Skin: No rashes    MEDICATIONS:  MEDICATIONS  (STANDING):  amLODIPine   Tablet 2.5 milliGRAM(s) Oral daily  aspirin enteric coated 81 milliGRAM(s) Oral daily  atorvastatin 40 milliGRAM(s) Oral at bedtime  calcium carbonate   1250 mG (OsCal) 1 Tablet(s) Oral two times a day  cholecalciferol 2000 Unit(s) Oral daily  enalapril 10 milliGRAM(s) Oral two times a day  pantoprazole    Tablet 40 milliGRAM(s) Oral before breakfast  rivaroxaban 20 milliGRAM(s) Oral daily      LABS: All Labs Reviewed:                        13.1   14.78 )-----------( 272      ( 27 Feb 2024 06:31 )             39.3     02-27    138  |  101  |  18  ----------------------------<  111<H>  3.0<L>   |  31  |  0.74    Ca    8.8      27 Feb 2024 06:31    TPro  7.2  /  Alb  3.3  /  TBili  0.9  /  DBili  x   /  AST  64<H>  /  ALT  51  /  AlkPhos  64  02-27    PT/INR - ( 27 Feb 2024 06:31 )   PT: 21.0 sec;   INR: 1.89 ratio         PTT - ( 27 Feb 2024 06:31 )  PTT:25.5 sec        Blood Culture:   lipid profile       RADIOLOGY:    EKG:    Telemetry:    ECHO:

## 2024-02-28 LAB
ADD ON TEST-SPECIMEN IN LAB: SIGNIFICANT CHANGE UP
ANION GAP SERPL CALC-SCNC: 6 MMOL/L — SIGNIFICANT CHANGE UP (ref 5–17)
BASOPHILS # BLD AUTO: 0.05 K/UL — SIGNIFICANT CHANGE UP (ref 0–0.2)
BASOPHILS NFR BLD AUTO: 0.5 % — SIGNIFICANT CHANGE UP (ref 0–2)
BUN SERPL-MCNC: 7 MG/DL — SIGNIFICANT CHANGE UP (ref 7–23)
CALCIUM SERPL-MCNC: 8.5 MG/DL — SIGNIFICANT CHANGE UP (ref 8.5–10.1)
CHLORIDE SERPL-SCNC: 108 MMOL/L — SIGNIFICANT CHANGE UP (ref 96–108)
CO2 SERPL-SCNC: 28 MMOL/L — SIGNIFICANT CHANGE UP (ref 22–31)
CREAT SERPL-MCNC: 0.38 MG/DL — LOW (ref 0.5–1.3)
EGFR: 104 ML/MIN/1.73M2 — SIGNIFICANT CHANGE UP
EOSINOPHIL # BLD AUTO: 0.24 K/UL — SIGNIFICANT CHANGE UP (ref 0–0.5)
EOSINOPHIL NFR BLD AUTO: 2.5 % — SIGNIFICANT CHANGE UP (ref 0–6)
GLUCOSE SERPL-MCNC: 102 MG/DL — HIGH (ref 70–99)
HCT VFR BLD CALC: 32.3 % — LOW (ref 34.5–45)
HGB BLD-MCNC: 10.6 G/DL — LOW (ref 11.5–15.5)
IMM GRANULOCYTES NFR BLD AUTO: 0.5 % — SIGNIFICANT CHANGE UP (ref 0–0.9)
LEGIONELLA AG UR QL: NEGATIVE — SIGNIFICANT CHANGE UP
LYMPHOCYTES # BLD AUTO: 1.09 K/UL — SIGNIFICANT CHANGE UP (ref 1–3.3)
LYMPHOCYTES # BLD AUTO: 11.6 % — LOW (ref 13–44)
MAGNESIUM SERPL-MCNC: 1.6 MG/DL — SIGNIFICANT CHANGE UP (ref 1.6–2.6)
MCHC RBC-ENTMCNC: 32.8 GM/DL — SIGNIFICANT CHANGE UP (ref 32–36)
MCHC RBC-ENTMCNC: 34.2 PG — HIGH (ref 27–34)
MCV RBC AUTO: 104.2 FL — HIGH (ref 80–100)
MONOCYTES # BLD AUTO: 0.72 K/UL — SIGNIFICANT CHANGE UP (ref 0–0.9)
MONOCYTES NFR BLD AUTO: 7.6 % — SIGNIFICANT CHANGE UP (ref 2–14)
NEUTROPHILS # BLD AUTO: 7.27 K/UL — SIGNIFICANT CHANGE UP (ref 1.8–7.4)
NEUTROPHILS NFR BLD AUTO: 77.3 % — HIGH (ref 43–77)
PLATELET # BLD AUTO: 208 K/UL — SIGNIFICANT CHANGE UP (ref 150–400)
POTASSIUM SERPL-MCNC: 3.3 MMOL/L — LOW (ref 3.5–5.3)
POTASSIUM SERPL-SCNC: 3.3 MMOL/L — LOW (ref 3.5–5.3)
RAPID RVP RESULT: SIGNIFICANT CHANGE UP
RBC # BLD: 3.1 M/UL — LOW (ref 3.8–5.2)
RBC # FLD: 15.6 % — HIGH (ref 10.3–14.5)
SARS-COV-2 RNA SPEC QL NAA+PROBE: SIGNIFICANT CHANGE UP
SODIUM SERPL-SCNC: 142 MMOL/L — SIGNIFICANT CHANGE UP (ref 135–145)
WBC # BLD: 9.42 K/UL — SIGNIFICANT CHANGE UP (ref 3.8–10.5)
WBC # FLD AUTO: 9.42 K/UL — SIGNIFICANT CHANGE UP (ref 3.8–10.5)

## 2024-02-28 PROCEDURE — 93010 ELECTROCARDIOGRAM REPORT: CPT

## 2024-02-28 PROCEDURE — 99233 SBSQ HOSP IP/OBS HIGH 50: CPT

## 2024-02-28 RX ORDER — POTASSIUM CHLORIDE 20 MEQ
40 PACKET (EA) ORAL ONCE
Refills: 0 | Status: COMPLETED | OUTPATIENT
Start: 2024-02-28 | End: 2024-02-28

## 2024-02-28 RX ORDER — ACETAMINOPHEN 500 MG
650 TABLET ORAL EVERY 6 HOURS
Refills: 0 | Status: DISCONTINUED | OUTPATIENT
Start: 2024-02-28 | End: 2024-03-01

## 2024-02-28 RX ADMIN — Medication 1 TABLET(S): at 21:57

## 2024-02-28 RX ADMIN — Medication 1 TABLET(S): at 09:11

## 2024-02-28 RX ADMIN — Medication 650 MILLIGRAM(S): at 10:45

## 2024-02-28 RX ADMIN — Medication 10 MILLIGRAM(S): at 09:10

## 2024-02-28 RX ADMIN — NEBIVOLOL HYDROCHLORIDE 10 MILLIGRAM(S): 5 TABLET ORAL at 09:10

## 2024-02-28 RX ADMIN — ATORVASTATIN CALCIUM 40 MILLIGRAM(S): 80 TABLET, FILM COATED ORAL at 21:57

## 2024-02-28 RX ADMIN — AZITHROMYCIN 255 MILLIGRAM(S): 500 TABLET, FILM COATED ORAL at 14:54

## 2024-02-28 RX ADMIN — AMLODIPINE BESYLATE 2.5 MILLIGRAM(S): 2.5 TABLET ORAL at 09:10

## 2024-02-28 RX ADMIN — Medication 81 MILLIGRAM(S): at 09:10

## 2024-02-28 RX ADMIN — Medication 40 MILLIEQUIVALENT(S): at 17:10

## 2024-02-28 RX ADMIN — Medication 650 MILLIGRAM(S): at 11:15

## 2024-02-28 RX ADMIN — Medication 2000 UNIT(S): at 09:10

## 2024-02-28 RX ADMIN — CEFTRIAXONE 1000 MILLIGRAM(S): 500 INJECTION, POWDER, FOR SOLUTION INTRAMUSCULAR; INTRAVENOUS at 14:54

## 2024-02-28 RX ADMIN — RIVAROXABAN 20 MILLIGRAM(S): KIT at 17:11

## 2024-02-28 RX ADMIN — Medication 10 MILLIGRAM(S): at 21:57

## 2024-02-28 RX ADMIN — PANTOPRAZOLE SODIUM 40 MILLIGRAM(S): 20 TABLET, DELAYED RELEASE ORAL at 05:58

## 2024-02-29 ENCOUNTER — TRANSCRIPTION ENCOUNTER (OUTPATIENT)
Age: 74
End: 2024-02-29

## 2024-02-29 LAB
ANION GAP SERPL CALC-SCNC: 4 MMOL/L — LOW (ref 5–17)
BASOPHILS # BLD AUTO: 0.05 K/UL — SIGNIFICANT CHANGE UP (ref 0–0.2)
BASOPHILS NFR BLD AUTO: 0.6 % — SIGNIFICANT CHANGE UP (ref 0–2)
BUN SERPL-MCNC: 11 MG/DL — SIGNIFICANT CHANGE UP (ref 7–23)
CALCIUM SERPL-MCNC: 9.2 MG/DL — SIGNIFICANT CHANGE UP (ref 8.5–10.1)
CHLORIDE SERPL-SCNC: 105 MMOL/L — SIGNIFICANT CHANGE UP (ref 96–108)
CO2 SERPL-SCNC: 32 MMOL/L — HIGH (ref 22–31)
CREAT SERPL-MCNC: 0.44 MG/DL — LOW (ref 0.5–1.3)
EGFR: 101 ML/MIN/1.73M2 — SIGNIFICANT CHANGE UP
EOSINOPHIL # BLD AUTO: 0.34 K/UL — SIGNIFICANT CHANGE UP (ref 0–0.5)
EOSINOPHIL NFR BLD AUTO: 4.1 % — SIGNIFICANT CHANGE UP (ref 0–6)
GLUCOSE SERPL-MCNC: 106 MG/DL — HIGH (ref 70–99)
HCT VFR BLD CALC: 35 % — SIGNIFICANT CHANGE UP (ref 34.5–45)
HGB BLD-MCNC: 11.3 G/DL — LOW (ref 11.5–15.5)
IMM GRANULOCYTES NFR BLD AUTO: 0.5 % — SIGNIFICANT CHANGE UP (ref 0–0.9)
LYMPHOCYTES # BLD AUTO: 1.21 K/UL — SIGNIFICANT CHANGE UP (ref 1–3.3)
LYMPHOCYTES # BLD AUTO: 14.5 % — SIGNIFICANT CHANGE UP (ref 13–44)
MCHC RBC-ENTMCNC: 32.3 GM/DL — SIGNIFICANT CHANGE UP (ref 32–36)
MCHC RBC-ENTMCNC: 33.7 PG — SIGNIFICANT CHANGE UP (ref 27–34)
MCV RBC AUTO: 104.5 FL — HIGH (ref 80–100)
MONOCYTES # BLD AUTO: 0.6 K/UL — SIGNIFICANT CHANGE UP (ref 0–0.9)
MONOCYTES NFR BLD AUTO: 7.2 % — SIGNIFICANT CHANGE UP (ref 2–14)
NEUTROPHILS # BLD AUTO: 6.08 K/UL — SIGNIFICANT CHANGE UP (ref 1.8–7.4)
NEUTROPHILS NFR BLD AUTO: 73.1 % — SIGNIFICANT CHANGE UP (ref 43–77)
PLATELET # BLD AUTO: 241 K/UL — SIGNIFICANT CHANGE UP (ref 150–400)
POTASSIUM SERPL-MCNC: 3.5 MMOL/L — SIGNIFICANT CHANGE UP (ref 3.5–5.3)
POTASSIUM SERPL-SCNC: 3.5 MMOL/L — SIGNIFICANT CHANGE UP (ref 3.5–5.3)
RBC # BLD: 3.35 M/UL — LOW (ref 3.8–5.2)
RBC # FLD: 15 % — HIGH (ref 10.3–14.5)
SODIUM SERPL-SCNC: 141 MMOL/L — SIGNIFICANT CHANGE UP (ref 135–145)
WBC # BLD: 8.32 K/UL — SIGNIFICANT CHANGE UP (ref 3.8–10.5)
WBC # FLD AUTO: 8.32 K/UL — SIGNIFICANT CHANGE UP (ref 3.8–10.5)

## 2024-02-29 PROCEDURE — 73562 X-RAY EXAM OF KNEE 3: CPT | Mod: 26,LT

## 2024-02-29 PROCEDURE — 93970 EXTREMITY STUDY: CPT | Mod: 26

## 2024-02-29 PROCEDURE — 99232 SBSQ HOSP IP/OBS MODERATE 35: CPT

## 2024-02-29 RX ORDER — CYCLOBENZAPRINE HYDROCHLORIDE 10 MG/1
5 TABLET, FILM COATED ORAL ONCE
Refills: 0 | Status: COMPLETED | OUTPATIENT
Start: 2024-02-29 | End: 2024-02-29

## 2024-02-29 RX ORDER — AZITHROMYCIN 500 MG/1
1 TABLET, FILM COATED ORAL
Qty: 2 | Refills: 0
Start: 2024-02-29 | End: 2024-03-01

## 2024-02-29 RX ORDER — CEFUROXIME AXETIL 250 MG
1 TABLET ORAL
Qty: 8 | Refills: 0
Start: 2024-02-29 | End: 2024-03-03

## 2024-02-29 RX ADMIN — Medication 10 MILLIGRAM(S): at 09:32

## 2024-02-29 RX ADMIN — Medication 650 MILLIGRAM(S): at 22:44

## 2024-02-29 RX ADMIN — Medication 10 MILLIGRAM(S): at 21:40

## 2024-02-29 RX ADMIN — ATORVASTATIN CALCIUM 40 MILLIGRAM(S): 80 TABLET, FILM COATED ORAL at 21:41

## 2024-02-29 RX ADMIN — ZOLPIDEM TARTRATE 5 MILLIGRAM(S): 10 TABLET ORAL at 21:40

## 2024-02-29 RX ADMIN — CYCLOBENZAPRINE HYDROCHLORIDE 5 MILLIGRAM(S): 10 TABLET, FILM COATED ORAL at 01:22

## 2024-02-29 RX ADMIN — Medication 1 TABLET(S): at 21:41

## 2024-02-29 RX ADMIN — PANTOPRAZOLE SODIUM 40 MILLIGRAM(S): 20 TABLET, DELAYED RELEASE ORAL at 06:14

## 2024-02-29 RX ADMIN — Medication 2000 UNIT(S): at 09:31

## 2024-02-29 RX ADMIN — Medication 1 TABLET(S): at 09:31

## 2024-02-29 RX ADMIN — AZITHROMYCIN 255 MILLIGRAM(S): 500 TABLET, FILM COATED ORAL at 14:32

## 2024-02-29 RX ADMIN — RIVAROXABAN 20 MILLIGRAM(S): KIT at 17:39

## 2024-02-29 RX ADMIN — CEFTRIAXONE 1000 MILLIGRAM(S): 500 INJECTION, POWDER, FOR SOLUTION INTRAMUSCULAR; INTRAVENOUS at 14:31

## 2024-02-29 RX ADMIN — Medication 81 MILLIGRAM(S): at 09:31

## 2024-02-29 RX ADMIN — Medication 650 MILLIGRAM(S): at 21:41

## 2024-02-29 RX ADMIN — AMLODIPINE BESYLATE 2.5 MILLIGRAM(S): 2.5 TABLET ORAL at 09:31

## 2024-02-29 RX ADMIN — NEBIVOLOL HYDROCHLORIDE 10 MILLIGRAM(S): 5 TABLET ORAL at 09:31

## 2024-02-29 NOTE — DISCHARGE NOTE PROVIDER - NSDCMRMEDTOKEN_GEN_ALL_CORE_FT
amLODIPine 2.5 mg oral tablet: 1 tab(s) orally once a day  aspirin 81 mg oral delayed release tablet: 1 tab(s) orally once a day  Bystolic 10 mg oral tablet: 1 tab(s) orally once a day  calcium (as carbonate) 500 mg oral tablet: 1 tab(s) orally 2 times a day  cefuroxime 500 mg oral tablet: 1 tab(s) orally 2 times a day  chlordiazepoxide-clidinium 5 mg-2.5 mg oral capsule: 1 cap(s) orally 2 times a day  Crestor 10 mg oral tablet: 1 tab(s) orally once a day  enalapril 10 mg oral tablet: 1 tab(s) orally 2 times a day  ezetimibe 10 mg oral tablet: 1 tab(s) orally once a day (at bedtime)  pantoprazole 40 mg oral delayed release tablet: 1 tab(s) orally once a day (at bedtime)  rivaroxaban 20 mg oral tablet: 1 tab(s) orally once a day (in the morning)  Vitamin D2 2000 intl units oral capsule: 1 cap(s) orally once a day (at bedtime)  Xanax 1 mg oral tablet: 1 tab(s) orally 3 times a day, As Needed; DO NOT TAKE with Narcotic pain medication  Zithromax 250 mg oral tablet: 1 tab(s) orally once a day  zolpidem 12.5 mg oral tablet, extended release: 1 tab(s) orally once a day (at bedtime) as needed for sleep

## 2024-02-29 NOTE — DISCHARGE NOTE NURSING/CASE MANAGEMENT/SOCIAL WORK - NSDCPEFALRISK_GEN_ALL_CORE
For information on Fall & Injury Prevention, visit: [email or web address] OR  [email or web address] OR  https://www.cdc.gov/steadi/patient.html

## 2024-02-29 NOTE — DISCHARGE NOTE PROVIDER - NSDCCPCAREPLAN_GEN_ALL_CORE_FT
PRINCIPAL DISCHARGE DIAGNOSIS  Diagnosis: Acute chest pain  Assessment and Plan of Treatment: follow up cardiology as outpatient for outpatient stress test vs cath and echo  follow up PCP for further pneumonia follow up

## 2024-02-29 NOTE — CHART NOTE - NSCHARTNOTEFT_GEN_A_CORE
Patient reports that she has been experiencing left lower extremity pain and stiffness since this afternoon. Patient reports that she is unable to bend her knee without pain in her thigh,. Reports that prior to today, she was able to walk without pain or use of a walker, however today she had to use the walker to get around because she feels as though she cannot put her full weight on her left lower extremity.     T(C): 36.7 (02-28-24 @ 15:51), Max: 37.7 (02-28-24 @ 05:54)  HR: 74 (02-28-24 @ 15:51) (74 - 81)  BP: 109/58 (02-28-24 @ 15:51) (109/58 - 144/75)  RR: 18 (02-28-24 @ 15:51) (18 - 18)  SpO2: 95% (02-28-24 @ 15:51) (93% - 97%)    CONSTITUTIONAL: Well groomed, no apparent distress  MSK: Decreased ROM of Left knee due to pain in her thigh      Plan  left lower extremity pain   - US duplex B/L lower extremity  - Xray knee   - Flexeril one time

## 2024-02-29 NOTE — PROGRESS NOTE ADULT - ASSESSMENT
72 y/o F w/ PMH of anemia, diverticulitis, breast cancer, TIA, CAD s/p PCI, anxiety, HTN, ASD / PFO, a-fib (on Xarelto), p/w CP    *CP likely from PNA   resolved w/ h/o CAD s/p PCI   chest pains similar to prior CAD with abnormal EKG (old) but negative troponin level  -ASA  cancelled cardiac cath due to fever 100.7 found to have PNA  -Troponins negative x 2  -Cardio consult appreciated   -EKG: NSR 73 bpm   dc tele   -Echo  D dimer elevated   cardio does not recommend further inpatient testing, Dw Dr leung who will get echo as outpatient     *Leukocytosis   fever , acute hypoxic respiratory failure from Multifocal pneumonia, suspected gram neg silvana and atypical  weaned off NC , now RA  CTA  no PE    blood cx NGTD,,  ceftriaxone zithromax switch to po 3/1  legionella neg   sputum cx      new left knee and popliteal region pain with weight bearing, no recent trauma  Xray left knee   V doppler neg for DVT    *H/o Anemia / diverticulitis / breast cancer / TIA / anxiety / ASD / PFO / a-fib   -C/w home meds and f/u outpatient for further management if conditions remain stable during hospitalization    *DVT ppx   -On Xarelto dw with cardio    plan to discharge home tmrw , if able to ambulate better and  pending Xray left knee read , f/u cardio as outpatient     
72 y/o F w/ PMH of anemia, diverticulitis, breast cancer, TIA, CAD s/p PCI, anxiety, HTN, ASD / PFO, a-fib (on Xarelto), p/w CP    *CP w/ h/o CAD s/p PCI   -ASA  cancelled cardiac cath due to fever 100.7  -Troponins negative x 2  -Cardio consult  -EKG: NSR 73 bpm   -Tele monitoring  -Echo  D dimer elevated     *Leukocytosis   fever , acute hypoxic respiratory failure   CTA r/p PE vs PNA   blood cx   ceftriaxone zithroma  legionella   sputum cx     *H/o Anemia / diverticulitis / breast cancer / TIA / anxiety / ASD / PFO / a-fib   -C/w home meds and f/u outpatient for further management if conditions remain stable during hospitalization    *DVT ppx   -On Xarelto   
74 y/o F w/ PMH of anemia, diverticulitis, breast cancer, TIA, CAD s/p PCI, anxiety, HTN, ASD / PFO, a-fib (on Xarelto), p/w CP    *CP w/ h/o CAD s/p PCI   -ASA  cancelled cardiac cath due to fever 100.7  -Troponins negative x 2  -Cardio consult  -EKG: NSR 73 bpm   -Tele monitoring  -Echo  D dimer elevated     *Leukocytosis   fever , acute hypoxic respiratory failure from Multifocal pneumonia, suspected gram neg silvana and atypical  CTA  no PE    blood cx   ceftriaxone zithromax  legionella   sputum cx     *H/o Anemia / diverticulitis / breast cancer / TIA / anxiety / ASD / PFO / a-fib   -C/w home meds and f/u outpatient for further management if conditions remain stable during hospitalization    *DVT ppx   -On Xarelto dw with cardio

## 2024-02-29 NOTE — PROGRESS NOTE ADULT - SUBJECTIVE AND OBJECTIVE BOX
72 y/o F w/ PMH of anemia, diverticulitis, breast cancer, TIA, CAD s/p PCI, anxiety, HTN, ASD / PFO, a-fib (on Xarelto), p/w CP. Patient has been heen having intermittent CP for ~ 1 week. However, CP got worse yesterday morning. CP is substernal, pressure in quality and radiates to back / neck and B/L shoulders. Pain is worse with deep breathing. States pain has improved since yesterday, but is still there. Denies diophoresis, SOB, nausea, vomiting, fever, chills, cough, runny nose, sore throat, dysuria, increased frequency, abdominal pain, diarrhea. States she had a stress test March of last year, and was told it looked a bit worse compared to previous one.     ROS All other review of systems negative, except as noted in HPI  2/27 cardiac cath cancelled as pt with fever 100.7 , hypoxia 89% RA cath lab, , now 92% RA     PHYSICAL EXAM:    Daily Height in cm: 160 (27 Feb 2024 13:57)    Daily     ICU Vital Signs Last 24 Hrs  T(C): 38.2 (27 Feb 2024 14:22), Max: 38.2 (27 Feb 2024 14:22)  T(F): 100.7 (27 Feb 2024 14:22), Max: 100.7 (27 Feb 2024 14:22)  HR: 88 (27 Feb 2024 13:59) (81 - 88)  BP: 130/79 (27 Feb 2024 13:59) (125/80 - 138/70)  BP(mean): 88 (27 Feb 2024 06:20) (88 - 92)  ABP: --  ABP(mean): --  RR: 16 (27 Feb 2024 13:59) (16 - 19)  SpO2: 95% (27 Feb 2024 13:59) (91% - 95%)    O2 Parameters below as of 27 Feb 2024 13:59  Patient On (Oxygen Delivery Method): room air            Constitutional:NAD  HEENT: Atraumatic, RAFIA, Normal, No congestion  Respiratoryfine crackle lung abses  Cardiovascular: N S1S2;   Gastrointestinal: Abdomen soft, non tender, Bowel Ssounds present  Extremities: No edema, peripheral pulses present  Neurological: AAO x 3, no gross focal motor deficits  Skin: Non cellulitic, no rash, ulcers                            13.1   14.78 )-----------( 272      ( 27 Feb 2024 06:31 )             39.3       CBC Full  -  ( 27 Feb 2024 06:31 )  WBC Count : 14.78 K/uL  RBC Count : 3.93 M/uL  Hemoglobin : 13.1 g/dL  Hematocrit : 39.3 %  Platelet Count - Automated : 272 K/uL  Mean Cell Volume : 100.0 fl  Mean Cell Hemoglobin : 33.3 pg  Mean Cell Hemoglobin Concentration : 33.3 gm/dL  Auto Neutrophil # : 11.98 K/uL  Auto Lymphocyte # : 1.91 K/uL  Auto Monocyte # : 0.70 K/uL  Auto Eosinophil # : 0.09 K/uL  Auto Basophil # : 0.05 K/uL  Auto Neutrophil % : 81.2 %  Auto Lymphocyte % : 12.9 %  Auto Monocyte % : 4.7 %  Auto Eosinophil % : 0.6 %  Auto Basophil % : 0.3 %      02-27    138  |  101  |  18  ----------------------------<  111<H>  3.0<L>   |  31  |  0.74    Ca    8.8      27 Feb 2024 06:31  Phos  2.5     02-27  Mg     1.2     02-27    TPro  7.2  /  Alb  3.3  /  TBili  0.9  /  DBili  x   /  AST  64<H>  /  ALT  51  /  AlkPhos  64  02-27      LIVER FUNCTIONS - ( 27 Feb 2024 06:31 )  Alb: 3.3 g/dL / Pro: 7.2 gm/dL / ALK PHOS: 64 U/L / ALT: 51 U/L / AST: 64 U/L / GGT: x             PT/INR - ( 27 Feb 2024 06:31 )   PT: 21.0 sec;   INR: 1.89 ratio         PTT - ( 27 Feb 2024 06:31 )  PTT:25.5 sec          Urinalysis Basic - ( 27 Feb 2024 06:31 )    Color: x / Appearance: x / SG: x / pH: x  Gluc: 111 mg/dL / Ketone: x  / Bili: x / Urobili: x   Blood: x / Protein: x / Nitrite: x   Leuk Esterase: x / RBC: x / WBC x   Sq Epi: x / Non Sq Epi: x / Bacteria: x            MEDICATIONS  (STANDING):  amLODIPine   Tablet 2.5 milliGRAM(s) Oral daily  aspirin enteric coated 81 milliGRAM(s) Oral daily  atorvastatin 40 milliGRAM(s) Oral at bedtime  calcium carbonate   1250 mG (OsCal) 1 Tablet(s) Oral two times a day  cholecalciferol 2000 Unit(s) Oral daily  enalapril 10 milliGRAM(s) Oral two times a day  pantoprazole    Tablet 40 milliGRAM(s) Oral before breakfast  potassium chloride   Powder 40 milliEquivalent(s) Oral every 2 hours      
72 y/o F w/ PMH of anemia, diverticulitis, breast cancer, TIA, CAD s/p PCI, anxiety, HTN, ASD / PFO, a-fib (on Xarelto), p/w CP. Patient has been heen having intermittent CP for ~ 1 week. However, CP got worse yesterday morning. CP is substernal, pressure in quality and radiates to back / neck and B/L shoulders. Pain is worse with deep breathing. States pain has improved since yesterday, but is still there. Denies diophoresis, SOB, nausea, vomiting, fever, chills, cough, runny nose, sore throat, dysuria, increased frequency, abdominal pain, diarrhea. States she had a stress test March of last year, and was told it looked a bit worse compared to previous one.     ROS All other review of systems negative, except as noted in HPI  2/27 cardiac cath cancelled as pt with fever 100.7 , hypoxia 89% RA cath lab, , now 92% RA   2/28 on 2 L NC , has cough , fever, comfortable at rest, denies CP at this time   PHYSICAL EXAM:Constitutional:NAD  HEENT: Atraumatic, RAFIA, Normal, No congestion  Respiratoryfine crackle lung abses  Cardiovascular: N S1S2;   Gastrointestinal: Abdomen soft, non tender, Bowel Ssounds present  Extremities: No edema, peripheral pulses present  Neurological: AAO x 3, no gross focal motor deficits  Skin: Non cellulitic, no rash, ulcers        PHYSICAL EXAM:    Daily     Daily     ICU Vital Signs Last 24 Hrs  T(C): 36.7 (28 Feb 2024 15:51), Max: 37.7 (28 Feb 2024 05:54)  T(F): 98.1 (28 Feb 2024 15:51), Max: 99.9 (28 Feb 2024 05:54)  HR: 74 (28 Feb 2024 15:51) (74 - 85)  BP: 109/58 (28 Feb 2024 15:51) (109/58 - 144/75)  BP(mean): 92 (27 Feb 2024 23:10) (92 - 92)  ABP: --  ABP(mean): --  RR: 18 (28 Feb 2024 15:51) (18 - 18)  SpO2: 95% (28 Feb 2024 15:51) (92% - 97%)    O2 Parameters below as of 28 Feb 2024 15:51  Patient On (Oxygen Delivery Method): room air                            10.6   9.42  )-----------( 208      ( 28 Feb 2024 07:05 )             32.3       CBC Full  -  ( 28 Feb 2024 07:05 )  WBC Count : 9.42 K/uL  RBC Count : 3.10 M/uL  Hemoglobin : 10.6 g/dL  Hematocrit : 32.3 %  Platelet Count - Automated : 208 K/uL  Mean Cell Volume : 104.2 fl  Mean Cell Hemoglobin : 34.2 pg  Mean Cell Hemoglobin Concentration : 32.8 gm/dL  Auto Neutrophil # : 7.27 K/uL  Auto Lymphocyte # : 1.09 K/uL  Auto Monocyte # : 0.72 K/uL  Auto Eosinophil # : 0.24 K/uL  Auto Basophil # : 0.05 K/uL  Auto Neutrophil % : 77.3 %  Auto Lymphocyte % : 11.6 %  Auto Monocyte % : 7.6 %  Auto Eosinophil % : 2.5 %  Auto Basophil % : 0.5 %      02-28    142  |  108  |  7   ----------------------------<  102<H>  3.3<L>   |  28  |  0.38<L>    Ca    8.5      28 Feb 2024 07:05  Phos  2.5     02-27  Mg     1.2     02-27    TPro  7.2  /  Alb  3.3  /  TBili  0.9  /  DBili  x   /  AST  64<H>  /  ALT  51  /  AlkPhos  64  02-27      LIVER FUNCTIONS - ( 27 Feb 2024 06:31 )  Alb: 3.3 g/dL / Pro: 7.2 gm/dL / ALK PHOS: 64 U/L / ALT: 51 U/L / AST: 64 U/L / GGT: x             PT/INR - ( 27 Feb 2024 06:31 )   PT: 21.0 sec;   INR: 1.89 ratio         PTT - ( 27 Feb 2024 06:31 )  PTT:25.5 sec          Urinalysis Basic - ( 28 Feb 2024 07:05 )    Color: x / Appearance: x / SG: x / pH: x  Gluc: 102 mg/dL / Ketone: x  / Bili: x / Urobili: x   Blood: x / Protein: x / Nitrite: x   Leuk Esterase: x / RBC: x / WBC x   Sq Epi: x / Non Sq Epi: x / Bacteria: x            MEDICATIONS  (STANDING):  amLODIPine   Tablet 2.5 milliGRAM(s) Oral daily  aspirin enteric coated 81 milliGRAM(s) Oral daily  atorvastatin 40 milliGRAM(s) Oral at bedtime  azithromycin  IVPB 500 milliGRAM(s) IV Intermittent every 24 hours  calcium carbonate   1250 mG (OsCal) 1 Tablet(s) Oral two times a day  cefTRIAXone Injectable. 1000 milliGRAM(s) IV Push every 24 hours  cholecalciferol 2000 Unit(s) Oral daily  enalapril 10 milliGRAM(s) Oral two times a day  nebivolol 10 milliGRAM(s) Oral daily  pantoprazole    Tablet 40 milliGRAM(s) Oral before breakfast  rivaroxaban 20 milliGRAM(s) Oral with dinner  sodium chloride 0.9%. 2000 milliLiter(s) (1000 mL/Hr) IV Continuous <Continuous>  sodium chloride 0.9%. 500 milliLiter(s) (500 mL/Hr) IV Continuous <Continuous>        
74 y/o F w/ PMH of anemia, diverticulitis, breast cancer, TIA, CAD s/p PCI, anxiety, HTN, ASD / PFO, a-fib (on Xarelto), p/w CP. Patient has been heen having intermittent CP for ~ 1 week. However, CP got worse yesterday morning. CP is substernal, pressure in quality and radiates to back / neck and B/L shoulders. Pain is worse with deep breathing. States pain has improved since yesterday, but is still there. Denies diophoresis, SOB, nausea, vomiting, fever, chills, cough, runny nose, sore throat, dysuria, increased frequency, abdominal pain, diarrhea. States she had a stress test March of last year, and was told it looked a bit worse compared to previous one.     ROS All other review of systems negative, except as noted in HPI  2/27 cardiac cath cancelled as pt with fever 100.7 , hypoxia 89% RA cath lab, , now 92% RA   2/28 on 2 L NC , has cough , fever, comfortable at rest, denies CP at this time   2/29 on RA , cough better, no sob , new left knee pain     PHYSICAL EXAM:    Daily     Daily     ICU Vital Signs Last 24 Hrs  T(C): 36.6 (29 Feb 2024 08:29), Max: 36.9 (28 Feb 2024 21:00)  T(F): 97.9 (29 Feb 2024 08:29), Max: 98.4 (28 Feb 2024 21:00)  HR: 78 (29 Feb 2024 08:29) (71 - 82)  BP: 134/88 (29 Feb 2024 08:29) (133/67 - 134/88)  BP(mean): --  ABP: --  ABP(mean): --  RR: 18 (29 Feb 2024 08:29) (18 - 18)  SpO2: 98% (29 Feb 2024 08:29) (98% - 99%)    O2 Parameters below as of 29 Feb 2024 08:29  Patient On (Oxygen Delivery Method): room air  PHYSICAL EXAM:Constitutional:NAD  HEENT: Atraumatic, RAFIA, Normal, No congestion  Respiratoryfine crackle lung abses  Cardiovascular: N S1S2;   Gastrointestinal: Abdomen soft, non tender, Bowel Ssounds present  Extremities: No edema, peripheral pulses present  Neurological: AAO x 3, no gross focal motor deficits  Skin: Non cellulitic, no rash, ulcers                                          11.3   8.32  )-----------( 241      ( 29 Feb 2024 07:28 )             35.0       CBC Full  -  ( 29 Feb 2024 07:28 )  WBC Count : 8.32 K/uL  RBC Count : 3.35 M/uL  Hemoglobin : 11.3 g/dL  Hematocrit : 35.0 %  Platelet Count - Automated : 241 K/uL  Mean Cell Volume : 104.5 fl  Mean Cell Hemoglobin : 33.7 pg  Mean Cell Hemoglobin Concentration : 32.3 gm/dL  Auto Neutrophil # : 6.08 K/uL  Auto Lymphocyte # : 1.21 K/uL  Auto Monocyte # : 0.60 K/uL  Auto Eosinophil # : 0.34 K/uL  Auto Basophil # : 0.05 K/uL  Auto Neutrophil % : 73.1 %  Auto Lymphocyte % : 14.5 %  Auto Monocyte % : 7.2 %  Auto Eosinophil % : 4.1 %  Auto Basophil % : 0.6 %      02-29    141  |  105  |  11  ----------------------------<  106<H>  3.5   |  32<H>  |  0.44<L>    Ca    9.2      29 Feb 2024 07:28  Mg     1.6     02-28                      Urinalysis Basic - ( 29 Feb 2024 07:28 )    Color: x / Appearance: x / SG: x / pH: x  Gluc: 106 mg/dL / Ketone: x  / Bili: x / Urobili: x   Blood: x / Protein: x / Nitrite: x   Leuk Esterase: x / RBC: x / WBC x   Sq Epi: x / Non Sq Epi: x / Bacteria: x            MEDICATIONS  (STANDING):  amLODIPine   Tablet 2.5 milliGRAM(s) Oral daily  aspirin enteric coated 81 milliGRAM(s) Oral daily  atorvastatin 40 milliGRAM(s) Oral at bedtime  azithromycin  IVPB 500 milliGRAM(s) IV Intermittent every 24 hours  calcium carbonate   1250 mG (OsCal) 1 Tablet(s) Oral two times a day  cefTRIAXone Injectable. 1000 milliGRAM(s) IV Push every 24 hours  cholecalciferol 2000 Unit(s) Oral daily  enalapril 10 milliGRAM(s) Oral two times a day  nebivolol 10 milliGRAM(s) Oral daily  pantoprazole    Tablet 40 milliGRAM(s) Oral before breakfast  rivaroxaban 20 milliGRAM(s) Oral with dinner  sodium chloride 0.9%. 2000 milliLiter(s) (1000 mL/Hr) IV Continuous <Continuous>  sodium chloride 0.9%. 500 milliLiter(s) (500 mL/Hr) IV Continuous <Continuous>

## 2024-02-29 NOTE — DISCHARGE NOTE PROVIDER - CARE PROVIDER_API CALL
Bradley Hogan  Cardiovascular Disease  [address], [address]  [place] [address]  Phone: [phone]  Fax: [phone]  Follow Up Time:

## 2024-02-29 NOTE — DISCHARGE NOTE PROVIDER - NSDCFUSCHEDAPPT_GEN_ALL_CORE_FT
[facility]  [facility] [address]  Scheduled Appointment: 03/05/2024

## 2024-02-29 NOTE — DISCHARGE NOTE NURSING/CASE MANAGEMENT/SOCIAL WORK - PATIENT PORTAL LINK FT
You can access the FollowMyHealth Patient Portal offered by [facility] by registering at the following website: [email or web address]. By joining [employer]’s FollowMyHealth portal, you will also be able to view your health information using other applications (apps) compatible with our system.

## 2024-02-29 NOTE — DISCHARGE NOTE PROVIDER - HOSPITAL COURSE
74 y/o F w/ PMH of anemia, diverticulitis, breast cancer, TIA, CAD s/p PCI, anxiety, HTN, ASD / PFO, a-fib (on Xarelto), p/w CP    *CP likely from PNA   resolved w/ h/o CAD s/p PCI   chest pains similar to prior CAD with abnormal EKG (old) but negative troponin level  -ASA  cancelled cardiac cath due to fever 100.7 found to have PNA  -Troponins negative x 2  -Cardio consult appreciated   -EKG: NSR 73 bpm   dc tele   -Echo  D dimer elevated   cardio does not recommend further inpatient testing, Dw Dr leung who will get echo as outpatient     *Leukocytosis   fever , acute hypoxic respiratory failure from Multifocal pneumonia, suspected gram neg silvana and atypical  weaned off NC , now RA  CTA  no PE    blood cx NGTD,,  ceftriaxone zithromax switch to po 3/1  legionella neg   sputum cx      new left knee and popliteal region pain with weight bearing, no recent trauma  Xray left knee   V doppler neg for DVT    *H/o Anemia / diverticulitis / breast cancer / TIA / anxiety / ASD / PFO / a-fib   -C/w home meds and f/u outpatient for further management if conditions remain stable during hospitalization    *DVT ppx   -On Xarelto dw with cardio    plan to discharge home tmrw , if able to ambulate better and  pending Xray left knee read , f/u cardio as outpatient    74 y/o F w/ PMH of anemia, diverticulitis, breast cancer, TIA, CAD s/p PCI, anxiety, HTN, ASD / PFO, a-fib (on Xarelto), p/w CP    *CP likely from PNA   resolved w/ h/o CAD s/p PCI   chest pains similar to prior CAD with abnormal EKG (old) but negative troponin level  -ASA  cancelled cardiac cath due to fever 100.7 found to have PNA  -Troponins negative x 2  -Cardio consult appreciated   -EKG: NSR 73 bpm   dc tele   -Echo  D dimer elevated   cardio does not recommend further inpatient testing, Dw Dr leung who will get echo as outpatient     *Leukocytosis   fever , acute hypoxic respiratory failure from Multifocal pneumonia, suspected gram neg silvana and atypical  weaned off NC , now RA  CTA  no PE    blood cx NGTD,,  ceftriaxone zithromax switch to po 3/1  legionella neg   sputum cx     #new left knee and popliteal region pain with weight bearing, no recent trauma  -now  resolved  -Xray left knee: reviewed: no overt fracture (official read pending)  V doppler neg for DVT    *H/o Anemia / diverticulitis / breast cancer / TIA / anxiety / ASD / PFO / a-fib   -C/w home meds and f/u outpatient for further management if conditions remain stable during hospitalization    *DVT ppx   -On Xarelto dw with cardio    plan to discharge home tmrw , if able to ambulate better and  pending Xray left knee read , f/u cardio as outpatient

## 2024-02-29 NOTE — PROVIDER CONTACT NOTE (OTHER) - SITUATION
Pt c/o new LLE swelling as of today, was previously able to ambulate, now saying she needs to use a walker.

## 2024-03-01 VITALS
OXYGEN SATURATION: 93 % | SYSTOLIC BLOOD PRESSURE: 144 MMHG | TEMPERATURE: 98 F | HEART RATE: 70 BPM | DIASTOLIC BLOOD PRESSURE: 77 MMHG | RESPIRATION RATE: 18 BRPM

## 2024-03-01 PROCEDURE — 99239 HOSP IP/OBS DSCHRG MGMT >30: CPT

## 2024-03-01 RX ADMIN — PANTOPRAZOLE SODIUM 40 MILLIGRAM(S): 20 TABLET, DELAYED RELEASE ORAL at 06:25

## 2024-03-01 RX ADMIN — NEBIVOLOL HYDROCHLORIDE 10 MILLIGRAM(S): 5 TABLET ORAL at 09:48

## 2024-03-01 RX ADMIN — AMLODIPINE BESYLATE 2.5 MILLIGRAM(S): 2.5 TABLET ORAL at 09:47

## 2024-03-01 RX ADMIN — Medication 1 TABLET(S): at 09:46

## 2024-03-01 RX ADMIN — Medication 81 MILLIGRAM(S): at 09:48

## 2024-03-01 RX ADMIN — Medication 10 MILLIGRAM(S): at 09:47

## 2024-03-01 RX ADMIN — Medication 650 MILLIGRAM(S): at 10:20

## 2024-03-01 RX ADMIN — Medication 650 MILLIGRAM(S): at 09:49

## 2024-03-01 RX ADMIN — Medication 2000 UNIT(S): at 09:47

## 2024-03-01 NOTE — PHYSICAL THERAPY INITIAL EVALUATION ADULT - PERTINENT HX OF CURRENT PROBLEM, REHAB EVAL
Pt admitted to  secondary to CP. Pt also developed left knee pain with decrease ROM as well on 2/29/2024. CT angio chest: neg for PE

## 2024-03-01 NOTE — PHYSICAL THERAPY INITIAL EVALUATION ADULT - PLANNED THERAPY INTERVENTIONS, PT EVAL
Stair training. Eval, amb, transfers, bed mobility x 15'. Pt left in bed with all observation section equipment/material intact and bed alarm activated. Pt with no c/o pain. Will cont to follow./bed mobility training/gait training/transfer training

## 2024-03-01 NOTE — PHYSICAL THERAPY INITIAL EVALUATION ADULT - ADDITIONAL COMMENTS
info obtained from eval 11/1/2022 info obtained from eval 11/1/2022. 3/1/2024: info same as perviously documented

## 2024-03-01 NOTE — PHYSICAL THERAPY INITIAL EVALUATION ADULT - GAIT TRAINING, PT EVAL
By D/C: pt will amb without device 300' with independence. By D/C: Pt will ascend/descend 5 steps with HR, step to gait pattern, and independence.

## 2024-03-05 ENCOUNTER — APPOINTMENT (OUTPATIENT)
Dept: DERMATOLOGY | Facility: CLINIC | Age: 74
End: 2024-03-05
Payer: MEDICARE

## 2024-03-05 DIAGNOSIS — D22.9 MELANOCYTIC NEVI, UNSPECIFIED: ICD-10-CM

## 2024-03-05 PROCEDURE — 17111 DESTRUCTION B9 LESIONS 15/>: CPT

## 2024-03-05 PROCEDURE — 99214 OFFICE O/P EST MOD 30 MIN: CPT | Mod: 25

## 2024-03-05 RX ORDER — TRIAMCINOLONE ACETONIDE 1 MG/G
0.1 OINTMENT TOPICAL TWICE DAILY
Qty: 1 | Refills: 3 | Status: ACTIVE | COMMUNITY
Start: 2024-03-05 | End: 1900-01-01

## 2024-03-05 NOTE — ASSESSMENT
[FreeTextEntry1] : #benign findings as above  ISK The specified lesions were treated with liquid nitrogen cryotherapy.  Discussed risks including pain, blistering, crusting, discoloration, recurrence.  discussed unrealistic expecations; discussed unable to remove all lesions even if they itch; will treat sveral at a time; discussed new ones will form   discussed fraxel for face; revisit after LN2  rash right 3rd digit; trial of TC, if no improvement keto or bx. ; SED

## 2024-03-05 NOTE — HISTORY OF PRESENT ILLNESS
[FreeTextEntry1] : skin check [de-identified] : 73 year old. spots on face and back. enlarging. rash 3rd digit on right hand.

## 2024-03-05 NOTE — PHYSICAL EXAM
[FreeTextEntry3] : AAOx3, pleasant, NAD, no visual lymphadenopathy hair, scalp, face, nose, eyelids, ears, lips, oropharynx, neck, chest, abdomen, back, right arm, left arm, nails, and hands examined with all normal findings, pertinent findings include:  multiple benign nevi and lentigines + inflamed, waxy, keratotic papules on back x15, right cheek x1, left axillae x1 erythema right 3rd digit

## 2024-03-07 DIAGNOSIS — R50.9 FEVER, UNSPECIFIED: ICD-10-CM

## 2024-03-07 DIAGNOSIS — Z79.01 LONG TERM (CURRENT) USE OF ANTICOAGULANTS: ICD-10-CM

## 2024-03-07 DIAGNOSIS — Z79.82 LONG TERM (CURRENT) USE OF ASPIRIN: ICD-10-CM

## 2024-03-07 DIAGNOSIS — J18.9 PNEUMONIA, UNSPECIFIED ORGANISM: ICD-10-CM

## 2024-03-07 DIAGNOSIS — Z86.73 PERSONAL HISTORY OF TRANSIENT ISCHEMIC ATTACK (TIA), AND CEREBRAL INFARCTION WITHOUT RESIDUAL DEFICITS: ICD-10-CM

## 2024-03-07 DIAGNOSIS — Z85.3 PERSONAL HISTORY OF MALIGNANT NEOPLASM OF BREAST: ICD-10-CM

## 2024-03-07 DIAGNOSIS — I10 ESSENTIAL (PRIMARY) HYPERTENSION: ICD-10-CM

## 2024-03-07 DIAGNOSIS — Z95.5 PRESENCE OF CORONARY ANGIOPLASTY IMPLANT AND GRAFT: ICD-10-CM

## 2024-03-07 DIAGNOSIS — I25.2 OLD MYOCARDIAL INFARCTION: ICD-10-CM

## 2024-03-07 DIAGNOSIS — I25.10 ATHEROSCLEROTIC HEART DISEASE OF NATIVE CORONARY ARTERY WITHOUT ANGINA PECTORIS: ICD-10-CM

## 2024-03-07 DIAGNOSIS — J96.01 ACUTE RESPIRATORY FAILURE WITH HYPOXIA: ICD-10-CM

## 2024-03-07 DIAGNOSIS — M25.562 PAIN IN LEFT KNEE: ICD-10-CM

## 2024-03-08 ENCOUNTER — TRANSCRIPTION ENCOUNTER (OUTPATIENT)
Age: 74
End: 2024-03-08

## 2024-03-13 ENCOUNTER — TRANSCRIPTION ENCOUNTER (OUTPATIENT)
Age: 74
End: 2024-03-13

## 2024-03-14 ENCOUNTER — TRANSCRIPTION ENCOUNTER (OUTPATIENT)
Age: 74
End: 2024-03-14

## 2024-03-21 ENCOUNTER — TRANSCRIPTION ENCOUNTER (OUTPATIENT)
Age: 74
End: 2024-03-21

## 2024-03-27 ENCOUNTER — TRANSCRIPTION ENCOUNTER (OUTPATIENT)
Age: 74
End: 2024-03-27

## 2024-04-05 NOTE — PATIENT PROFILE ADULT - DO YOU LACK THE NECESSARY SUPPORT TO HELP YOU COPE WITH LIFE CHALLENGES?
RT Inhaler-Nebulizer Bronchodilator Protocol Note    There is a bronchodilator order in the chart from a provider indicating to follow the RT Bronchodilator Protocol and there is an “Initiate RT Inhaler-Nebulizer Bronchodilator Protocol” order as well (see protocol at bottom of note).    CXR Findings:  No results found.    The findings from the last RT Protocol Assessment were as follows:   History Pulmonary Disease: Smoker 15 pack years or more  Respiratory Pattern: Dyspnea on exertion or RR 21-25 bpm  Breath Sounds: Slightly diminished and/or crackles  Cough: Strong, spontaneous, non-productive  Indication for Bronchodilator Therapy: On home bronchodilators, Decreased or absent breath sounds  Bronchodilator Assessment Score: 5    Aerosolized bronchodilator medication orders have been revised according to the RT Inhaler-Nebulizer Bronchodilator Protocol below.    Respiratory Therapist to perform RT Therapy Protocol Assessment initially then follow the protocol.  Repeat RT Therapy Protocol Assessment PRN for score 0-3 or on second treatment, BID, and PRN for scores above 3.    No Indications - adjust the frequency to every 6 hours PRN wheezing or bronchospasm, if no treatments needed after 48 hours then discontinue using Per Protocol order mode.     If indication present, adjust the RT bronchodilator orders based on the Bronchodilator Assessment Score as indicated below.  Use Inhaler orders unless patient has one or more of the following: on home nebulizer, not able to hold breath for 10 seconds, is not alert and oriented, cannot activate and use MDI correctly, or respiratory rate 25 breaths per minute or more, then use the equivalent nebulizer order(s) with same Frequency and PRN reasons based on the score.  If a patient is on this medication at home then do not decrease Frequency below that used at home.    0-3 - enter or revise RT bronchodilator order(s) to equivalent RT Bronchodilator order with Frequency of  no

## 2024-04-08 ENCOUNTER — OUTPATIENT (OUTPATIENT)
Dept: OUTPATIENT SERVICES | Facility: HOSPITAL | Age: 74
LOS: 1 days | Discharge: HOME | End: 2024-04-08

## 2024-04-08 DIAGNOSIS — Z90.11 ACQUIRED ABSENCE OF RIGHT BREAST AND NIPPLE: Chronic | ICD-10-CM

## 2024-04-08 DIAGNOSIS — D50.9 IRON DEFICIENCY ANEMIA, UNSPECIFIED: ICD-10-CM

## 2024-04-08 DIAGNOSIS — Z95.5 PRESENCE OF CORONARY ANGIOPLASTY IMPLANT AND GRAFT: Chronic | ICD-10-CM

## 2024-04-08 DIAGNOSIS — H26.9 UNSPECIFIED CATARACT: Chronic | ICD-10-CM

## 2024-04-15 ENCOUNTER — RESULT REVIEW (OUTPATIENT)
Age: 74
End: 2024-04-15

## 2024-04-15 ENCOUNTER — APPOINTMENT (OUTPATIENT)
Dept: HEMATOLOGY ONCOLOGY | Facility: CLINIC | Age: 74
End: 2024-04-15

## 2024-04-15 LAB
BASOPHILS # BLD AUTO: 0.06 K/UL — SIGNIFICANT CHANGE UP (ref 0–0.2)
BASOPHILS NFR BLD AUTO: 1.2 % — SIGNIFICANT CHANGE UP (ref 0–2)
EOSINOPHIL # BLD AUTO: 0.22 K/UL — SIGNIFICANT CHANGE UP (ref 0–0.5)
EOSINOPHIL NFR BLD AUTO: 4.3 % — SIGNIFICANT CHANGE UP (ref 0–6)
HCT VFR BLD CALC: 34.1 % — LOW (ref 34.5–45)
HGB BLD-MCNC: 11.1 G/DL — LOW (ref 11.5–15.5)
IMM GRANULOCYTES NFR BLD AUTO: 0 % — SIGNIFICANT CHANGE UP (ref 0–0.9)
LYMPHOCYTES # BLD AUTO: 1.39 K/UL — SIGNIFICANT CHANGE UP (ref 1–3.3)
LYMPHOCYTES # BLD AUTO: 27 % — SIGNIFICANT CHANGE UP (ref 13–44)
MCHC RBC-ENTMCNC: 31.8 PG — SIGNIFICANT CHANGE UP (ref 27–34)
MCHC RBC-ENTMCNC: 32.6 GM/DL — SIGNIFICANT CHANGE UP (ref 32–36)
MCV RBC AUTO: 97.7 FL — SIGNIFICANT CHANGE UP (ref 80–100)
MONOCYTES # BLD AUTO: 0.59 K/UL — SIGNIFICANT CHANGE UP (ref 0–0.9)
MONOCYTES NFR BLD AUTO: 11.5 % — SIGNIFICANT CHANGE UP (ref 2–14)
NEUTROPHILS # BLD AUTO: 2.88 K/UL — SIGNIFICANT CHANGE UP (ref 1.8–7.4)
NEUTROPHILS NFR BLD AUTO: 56 % — SIGNIFICANT CHANGE UP (ref 43–77)
NRBC # BLD: 0 /100 WBCS — SIGNIFICANT CHANGE UP (ref 0–0)
PLATELET # BLD AUTO: 334 K/UL — SIGNIFICANT CHANGE UP (ref 150–400)
RBC # BLD: 3.49 M/UL — LOW (ref 3.8–5.2)
RBC # FLD: 13.2 % — SIGNIFICANT CHANGE UP (ref 10.3–14.5)
WBC # BLD: 5.14 K/UL — SIGNIFICANT CHANGE UP (ref 3.8–10.5)
WBC # FLD AUTO: 5.14 K/UL — SIGNIFICANT CHANGE UP (ref 3.8–10.5)

## 2024-04-16 ENCOUNTER — APPOINTMENT (OUTPATIENT)
Dept: DERMATOLOGY | Facility: CLINIC | Age: 74
End: 2024-04-16
Payer: MEDICARE

## 2024-04-16 ENCOUNTER — NON-APPOINTMENT (OUTPATIENT)
Age: 74
End: 2024-04-16

## 2024-04-16 DIAGNOSIS — D22.9 MELANOCYTIC NEVI, UNSPECIFIED: ICD-10-CM

## 2024-04-16 LAB
ERYTHROCYTE [SEDIMENTATION RATE] IN BLOOD: 22 MM/HR — HIGH (ref 0–20)
FERRITIN SERPL-MCNC: 21 NG/ML — SIGNIFICANT CHANGE UP (ref 13–330)
IRON SATN MFR SERPL: 31 UG/DL — SIGNIFICANT CHANGE UP (ref 30–160)
IRON SATN MFR SERPL: 8 % — LOW (ref 14–50)
TIBC SERPL-MCNC: 371 UG/DL — SIGNIFICANT CHANGE UP (ref 220–430)
UIBC SERPL-MCNC: 341 UG/DL — SIGNIFICANT CHANGE UP (ref 110–370)

## 2024-04-16 PROCEDURE — 99214 OFFICE O/P EST MOD 30 MIN: CPT | Mod: 25

## 2024-04-16 PROCEDURE — 17110 DESTRUCTION B9 LES UP TO 14: CPT

## 2024-04-16 NOTE — HISTORY OF PRESENT ILLNESS
[FreeTextEntry1] : skin check [de-identified] : 73 year old. spots on face and back. enlarging. rash 3rd digit on right hand.

## 2024-04-17 ENCOUNTER — APPOINTMENT (OUTPATIENT)
Dept: MRI IMAGING | Facility: CLINIC | Age: 74
End: 2024-04-17

## 2024-04-26 ENCOUNTER — APPOINTMENT (OUTPATIENT)
Dept: INFUSION THERAPY | Facility: CLINIC | Age: 74
End: 2024-04-26

## 2024-04-26 VITALS
BODY MASS INDEX: 31.89 KG/M2 | HEIGHT: 63 IN | SYSTOLIC BLOOD PRESSURE: 148 MMHG | DIASTOLIC BLOOD PRESSURE: 83 MMHG | OXYGEN SATURATION: 94 % | TEMPERATURE: 97.7 F | WEIGHT: 180 LBS | HEART RATE: 80 BPM

## 2024-04-30 ENCOUNTER — APPOINTMENT (OUTPATIENT)
Dept: INFUSION THERAPY | Facility: CLINIC | Age: 74
End: 2024-04-30

## 2024-04-30 VITALS
DIASTOLIC BLOOD PRESSURE: 73 MMHG | HEART RATE: 81 BPM | BODY MASS INDEX: 31.94 KG/M2 | OXYGEN SATURATION: 94 % | TEMPERATURE: 97.6 F | RESPIRATION RATE: 16 BRPM | HEIGHT: 63 IN | WEIGHT: 180.25 LBS | SYSTOLIC BLOOD PRESSURE: 110 MMHG

## 2024-05-01 ENCOUNTER — APPOINTMENT (OUTPATIENT)
Dept: ORTHOPEDIC SURGERY | Facility: CLINIC | Age: 74
End: 2024-05-01
Payer: MEDICARE

## 2024-05-01 VITALS — HEIGHT: 63 IN | BODY MASS INDEX: 31.89 KG/M2 | WEIGHT: 180 LBS

## 2024-05-01 PROCEDURE — 73564 X-RAY EXAM KNEE 4 OR MORE: CPT | Mod: RT

## 2024-05-01 PROCEDURE — 99213 OFFICE O/P EST LOW 20 MIN: CPT

## 2024-05-02 NOTE — HISTORY OF PRESENT ILLNESS
[7] : 7 [Dull/Aching] : dull/aching [Localized] : localized [Tingling] : tingling [Retired] : Work status: retired [] : This patient has had an injection before: no [FreeTextEntry1] : R Knee [FreeTextEntry3] : 11/1/22 [FreeTextEntry5] : 73 Y/O F eval R Knee S/P R TKR on above date pt states recovery has declined since last visit. [de-identified] : PT  [de-identified] : 11/1/22 [de-identified] : LILLY LEROY

## 2024-05-02 NOTE — ASSESSMENT
[FreeTextEntry1] : The patient is 1.5 years from her right total knee replacement done November 1, 2022. She is definitely showing signs of improvement. She denies new trauma. The patient reports instability and uneasiness with walking without a cane. She feels disappointed with her current level of healing. The patient reports tightness and soreness directly beneath her incision. She is upset with her ability to complete ADLs.   Right knee exam: Neurovascularly intact. Sensation intact. Examination of right knee reveals well-healed midline scar. Range of motion 0 to 125 degrees with normal tracking. There is no crepitation or instability. There is no point tenderness. There is no redness rashes or effusion. There is no signs of infection or DVT.  The patient is doing well. She is 1.5 years from a right TKA. The patient will go for full workup including labs, bone, scan, and MRI. She will begin PT as well. She will return in 4 weeks to review studies.

## 2024-05-06 ENCOUNTER — APPOINTMENT (OUTPATIENT)
Dept: INFUSION THERAPY | Facility: CLINIC | Age: 74
End: 2024-05-06
Payer: MEDICARE

## 2024-05-06 ENCOUNTER — APPOINTMENT (OUTPATIENT)
Dept: HEMATOLOGY ONCOLOGY | Facility: CLINIC | Age: 74
End: 2024-05-06
Payer: MEDICARE

## 2024-05-06 VITALS
HEART RATE: 77 BPM | WEIGHT: 181 LBS | OXYGEN SATURATION: 94 % | DIASTOLIC BLOOD PRESSURE: 89 MMHG | BODY MASS INDEX: 32.07 KG/M2 | SYSTOLIC BLOOD PRESSURE: 156 MMHG | HEIGHT: 63 IN | TEMPERATURE: 97.9 F

## 2024-05-06 DIAGNOSIS — Z79.01 LONG TERM (CURRENT) USE OF ANTICOAGULANTS: ICD-10-CM

## 2024-05-06 DIAGNOSIS — K21.9 DIAPHRAGMATIC HERNIA W/OUT OBSTRUCTION OR GANGRENE: ICD-10-CM

## 2024-05-06 DIAGNOSIS — R06.02 SHORTNESS OF BREATH: ICD-10-CM

## 2024-05-06 DIAGNOSIS — D50.9 IRON DEFICIENCY ANEMIA, UNSPECIFIED: ICD-10-CM

## 2024-05-06 DIAGNOSIS — K44.9 DIAPHRAGMATIC HERNIA W/OUT OBSTRUCTION OR GANGRENE: ICD-10-CM

## 2024-05-06 DIAGNOSIS — D50.0 IRON DEFICIENCY ANEMIA SECONDARY TO BLOOD LOSS (CHRONIC): ICD-10-CM

## 2024-05-06 DIAGNOSIS — D50.8 OTHER IRON DEFICIENCY ANEMIAS: ICD-10-CM

## 2024-05-06 PROCEDURE — G2211 COMPLEX E/M VISIT ADD ON: CPT

## 2024-05-06 PROCEDURE — 99214 OFFICE O/P EST MOD 30 MIN: CPT

## 2024-05-06 RX ORDER — ALPRAZOLAM 3 MG/1
TABLET, EXTENDED RELEASE ORAL
Refills: 0 | Status: ACTIVE | COMMUNITY

## 2024-05-06 RX ORDER — ASPIRIN 81 MG
81 TABLET, DELAYED RELEASE (ENTERIC COATED) ORAL
Refills: 0 | Status: ACTIVE | COMMUNITY

## 2024-05-06 RX ORDER — ACETAMINOPHEN 325 MG/1
325 TABLET, FILM COATED ORAL
Refills: 0 | Status: ACTIVE | COMMUNITY

## 2024-05-07 DIAGNOSIS — R06.02 SHORTNESS OF BREATH: ICD-10-CM

## 2024-05-07 DIAGNOSIS — D50.0 IRON DEFICIENCY ANEMIA SECONDARY TO BLOOD LOSS (CHRONIC): ICD-10-CM

## 2024-05-07 DIAGNOSIS — K44.9 DIAPHRAGMATIC HERNIA WITHOUT OBSTRUCTION OR GANGRENE: ICD-10-CM

## 2024-05-07 DIAGNOSIS — Z79.01 LONG TERM (CURRENT) USE OF ANTICOAGULANTS: ICD-10-CM

## 2024-05-07 PROBLEM — D50.8 OTHER IRON DEFICIENCY ANEMIA: Status: RESOLVED | Noted: 2019-11-06 | Resolved: 2024-05-07

## 2024-05-07 PROBLEM — D50.9 IRON DEFICIENCY ANEMIA, UNSPECIFIED IRON DEFICIENCY ANEMIA TYPE: Status: ACTIVE | Noted: 2023-08-28

## 2024-05-07 NOTE — ASSESSMENT
[FreeTextEntry1] : Patient is a 74 y.o. with a recurrent SHARMAINE. 8/2023 - 12/2023 - FERAHEME x 6.   GI eval up to date. Hx of Stan's erosion, but most recent evaluation of 11/10/23 with large hiatal hernia, else ess unremarkable. Had capsule endoscopy 2/12/24 that was unremarkable. No GYN losses.  Saw GYN ~ 1 1/2 years ago (Dr. Adeline Hardy - Uro/GYN).  Patient now needing IV iron again despite being followed closely. In 8 weeks Hgb 14.2 -> 11.1.   Ferritin 206 -> 21.   Despite negative GI evaluation she must be bleeding.  She now does report intermittent black stools. Concerning as she is in Xarelto.  She needs to remain on Xarelto due to hx of neuro and cardiac events.  OK to continue IV iron PRN since prior GI eval showed no malignancy or anything that can be cauterized. However, if iron requirements increase may need to consider R/B of repeating GI eval.    For Feraheme #3/3 today - repeat labs in 1 month. Then will continue to follow closely. Goal is to have Ferritin level >100, TSAT >20%.   Will need labs on a monthly basis.   Dr. Bradley Hogan (PCP) Dr. Lake Landry (GI) Dr. Chrissie Pereira (Breast Surgery) Dr. Danyel Dugan (Ortho) Dr. Hector Corona (Derm) Dr. Hector Montenegro (Pulm) Dr. Adeline Hardy (Uro/GYN).

## 2024-05-07 NOTE — REVIEW OF SYSTEMS
[Joint Pain] : joint pain [Anxiety] : anxiety [Depression] : depression [Joint Stiffness] : no joint stiffness [FreeTextEntry5] : chest pressure after she climbs stairs [FreeTextEntry7] : see interval hx [FreeTextEntry9] : in her right knee [de-identified] : sleep problems

## 2024-05-07 NOTE — HISTORY OF PRESENT ILLNESS
[de-identified] : KALEB HAMMOND is a 74 y.o. F with a PMH significant for HTN, HLD, GERD, and an ER+, HER2-, T1c,N0, stage I low risk BC (Recurrence score 14) s/p right breast lumpectomy and sentinel LN biopsy, next she had re-excision and mastectomy for positive margins/ DCIS, and then AI x 5 years completed 2/2019, who we are following for an iron deficiency anemia.   8/22/23 -   Hgb: 9.1, MCV: 86.2, Ferritin: 8, TSAT: 5%, B12: >2000, Folate: 11.2. Was started on PO iron but was unable to tolerate (stomach upset/ constipation). Very fatigued and depressed. FERAHEME x 3 10/5/23 - Labs 1 month post - Hgb: 12.1, MCV: 94, Ferritin: 317, TSAT: 49%, ESR: 23, B12: 463, Folate: 6.2 11/2/23 - EGD/Colonoscopy (Dr. Landry) - Large hiatal hernia, moderate left sided diverticulosis, internal hemorrhoids. Prior EGD showed paraesophageal hernia with Stan's erosion. 11/10/23 - Hgb: 13.2, MCV: 99.8,   Ferritin: 59, TSAT: 23%, ESR: 43 -> FERAHEME x 1 12/21/23 - Labs 1 month post - Hgb: 11.3, MCV: 101.1, Ferritin: 56, TSAT: 11%, ESR: 45 - FERAHEME x 2.   [de-identified] : 2/15/24 - Hgb: 14.2, MCV: 98.8, Ferritin: 206, TSAT: 33%, ESR: 48 4/15/24 - Hgb: 11.1, MCV: 97.7, Ferritin:  21, TSAT:   8%, ESR: 22   Was advised to come for FERAHEME x 3.  Today is #3/3.  2/12/24 - Capsule Endoscopy - normal.   Patient reports she went to the ER 2/26/24 because she thought she was having a heart attack.  They were ready to give her an angiogram when they noted she had an elevated WBC, so they canceled the angiogram and did a CT scan of the chest instead.  She states that they then diagnosed her with a pneumonia.  She was in the hospital for 3 - 4 days for IV antibiotics.  Has been fatigued since. States [place] with 13 stairs - she is OOB and has chest pressure by the time she gets to the top.  Also bad right knee pain making it hard to ambulate as well.  Takes only Tylenol for this. To restart PT soon.  She does report that she noticed some black stool on occasion, especially if she is constipated.  This may happen ~ 1x a week or so.  Once it happens she may have a few more episodes in the same day, sometimes diarrhea.    Denies any obvious bleeding, bruising.   Denies any other changes in her family, medical, or social history since her last visit of 1/17/24.

## 2024-05-23 ENCOUNTER — RESULT REVIEW (OUTPATIENT)
Age: 74
End: 2024-05-23

## 2024-05-23 ENCOUNTER — APPOINTMENT (OUTPATIENT)
Dept: HEMATOLOGY ONCOLOGY | Facility: CLINIC | Age: 74
End: 2024-05-23

## 2024-05-23 LAB
BASOPHILS # BLD AUTO: 0.03 K/UL — SIGNIFICANT CHANGE UP (ref 0–0.2)
BASOPHILS NFR BLD AUTO: 0.9 % — SIGNIFICANT CHANGE UP (ref 0–2)
EOSINOPHIL # BLD AUTO: 0.12 K/UL — SIGNIFICANT CHANGE UP (ref 0–0.5)
EOSINOPHIL NFR BLD AUTO: 3.5 % — SIGNIFICANT CHANGE UP (ref 0–6)
HCT VFR BLD CALC: 36.9 % — SIGNIFICANT CHANGE UP (ref 34.5–45)
HGB BLD-MCNC: 12.3 G/DL — SIGNIFICANT CHANGE UP (ref 11.5–15.5)
IMM GRANULOCYTES NFR BLD AUTO: 0.3 % — SIGNIFICANT CHANGE UP (ref 0–0.9)
LYMPHOCYTES # BLD AUTO: 1.29 K/UL — SIGNIFICANT CHANGE UP (ref 1–3.3)
LYMPHOCYTES # BLD AUTO: 37.5 % — SIGNIFICANT CHANGE UP (ref 13–44)
MCHC RBC-ENTMCNC: 33.3 GM/DL — SIGNIFICANT CHANGE UP (ref 32–36)
MCHC RBC-ENTMCNC: 33.6 PG — SIGNIFICANT CHANGE UP (ref 27–34)
MCV RBC AUTO: 100.8 FL — HIGH (ref 80–100)
MONOCYTES # BLD AUTO: 0.41 K/UL — SIGNIFICANT CHANGE UP (ref 0–0.9)
MONOCYTES NFR BLD AUTO: 11.9 % — SIGNIFICANT CHANGE UP (ref 2–14)
NEUTROPHILS # BLD AUTO: 1.58 K/UL — LOW (ref 1.8–7.4)
NEUTROPHILS NFR BLD AUTO: 45.9 % — SIGNIFICANT CHANGE UP (ref 43–77)
NRBC # BLD: 0 /100 WBCS — SIGNIFICANT CHANGE UP (ref 0–0)
PLATELET # BLD AUTO: 255 K/UL — SIGNIFICANT CHANGE UP (ref 150–400)
RBC # BLD: 3.66 M/UL — LOW (ref 3.8–5.2)
RBC # FLD: 18.7 % — HIGH (ref 10.3–14.5)
WBC # BLD: 3.44 K/UL — LOW (ref 3.8–10.5)
WBC # FLD AUTO: 3.44 K/UL — LOW (ref 3.8–10.5)

## 2024-05-24 ENCOUNTER — NON-APPOINTMENT (OUTPATIENT)
Age: 74
End: 2024-05-24

## 2024-05-24 LAB
ERYTHROCYTE [SEDIMENTATION RATE] IN BLOOD: 9 MM/HR — SIGNIFICANT CHANGE UP (ref 0–20)
FERRITIN SERPL-MCNC: 533 NG/ML — HIGH (ref 13–330)
IRON SATN MFR SERPL: 113 UG/DL — SIGNIFICANT CHANGE UP (ref 30–160)
IRON SATN MFR SERPL: 42 % — SIGNIFICANT CHANGE UP (ref 14–50)
TIBC SERPL-MCNC: 269 UG/DL — SIGNIFICANT CHANGE UP (ref 220–430)
UIBC SERPL-MCNC: 157 UG/DL — SIGNIFICANT CHANGE UP (ref 110–370)

## 2024-05-28 ENCOUNTER — APPOINTMENT (OUTPATIENT)
Dept: DERMATOLOGY | Facility: CLINIC | Age: 74
End: 2024-05-28
Payer: MEDICARE

## 2024-05-28 DIAGNOSIS — L81.4 OTHER MELANIN HYPERPIGMENTATION: ICD-10-CM

## 2024-05-28 DIAGNOSIS — R21 RASH AND OTHER NONSPECIFIC SKIN ERUPTION: ICD-10-CM

## 2024-05-28 DIAGNOSIS — L82.0 INFLAMED SEBORRHEIC KERATOSIS: ICD-10-CM

## 2024-05-28 PROCEDURE — 17111 DESTRUCTION B9 LESIONS 15/>: CPT

## 2024-05-28 PROCEDURE — 99214 OFFICE O/P EST MOD 30 MIN: CPT | Mod: 25

## 2024-05-28 NOTE — HISTORY OF PRESENT ILLNESS
[FreeTextEntry1] : skin check [de-identified] : 74 year old. spots on face and back. enlarging. rash 3rd digit on right hand.

## 2024-06-17 ENCOUNTER — APPOINTMENT (OUTPATIENT)
Dept: ORTHOPEDIC SURGERY | Facility: CLINIC | Age: 74
End: 2024-06-17
Payer: MEDICARE

## 2024-06-17 VITALS — HEIGHT: 63 IN | WEIGHT: 181 LBS | BODY MASS INDEX: 32.07 KG/M2

## 2024-06-17 DIAGNOSIS — M17.12 UNILATERAL PRIMARY OSTEOARTHRITIS, LEFT KNEE: ICD-10-CM

## 2024-06-17 DIAGNOSIS — M17.11 UNILATERAL PRIMARY OSTEOARTHRITIS, RIGHT KNEE: ICD-10-CM

## 2024-06-17 PROCEDURE — 73564 X-RAY EXAM KNEE 4 OR MORE: CPT | Mod: LT

## 2024-06-17 PROCEDURE — J3490M: CUSTOM | Mod: NC

## 2024-06-17 PROCEDURE — 99213 OFFICE O/P EST LOW 20 MIN: CPT | Mod: 25

## 2024-06-17 PROCEDURE — 20610 DRAIN/INJ JOINT/BURSA W/O US: CPT | Mod: LT

## 2024-06-20 PROBLEM — M17.11 PRIMARY OSTEOARTHRITIS OF RIGHT KNEE: Status: ACTIVE | Noted: 2022-11-14

## 2024-06-20 PROBLEM — M17.12 PRIMARY OSTEOARTHRITIS OF LEFT KNEE: Status: ACTIVE | Noted: 2022-11-14

## 2024-06-20 NOTE — HISTORY OF PRESENT ILLNESS
[Walking] : walking [Retired] : Work status: retired [] : This patient has had an injection before: no [FreeTextEntry5] : 75 y/o F presents for f/u eval of the Rt. knee today. Pt reports of continued pain upon incision area as well as tightness and instability when walking. She notes of recent Lt. knee pain with no associated trauma. Pt 2x weekly.  [de-identified] : PT  [de-identified] : 11/1/22 [de-identified] : LILLY LEROY

## 2024-06-20 NOTE — ASSESSMENT
[FreeTextEntry1] : The patient comes in today for evaluation of bilateral knees.  Her right knee we replaced in November 2022 and unfortunately continues to be somewhat painful.  Is mostly a burning type pain over the incision.  She has a little bit of pain with walking and stairs but not severe.  Her left knee however has gotten progressively worse over the last several months.  She is tried physical therapy which has not really helped.  She has pain walking distances, doing stairs and occasionally at night.  With regards to her right knee we had ordered in the past blood tests an MRI and a bone scan which were never done by the patient.  She did start physical therapy which was somewhat helpful for the right knee.  Examination of the right lower extremity reveals normal neurovascular exam.  Examination of the right knee reveals well-healed midline scar.  It is tender to the touch.  Her range of motion is 0 to 125 degrees with no crepitation or maltracking.  There is no instability to the knee and no joint line pain.  Examination of the left lower extremity reveals a normal neurovascular exam.  Examination of the left knee reveals a slight limitation of range of motion from 5 to 120 degrees with pain at the extremes.  There is pain at the medial joint line with equivocal Raghu's sign.  There is no lateral joint complaints or instability.  There is no redness, rashes or visible scars.  X-rays done in the office today of the left knee 4 views weightbearing show tricompartmental arthritis with near bone-on-bone changes of the medial compartment with slight varus deformity.  There is a benign enchondroma in the tibial shaft.  There are no other obvious tumors, masses or calcifications seen.  Under sterile conditions the left knee was injected with 5 cc of quarter percent plain Marcaine; 5 cc of 2% plain lidocaine and 80 mg of Depo-Medrol.  Patient tolerated the procedure well.  Plan at this time is continue physical therapy.  I reordered her bone scan MRI and blood work to assess the right knee.  Will decide on a course of action following the studies.

## 2024-07-02 ENCOUNTER — APPOINTMENT (OUTPATIENT)
Dept: MRI IMAGING | Facility: CLINIC | Age: 74
End: 2024-07-02

## 2024-07-09 ENCOUNTER — APPOINTMENT (OUTPATIENT)
Dept: DERMATOLOGY | Facility: CLINIC | Age: 74
End: 2024-07-09
Payer: MEDICARE

## 2024-07-09 ENCOUNTER — APPOINTMENT (OUTPATIENT)
Dept: RADIOLOGY | Facility: CLINIC | Age: 74
End: 2024-07-09
Payer: MEDICARE

## 2024-07-09 ENCOUNTER — OUTPATIENT (OUTPATIENT)
Dept: OUTPATIENT SERVICES | Facility: HOSPITAL | Age: 74
LOS: 1 days | End: 2024-07-09
Payer: MEDICARE

## 2024-07-09 DIAGNOSIS — D22.9 MELANOCYTIC NEVI, UNSPECIFIED: ICD-10-CM

## 2024-07-09 DIAGNOSIS — Z95.5 PRESENCE OF CORONARY ANGIOPLASTY IMPLANT AND GRAFT: Chronic | ICD-10-CM

## 2024-07-09 DIAGNOSIS — L82.0 INFLAMED SEBORRHEIC KERATOSIS: ICD-10-CM

## 2024-07-09 DIAGNOSIS — R21 RASH AND OTHER NONSPECIFIC SKIN ERUPTION: ICD-10-CM

## 2024-07-09 DIAGNOSIS — H26.9 UNSPECIFIED CATARACT: Chronic | ICD-10-CM

## 2024-07-09 DIAGNOSIS — Q78.2 OSTEOPETROSIS: ICD-10-CM

## 2024-07-09 DIAGNOSIS — L81.4 OTHER MELANIN HYPERPIGMENTATION: ICD-10-CM

## 2024-07-09 PROCEDURE — 17111 DESTRUCTION B9 LESIONS 15/>: CPT

## 2024-07-09 PROCEDURE — 77080 DXA BONE DENSITY AXIAL: CPT

## 2024-07-09 PROCEDURE — 77080 DXA BONE DENSITY AXIAL: CPT | Mod: 26

## 2024-07-09 PROCEDURE — 99214 OFFICE O/P EST MOD 30 MIN: CPT | Mod: 25

## 2024-07-18 ENCOUNTER — APPOINTMENT (OUTPATIENT)
Dept: MRI IMAGING | Facility: CLINIC | Age: 74
End: 2024-07-18

## 2024-07-23 ENCOUNTER — APPOINTMENT (OUTPATIENT)
Dept: MRI IMAGING | Facility: CLINIC | Age: 74
End: 2024-07-23

## 2024-07-23 PROCEDURE — 77049 MRI BREAST C-+ W/CAD BI: CPT | Mod: 26

## 2024-07-26 ENCOUNTER — NON-APPOINTMENT (OUTPATIENT)
Age: 74
End: 2024-07-26

## 2024-07-31 ENCOUNTER — OUTPATIENT (OUTPATIENT)
Dept: OUTPATIENT SERVICES | Facility: HOSPITAL | Age: 74
LOS: 1 days | Discharge: HOME | End: 2024-07-31

## 2024-07-31 DIAGNOSIS — Z95.5 PRESENCE OF CORONARY ANGIOPLASTY IMPLANT AND GRAFT: Chronic | ICD-10-CM

## 2024-07-31 DIAGNOSIS — Z90.11 ACQUIRED ABSENCE OF RIGHT BREAST AND NIPPLE: Chronic | ICD-10-CM

## 2024-07-31 DIAGNOSIS — D50.9 IRON DEFICIENCY ANEMIA, UNSPECIFIED: ICD-10-CM

## 2024-07-31 DIAGNOSIS — H26.9 UNSPECIFIED CATARACT: Chronic | ICD-10-CM

## 2024-08-01 ENCOUNTER — RESULT REVIEW (OUTPATIENT)
Age: 74
End: 2024-08-01

## 2024-08-01 ENCOUNTER — APPOINTMENT (OUTPATIENT)
Dept: HEMATOLOGY ONCOLOGY | Facility: CLINIC | Age: 74
End: 2024-08-01

## 2024-08-01 LAB
BASOPHILS # BLD AUTO: 0.06 K/UL — SIGNIFICANT CHANGE UP (ref 0–0.2)
BASOPHILS NFR BLD AUTO: 1.1 % — SIGNIFICANT CHANGE UP (ref 0–2)
EOSINOPHIL # BLD AUTO: 0.35 K/UL — SIGNIFICANT CHANGE UP (ref 0–0.5)
EOSINOPHIL NFR BLD AUTO: 6.3 % — HIGH (ref 0–6)
HCT VFR BLD CALC: 33.5 % — LOW (ref 34.5–45)
HGB BLD-MCNC: 11.2 G/DL — LOW (ref 11.5–15.5)
IMM GRANULOCYTES NFR BLD AUTO: 0.4 % — SIGNIFICANT CHANGE UP (ref 0–0.9)
LYMPHOCYTES # BLD AUTO: 1.54 K/UL — SIGNIFICANT CHANGE UP (ref 1–3.3)
LYMPHOCYTES # BLD AUTO: 27.6 % — SIGNIFICANT CHANGE UP (ref 13–44)
MCHC RBC-ENTMCNC: 33 PG — SIGNIFICANT CHANGE UP (ref 27–34)
MCHC RBC-ENTMCNC: 33.4 GM/DL — SIGNIFICANT CHANGE UP (ref 32–36)
MCV RBC AUTO: 98.8 FL — SIGNIFICANT CHANGE UP (ref 80–100)
MONOCYTES # BLD AUTO: 0.5 K/UL — SIGNIFICANT CHANGE UP (ref 0–0.9)
MONOCYTES NFR BLD AUTO: 9 % — SIGNIFICANT CHANGE UP (ref 2–14)
NEUTROPHILS # BLD AUTO: 3.11 K/UL — SIGNIFICANT CHANGE UP (ref 1.8–7.4)
NEUTROPHILS NFR BLD AUTO: 55.6 % — SIGNIFICANT CHANGE UP (ref 43–77)
NRBC # BLD: 0 /100 WBCS — SIGNIFICANT CHANGE UP (ref 0–0)
PLATELET # BLD AUTO: 350 K/UL — SIGNIFICANT CHANGE UP (ref 150–400)
RBC # BLD: 3.39 M/UL — LOW (ref 3.8–5.2)
RBC # FLD: 12.4 % — SIGNIFICANT CHANGE UP (ref 10.3–14.5)
WBC # BLD: 5.58 K/UL — SIGNIFICANT CHANGE UP (ref 3.8–10.5)
WBC # FLD AUTO: 5.58 K/UL — SIGNIFICANT CHANGE UP (ref 3.8–10.5)

## 2024-08-02 ENCOUNTER — NON-APPOINTMENT (OUTPATIENT)
Age: 74
End: 2024-08-02

## 2024-08-02 LAB
ERYTHROCYTE [SEDIMENTATION RATE] IN BLOOD: 27 MM/HR — HIGH (ref 0–20)
FERRITIN SERPL-MCNC: 24 NG/ML — SIGNIFICANT CHANGE UP (ref 13–330)
IRON SATN MFR SERPL: 29 UG/DL — LOW (ref 30–160)
IRON SATN MFR SERPL: 8 % — LOW (ref 14–50)
TIBC SERPL-MCNC: 382 UG/DL — SIGNIFICANT CHANGE UP (ref 220–430)
UIBC SERPL-MCNC: 353 UG/DL — SIGNIFICANT CHANGE UP (ref 110–370)

## 2024-08-14 ENCOUNTER — APPOINTMENT (OUTPATIENT)
Dept: HEMATOLOGY ONCOLOGY | Facility: CLINIC | Age: 74
End: 2024-08-14
Payer: MEDICARE

## 2024-08-14 ENCOUNTER — APPOINTMENT (OUTPATIENT)
Dept: INFUSION THERAPY | Facility: CLINIC | Age: 74
End: 2024-08-14
Payer: MEDICARE

## 2024-08-14 VITALS
TEMPERATURE: 97.8 F | DIASTOLIC BLOOD PRESSURE: 70 MMHG | HEART RATE: 92 BPM | HEIGHT: 63 IN | SYSTOLIC BLOOD PRESSURE: 106 MMHG | WEIGHT: 172 LBS | OXYGEN SATURATION: 94 % | BODY MASS INDEX: 30.48 KG/M2

## 2024-08-14 DIAGNOSIS — R06.02 SHORTNESS OF BREATH: ICD-10-CM

## 2024-08-14 DIAGNOSIS — K92.1 MELENA: ICD-10-CM

## 2024-08-14 DIAGNOSIS — Z79.01 LONG TERM (CURRENT) USE OF ANTICOAGULANTS: ICD-10-CM

## 2024-08-14 DIAGNOSIS — D50.0 IRON DEFICIENCY ANEMIA SECONDARY TO BLOOD LOSS (CHRONIC): ICD-10-CM

## 2024-08-14 DIAGNOSIS — R91.8 OTHER NONSPECIFIC ABNORMAL FINDING OF LUNG FIELD: ICD-10-CM

## 2024-08-14 DIAGNOSIS — Z87.898 PERSONAL HISTORY OF OTHER SPECIFIED CONDITIONS: ICD-10-CM

## 2024-08-14 DIAGNOSIS — R26.89 OTHER ABNORMALITIES OF GAIT AND MOBILITY: ICD-10-CM

## 2024-08-14 DIAGNOSIS — D50.9 IRON DEFICIENCY ANEMIA, UNSPECIFIED: ICD-10-CM

## 2024-08-14 PROCEDURE — G2211 COMPLEX E/M VISIT ADD ON: CPT

## 2024-08-14 PROCEDURE — 99214 OFFICE O/P EST MOD 30 MIN: CPT

## 2024-08-14 NOTE — REVIEW OF SYSTEMS
[Patient Intake Form Reviewed] : Patient intake form was reviewed [Fatigue] : fatigue [SOB on Exertion] : shortness of breath during exertion [Joint Pain] : joint pain [Anxiety] : anxiety [Depression] : depression [Joint Stiffness] : no joint stiffness [Negative] : Gastrointestinal [FreeTextEntry5] : chest pressure after she climbs stairs [FreeTextEntry6] : cough at night [FreeTextEntry7] : IBS with Const alt with Diarrhea. GERD symptoms.  Intermittent black stools.  [FreeTextEntry9] : in her right knee [de-identified] : sleep problems

## 2024-08-14 NOTE — PHYSICAL EXAM
[Ambulatory and capable of all self care but unable to carry out any work activities] : Status 2- Ambulatory and capable of all self care but unable to carry out any work activities. Up and about more than 50% of waking hours [Normal] : affect appropriate [de-identified] : anicteric [de-identified] : no rashes or excessive bruising

## 2024-08-14 NOTE — REVIEW OF SYSTEMS
[Patient Intake Form Reviewed] : Patient intake form was reviewed [Fatigue] : fatigue [SOB on Exertion] : shortness of breath during exertion [Joint Pain] : joint pain [Anxiety] : anxiety [Depression] : depression [Joint Stiffness] : no joint stiffness [Negative] : Gastrointestinal [FreeTextEntry5] : chest pressure after she climbs stairs [FreeTextEntry6] : cough at night [FreeTextEntry7] : IBS with Const alt with Diarrhea. GERD symptoms.  Intermittent black stools.  [FreeTextEntry9] : in her right knee [de-identified] : sleep problems

## 2024-08-14 NOTE — HISTORY OF PRESENT ILLNESS
[de-identified] : KALEB HAMMOND is a 74 y.o. F with a PMH significant for HTN, HLD, GERD, and an ER+, HER2-, T1c,N0, stage I low risk BC (Recurrence score 14) s/p right breast lumpectomy and sentinel LN biopsy, next she had re-excision and mastectomy for positive margins/ DCIS, and then AI x 5 years completed 2/2019, who we are following for an iron deficiency anemia.   8/22/23 -   Hgb: 9.1, MCV: 86.2, Ferritin: 8, TSAT: 5%, B12: >2000, Folate: 11.2. Was started on PO iron but was unable to tolerate (stomach upset/ constipation). Very fatigued and depressed. FERAHEME x 3 10/5/23 - Labs 1 month post - Hgb: 12.1, MCV: 94, Ferritin: 317, TSAT: 49%, ESR: 23, B12: 463, Folate: 6.2 11/2/23 - EGD/Colonoscopy (Dr. Landry) - Large hiatal hernia, moderate left sided diverticulosis, internal hemorrhoids. Prior EGD showed paraesophageal hernia with Stan's erosion. 11/10/23 - Hgb: 13.2, MCV: 99.8,   Ferritin: 59, TSAT: 23%, ESR: 43 -> FERAHEME x 1 12/21/23 - Labs 1 month post - Hgb: 11.3, MCV: 101.1, Ferritin: 56, TSAT: 11%, ESR: 45 - FERAHEME x 2.    2/12/24 - Capsule Endoscopy - normal. 2/15/24 - Hgb: 14.2, MCV: 98.8, Ferritin: 206, TSAT: 33%, ESR: 48  2/26/24 - Went to ER because she thought she was having a heart attack. They were ready to give her an angiogram when they noted she had an elevated WBC, so they canceled the angiogram and did a CT scan of the chest instead -> diagnosed her with a pneumonia. She was in the hospital for 3 - 4 days for IV antibiotics. 4/15/24 - Hgb: 11.1, MCV: 97.7, Ferritin:  21, TSAT:   8%, ESR: 22 -> Feraheme x 3 [de-identified] : 4/2024 - Feraheme x 3, last 5/6/24 5/21/24 - Patient called with c/o increased fatigue and SOB on exertion. Was scheduled for labs 1 month after last Feraheme infusion but wanted to come in sooner.  5/23/24 - WBC: 3.44, ANC: 1.58, Hgb: 12.3, MCV: 101, Ferritin:  533, TSAT: 42%, ESR: 9 (only 2 1/2 weeks after IV iron).  Since WBC was low was told to RTO 3 - 4 weeks to repeat labs. When d/w patient she admitted to RN that she had intermittent dark tarry stools last week which seems to have resolved. Was advised to f/u with GI.  There was then confusion re: making the f/u appointment. When patient called to make appointment, she thought it was for 3 - 4 months, not 3 - 4 weeks.  Had an appointment for end of August - asked to move up sooner as she was symptomatic. 8/01/24 - Hgb: 11.2, MCV: 98.8, Ferritin:  24, TSAT:   8%, ESR: 27 -> Instructed to come for Feraheme x 2 Today #1/2.   Patient [place] in a colonial house with 13 stairs - when she is symptomatic, she gets very OOB and has chest pressure by the time she gets to the top.  Patient also states she is extremely fatigued - doesn't even have energy to get OOB in the morning.  She continues to report that she has black stools on occasion.   She mentions that she has had some scant brown vaginal spotting for last 2 days.  Last time this happened was ~ a year ago. She saw GYN at that time and he told her it was due to aging and was nothing to worry about.  Denies any obvious bleeding, bruising.   Still has right knee pain making it hard to ambulate as well.  Started PT- doesn't feel helping much, still stiff.  Patient reports that her  was diagnosed with a melanoma on his right cheek.  Needed Moh's with a skin graft.  Skin graft has been too tight and is pulling down on his lower eyelid.  She has been worried about him and seeing multiple doctors re: his condition. She feels this stress is probably exacerbating her blood loss.  Denies any other changes in her family, medical, or social history since her last visit of 5/6/24.

## 2024-08-14 NOTE — PHYSICAL EXAM
[Ambulatory and capable of all self care but unable to carry out any work activities] : Status 2- Ambulatory and capable of all self care but unable to carry out any work activities. Up and about more than 50% of waking hours [Normal] : affect appropriate [de-identified] : anicteric [de-identified] : no rashes or excessive bruising

## 2024-08-14 NOTE — HISTORY OF PRESENT ILLNESS
[de-identified] : KALEB HAMMOND is a 74 y.o. F with a PMH significant for HTN, HLD, GERD, and an ER+, HER2-, T1c,N0, stage I low risk BC (Recurrence score 14) s/p right breast lumpectomy and sentinel LN biopsy, next she had re-excision and mastectomy for positive margins/ DCIS, and then AI x 5 years completed 2/2019, who we are following for an iron deficiency anemia.   8/22/23 -   Hgb: 9.1, MCV: 86.2, Ferritin: 8, TSAT: 5%, B12: >2000, Folate: 11.2. Was started on PO iron but was unable to tolerate (stomach upset/ constipation). Very fatigued and depressed. FERAHEME x 3 10/5/23 - Labs 1 month post - Hgb: 12.1, MCV: 94, Ferritin: 317, TSAT: 49%, ESR: 23, B12: 463, Folate: 6.2 11/2/23 - EGD/Colonoscopy (Dr. Landry) - Large hiatal hernia, moderate left sided diverticulosis, internal hemorrhoids. Prior EGD showed paraesophageal hernia with Stan's erosion. 11/10/23 - Hgb: 13.2, MCV: 99.8,   Ferritin: 59, TSAT: 23%, ESR: 43 -> FERAHEME x 1 12/21/23 - Labs 1 month post - Hgb: 11.3, MCV: 101.1, Ferritin: 56, TSAT: 11%, ESR: 45 - FERAHEME x 2.    2/12/24 - Capsule Endoscopy - normal. 2/15/24 - Hgb: 14.2, MCV: 98.8, Ferritin: 206, TSAT: 33%, ESR: 48  2/26/24 - Went to ER because she thought she was having a heart attack. They were ready to give her an angiogram when they noted she had an elevated WBC, so they canceled the angiogram and did a CT scan of the chest instead -> diagnosed her with a pneumonia. She was in the hospital for 3 - 4 days for IV antibiotics. 4/15/24 - Hgb: 11.1, MCV: 97.7, Ferritin:  21, TSAT:   8%, ESR: 22 -> Feraheme x 3 [de-identified] : 4/2024 - Feraheme x 3, last 5/6/24 5/21/24 - Patient called with c/o increased fatigue and SOB on exertion. Was scheduled for labs 1 month after last Feraheme infusion but wanted to come in sooner.  5/23/24 - WBC: 3.44, ANC: 1.58, Hgb: 12.3, MCV: 101, Ferritin:  533, TSAT: 42%, ESR: 9 (only 2 1/2 weeks after IV iron).  Since WBC was low was told to RTO 3 - 4 weeks to repeat labs. When d/w patient she admitted to RN that she had intermittent dark tarry stools last week which seems to have resolved. Was advised to f/u with GI.  There was then confusion re: making the f/u appointment. When patient called to make appointment, she thought it was for 3 - 4 months, not 3 - 4 weeks.  Had an appointment for end of August - asked to move up sooner as she was symptomatic. 8/01/24 - Hgb: 11.2, MCV: 98.8, Ferritin:  24, TSAT:   8%, ESR: 27 -> Instructed to come for Feraheme x 2 Today #1/2.   Patient [place] in a colonial house with 13 stairs - when she is symptomatic, she gets very OOB and has chest pressure by the time she gets to the top.  Patient also states she is extremely fatigued - doesn't even have energy to get OOB in the morning.  She continues to report that she has black stools on occasion.   She mentions that she has had some scant brown vaginal spotting for last 2 days.  Last time this happened was ~ a year ago. She saw GYN at that time and he told her it was due to aging and was nothing to worry about.  Denies any obvious bleeding, bruising.   Still has right knee pain making it hard to ambulate as well.  Started PT- doesn't feel helping much, still stiff.  Patient reports that her  was diagnosed with a melanoma on his right cheek.  Needed Moh's with a skin graft.  Skin graft has been too tight and is pulling down on his lower eyelid.  She has been worried about him and seeing multiple doctors re: his condition. She feels this stress is probably exacerbating her blood loss.  Denies any other changes in her family, medical, or social history since her last visit of 5/6/24.

## 2024-08-14 NOTE — ASSESSMENT
[FreeTextEntry1] : Patient is a 74 y.o. with a recurrent SHARMAINE. She has been needing IV iron every 3 - 4 months. Etiology felt to be due to GI bleeding exacerbated by Xarelto use.  She reports that she must remain on Xarelto due to hx of neuro and cardiac events.  She has had extensive GI evaluation: 11/2/23 - EGD/Colonoscopy (Dr. Landry) - Large hiatal hernia, moderate left sided diverticulosis, internal hemorrhoids. Prior EGD showed paraoesophageal hernia with Stan's erosion. 2/12/24 - Capsule Endoscopy - normal. Patient does report intermittent black stools.  Suspect GI eval negative as she probably stops Xarelto for the tests.  No GYN losses.  Saw GYN ~ 1 1/2 years ago (Dr. Adeline Hardy - Uro/GYN).  8/2023 - 12/2023 - FERAHEME x 6.   2024: Jan -  Feraheme x 2 April - Feraheme x 3 Now of concern Ferritin 2 1/2 weeks after last IV iron 533; Ferritin 8/1/24 24.  Patient must be bleeding for Ferritin to drop by so much.  For Feraheme #1/2 today - repeat labs in 1 month. Then will continue to follow closely. Goal is to have Ferritin level >100, TSAT >20%.   Since cannot stop AC will need labs on a monthly basis.   Dr. Bradley Hogan (PCP) Dr. Lake Landry (GI) Dr. Chrissie Pereira (Breast Surgery) Dr. Danyel Dugan (Ortho) Dr. Hector Corona (Derm) Dr. Hector Montenegro (Pulm) Dr. Adeline Hardy (Uro/GYN).

## 2024-08-15 DIAGNOSIS — D50.0 IRON DEFICIENCY ANEMIA SECONDARY TO BLOOD LOSS (CHRONIC): ICD-10-CM

## 2024-08-15 DIAGNOSIS — Z79.01 LONG TERM (CURRENT) USE OF ANTICOAGULANTS: ICD-10-CM

## 2024-08-20 ENCOUNTER — APPOINTMENT (OUTPATIENT)
Dept: INFUSION THERAPY | Facility: CLINIC | Age: 74
End: 2024-08-20

## 2024-08-20 ENCOUNTER — APPOINTMENT (OUTPATIENT)
Dept: DERMATOLOGY | Facility: CLINIC | Age: 74
End: 2024-08-20
Payer: MEDICARE

## 2024-08-20 VITALS
BODY MASS INDEX: 29.95 KG/M2 | TEMPERATURE: 97.6 F | DIASTOLIC BLOOD PRESSURE: 62 MMHG | HEIGHT: 63 IN | HEART RATE: 80 BPM | OXYGEN SATURATION: 95 % | SYSTOLIC BLOOD PRESSURE: 90 MMHG | WEIGHT: 169 LBS

## 2024-08-20 DIAGNOSIS — L81.4 OTHER MELANIN HYPERPIGMENTATION: ICD-10-CM

## 2024-08-20 DIAGNOSIS — L82.0 INFLAMED SEBORRHEIC KERATOSIS: ICD-10-CM

## 2024-08-20 DIAGNOSIS — R21 RASH AND OTHER NONSPECIFIC SKIN ERUPTION: ICD-10-CM

## 2024-08-20 DIAGNOSIS — D22.9 MELANOCYTIC NEVI, UNSPECIFIED: ICD-10-CM

## 2024-08-20 PROCEDURE — 99214 OFFICE O/P EST MOD 30 MIN: CPT | Mod: 25

## 2024-08-20 PROCEDURE — 17111 DESTRUCTION B9 LESIONS 15/>: CPT

## 2024-08-30 ENCOUNTER — NON-APPOINTMENT (OUTPATIENT)
Age: 74
End: 2024-08-30

## 2024-09-11 ENCOUNTER — APPOINTMENT (OUTPATIENT)
Dept: PULMONOLOGY | Facility: CLINIC | Age: 74
End: 2024-09-11
Payer: MEDICARE

## 2024-09-11 VITALS
TEMPERATURE: 97.8 F | HEART RATE: 88 BPM | BODY MASS INDEX: 30.65 KG/M2 | WEIGHT: 173 LBS | SYSTOLIC BLOOD PRESSURE: 130 MMHG | HEIGHT: 63 IN | DIASTOLIC BLOOD PRESSURE: 80 MMHG | OXYGEN SATURATION: 93 %

## 2024-09-11 DIAGNOSIS — R49.0 DYSPHONIA: ICD-10-CM

## 2024-09-11 DIAGNOSIS — R06.02 SHORTNESS OF BREATH: ICD-10-CM

## 2024-09-11 DIAGNOSIS — R91.8 OTHER NONSPECIFIC ABNORMAL FINDING OF LUNG FIELD: ICD-10-CM

## 2024-09-11 PROCEDURE — 99204 OFFICE O/P NEW MOD 45 MIN: CPT

## 2024-09-12 NOTE — CONSULT LETTER
[Dear  ___] : Dear  [unfilled], [Consult Letter:] : I had the pleasure of evaluating your patient, [unfilled]. [Please see my note below.] : Please see my note below. [Consult Closing:] : Thank you very much for allowing me to participate in the care of this patient.  If you have any questions, please do not hesitate to contact me. [Sincerely,] : Sincerely, [FreeTextEntry3] : Delgado Mitchell MD MPH DipABLM  FACP FCCP [facility]  Faculty pulmonary critical care medicine , [address] [address] t Telephone [phone]

## 2024-09-12 NOTE — REVIEW OF SYSTEMS
[Fever] : no fever [Fatigue] : fatigue [Recent Wt Gain (___ Lbs)] : ~T no recent weight gain [Chills] : no chills [Poor Appetite] : no poor appetite [Recent Wt Loss (___ Lbs)] : ~T no recent weight loss [Dry Eyes] : no dry eyes [Epistaxis] : no epistaxis [Sore Throat] : no sore throat [Eye Irritation] : no eye irritation [Nasal Congestion] : nasal congestion [Postnasal Drip] : postnasal drip [Dry Mouth] : no dry mouth [Sinus Problems] : sinus problems [Mouth Ulcers] : no mouth ulcers [Poor Dentition] : no poor dentition [Edentulous] : no edentulous [Cough] : cough [Hemoptysis] : no hemoptysis [Chest Tightness] : no chest tightness [Frequent URIs] : no frequent URIs [Sputum] : no sputum [Dyspnea] : no dyspnea [Pleuritic Pain] : no pleuritic pain [A.M. Dry Mouth] : no a.m. dry mouth [SOB on Exertion] : sob on exertion [Chest Discomfort] : no chest discomfort [Claudication] : no claudication [Edema] : no edema [Orthopnea] : no orthopnea [Palpitations] : no palpitations [Phlebitis] : no phlebitis [Syncope] : no syncope [Hay Fever] : no hay fever [Watery Eyes] : no watery eyes [Itchy Eyes] : no itchy eyes [Seasonal Allergies] : seasonal allergies [Nasal Discharge] : no nasal discharge [Hives] : no hives [Angioedema] : no angioedema [Immunocompromised] : not immunocompromised [GERD] : gerd [Abdominal Pain] : no abdominal pain [Nausea] : no nausea [Vomiting] : no vomiting [Diarrhea] : no diarrhea [Constipation] : no constipation [Bleeding] : no bleeding [Food Intolerance] : no food intolerance [Hepatic Disease] : no hepatic disease [Nocturia] : no nocturia [Irregular Menses] : no irregular menses [Arthralgias] : arthralgias [Myalgias] : no myalgias [Back Pain] : back pain [Fracture] : no fracture [Trauma/ Injury] : no trauma/ injury [Chronic Pain] : no chronic pain [Raynaud] : no raynaud [Anemia] : anemia [Blood Transfusion] : blood transfusion [Easy Bruising] : easy bruising [Clotting Disorder/ Frequent bleeding] : clotting disorder/ frequent bleeding [History of Iron Deficiency] : history of iron deficiency [Headache] : no headache [Focal Weakness] : no focal weakness [Seizures] : no seizures [Head Injury] : no head injury [Dizziness] : no dizziness [Numbness] : no numbness [Memory Loss] : no memory loss [Involuntary Movements] : no involuntary movements [Paralysis] : no paralysis [Tremor] : no tremor [Depression] : no depression [Panic Attacks] : no panic attacks [Diabetes] : no diabetes [Obesity] : obesity

## 2024-09-12 NOTE — CONSULT LETTER
[Dear  ___] : Dear  [unfilled], [Consult Letter:] : I had the pleasure of evaluating your patient, [unfilled]. [Please see my note below.] : Please see my note below. [Consult Closing:] : Thank you very much for allowing me to participate in the care of this patient.  If you have any questions, please do not hesitate to contact me. [Sincerely,] : Sincerely, [FreeTextEntry3] : Delgado Mitchell MD MPH DipABLM  FACP FCCP [facility]  Faculty pulmonary critical care medicine , [address] [address] t Telephone [phone]

## 2024-09-12 NOTE — PHYSICAL EXAM
[No Acute Distress] : no acute distress [Well Nourished] : well nourished [Well Developed] : well developed [Normal Oropharynx] : normal oropharynx [Erythema] : erythema [II] : Mallampati Class: II [Normal Appearance] : normal appearance [No Neck Mass] : no neck mass [Normal Rate/Rhythm] : normal rate/rhythm [Normal S1, S2] : normal s1, s2 [No Resp Distress] : no resp distress [Clear to Auscultation Bilaterally] : clear to auscultation bilaterally [No Abnormalities] : no abnormalities [Normal Gait] : normal gait [No Clubbing] : no clubbing [No Cyanosis] : no cyanosis [No Edema] : no edema [FROM] : FROM [Normal Color/ Pigmentation] : normal color/ pigmentation [No Focal Deficits] : no focal deficits [Oriented x3] : oriented x3 [Normal Affect] : normal affect

## 2024-09-12 NOTE — PROCEDURE
[FreeTextEntry1] : Previous pulmonary function test in 2020 shows mild reduction in DLCO normal spirometry and normal lung volumes

## 2024-09-12 NOTE — HISTORY OF PRESENT ILLNESS
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 She reports exertional dyspnea for more than 1 year. She has coronary artery disease status post 2 stents TIAs and history of knee replacement. She gets iron infusions for anemia. No prior pulm disease like asthma COPD or interstitial lung disease. A previous pulmonary function test was reviewed. She had a CT scan of the chest in 2020 that was unremarkable this was at [facility]. Recent CT scan of the chest showed bilateral nodularities in perihilar distribution. She had pneumonia at that time and was treated with antibiotics. Since that time she continues to have difficulty breathing exertional dyspnea tiredness and fatigue no recent fever chills or night sweats she does not use inhaled medications. Patient is on anticoagulation PPI and  antihypertension medications.

## 2024-09-12 NOTE — HISTORY OF PRESENT ILLNESS
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 She reports exertional dyspnea for more than 1 year. She has coronary artery disease status post 2 stents TIAs and history of knee replacement. She gets iron infusions for anemia. No prior pulm disease like asthma COPD or interstitial lung disease. A previous pulmonary function test was reviewed. She had a CT scan of the chest in 2020 that was unremarkable this was at [facility]. Recent CT scan of the chest showed bilateral nodularities in perihilar distribution. She had pneumonia at that time and was treated with antibiotics. Since that time she continues to have difficulty breathing exertional dyspnea tiredness and fatigue no recent fever chills or night sweats she does not use inhaled medications. Patient is on anticoagulation PPI and  antihypertension medications.

## 2024-09-12 NOTE — ASSESSMENT
[FreeTextEntry1] : 74 years old female prior history of treated breast cancer currently cancer free history of iron deficiency anemia, hyperlipidemia, stented coronary artery disease previous history of TIA came to establish care. History of pneumonia requiring hospitalization in February 2024. CT scan at that time showed bilateral perihilar nodularity/infiltrate. Since that time patient continues to have exertional dyspnea tiredness and fatigue. She takes iron infusion for chronic anemia. Her previous pulmonary function test show moderate reduction in DLCO no prior pulmonary disease like asthma COPD interstitial lung disease.  Problems. Exertional dyspnea. Previous history of pneumonia/bilateral pulmonary infiltrate seen on a CT scan of the chest. Coronary artery disease, normal spirometry lung volumes but moderate reduction in DLCO cannot rule out pulmonary hypertension. Chronic anemia iron deficiency and and requires iron infusions. Shortness of breath likely from nonpulmonary dyspnea  Recommendations. Consider cardiac or pulmonary rehab. Patient will discuss with cardiology for cardiac rehab. Follow-up CT scan of the chest. Follow-up pulmonary function testing. Continue incentive spirometry or deep breathing exercise and exercise as tolerated.

## 2024-09-13 ENCOUNTER — APPOINTMENT (OUTPATIENT)
Dept: INTERNAL MEDICINE | Facility: CLINIC | Age: 74
End: 2024-09-13

## 2024-09-16 ENCOUNTER — RESULT REVIEW (OUTPATIENT)
Age: 74
End: 2024-09-16

## 2024-09-16 ENCOUNTER — APPOINTMENT (OUTPATIENT)
Dept: HEMATOLOGY ONCOLOGY | Facility: CLINIC | Age: 74
End: 2024-09-16

## 2024-09-16 LAB
BASOPHILS # BLD AUTO: 0.08 K/UL — SIGNIFICANT CHANGE UP (ref 0–0.2)
BASOPHILS NFR BLD AUTO: 2.1 % — HIGH (ref 0–2)
EOSINOPHIL # BLD AUTO: 0.3 K/UL — SIGNIFICANT CHANGE UP (ref 0–0.5)
EOSINOPHIL NFR BLD AUTO: 7.8 % — HIGH (ref 0–6)
HCT VFR BLD CALC: 39.2 % — SIGNIFICANT CHANGE UP (ref 34.5–45)
HGB BLD-MCNC: 13 G/DL — SIGNIFICANT CHANGE UP (ref 11.5–15.5)
IMM GRANULOCYTES NFR BLD AUTO: 0.3 % — SIGNIFICANT CHANGE UP (ref 0–0.9)
LYMPHOCYTES # BLD AUTO: 1.3 K/UL — SIGNIFICANT CHANGE UP (ref 1–3.3)
LYMPHOCYTES # BLD AUTO: 33.9 % — SIGNIFICANT CHANGE UP (ref 13–44)
MCHC RBC-ENTMCNC: 33 PG — SIGNIFICANT CHANGE UP (ref 27–34)
MCHC RBC-ENTMCNC: 33.2 GM/DL — SIGNIFICANT CHANGE UP (ref 32–36)
MCV RBC AUTO: 99.5 FL — SIGNIFICANT CHANGE UP (ref 80–100)
MONOCYTES # BLD AUTO: 0.4 K/UL — SIGNIFICANT CHANGE UP (ref 0–0.9)
MONOCYTES NFR BLD AUTO: 10.4 % — SIGNIFICANT CHANGE UP (ref 2–14)
NEUTROPHILS # BLD AUTO: 1.74 K/UL — LOW (ref 1.8–7.4)
NEUTROPHILS NFR BLD AUTO: 45.5 % — SIGNIFICANT CHANGE UP (ref 43–77)
NRBC # BLD: 0 /100 WBCS — SIGNIFICANT CHANGE UP (ref 0–0)
PLATELET # BLD AUTO: 319 K/UL — SIGNIFICANT CHANGE UP (ref 150–400)
RBC # BLD: 3.94 M/UL — SIGNIFICANT CHANGE UP (ref 3.8–5.2)
RBC # FLD: 15.3 % — HIGH (ref 10.3–14.5)
WBC # BLD: 3.83 K/UL — SIGNIFICANT CHANGE UP (ref 3.8–10.5)
WBC # FLD AUTO: 3.83 K/UL — SIGNIFICANT CHANGE UP (ref 3.8–10.5)

## 2024-09-17 LAB — ERYTHROCYTE [SEDIMENTATION RATE] IN BLOOD BY WESTERGREN METHOD: 37 MM/HR

## 2024-09-18 ENCOUNTER — NON-APPOINTMENT (OUTPATIENT)
Age: 74
End: 2024-09-18

## 2024-09-18 LAB
FERRITIN SERPL-MCNC: 125 NG/ML
IRON SATN MFR SERPL: 25 %
IRON SERPL-MCNC: 75 UG/DL
TIBC SERPL-MCNC: 307 UG/DL
UIBC SERPL-MCNC: 232 UG/DL

## 2024-09-27 NOTE — H&P ADULT - NSHPREVIEWOFSYSTEMS_GEN_ALL_CORE
REVIEW OF SYSTEMS:    CONSTITUTIONAL: No fever, weight loss, chills, shakes, or fatigue  EYES: No eye pain, visual disturbances, or discharge  ENMT:  No difficulty hearing, tinnitus, vertigo; No sinus or throat pain  NECK: No pain or stiffness  RESPIRATORY: + SOB   CARDIOVASCULAR: No chest pain, dyspnea, palpitations, dizziness, syncope, paroxysmal nocturnal dyspnea, orthopnea, or arm or leg swelling  GASTROINTESTINAL: No abdominal  or epigastric pain, nausea, vomiting, hematemesis, diarrhea, constipation, melena or bright red blood.  GENITOURINARY: No dysuria, nocturia, hematuria, or urinary incontinence  NEUROLOGICAL: No headaches, memory loss, slurred speech, limb weakness, loss of strength, numbness, or tremors  SKIN: No itching, burning, rashes, or lesions   MUSCULOSKELETAL: No joint pain or swelling, muscle, back, or extremity pain  PSYCHIATRIC: + anxiety

## 2024-09-27 NOTE — H&P ADULT - HISTORY OF PRESENT ILLNESS
72 y/o F w/ PMHx of anemia, diverticulitis, breast cancer, TIA, anxiety, HTN, ASD / PFO, a-fib (on Xarelto, last dose:      ), CAD, s/p stents to LAD, RPDA in 2009, RPLA in 2010, presented to cardiology with follow up. Pt reports having fatigue, palpitation and SOB on exertion. Stress test done in 3/2024 showed no evidence of infarct or ischemia. EF 60-65% , mild to moderate TR on recent Echo.   Pt referred to [facility] with possible PCI in given multiple PCI history and ongoing symptoms, although negative stress test.  75 y/o F w/ PMHx of smoking x 15 yrs , anemia, diverticulitis, breast cancer, TIA, anxiety, HTN, ASD / PFO, a-fib (on Xarelto, last dose: 9/27/24 am   ), CAD, s/p stents to LAD, RPDA in 2009, RPLA in 2010, presented to cardiology with c/o ongoing SOB with mild evaluation for 1 year.  Stress test done in 3/2024 showed no evidence of infarct or ischemia. EF 60-65% , mild to moderate TR on recent Echo. Pt referred to interventional cardiology for a cardiac cath for evaluation.

## 2024-09-27 NOTE — H&P ADULT - PROBLEM SELECTOR PLAN 1
Plan for [facility] with possible PCI     - TONI protocol pre hydration: NS 250cc IV bolus x1   - Procedure, its risks, alternatives, benefits and potential complications were discussed in detail. Risks include but not limited to bleeding, infection, allergy, renal failure requiring dialysis, stroke, vascular injury, pericardial tamponade, arrhythmias, MI and even death. Pt is agreeable and has consented for cardiac catheterization with possible stent placement and possible sedation and/ or analgesia. a/w SOB with mild exertion  [facility]   - TONI protocol pre hydration: NS 250cc IV bolus x1     -Consent obtained by the interventional cardiologist for cardiac catheterization w/ coronary angiogram, possible sedation and/or analgesia and possible stent placement. Pt is competent, has capacity, and understands risks and benefits of procedure. Risks and benefits discussed. Risk discussed included, but not limited to MI, stroke, mortality, major bleeding, arrythmia, or infection. All questions answered

## 2024-09-27 NOTE — H&P ADULT - NSHPPHYSICALEXAM_GEN_ALL_CORE
PHYSICAL EXAM  GENERAL: NAD, obese   HEAD:  Atraumatic, Normocephalic  EYES: EOMI, PERRLA, conjunctiva and sclera clear  NECK: Supple, No JVD, No LAD  CHEST/LUNG: Clear to auscultation bilaterally; No wheeze  HEART: + murmur left/right sternal border   ABDOMEN: Soft, Nontender, Nondistended; Bowel sounds present X 4 quadrants   EXTREMITIES:  2+ Peripheral Pulses, No clubbing, cyanosis, or edema  SKIN: No rashes or lesions,  b/l LE not red, cool to touch,  no open skin no drainage  NEURO: nonfocal CN/motor/sensory/reflexes  Psych: extremely anxious

## 2024-09-27 NOTE — H&P ADULT - NSHPLABSRESULTS_GEN_ALL_CORE
< from: Cardiac Catheterization (09.22.21 @ 09:13) >     Impression     Diagnostic Conclusions     Non-Obstructive CAD.   Patent stents in LAD and RCA     Recommendations     Manage with medical therapy.   Aggressive medical management of coronary artery disease and its   underlying risk factors.    Estimated Blood Loss:5ml.     Signatures  ----------------------------------------------------------------   Electronically signed by Dania Kuo MD(Performing Physician)   on 09/22/2021 09:20   ----------------------------------------------------------------      Cardiac cath < from: Cardiac Cath Lab - Adult (11.12.18 @ 07:59) >  Impression  Diagnostic Conclusions  Normal LV systolic function with mild LVH. Estimated LV ejection fraction   is 55-60%.   Patent LAD and RCA and RPDA stents without obstructive disease seen.     Recommendations   Aggressive medical management of coronary artery disease and its   underlying risk factors.  < end of copied text >    < from: Cardiac Cath Lab (04.30.10 @ 16:44) >  Summary:  Summary: Patent LAD stent. Distal (small vessel) LAD disease. Severe ostial  RPDA/RPL disease. Mild anterolateral wall motion abnormality.  Recommendations:  Aspirin and Plavix for one year.  The LAD is too small for stenting (distal vessel).  Beta blocker recommended.  < end of copied text >    Echo (4/5/20224): EF 60-65%, mild to moderate TR     Stress test (3/22/24): EF 75% at stress, 52% at rest, no evidence of MI or ischemia EK24  SR with non specific ST abnormality     < from: Cardiac Catheterization (21 @ 09:13) >     Impression     Diagnostic Conclusions     Non-Obstructive CAD.   Patent stents in LAD and RCA     Recommendations     Manage with medical therapy.   Aggressive medical management of coronary artery disease and its   underlying risk factors.    Estimated Blood Loss:5ml.     Signatures  ----------------------------------------------------------------   Electronically signed by Dania Kuo MD(Performing Physician)   on 2021 09:20   ----------------------------------------------------------------      Cardiac cath < from: Cardiac Cath Lab - Adult (18 @ 07:59) >  Impression  Diagnostic Conclusions  Normal LV systolic function with mild LVH. Estimated LV ejection fraction   is 55-60%.   Patent LAD and RCA and RPDA stents without obstructive disease seen.     Recommendations   Aggressive medical management of coronary artery disease and its   underlying risk factors.  < end of copied text >    < from: Cardiac Cath Lab (04.30.10 @ 16:44) >  Summary:  Summary: Patent LAD stent. Distal (small vessel) LAD disease. Severe ostial  RPDA/RPL disease. Mild anterolateral wall motion abnormality.  Recommendations:  Aspirin and Plavix for one year.  The LAD is too small for stenting (distal vessel).  Beta blocker recommended.  < end of copied text >    Echo (): EF 60-65%, mild to moderate TR     Stress test (3/22/24): EF 75% at stress, 52% at rest, no evidence of MI or ischemia

## 2024-09-27 NOTE — H&P ADULT - ASSESSMENT
74 y/o F w/ PMHx of anemia, diverticulitis, breast cancer, TIA, anxiety, HTN, ASD / PFO, a-fib (on Xarelto, last dose:      ), CAD, s/p stents to LAD, RPDA in 2009, RPLA in 2010, presented to cardiology with follow up. Pt reports having fatigue, palpitation and SOB on exertion. Stress test done in 3/2024 showed no evidence of infarct or ischemia. EF 60-65% , mild to moderate TR on recent Echo.   Pt referred to [facility] with possible PCI in given multiple PCI history and ongoing symptoms, although negative stress test.     ASA class:  Cr:  GFR;  Bleeding risk:  Lv score:  73 y/o F w/ PMHx of smoking x 15yrs,  anemia, diverticulitis, breast cancer, TIA, anxiety, HTN, ASD / PFO, a-fib (on Xarelto, last dose: 9/27/24 am   ), CAD, s/p stents to LAD, RPDA in 2009, RPLA in 2010, presented to cardiology with c/o ongoing SOB with mild evaluation for 1 year.  Stress test done in 3/2024 showed no evidence of infarct or ischemia. EF 60-65% , mild to moderate TR on recent Echo. Pt referred to interventional cardiology for a cardiac cath for evaluation.          ASA class: II  Cr: 0.57  GFR; 94  Bleeding risk: 2.9%  Lv score:  5pts

## 2024-09-30 ENCOUNTER — TRANSCRIPTION ENCOUNTER (OUTPATIENT)
Age: 74
End: 2024-09-30

## 2024-09-30 ENCOUNTER — OUTPATIENT (OUTPATIENT)
Dept: OUTPATIENT SERVICES | Facility: HOSPITAL | Age: 74
LOS: 1 days | Discharge: HOME | End: 2024-09-30
Payer: MEDICARE

## 2024-09-30 VITALS
WEIGHT: 169.98 LBS | DIASTOLIC BLOOD PRESSURE: 79 MMHG | OXYGEN SATURATION: 94 % | HEIGHT: 62 IN | TEMPERATURE: 98 F | RESPIRATION RATE: 18 BRPM | SYSTOLIC BLOOD PRESSURE: 112 MMHG | HEART RATE: 81 BPM

## 2024-09-30 VITALS
RESPIRATION RATE: 16 BRPM | HEART RATE: 65 BPM | OXYGEN SATURATION: 95 % | DIASTOLIC BLOOD PRESSURE: 86 MMHG | SYSTOLIC BLOOD PRESSURE: 140 MMHG

## 2024-09-30 DIAGNOSIS — I25.10 ATHEROSCLEROTIC HEART DISEASE OF NATIVE CORONARY ARTERY WITHOUT ANGINA PECTORIS: ICD-10-CM

## 2024-09-30 DIAGNOSIS — Z95.5 PRESENCE OF CORONARY ANGIOPLASTY IMPLANT AND GRAFT: Chronic | ICD-10-CM

## 2024-09-30 DIAGNOSIS — Z90.11 ACQUIRED ABSENCE OF RIGHT BREAST AND NIPPLE: Chronic | ICD-10-CM

## 2024-09-30 DIAGNOSIS — H26.9 UNSPECIFIED CATARACT: Chronic | ICD-10-CM

## 2024-09-30 PROCEDURE — 93458 L HRT ARTERY/VENTRICLE ANGIO: CPT | Mod: 26

## 2024-09-30 PROCEDURE — 93458 L HRT ARTERY/VENTRICLE ANGIO: CPT

## 2024-09-30 PROCEDURE — 99152 MOD SED SAME PHYS/QHP 5/>YRS: CPT

## 2024-09-30 PROCEDURE — C1887: CPT

## 2024-09-30 PROCEDURE — 93005 ELECTROCARDIOGRAM TRACING: CPT | Mod: XU

## 2024-09-30 PROCEDURE — C1769: CPT

## 2024-09-30 PROCEDURE — 93010 ELECTROCARDIOGRAM REPORT: CPT

## 2024-09-30 PROCEDURE — C1894: CPT

## 2024-09-30 RX ORDER — SODIUM CHLORIDE 0.9 % (FLUSH) 0.9 %
1000 SYRINGE (ML) INJECTION
Refills: 0 | Status: DISCONTINUED | OUTPATIENT
Start: 2024-09-30 | End: 2024-09-30

## 2024-09-30 RX ORDER — DICYCLOMINE HCL 20 MG
2 TABLET ORAL
Refills: 0 | DISCHARGE

## 2024-09-30 RX ORDER — SODIUM CHLORIDE 0.9 % (FLUSH) 0.9 %
250 SYRINGE (ML) INJECTION ONCE
Refills: 0 | Status: COMPLETED | OUTPATIENT
Start: 2024-09-30 | End: 2024-09-30

## 2024-09-30 RX ADMIN — Medication 250 MILLILITER(S): at 08:02

## 2024-09-30 RX ADMIN — Medication 154 MILLILITER(S): at 09:21

## 2024-09-30 NOTE — ASU DISCHARGE PLAN (ADULT/PEDIATRIC) - CARE PROVIDER_API CALL
Bradley Hogan  Cardiovascular Disease  [address], [address]  [place] [address]  Phone: [phone]  Fax: [phone]  Follow Up Time:

## 2024-09-30 NOTE — ASU PREOP CHECKLIST - BP NONINVASIVE DIASTOLIC (MM HG)
77 2y4m old female history of Eczema, no PSHx, UTD on vaccinations presents with difficulty breathing over the past day. Per father, at bedside, patient has experienced rhinorrhea for four days. Two days ago, she developed cough and increased difficulty breathing. Yesterday, father took the patient to urgent care who sent the patient home, she did not receive a breathing treatment at urgent care. Father administered one nebulizer treatment from her brother's nebulizer machine. This am, patient was continuing to experience difficulty breathing prompting the parents to bring the patient to the ED. Parents last gave rectal Tylenol yesterday PM for subjective fever. Parents deny n/v/d, abd pain, new unusual rash. Patient has NKDA. 2y4m old female history of Eczema, no PSHx, UTD on vaccinations presents with difficulty breathing over the past day. Per father, at bedside, patient has experienced rhinorrhea for four days. Two days ago, she developed cough and increased difficulty breathing. Yesterday, father took the patient to urgent care who sent the patient home, she did not receive a breathing treatment at urgent care. Father administered one nebulizer treatment from her brother's nebulizer machine. This am, patient was continuing to experience difficulty breathing prompting the parents to bring the patient to the ED. Parents last gave rectal Tylenol yesterday PM for subjective fever. Parents deny n/v/d, abd pain, new unusual rash. Patient has NKDA.    PMH/PSH: SEE ABOVE  FH/SH: non-contributory, except as noted in the HPI  Allergies: No known drug allergies  Immunizations: Up-to-date  Medications: No chronic home medications

## 2024-09-30 NOTE — PROGRESS NOTE ADULT - SUBJECTIVE AND OBJECTIVE BOX
Nurse Practitioner Progress note:     HPI:  75 y/o F w/ PMHx of smoking x 15 yrs , anemia, diverticulitis, breast cancer, TIA, anxiety, HTN, ASD / PFO, a-fib (on Xarelto, last dose: 9/27/24 am   ), CAD, s/p stents to LAD, RPDA in 2009, RPLA in 2010, presented to cardiology with c/o ongoing SOB with mild evaluation for 1 year.  Stress test done in 3/2024 showed no evidence of infarct or ischemia. EF 60-65% , mild to moderate TR on recent Echo. Pt referred to interventional cardiology for a cardiac cath for evaluation. (27 Sep 2024 16:19)    S/P LHC patent stents, non obstructive CAD     T(C): 36.8 (09-30-24 @ 07:50), Max: 36.8 (09-30-24 @ 07:50)  HR: 58 (09-30-24 @ 09:15) (58 - 81)  BP: 136/78 (09-30-24 @ 09:15) (112/79 - 153/76)  RR: 16 (09-30-24 @ 09:15) (16 - 18)  SpO2: 95% (09-30-24 @ 09:15) (94% - 95%)  Wt(kg): --        PHYSICAL EXAM:  NEURO: Non-focal, AxOx3.  No neuro deficits   NECK: Supple, No JVD, No LAD  CHEST/LUNG: Clear to auscultation bilaterally; No wheeze  HEART: + murmur r and l sternal boarder   ABDOMEN: Soft, Nontender, Nondistended; Bowel sounds present X 4 quadrants   EXTREMITIES:  2+ Peripheral Pulses, No clubbing, cyanosis, or edema   VASCULAR: Peripheral pulses palpable 2+ bilaterally  PROCEDURE SITE: right band removed one hour post placement ,Site is without hematoma or bleeding. Sensation and CATHY intact. Distal pulses palpable 2+, capillary refill < 2 seconds. Patient denies pain, numbness, tingling, CP or SOB. Clean dry dressing applied     PROCEDURE RESULTS: Full report to follow     ASSESSMENT: HPI:  75 y/o F w/ PMHx of smoking x 15 yrs , anemia, diverticulitis, breast cancer, TIA, anxiety, HTN, ASD / PFO, a-fib (on Xarelto, last dose: 9/27/24 am   ), CAD, s/p stents to LAD, RPDA in 2009, RPLA in 2010, presented to cardiology with c/o ongoing SOB with mild evaluation for 1 year.  Stress test done in 3/2024 showed no evidence of infarct or ischemia. EF 60-65% , mild to moderate TR on recent Echo. Pt referred to interventional cardiology for a cardiac cath for evaluation. (27 Sep 2024 16:19)    S/P LHC patent stent , non obstructive disease     PLAN:  -VS, diet, activity as per post cath orders  -TONI post hydration protocol  -Continue current medications  -Resume Xarelto at your next scheduled dose  -Plan of care discussed with patient,  and Dr Arvizu  -Post cath instructions reviewed, patient verbalizes and understands instructions  -Consider microvascular disease if symptoms persist   - Patient to be discharged home, recommend following up with cardiologist in 2 weeks

## 2024-09-30 NOTE — ASU PREOP CHECKLIST - SPO2 (%)
"Subjective     Patient ID: Nita [id number]

## 2024-10-01 ENCOUNTER — APPOINTMENT (OUTPATIENT)
Dept: DERMATOLOGY | Facility: CLINIC | Age: 74
End: 2024-10-01
Payer: MEDICARE

## 2024-10-01 DIAGNOSIS — L82.0 INFLAMED SEBORRHEIC KERATOSIS: ICD-10-CM

## 2024-10-01 DIAGNOSIS — D22.9 MELANOCYTIC NEVI, UNSPECIFIED: ICD-10-CM

## 2024-10-01 DIAGNOSIS — R21 RASH AND OTHER NONSPECIFIC SKIN ERUPTION: ICD-10-CM

## 2024-10-01 DIAGNOSIS — L81.4 OTHER MELANIN HYPERPIGMENTATION: ICD-10-CM

## 2024-10-01 PROCEDURE — 99214 OFFICE O/P EST MOD 30 MIN: CPT | Mod: 25

## 2024-10-01 PROCEDURE — 17110 DESTRUCTION B9 LES UP TO 14: CPT

## 2024-10-01 NOTE — POST DISCHARGE NOTE - DETAILS:
left voicemail Post procedure phone call completed; patient understood all discharge paperwork. No questions regarding medications or pain management. MD follow up appointment made. Patient was able to rest when they were discharged. Patient will recommend [facility], no complaints of hospital stay, satisfied with care. Instructed patient to contact provider with any further questions or concerns.

## 2024-10-01 NOTE — PHYSICAL EXAM
[FreeTextEntry3] : AAOx3, pleasant, NAD, no visual lymphadenopathy hair, scalp, face, nose, eyelids, ears, lips, oropharynx, neck, chest, abdomen, back, right arm, left arm, nails, and hands examined with all normal findings, pertinent findings include:  multiple benign nevi and lentigines + inflamed, waxy, keratotic papules on face x10, right breast x7 erythema right 3rd digit

## 2024-10-01 NOTE — HISTORY OF PRESENT ILLNESS
[FreeTextEntry1] : skin check [de-identified] : 74 year old. spots on face and back. enlarging. rash 3rd digit on right hand.

## 2024-10-02 DIAGNOSIS — I25.10 ATHEROSCLEROTIC HEART DISEASE OF NATIVE CORONARY ARTERY WITHOUT ANGINA PECTORIS: ICD-10-CM

## 2024-10-02 DIAGNOSIS — Z95.5 PRESENCE OF CORONARY ANGIOPLASTY IMPLANT AND GRAFT: ICD-10-CM

## 2024-10-13 ENCOUNTER — OUTPATIENT (OUTPATIENT)
Dept: OUTPATIENT SERVICES | Facility: HOSPITAL | Age: 74
LOS: 1 days | Discharge: HOME | End: 2024-10-13

## 2024-10-13 DIAGNOSIS — D50.9 IRON DEFICIENCY ANEMIA, UNSPECIFIED: ICD-10-CM

## 2024-10-13 DIAGNOSIS — Z79.01 LONG TERM (CURRENT) USE OF ANTICOAGULANTS: ICD-10-CM

## 2024-10-13 DIAGNOSIS — Z90.11 ACQUIRED ABSENCE OF RIGHT BREAST AND NIPPLE: Chronic | ICD-10-CM

## 2024-10-13 DIAGNOSIS — Z95.5 PRESENCE OF CORONARY ANGIOPLASTY IMPLANT AND GRAFT: Chronic | ICD-10-CM

## 2024-10-14 ENCOUNTER — APPOINTMENT (OUTPATIENT)
Dept: HEMATOLOGY ONCOLOGY | Facility: CLINIC | Age: 74
End: 2024-10-14

## 2024-10-16 ENCOUNTER — APPOINTMENT (OUTPATIENT)
Dept: INTERNAL MEDICINE | Facility: CLINIC | Age: 74
End: 2024-10-16

## 2024-10-16 ENCOUNTER — APPOINTMENT (OUTPATIENT)
Dept: PULMONOLOGY | Facility: CLINIC | Age: 74
End: 2024-10-16

## 2024-10-28 ENCOUNTER — APPOINTMENT (OUTPATIENT)
Dept: HEMATOLOGY ONCOLOGY | Facility: CLINIC | Age: 74
End: 2024-10-28

## 2024-10-28 ENCOUNTER — RESULT REVIEW (OUTPATIENT)
Age: 74
End: 2024-10-28

## 2024-10-28 LAB
BASOPHILS # BLD AUTO: 0.06 K/UL — SIGNIFICANT CHANGE UP (ref 0–0.2)
BASOPHILS NFR BLD AUTO: 1.2 % — SIGNIFICANT CHANGE UP (ref 0–2)
EOSINOPHIL # BLD AUTO: 0.22 K/UL — SIGNIFICANT CHANGE UP (ref 0–0.5)
EOSINOPHIL NFR BLD AUTO: 4.3 % — SIGNIFICANT CHANGE UP (ref 0–6)
HCT VFR BLD CALC: 25.9 % — LOW (ref 34.5–45)
HGB BLD-MCNC: 8.3 G/DL — LOW (ref 11.5–15.5)
IMM GRANULOCYTES NFR BLD AUTO: 0.4 % — SIGNIFICANT CHANGE UP (ref 0–0.9)
LYMPHOCYTES # BLD AUTO: 1.32 K/UL — SIGNIFICANT CHANGE UP (ref 1–3.3)
LYMPHOCYTES # BLD AUTO: 25.9 % — SIGNIFICANT CHANGE UP (ref 13–44)
MCHC RBC-ENTMCNC: 30.9 PG — SIGNIFICANT CHANGE UP (ref 27–34)
MCHC RBC-ENTMCNC: 32 GM/DL — SIGNIFICANT CHANGE UP (ref 32–36)
MCV RBC AUTO: 96.3 FL — SIGNIFICANT CHANGE UP (ref 80–100)
MONOCYTES # BLD AUTO: 0.45 K/UL — SIGNIFICANT CHANGE UP (ref 0–0.9)
MONOCYTES NFR BLD AUTO: 8.8 % — SIGNIFICANT CHANGE UP (ref 2–14)
NEUTROPHILS # BLD AUTO: 3.02 K/UL — SIGNIFICANT CHANGE UP (ref 1.8–7.4)
NEUTROPHILS NFR BLD AUTO: 59.4 % — SIGNIFICANT CHANGE UP (ref 43–77)
NRBC # BLD: 0 /100 WBCS — SIGNIFICANT CHANGE UP (ref 0–0)
PLATELET # BLD AUTO: 439 K/UL — HIGH (ref 150–400)
RBC # BLD: 2.69 M/UL — LOW (ref 3.8–5.2)
RBC # FLD: 14.2 % — SIGNIFICANT CHANGE UP (ref 10.3–14.5)
WBC # BLD: 5.09 K/UL — SIGNIFICANT CHANGE UP (ref 3.8–10.5)
WBC # FLD AUTO: 5.09 K/UL — SIGNIFICANT CHANGE UP (ref 3.8–10.5)

## 2024-10-30 ENCOUNTER — NON-APPOINTMENT (OUTPATIENT)
Age: 74
End: 2024-10-30

## 2024-10-31 ENCOUNTER — OUTPATIENT (OUTPATIENT)
Dept: OUTPATIENT SERVICES | Facility: HOSPITAL | Age: 74
LOS: 1 days | Discharge: HOME | End: 2024-10-31
Payer: MEDICARE

## 2024-10-31 ENCOUNTER — APPOINTMENT (OUTPATIENT)
Dept: INTERNAL MEDICINE | Facility: CLINIC | Age: 74
End: 2024-10-31
Payer: MEDICARE

## 2024-10-31 ENCOUNTER — APPOINTMENT (OUTPATIENT)
Dept: PULMONOLOGY | Facility: CLINIC | Age: 74
End: 2024-10-31
Payer: MEDICARE

## 2024-10-31 ENCOUNTER — INPATIENT (INPATIENT)
Facility: HOSPITAL | Age: 74
LOS: 3 days | Discharge: HOME | DRG: 378 | End: 2024-11-04
Attending: STUDENT IN AN ORGANIZED HEALTH CARE EDUCATION/TRAINING PROGRAM | Admitting: STUDENT IN AN ORGANIZED HEALTH CARE EDUCATION/TRAINING PROGRAM
Payer: MEDICARE

## 2024-10-31 VITALS
WEIGHT: 170 LBS | RESPIRATION RATE: 18 BRPM | SYSTOLIC BLOOD PRESSURE: 123 MMHG | HEIGHT: 62 IN | HEART RATE: 90 BPM | OXYGEN SATURATION: 99 % | TEMPERATURE: 97.2 F | DIASTOLIC BLOOD PRESSURE: 86 MMHG | BODY MASS INDEX: 31.28 KG/M2

## 2024-10-31 VITALS
TEMPERATURE: 99 F | SYSTOLIC BLOOD PRESSURE: 144 MMHG | DIASTOLIC BLOOD PRESSURE: 91 MMHG | RESPIRATION RATE: 18 BRPM | OXYGEN SATURATION: 100 % | HEIGHT: 62 IN | WEIGHT: 187.39 LBS | HEART RATE: 83 BPM

## 2024-10-31 DIAGNOSIS — Z90.11 ACQUIRED ABSENCE OF RIGHT BREAST AND NIPPLE: Chronic | ICD-10-CM

## 2024-10-31 DIAGNOSIS — D64.9 ANEMIA, UNSPECIFIED: ICD-10-CM

## 2024-10-31 DIAGNOSIS — Z95.5 PRESENCE OF CORONARY ANGIOPLASTY IMPLANT AND GRAFT: Chronic | ICD-10-CM

## 2024-10-31 DIAGNOSIS — R93.89 ABNORMAL FINDINGS ON DIAGNOSTIC IMAGING OF OTHER SPECIFIED BODY STRUCTURES: ICD-10-CM

## 2024-10-31 DIAGNOSIS — H26.9 UNSPECIFIED CATARACT: Chronic | ICD-10-CM

## 2024-10-31 DIAGNOSIS — R06.02 SHORTNESS OF BREATH: ICD-10-CM

## 2024-10-31 DIAGNOSIS — K92.1 MELENA: ICD-10-CM

## 2024-10-31 LAB
ALBUMIN SERPL ELPH-MCNC: 3.4 G/DL — SIGNIFICANT CHANGE UP (ref 3.3–5)
ALP SERPL-CCNC: 48 U/L — SIGNIFICANT CHANGE UP (ref 40–120)
ALT FLD-CCNC: 15 U/L — SIGNIFICANT CHANGE UP (ref 12–78)
ANION GAP SERPL CALC-SCNC: 9 MMOL/L — SIGNIFICANT CHANGE UP (ref 5–17)
APTT BLD: 28.1 SEC — SIGNIFICANT CHANGE UP (ref 24.5–35.6)
AST SERPL-CCNC: 8 U/L — LOW (ref 15–37)
BASOPHILS # BLD AUTO: 0.06 K/UL — SIGNIFICANT CHANGE UP (ref 0–0.2)
BASOPHILS NFR BLD AUTO: 1.1 % — SIGNIFICANT CHANGE UP (ref 0–2)
BILIRUB SERPL-MCNC: 0.2 MG/DL — SIGNIFICANT CHANGE UP (ref 0.2–1.2)
BLD GP AB SCN SERPL QL: SIGNIFICANT CHANGE UP
BLD GP AB SCN SERPL QL: SIGNIFICANT CHANGE UP
BUN SERPL-MCNC: 19 MG/DL — SIGNIFICANT CHANGE UP (ref 7–23)
CALCIUM SERPL-MCNC: 8.8 MG/DL — SIGNIFICANT CHANGE UP (ref 8.5–10.1)
CHLORIDE SERPL-SCNC: 100 MMOL/L — SIGNIFICANT CHANGE UP (ref 96–108)
CO2 SERPL-SCNC: 30 MMOL/L — SIGNIFICANT CHANGE UP (ref 22–31)
CREAT SERPL-MCNC: 0.78 MG/DL — SIGNIFICANT CHANGE UP (ref 0.5–1.3)
EGFR: 79 ML/MIN/1.73M2 — SIGNIFICANT CHANGE UP
EOSINOPHIL # BLD AUTO: 0.14 K/UL — SIGNIFICANT CHANGE UP (ref 0–0.5)
EOSINOPHIL NFR BLD AUTO: 2.5 % — SIGNIFICANT CHANGE UP (ref 0–6)
GLUCOSE SERPL-MCNC: 117 MG/DL — HIGH (ref 70–99)
HCT VFR BLD CALC: 23.2 % — LOW (ref 34.5–45)
HGB BLD-MCNC: 7.1 G/DL — LOW (ref 11.5–15.5)
IMM GRANULOCYTES NFR BLD AUTO: 0.5 % — SIGNIFICANT CHANGE UP (ref 0–0.9)
INR BLD: 1.09 RATIO — SIGNIFICANT CHANGE UP (ref 0.85–1.16)
LYMPHOCYTES # BLD AUTO: 1.62 K/UL — SIGNIFICANT CHANGE UP (ref 1–3.3)
LYMPHOCYTES # BLD AUTO: 28.4 % — SIGNIFICANT CHANGE UP (ref 13–44)
MCHC RBC-ENTMCNC: 29.8 PG — SIGNIFICANT CHANGE UP (ref 27–34)
MCHC RBC-ENTMCNC: 30.6 G/DL — LOW (ref 32–36)
MCV RBC AUTO: 97.5 FL — SIGNIFICANT CHANGE UP (ref 80–100)
MONOCYTES # BLD AUTO: 0.68 K/UL — SIGNIFICANT CHANGE UP (ref 0–0.9)
MONOCYTES NFR BLD AUTO: 11.9 % — SIGNIFICANT CHANGE UP (ref 2–14)
NEUTROPHILS # BLD AUTO: 3.18 K/UL — SIGNIFICANT CHANGE UP (ref 1.8–7.4)
NEUTROPHILS NFR BLD AUTO: 55.6 % — SIGNIFICANT CHANGE UP (ref 43–77)
PLATELET # BLD AUTO: 417 K/UL — HIGH (ref 150–400)
POTASSIUM SERPL-MCNC: 2.4 MMOL/L — CRITICAL LOW (ref 3.5–5.3)
POTASSIUM SERPL-SCNC: 2.4 MMOL/L — CRITICAL LOW (ref 3.5–5.3)
PROT SERPL-MCNC: 7.1 GM/DL — SIGNIFICANT CHANGE UP (ref 6–8.3)
PROTHROM AB SERPL-ACNC: 12.8 SEC — SIGNIFICANT CHANGE UP (ref 9.9–13.4)
RBC # BLD: 2.38 M/UL — LOW (ref 3.8–5.2)
RBC # FLD: 14.6 % — HIGH (ref 10.3–14.5)
SODIUM SERPL-SCNC: 139 MMOL/L — SIGNIFICANT CHANGE UP (ref 135–145)
WBC # BLD: 5.71 K/UL — SIGNIFICANT CHANGE UP (ref 3.8–10.5)
WBC # FLD AUTO: 5.71 K/UL — SIGNIFICANT CHANGE UP (ref 3.8–10.5)

## 2024-10-31 PROCEDURE — 86901 BLOOD TYPING SEROLOGIC RH(D): CPT

## 2024-10-31 PROCEDURE — 99215 OFFICE O/P EST HI 40 MIN: CPT | Mod: 25

## 2024-10-31 PROCEDURE — 80048 BASIC METABOLIC PNL TOTAL CA: CPT

## 2024-10-31 PROCEDURE — 94010 BREATHING CAPACITY TEST: CPT

## 2024-10-31 PROCEDURE — 94729 DIFFUSING CAPACITY: CPT

## 2024-10-31 PROCEDURE — 36430 TRANSFUSION BLD/BLD COMPNT: CPT

## 2024-10-31 PROCEDURE — 99291 CRITICAL CARE FIRST HOUR: CPT

## 2024-10-31 PROCEDURE — 93306 TTE W/DOPPLER COMPLETE: CPT

## 2024-10-31 PROCEDURE — 85027 COMPLETE CBC AUTOMATED: CPT

## 2024-10-31 PROCEDURE — 36415 COLL VENOUS BLD VENIPUNCTURE: CPT

## 2024-10-31 PROCEDURE — 86900 BLOOD TYPING SEROLOGIC ABO: CPT

## 2024-10-31 PROCEDURE — 83735 ASSAY OF MAGNESIUM: CPT

## 2024-10-31 PROCEDURE — 86923 COMPATIBILITY TEST ELECTRIC: CPT

## 2024-10-31 PROCEDURE — 86850 RBC ANTIBODY SCREEN: CPT

## 2024-10-31 PROCEDURE — 94727 GAS DIL/WSHOT DETER LNG VOL: CPT

## 2024-10-31 PROCEDURE — P9040: CPT

## 2024-10-31 PROCEDURE — ZZZZZ: CPT

## 2024-10-31 PROCEDURE — 84100 ASSAY OF PHOSPHORUS: CPT

## 2024-10-31 RX ORDER — PANTOPRAZOLE SODIUM 40 MG/1
80 TABLET, DELAYED RELEASE ORAL ONCE
Refills: 0 | Status: COMPLETED | OUTPATIENT
Start: 2024-10-31 | End: 2024-10-31

## 2024-10-31 RX ORDER — PANTOPRAZOLE SODIUM 40 MG/1
8 TABLET, DELAYED RELEASE ORAL
Qty: 80 | Refills: 0 | Status: DISCONTINUED | OUTPATIENT
Start: 2024-10-31 | End: 2024-11-01

## 2024-10-31 RX ORDER — SODIUM CHLORIDE 9 MG/ML
3 INJECTION, SOLUTION INTRAMUSCULAR; INTRAVENOUS; SUBCUTANEOUS ONCE
Refills: 0 | Status: COMPLETED | OUTPATIENT
Start: 2024-10-31 | End: 2024-10-31

## 2024-10-31 RX ADMIN — PANTOPRAZOLE SODIUM 10 MG/HR: 40 TABLET, DELAYED RELEASE ORAL at 22:34

## 2024-10-31 RX ADMIN — PANTOPRAZOLE SODIUM 80 MILLIGRAM(S): 40 TABLET, DELAYED RELEASE ORAL at 20:43

## 2024-10-31 RX ADMIN — SODIUM CHLORIDE 3 MILLILITER(S): 9 INJECTION, SOLUTION INTRAMUSCULAR; INTRAVENOUS; SUBCUTANEOUS at 18:07

## 2024-10-31 NOTE — ED ADULT NURSE NOTE - NSFALLRISKINTERV_ED_ALL_ED

## 2024-10-31 NOTE — ED PROVIDER NOTE - CLINICAL SUMMARY MEDICAL DECISION MAKING FREE TEXT BOX
Pt with hx of GI bleed, iron deficiency anemia here with low hgb. Pulmonologist came and states pt had APPLE and appeared cyanotic when he saw her. Will get labs, IV fluids, Protonix, transfuse.

## 2024-10-31 NOTE — PHARMACOTHERAPY INTERVENTION NOTE - COMMENTS
Patient seen at bedside and is able to verify home medications.    Prior admission had dicyclomine as part of med list, however, name of medication is unfamiliar for patient, and it is not listed on her personal hand-written list.

## 2024-10-31 NOTE — ED ADULT TRIAGE NOTE - CHIEF COMPLAINT QUOTE
pt presents to ED for abnormal labs. pt has known lower GI bleed and scheduled for blood transfusion tomorrow. noted to have drop in hemoglobin today and sent to ED for transfusion. c/o SOB and weakness. endorses dark colored stools x 1 month.

## 2024-10-31 NOTE — ED PROVIDER NOTE - GASTROINTESTINAL, MLM
Abdomen soft, epigastric ttp, no guarding. Abdomen soft, epigastric ttp, no guarding. Rectal: dark stool heme +, lot [id number], exp 3/31/25

## 2024-10-31 NOTE — ED ADULT NURSE REASSESSMENT NOTE - NS ED NURSE REASSESS COMMENT FT1
Pt currently receiving PRBC, no adverse reactions noted, pt denies any complaints.
Pt received on stretcher, A&OX4, no labored breathing, pt denies any pain, pending blood transfusion.
Ear Star Wedge Flap Text: The defect edges were debeveled with a #15 blade scalpel.  Given the location of the defect and the proximity to free margins (helical rim) an ear star wedge flap was deemed most appropriate.  Using a sterile surgical marker, the appropriate flap was drawn incorporating the defect and placing the expected incisions between the helical rim and antihelix where possible.  The area thus outlined was incised through and through with a #15 scalpel blade.

## 2024-10-31 NOTE — ED PROVIDER NOTE - OBJECTIVE STATEMENT
73 y/o F with PMHx of iron deficiency anemia on IV iron infusions, diverticulitis, breast CA, IBS, acid reflux, sliding hiatal hernia of the stomach, anxiety, HTN, NSTEMI, transient cerebral ischemia, CAD, R mastectomy presents to the ED c/o low hgb. States she had blood work done on Monday, got the results on Wednesday telling her that she had a low hgb and needed a blood transfusion tomorrow. Endorses SOB x1 year, intermittent black emesis x1.5 years, and black stool x1 month. Last episode of dark stool was last night. Pt's outpatient doctor Dr Mitchell came into the ED room and notes that the pt's hgb dropped from 13 to 8 on her outpatient labs and was worried that she would decompensate fast, so he sent her in today for transfusion. Also states that when he saw her a few days ago, pt appeared cyanotic. Pt notes she is on Xarelto. States she had an endoscopy and colonoscopy in September which were both negative. Nonsmoker. Pt drinks "about a shot of EtOH 3x per week." Pt does not take any Ibuprofen, only Tylenol. Follows with Oncologist Nohemi for anemia. 75 y/o F with PMHx of iron deficiency anemia on IV iron infusions, diverticulitis, breast CA, IBS, acid reflux, sliding hiatal hernia of the stomach, anxiety, HTN, NSTEMI, transient cerebral ischemia, CAD, R mastectomy presents to the ED c/o low hgb. States she had blood work done on Monday, got the results on Wednesday telling her that she had a low hgb and needed a blood transfusion tomorrow. Endorses SOB x1 year, intermittent black emesis x1.5 years, and black stool x1 month. Last episode of dark stool was last night. Pt's outpatient doctor Dr Mitchell came into the ED room and notes that the pt's hgb dropped from 13 to 8 on her outpatient labs and was worried that she would decompensate fast, so he sent her in today for transfusion. Also states that when he saw her a few days ago, pt appeared cyanotic and had APPLE. Pt notes she is on Xarelto. States she had an endoscopy and colonoscopy in September which were both negative. Nonsmoker. Pt states she drinks every night now and takes Xanax because she has been under a lot of stress. Pt does not take any Ibuprofen, only Tylenol. Follows with Oncologist Nohemi for anemia.

## 2024-10-31 NOTE — ED PROVIDER NOTE - CRITICAL CARE ATTENDING CONTRIBUTION TO CARE
Critical care time spent evaluating and reevaluating patient, ordering and interpreting diagnostics, ordering therapeutics, speaking to family and admitting MD reviewing old records and documenting. Gracie OCONNELL

## 2024-10-31 NOTE — ED PROVIDER NOTE - CONSTITUTIONAL, MLM
Well appearing, awake, alert, oriented to person, place, time/situation and in no apparent distress. normal... Well appearing, awake, alert, oriented to person, place, time/situation and in no apparent distress, tremulous

## 2024-11-01 ENCOUNTER — OUTPATIENT (OUTPATIENT)
Dept: OUTPATIENT SERVICES | Facility: HOSPITAL | Age: 74
LOS: 1 days | Discharge: HOME | End: 2024-11-01
Payer: MEDICARE

## 2024-11-01 ENCOUNTER — RESULT REVIEW (OUTPATIENT)
Age: 74
End: 2024-11-01

## 2024-11-01 DIAGNOSIS — Z95.5 PRESENCE OF CORONARY ANGIOPLASTY IMPLANT AND GRAFT: Chronic | ICD-10-CM

## 2024-11-01 DIAGNOSIS — Z90.11 ACQUIRED ABSENCE OF RIGHT BREAST AND NIPPLE: Chronic | ICD-10-CM

## 2024-11-01 DIAGNOSIS — D64.9 ANEMIA, UNSPECIFIED: ICD-10-CM

## 2024-11-01 DIAGNOSIS — H26.9 UNSPECIFIED CATARACT: Chronic | ICD-10-CM

## 2024-11-01 LAB
ANION GAP SERPL CALC-SCNC: 6 MMOL/L — SIGNIFICANT CHANGE UP (ref 5–17)
ANION GAP SERPL CALC-SCNC: 7 MMOL/L — SIGNIFICANT CHANGE UP (ref 5–17)
BUN SERPL-MCNC: 11 MG/DL — SIGNIFICANT CHANGE UP (ref 7–23)
BUN SERPL-MCNC: 12 MG/DL — SIGNIFICANT CHANGE UP (ref 7–23)
CALCIUM SERPL-MCNC: 8.3 MG/DL — LOW (ref 8.5–10.1)
CALCIUM SERPL-MCNC: 8.4 MG/DL — LOW (ref 8.5–10.1)
CHLORIDE SERPL-SCNC: 103 MMOL/L — SIGNIFICANT CHANGE UP (ref 96–108)
CHLORIDE SERPL-SCNC: 105 MMOL/L — SIGNIFICANT CHANGE UP (ref 96–108)
CO2 SERPL-SCNC: 28 MMOL/L — SIGNIFICANT CHANGE UP (ref 22–31)
CO2 SERPL-SCNC: 29 MMOL/L — SIGNIFICANT CHANGE UP (ref 22–31)
CREAT SERPL-MCNC: 0.42 MG/DL — LOW (ref 0.5–1.3)
CREAT SERPL-MCNC: 0.44 MG/DL — LOW (ref 0.5–1.3)
EGFR: 100 ML/MIN/1.73M2 — SIGNIFICANT CHANGE UP
EGFR: 101 ML/MIN/1.73M2 — SIGNIFICANT CHANGE UP
GLUCOSE SERPL-MCNC: 90 MG/DL — SIGNIFICANT CHANGE UP (ref 70–99)
GLUCOSE SERPL-MCNC: 91 MG/DL — SIGNIFICANT CHANGE UP (ref 70–99)
HCT VFR BLD CALC: 22.7 % — LOW (ref 34.5–45)
HCT VFR BLD CALC: 25.9 % — LOW (ref 34.5–45)
HCT VFR BLD CALC: 26.7 % — LOW (ref 34.5–45)
HGB BLD-MCNC: 7.4 G/DL — LOW (ref 11.5–15.5)
HGB BLD-MCNC: 8.3 G/DL — LOW (ref 11.5–15.5)
HGB BLD-MCNC: 8.3 G/DL — LOW (ref 11.5–15.5)
MAGNESIUM SERPL-MCNC: 1.3 MG/DL — LOW (ref 1.6–2.6)
MCHC RBC-ENTMCNC: 29.9 PG — SIGNIFICANT CHANGE UP (ref 27–34)
MCHC RBC-ENTMCNC: 30.6 PG — SIGNIFICANT CHANGE UP (ref 27–34)
MCHC RBC-ENTMCNC: 31 PG — SIGNIFICANT CHANGE UP (ref 27–34)
MCHC RBC-ENTMCNC: 31.1 G/DL — LOW (ref 32–36)
MCHC RBC-ENTMCNC: 32 G/DL — SIGNIFICANT CHANGE UP (ref 32–36)
MCHC RBC-ENTMCNC: 32.6 G/DL — SIGNIFICANT CHANGE UP (ref 32–36)
MCV RBC AUTO: 95 FL — SIGNIFICANT CHANGE UP (ref 80–100)
MCV RBC AUTO: 95.6 FL — SIGNIFICANT CHANGE UP (ref 80–100)
MCV RBC AUTO: 96 FL — SIGNIFICANT CHANGE UP (ref 80–100)
PHOSPHATE SERPL-MCNC: 2.4 MG/DL — LOW (ref 2.5–4.5)
PLATELET # BLD AUTO: 322 K/UL — SIGNIFICANT CHANGE UP (ref 150–400)
PLATELET # BLD AUTO: 373 K/UL — SIGNIFICANT CHANGE UP (ref 150–400)
PLATELET # BLD AUTO: 377 K/UL — SIGNIFICANT CHANGE UP (ref 150–400)
POTASSIUM SERPL-MCNC: 3 MMOL/L — LOW (ref 3.5–5.3)
POTASSIUM SERPL-MCNC: 3.7 MMOL/L — SIGNIFICANT CHANGE UP (ref 3.5–5.3)
POTASSIUM SERPL-SCNC: 3 MMOL/L — LOW (ref 3.5–5.3)
POTASSIUM SERPL-SCNC: 3.7 MMOL/L — SIGNIFICANT CHANGE UP (ref 3.5–5.3)
RBC # BLD: 2.39 M/UL — LOW (ref 3.8–5.2)
RBC # BLD: 2.71 M/UL — LOW (ref 3.8–5.2)
RBC # BLD: 2.78 M/UL — LOW (ref 3.8–5.2)
RBC # FLD: 14.7 % — HIGH (ref 10.3–14.5)
RBC # FLD: 15.3 % — HIGH (ref 10.3–14.5)
RBC # FLD: 15.5 % — HIGH (ref 10.3–14.5)
SODIUM SERPL-SCNC: 138 MMOL/L — SIGNIFICANT CHANGE UP (ref 135–145)
SODIUM SERPL-SCNC: 140 MMOL/L — SIGNIFICANT CHANGE UP (ref 135–145)
WBC # BLD: 5.66 K/UL — SIGNIFICANT CHANGE UP (ref 3.8–10.5)
WBC # BLD: 5.66 K/UL — SIGNIFICANT CHANGE UP (ref 3.8–10.5)
WBC # BLD: 7.01 K/UL — SIGNIFICANT CHANGE UP (ref 3.8–10.5)
WBC # FLD AUTO: 5.66 K/UL — SIGNIFICANT CHANGE UP (ref 3.8–10.5)
WBC # FLD AUTO: 5.66 K/UL — SIGNIFICANT CHANGE UP (ref 3.8–10.5)
WBC # FLD AUTO: 7.01 K/UL — SIGNIFICANT CHANGE UP (ref 3.8–10.5)

## 2024-11-01 PROCEDURE — 93306 TTE W/DOPPLER COMPLETE: CPT | Mod: 26

## 2024-11-01 PROCEDURE — 99222 1ST HOSP IP/OBS MODERATE 55: CPT | Mod: FS,25

## 2024-11-01 PROCEDURE — 99223 1ST HOSP IP/OBS HIGH 75: CPT

## 2024-11-01 PROCEDURE — 43235 EGD DIAGNOSTIC BRUSH WASH: CPT

## 2024-11-01 RX ORDER — POTASSIUM CHLORIDE 10 MEQ
10 TABLET, EXTENDED RELEASE ORAL
Refills: 0 | Status: COMPLETED | OUTPATIENT
Start: 2024-11-01 | End: 2024-11-01

## 2024-11-01 RX ORDER — DIPHENHYDRAMINE HCL 25 MG
25 TABLET ORAL ONCE
Refills: 0 | Status: COMPLETED | OUTPATIENT
Start: 2024-11-01 | End: 2024-11-01

## 2024-11-01 RX ORDER — ENALAPRIL MALEATE 10 MG
10 TABLET ORAL
Refills: 0 | Status: DISCONTINUED | OUTPATIENT
Start: 2024-11-01 | End: 2024-11-04

## 2024-11-01 RX ORDER — ZOLPIDEM TARTRATE 10 MG
5 TABLET ORAL AT BEDTIME
Refills: 0 | Status: DISCONTINUED | OUTPATIENT
Start: 2024-11-01 | End: 2024-11-04

## 2024-11-01 RX ORDER — POTASSIUM CHLORIDE 10 MEQ
40 TABLET, EXTENDED RELEASE ORAL EVERY 4 HOURS
Refills: 0 | Status: COMPLETED | OUTPATIENT
Start: 2024-11-01 | End: 2024-11-01

## 2024-11-01 RX ORDER — SODIUM CHLORIDE 9 MG/ML
1000 INJECTION, SOLUTION INTRAMUSCULAR; INTRAVENOUS; SUBCUTANEOUS
Refills: 0 | Status: DISCONTINUED | OUTPATIENT
Start: 2024-11-01 | End: 2024-11-03

## 2024-11-01 RX ORDER — EZETIMIBE 10 MG/1
10 TABLET ORAL AT BEDTIME
Refills: 0 | Status: DISCONTINUED | OUTPATIENT
Start: 2024-11-01 | End: 2024-11-04

## 2024-11-01 RX ORDER — AMLODIPINE BESYLATE 10 MG
2.5 TABLET ORAL DAILY
Refills: 0 | Status: DISCONTINUED | OUTPATIENT
Start: 2024-11-01 | End: 2024-11-04

## 2024-11-01 RX ORDER — PANTOPRAZOLE SODIUM 40 MG/1
40 TABLET, DELAYED RELEASE ORAL
Refills: 0 | Status: DISCONTINUED | OUTPATIENT
Start: 2024-11-01 | End: 2024-11-03

## 2024-11-01 RX ORDER — ACETAMINOPHEN 80 MG/.8ML
650 SOLUTION/ DROPS ORAL ONCE
Refills: 0 | Status: COMPLETED | OUTPATIENT
Start: 2024-11-01 | End: 2024-11-01

## 2024-11-01 RX ORDER — ALPRAZOLAM 0.25 MG
1 TABLET ORAL THREE TIMES A DAY
Refills: 0 | Status: DISCONTINUED | OUTPATIENT
Start: 2024-11-01 | End: 2024-11-04

## 2024-11-01 RX ORDER — CALCIUM CARBONATE 400(1000)
1 TABLET,CHEWABLE ORAL
Refills: 0 | Status: DISCONTINUED | OUTPATIENT
Start: 2024-11-01 | End: 2024-11-04

## 2024-11-01 RX ORDER — ACETAMINOPHEN 500 MG
650 TABLET ORAL EVERY 6 HOURS
Refills: 0 | Status: DISCONTINUED | OUTPATIENT
Start: 2024-11-01 | End: 2024-11-04

## 2024-11-01 RX ADMIN — Medication 650 MILLIGRAM(S): at 11:21

## 2024-11-01 RX ADMIN — Medication 100 MILLIEQUIVALENT(S): at 11:42

## 2024-11-01 RX ADMIN — EZETIMIBE 10 MILLIGRAM(S): 10 TABLET ORAL at 21:10

## 2024-11-01 RX ADMIN — Medication 10 MILLIGRAM(S): at 21:10

## 2024-11-01 RX ADMIN — Medication 10 MILLIGRAM(S): at 10:46

## 2024-11-01 RX ADMIN — Medication 2.5 MILLIGRAM(S): at 10:45

## 2024-11-01 RX ADMIN — Medication 40 MILLIEQUIVALENT(S): at 10:45

## 2024-11-01 RX ADMIN — Medication 40 MILLIEQUIVALENT(S): at 09:37

## 2024-11-01 RX ADMIN — Medication 100 MILLIEQUIVALENT(S): at 18:33

## 2024-11-01 RX ADMIN — Medication 100 MILLIEQUIVALENT(S): at 13:16

## 2024-11-01 RX ADMIN — PANTOPRAZOLE SODIUM 40 MILLIGRAM(S): 40 TABLET, DELAYED RELEASE ORAL at 18:29

## 2024-11-01 RX ADMIN — SODIUM CHLORIDE 75 MILLILITER(S): 9 INJECTION, SOLUTION INTRAMUSCULAR; INTRAVENOUS; SUBCUTANEOUS at 05:35

## 2024-11-01 RX ADMIN — Medication 40 MILLIEQUIVALENT(S): at 01:26

## 2024-11-01 RX ADMIN — PANTOPRAZOLE SODIUM 40 MILLIGRAM(S): 40 TABLET, DELAYED RELEASE ORAL at 09:37

## 2024-11-01 NOTE — CONSULT NOTE ADULT - NS ATTEND AMEND GEN_ALL_CORE FT
I personally examined the patient alongside my colleague, Merissa Hernandez NP. Patient is a 75 yo F w/ a hx of Afib on xarelto (last dose 10/29) here with concerns of symptomatic anemia w/ reported melena and coffee ground emesis at home with downtrend in Hb from baseline of 13 to 7. She previously has been evaluated with EGD/capsule/colon in the past which were unremarkable with exception of large hiatal hernia and diverticula/hemorrhoids. Given reports of melena and downtrend in Hb will plan for EGD today. Further recommendations to follow post procedure. I discussed risks and benefits of EGD with patient. Risks include but are not limited to infection, bleeding, perforation. Patient amenable to proceed.
73 y/o female with recurrent iron deficiency anemia for at least one year. Requires multiple iv iron infusions ( every 3-4 months) Extensive GI w/up within past one year- colonoscopy EGD and capsule endoscopy - did not reveal source of bleeding. She is on chronic AC- Xarelto for cardiac and neuro indications ( Afib, TIA)   Last iv iron -  end of August. Now admitted with worsening symptomatic anemia. Report melena and worsening exertional dyspnea for about one month.  Receiving PRBC transfusion. Monitor.  Repeat iron studies, might need more iv iron.  GI evaluation- will need repeat w/up.

## 2024-11-01 NOTE — CONSULT NOTE ADULT - SUBJECTIVE AND OBJECTIVE BOX
Patient is a 74y old  Female who presents with a chief complaint of symptomatic anemia     HPI:  This is a 74 year old female with history of AF on Xarelto (last dose 10/29), GERD, IBS, large hiatal hernia, HLD, breast cancer, iron deficiency anemia (on infusions) presenting to [facility] with one month history of melena, worsening dyspnea on exertion, activity intolerance and persistent reflux with vomiting CGE with Hgb 13 about a month ago now 7's. History EGD/colonoscopy here in hospital 11/2023 unremarkable with exception of large hiatal hernia and diverticula/hemorrhoids on colonoscopy prompting outpatient capsule study, again unremarkable. At bedside, patient endorses she has been stressed lately due to her dog passing away and her 's diagnosis of a melanoma on the face. Her IBS has been acting up with up to 5x daily loose stools including black tarry stools over past month. She denies abdominal pain, vaginal bleeding, hematochezia, palpitations, and chest pain. Transfused in ED with Hgb 8.3 on arrival now 7.4; suboptimal response.     PAST MEDICAL & SURGICAL HISTORY:  Coronary artery disease, angina presence unspecified, unspecified vessel or lesion type, unspecified whether native or transplanted heart      Transient cerebral ischemia, unspecified type      Non-ST elevation (NSTEMI) myocardial infarction      HTN (hypertension)      Anxiety      Breast cancer      Diverticulitis      Anemia      S/P drug eluting coronary stent placement  in 2008 and 2010 - total x4      H/O total mastectomy of right breast      Cataract, bilateral    MEDICATIONS  (STANDING):  amLODIPine   Tablet 2.5 milliGRAM(s) Oral daily  calcium carbonate    500 mG (Tums) Chewable 1 Tablet(s) Chew two times a day  enalapril 10 milliGRAM(s) Oral two times a day  ezetimibe 10 milliGRAM(s) Oral at bedtime  pantoprazole  Injectable 40 milliGRAM(s) IV Push two times a day  potassium chloride  10 mEq/100 mL IVPB 10 milliEquivalent(s) IV Intermittent every 1 hour  sodium chloride 0.9%. 1000 milliLiter(s) (75 mL/Hr) IV Continuous <Continuous>    MEDICATIONS  (PRN):  acetaminophen     Tablet .. 650 milliGRAM(s) Oral every 6 hours PRN Temp greater or equal to 38C (100.4F), Mild Pain (1 - 3)  ALPRAZolam 1 milliGRAM(s) Oral three times a day PRN anxiety  zolpidem 5 milliGRAM(s) Oral at bedtime PRN Insomnia  zolpidem 5 milliGRAM(s) Oral at bedtime PRN Insomnia      Allergies    No Known Allergies    Intolerances    SOCIAL HISTORY:    FAMILY HISTORY:  Family history of lung cancer    Family history of breast cancer    Family history of bladder cancer (Sibling)    Family history of prostate cancer (Sibling)    Family history of heart disease (Sibling, Grandparent)  brother and grand mother    REVIEW OF SYSTEMS:    CONSTITUTIONAL: No weakness, fevers or chills  EYES/ENT: No visual changes;  No vertigo or throat pain   NECK: No pain or stiffness  RESPIRATORY: No cough, wheezing, hemoptysis; No shortness of breath  CARDIOVASCULAR: No chest pain or palpitations  GASTROINTESTINAL: See HPI  GENITOURINARY: No dysuria, frequency or hematuria  NEUROLOGICAL: No numbness or weakness  SKIN: No itching, burning, rashes, or lesions   PSYCH: Normal mood and affect  All other review of systems is negative unless indicated above.    Vital Signs Last 24 Hrs  T(C): 37.1 (01 Nov 2024 07:23), Max: 37.2 (31 Oct 2024 17:27)  T(F): 98.7 (01 Nov 2024 07:23), Max: 98.9 (31 Oct 2024 17:27)  HR: 83 (01 Nov 2024 07:23) (69 - 83)  BP: 155/80 (01 Nov 2024 07:23) (144/91 - 155/80)  BP(mean): 94 (01 Nov 2024 00:30) (94 - 95)  RR: 18 (01 Nov 2024 00:30) (17 - 18)  SpO2: 96% (01 Nov 2024 07:23) (94% - 100%)    Parameters below as of 01 Nov 2024 07:23  Patient On (Oxygen Delivery Method): room air    PHYSICAL EXAM:    Constitutional: No acute distress, pale, non-toxic appearing  HEENT: good phonation, not icteric  Neck: supple, no lymphadenopathy  Respiratory: clear to ascultation bilaterally, no wheezing  Cardiovascular: S1 and S2, regular rate and rhythm, no murmurs rubs or gallops  Gastrointestinal: soft, non-tender, non-distended, +bowel sounds, no rebound or guarding, no surgical scars, no drains  Extremities: No peripheral edema, no cyanosis or clubbing  Vascular: 2+ peripheral pulses, no venous stasis  Neurological: A/O x 3, no focal deficits, no asterixis  Psychiatric: Normal mood, normal affect  Skin: No rashes, not jaundiced    LABS:                        7.4    5.66  )-----------( 322      ( 01 Nov 2024 08:16 )             22.7     11-01    140  |  105  |  11  ----------------------------<  91  3.7   |  29  |  0.42[L]    Ca    8.4[L]      01 Nov 2024 12:44  Phos  2.4     11-01  Mg     1.3     11-01    TPro  7.1  /  Alb  3.4  /  TBili  0.2  /  DBili  x   /  AST  8[L]  /  ALT  15  /  AlkPhos  48  10-31    PT/INR - ( 31 Oct 2024 17:44 )   PT: 12.8 sec;   INR: 1.09 ratio         PTT - ( 31 Oct 2024 17:44 )  PTT:28.1 sec  LIVER FUNCTIONS - ( 31 Oct 2024 17:44 )  Alb: 3.4 g/dL / Pro: 7.1 gm/dL / ALK PHOS: 48 U/L / ALT: 15 U/L / AST: 8 U/L / GGT: x             RADIOLOGY & ADDITIONAL STUDIES: Patient is a 74y old  Female who presents with a chief complaint of symptomatic anemia     HPI:  This is a 74 year old female with history of AF on Xarelto (last dose 10/29), GERD, IBS, large hiatal hernia, HLD, breast cancer, iron deficiency anemia (on infusions) presenting to [facility] with one month history of melena, worsening dyspnea on exertion, activity intolerance and persistent reflux with vomiting CGE with Hgb 13 about a month ago now 7's. History EGD/colonoscopy here in hospital 11/2023 unremarkable with exception of large hiatal hernia and diverticula/hemorrhoids on colonoscopy prompting outpatient capsule study, again unremarkable. At bedside, patient endorses she has been stressed lately due to her dog passing away and her 's diagnosis of a melanoma on the face. Her IBS has been acting up with up to 5x daily loose stools including black tarry stools over past month. She denies abdominal pain, vaginal bleeding, hematochezia, palpitations, and chest pain. Transfused in ED with Hgb 8.3 on arrival now 7.4; suboptimal response. Denies NSAID use.    PAST MEDICAL & SURGICAL HISTORY:  Coronary artery disease, angina presence unspecified, unspecified vessel or lesion type, unspecified whether native or transplanted heart      Transient cerebral ischemia, unspecified type      Non-ST elevation (NSTEMI) myocardial infarction      HTN (hypertension)      Anxiety      Breast cancer      Diverticulitis      Anemia      S/P drug eluting coronary stent placement  in 2008 and 2010 - total x4      H/O total mastectomy of right breast      Cataract, bilateral    MEDICATIONS  (STANDING):  amLODIPine   Tablet 2.5 milliGRAM(s) Oral daily  calcium carbonate    500 mG (Tums) Chewable 1 Tablet(s) Chew two times a day  enalapril 10 milliGRAM(s) Oral two times a day  ezetimibe 10 milliGRAM(s) Oral at bedtime  pantoprazole  Injectable 40 milliGRAM(s) IV Push two times a day  potassium chloride  10 mEq/100 mL IVPB 10 milliEquivalent(s) IV Intermittent every 1 hour  sodium chloride 0.9%. 1000 milliLiter(s) (75 mL/Hr) IV Continuous <Continuous>    MEDICATIONS  (PRN):  acetaminophen     Tablet .. 650 milliGRAM(s) Oral every 6 hours PRN Temp greater or equal to 38C (100.4F), Mild Pain (1 - 3)  ALPRAZolam 1 milliGRAM(s) Oral three times a day PRN anxiety  zolpidem 5 milliGRAM(s) Oral at bedtime PRN Insomnia  zolpidem 5 milliGRAM(s) Oral at bedtime PRN Insomnia      Allergies    No Known Allergies    Intolerances    SOCIAL HISTORY:    FAMILY HISTORY:  Family history of lung cancer    Family history of breast cancer    Family history of bladder cancer (Sibling)    Family history of prostate cancer (Sibling)    Family history of heart disease (Sibling, Grandparent)  brother and grand mother    REVIEW OF SYSTEMS:    CONSTITUTIONAL: No weakness, fevers or chills  EYES/ENT: No visual changes;  No vertigo or throat pain   NECK: No pain or stiffness  RESPIRATORY: No cough, wheezing, hemoptysis; No shortness of breath  CARDIOVASCULAR: No chest pain or palpitations  GASTROINTESTINAL: See HPI  GENITOURINARY: No dysuria, frequency or hematuria  NEUROLOGICAL: No numbness or weakness  SKIN: No itching, burning, rashes, or lesions   PSYCH: Normal mood and affect  All other review of systems is negative unless indicated above.    Vital Signs Last 24 Hrs  T(C): 37.1 (01 Nov 2024 07:23), Max: 37.2 (31 Oct 2024 17:27)  T(F): 98.7 (01 Nov 2024 07:23), Max: 98.9 (31 Oct 2024 17:27)  HR: 83 (01 Nov 2024 07:23) (69 - 83)  BP: 155/80 (01 Nov 2024 07:23) (144/91 - 155/80)  BP(mean): 94 (01 Nov 2024 00:30) (94 - 95)  RR: 18 (01 Nov 2024 00:30) (17 - 18)  SpO2: 96% (01 Nov 2024 07:23) (94% - 100%)    Parameters below as of 01 Nov 2024 07:23  Patient On (Oxygen Delivery Method): room air    PHYSICAL EXAM:    Constitutional: No acute distress, pale, non-toxic appearing  HEENT: good phonation, not icteric  Neck: supple, no lymphadenopathy  Respiratory: clear to ascultation bilaterally, no wheezing  Cardiovascular: S1 and S2, regular rate and rhythm, no murmurs rubs or gallops  Gastrointestinal: soft, non-tender, non-distended, +bowel sounds, no rebound or guarding, no surgical scars, no drains  Extremities: No peripheral edema, no cyanosis or clubbing  Vascular: 2+ peripheral pulses, no venous stasis  Neurological: A/O x 3, no focal deficits, no asterixis  Psychiatric: Normal mood, normal affect  Skin: No rashes, not jaundiced    LABS:                        7.4    5.66  )-----------( 322      ( 01 Nov 2024 08:16 )             22.7     11-01    140  |  105  |  11  ----------------------------<  91  3.7   |  29  |  0.42[L]    Ca    8.4[L]      01 Nov 2024 12:44  Phos  2.4     11-01  Mg     1.3     11-01    TPro  7.1  /  Alb  3.4  /  TBili  0.2  /  DBili  x   /  AST  8[L]  /  ALT  15  /  AlkPhos  48  10-31    PT/INR - ( 31 Oct 2024 17:44 )   PT: 12.8 sec;   INR: 1.09 ratio         PTT - ( 31 Oct 2024 17:44 )  PTT:28.1 sec  LIVER FUNCTIONS - ( 31 Oct 2024 17:44 )  Alb: 3.4 g/dL / Pro: 7.1 gm/dL / ALK PHOS: 48 U/L / ALT: 15 U/L / AST: 8 U/L / GGT: x             RADIOLOGY & ADDITIONAL STUDIES: no recent imaging

## 2024-11-01 NOTE — H&P ADULT - HISTORY OF PRESENT ILLNESS
Patient is a 74-year-old female with a PMH of Afib (on Xarelto), CAD s/p 2 stents, hiatal hernia with GERD, IBS, hypertension, HLD, breast cancer (ER positive and HER2 negative) s/p R sided mastectomy and iron deficiency anemia (receives IV iron infusions) presenting to  ED on 10/31/24 for abnormal lab result.     Her hemoglobin level was 13.0 on 9/16/2024 and dropped to 8.3 on 10/28/2024. She reports having intermittent black tarry stools since the past month and has been increasing short of breath upon exertion and with generalized fatigue.   Patient received a call from her hematologist office regarding a sudden drop in her hemoglobin.  She agreed to both blood transfusion and Feraheme x 3.  She was scheduled to have a blood transfusion on Friday, 11/1/2024 and was going to be scheduled for Feraheme infusions.      - Has an appointment on 11/7/24 with Dr. Landry to follow-up on capsule study     Underwent capsule endoscopy with Dr. Landry on 2/12/24.     Upon arrival to the ED vitals were /91 HR 83 RR 18 T98.9F And SpO2 100% on room air. Labs significant for hemoglobin of 7.1. She received IV Protonix 80 mg x 1 and 1 unit of packed red blood cells.   Patient is a 74-year-old female with a PMH of Afib (on Xarelto), CAD s/p 2 stents, hiatal hernia with GERD, IBS, hypertension, HLD, breast cancer (ER positive and HER2 negative) and iron deficiency anemia (receives IV iron infusions) presenting to  ED on 10/31/24 for abnormal lab result.     Her hemoglobin level was 13.0 on 9/16/2024 and dropped to 8.3 on 10/28/2024. She reports having intermittent black tarry stools since the past month and has been increasingly  short of breath upon exertion. She has also been experiencing generalized fatigue.  She saw a new pulmonologist one day prior to admission (Dr. Delgado Mitchell) who was concerned she looked pale and very short of breath with minimal effort and advised her to come to the ED.     She recalls having an EGD/colonoscopy around November 2023 which she says did not reveal a source of bleeding. She had a capsule endoscopy in Feb 2024 and is scheduled to have an appointment with Dr. Landry on 11/7/24 to review the results (she has been having a hard time getting earlier appointments).     She reports she has been stressed lately due to her dog passing away and her 's diagnosis of a melanoma on the face. Her IBS has been acting up. She denies abdominal pain, vaginal bleeding, hematochezia, palpitations, and chest pain. The last time she took Xarelto was 2 days prior to admission.     Upon arrival to the ED vitals were /91 HR 83 RR 18 T98.9F And SpO2 100% on room air. Labs significant for hemoglobin of 7.1. She received IV Protonix 80 mg x 1 and 1 unit of packed red blood cells.

## 2024-11-01 NOTE — CONSULT NOTE ADULT - ASSESSMENT
74 year old female with history of AF on Xarelto (last dose 10/29), GERD, IBS, large hiatal hernia, HLD, breast cancer, iron deficiency anemia (on infusions) presenting to [facility] with one month history of melena, worsening dyspnea on exertion, activity intolerance and persistent reflux with vomiting CGE with Hgb 13 about a month ago now 7's. History EGD/colonoscopy here in hospital 11/2023 unremarkable with exception of large hiatal hernia and diverticula/hemorrhoids on colonoscopy prompting outpatient capsule study, again unremarkable.     Imp: Symptomatic anemia 2/2 likely UGIB with melena and CGE on full dose anticoagulation    Rec:  ::2 x large bore IVs  ::Trend HH and transfuse to maintain Hgb >7  ::PPI IV BID  ::NPO  ::Plan for EGD for further evaluation

## 2024-11-01 NOTE — CONSULT NOTE ADULT - SUBJECTIVE AND OBJECTIVE BOX
HPI:  74-year-old female with a PMH of Afib (on Xarelto), CAD s/p 2 stents, hiatal hernia with GERD, IBS, hypertension, HLD, breast cancer (ER positive and HER2 negative) and iron deficiency anemia (receives IV iron infusions) presenting to  ED on 10/31/24 for abnormal lab result.     Her hemoglobin level was 13.0 on 9/16/2024 and dropped to 8.3 on 10/28/2024. She reports having intermittent black tarry stools since the past month and has been increasingly short of breath upon exertion. She has also been experiencing generalized fatigue.  She saw a new pulmonologist one day prior to admission (Dr. Delgado Mitchell) who was concerned about how she presented to look pale and very short of breath with minimal exertion and advised her to come to the ED.     11/01/24: Seen at bedside along with Dr. Song, Patient in no acute distress, but continues to feel tired, scheduled for scope with GI today.      PAST MEDICAL & SURGICAL HISTORY:    Coronary artery disease, angina presence unspecified, unspecified vessel or lesion type, unspecified whether native or transplanted heart    Transient cerebral ischemia, unspecified type      Non-ST elevation (NSTEMI) myocardial infarction      HTN (hypertension)      Anxiety      Breast cancer      Diverticulitis      Anemia      S/P drug eluting coronary stent placement  in 2008 and 2010 - total x4      H/O total mastectomy of right breast      Cataract, bilateral      FAMILY HISTORY:       lung cancer    breast cancer    bladder cancer (Sibling)    prostate cancer (Sibling)    heart disease (Sibling, Grandparent)  brother and grand mother      MEDICATIONS  (STANDING):    amLODIPine   Tablet 2.5 milliGRAM(s) Oral daily  calcium carbonate    500 mG (Tums) Chewable 1 Tablet(s) Chew two times a day  enalapril 10 milliGRAM(s) Oral two times a day  ezetimibe 10 milliGRAM(s) Oral at bedtime  pantoprazole  Injectable 40 milliGRAM(s) IV Push two times a day  potassium chloride  10 mEq/100 mL IVPB 10 milliEquivalent(s) IV Intermittent every 1 hour  sodium chloride 0.9%. 1000 milliLiter(s) (75 mL/Hr) IV Continuous <Continuous>    MEDICATIONS  (PRN):    acetaminophen     Tablet .. 650 milliGRAM(s) Oral every 6 hours PRN Temp greater or equal to 38C (100.4F), Mild Pain (1 - 3)  ALPRAZolam 1 milliGRAM(s) Oral three times a day PRN anxiety  zolpidem 5 milliGRAM(s) Oral at bedtime PRN Insomnia  zolpidem 5 milliGRAM(s) Oral at bedtime PRN Insomnia      Allergies    No Known Allergies    Intolerances      REVIEW OF SYSTEM:    Constitutional, Eyes, ENT, Cardiovascular, Respiratory, Gastrointestinal, Genitourinary, Musculoskeletal, Integumentary, Neurological, Psychiatric, Endocrine, Heme/Lymph and Allergic/Immunologic review of systems are otherwise negative except as noted in HPI.     Vital Signs Last 24 Hrs    T(C): 37.1 (01 Nov 2024 07:23), Max: 37.2 (31 Oct 2024 17:27)  T(F): 98.7 (01 Nov 2024 07:23), Max: 98.9 (31 Oct 2024 17:27)  HR: 83 (01 Nov 2024 07:23) (69 - 83)  BP: 155/80 (01 Nov 2024 07:23) (144/91 - 155/80)  BP(mean): 94 (01 Nov 2024 00:30) (94 - 95)  RR: 18 (01 Nov 2024 00:30) (17 - 18)  SpO2: 96% (01 Nov 2024 07:23) (94% - 100%)    Parameters below as of 01 Nov 2024 07:23  Patient On (Oxygen Delivery Method): room air    PHYSICAL EXAM:    Constitutional: no acute distress  Eyes: no conjunctival infection, anicteric.   ENT: pharynx is unremarkable  Neck: supple without JVD  Pulmonary: clear to auscultation bilaterally   Cardiac: RRR  Vascular: no calf tenderness, venous stasis changes, varices  Abdomen: normoactive bowel sounds, soft and nontender, no hepatosplenomegaly or masses appreciated  Lymphatic: no peripheral adenopathy appreciated  Musculoskeletal: full range of motion and no deformities appreciated  Skin: normal appearance, no rash/erythema  Neurology: awake, alert, oriented; no focal deficits      LABS:    CBC Full  -  ( 01 Nov 2024 08:16 )  WBC Count : 5.66 K/uL  RBC Count : 2.39 M/uL  Hemoglobin : 7.4 g/dL  Hematocrit : 22.7 %  Platelet Count - Automated : 322 K/uL  Mean Cell Volume : 95.0 fl  Mean Cell Hemoglobin : 31.0 pg  Mean Cell Hemoglobin Concentration : 32.6 g/dL  Auto Neutrophil # : x  Auto Lymphocyte # : x  Auto Monocyte # : x  Auto Eosinophil # : x  Auto Basophil # : x  Auto Neutrophil % : x  Auto Lymphocyte % : x  Auto Monocyte % : x  Auto Eosinophil % : x  Auto Basophil % : x    11-01    138  |  103  |  12  ----------------------------<  90  3.0[L]   |  28  |  0.44[L]    Ca    8.3[L]      01 Nov 2024 08:16  Phos  2.4     11-01  Mg     1.3     11-01    TPro  7.1  /  Alb  3.4  /  TBili  0.2  /  DBili  x   /  AST  8[L]  /  ALT  15  /  AlkPhos  48  10-31    PT/INR - ( 31 Oct 2024 17:44 )   PT: 12.8 sec;   INR: 1.09 ratio         PTT - ( 31 Oct 2024 17:44 )  PTT:28.1 sec  Urinalysis Basic - ( 01 Nov 2024 08:16 )    Color: x / Appearance: x / SG: x / pH: x  Gluc: 90 mg/dL / Ketone: x  / Bili: x / Urobili: x   Blood: x / Protein: x / Nitrite: x   Leuk Esterase: x / RBC: x / WBC x   Sq Epi: x / Non Sq Epi: x / Bacteria: x      RADIOLOGY & ADDITIONAL STUDIES:

## 2024-11-01 NOTE — CHART NOTE - NSCHARTNOTEFT_GEN_A_CORE
EGD revealed normal esophagus with irregular Z-line at 32 cm and a large 8cm hiatal hernia and a few small fundic gland polyps and normal duodenum. Scant bile in stomach and duodenum without any evidence of old or fresh blood and no evidence of erosions.     Recs:  - Okay for regular diet  - Okay to discontinue PPI  - Continue to monitor hemoglobin and transfuse if <7  - Continue to monitor stool output and for any vomiting  - Based on hemoglobin trend will consider further inpatient work-up with colonoscopy

## 2024-11-01 NOTE — H&P ADULT - NSHPPHYSICALEXAM_GEN_ALL_CORE
Vital Signs Last 24 Hrs  T(C): 36.7 (01 Nov 2024 00:30), Max: 37.2 (31 Oct 2024 17:27)  T(F): 98.1 (01 Nov 2024 00:30), Max: 98.9 (31 Oct 2024 17:27)  HR: 70 (01 Nov 2024 00:30) (69 - 83)  BP: 152/73 (01 Nov 2024 00:30) (144/91 - 152/83)  BP(mean): 94 (01 Nov 2024 00:30) (94 - 95)  RR: 18 (01 Nov 2024 00:30) (17 - 18)  SpO2: 94% (01 Nov 2024 00:30) (94% - 100%)    Parameters below as of 01 Nov 2024 00:30  Patient On (Oxygen Delivery Method): room air    GENERAL: NAD   HEAD:  Atraumatic  EYES: EOMI  ENT: Moist mucous membranes  NECK: Supple  CHEST/LUNG: Clear to auscultation bilaterally  HEART: Regular rate and rhythm  ABDOMEN: Soft, Nontender  EXTREMITIES: No clubbing, cyanosis, or edema  NERVOUS SYSTEM:  Alert & Oriented X3, speech clear. No deficits   MSK: FROM all 4 extremities  SKIN: No rashes or lesions

## 2024-11-01 NOTE — CONSULT NOTE ADULT - SUBJECTIVE AND OBJECTIVE BOX
CHIEF COMPLAINT: Patient is a 74y old  Female who presents with a chief complaint of symptomatic anemia   GI bleed (01 Nov 2024 00:30)      HPI:  Patient is a 74-year-old female with a PMH of Afib (on Xarelto), CAD s/p 2 stents, hiatal hernia with GERD, IBS, hypertension, HLD, breast cancer (ER positive and HER2 negative) and iron deficiency anemia (receives IV iron infusions) presenting to  ED on 10/31/24 for abnormal lab result.     Her hemoglobin level was 13.0 on 9/16/2024 and dropped to 8.3 on 10/28/2024. She reports having intermittent black tarry stools since the past month and has been increasingly  short of breath upon exertion. She has also been experiencing generalized fatigue.  She saw a new pulmonologist one day prior to admission (Dr. Delgado Mitchell) who was concerned she looked pale and very short of breath with minimal effort and advised her to come to the ED.     She recalls having an EGD/colonoscopy around November 2023 which she says did not reveal a source of bleeding. She had a capsule endoscopy in Feb 2024 and is scheduled to have an appointment with Dr. Landry on 11/7/24 to review the results (she has been having a hard time getting earlier appointments).     She reports she has been stressed lately due to her dog passing away and her 's diagnosis of a melanoma on the face. Her IBS has been acting up. She denies abdominal pain, vaginal bleeding, hematochezia, palpitations, and chest pain. The last time she took Xarelto was 2 days prior to admission.     Upon arrival to the ED vitals were /91 HR 83 RR 18 T98.9F And SpO2 100% on room air. Labs significant for hemoglobin of 7.1. She received IV Protonix 80 mg x 1 and 1 unit of packed red blood cells.   (01 Nov 2024 00:30)    11/1-patient well known to me; patient with myriad of cardiac complaints, however, patient with recent cardiac cath 9/30/2024 with non obstructive disease.  PAtient on xarelto for PAFib-      PMHx: PAST MEDICAL & SURGICAL HISTORY:  Coronary artery disease, angina presence unspecified, unspecified vessel or lesion type, unspecified whether native or transplanted heart      Transient cerebral ischemia, unspecified type      Non-ST elevation (NSTEMI) myocardial infarction      HTN (hypertension)      Anxiety      Breast cancer      Diverticulitis      Anemia      S/P drug eluting coronary stent placement  in 2008 and 2010 - total x4      H/O total mastectomy of right breast      Cataract, bilateral            Soc Hx:       Allergies: Allergies    No Known Allergies    Intolerances          REVIEW OF SYSTEMS:    CONSTITUTIONAL: No weakness, fevers or chills  EYES/ENT: No visual changes;  No vertigo or throat pain   NECK: No pain or stiffness  RESPIRATORY: No cough, wheezing, hemoptysis; No shortness of breath  CARDIOVASCULAR: No chest pain or palpitations  GASTROINTESTINAL: No abdominal or epigastric pain. No nausea, vomiting, or hematemesis; No diarrhea or constipation. No melena or hematochezia.  GENITOURINARY: No dysuria, frequency or hematuria  NEUROLOGICAL: No numbness or weakness  SKIN: No itching, burning, rashes, or lesions   All other review of systems is negative unless indicated above    Vital Signs Last 24 Hrs  T(C): 37.1 (01 Nov 2024 07:23), Max: 37.2 (31 Oct 2024 17:27)  T(F): 98.7 (01 Nov 2024 07:23), Max: 98.9 (31 Oct 2024 17:27)  HR: 83 (01 Nov 2024 07:23) (69 - 83)  BP: 155/80 (01 Nov 2024 07:23) (144/91 - 155/80)  BP(mean): 94 (01 Nov 2024 00:30) (94 - 95)  RR: 18 (01 Nov 2024 00:30) (17 - 18)  SpO2: 96% (01 Nov 2024 07:23) (94% - 100%)    Parameters below as of 01 Nov 2024 07:23  Patient On (Oxygen Delivery Method): room air        I&O's Summary      CAPILLARY BLOOD GLUCOSE          PHYSICAL EXAM:   Constitutional: NAD, awake and alert, well-developed  HEENT: PERR, EOMI, Normal Hearing, MMM  Neck: Soft and supple, No LAD, No JVD  Respiratory: Breath sounds are clear bilaterally, No wheezing, rales or rhonchi  Cardiovascular: S1 and S2, regular rate and rhythm, no Murmurs, gallops or rubs  Gastrointestinal: Bowel Sounds present, soft, nontender, nondistended, no guarding, no rebound  Extremities: No peripheral edema  Vascular: 2+ peripheral pulses  Neurological: A/O x 3, no focal deficits  Musculoskeletal: 5/5 strength b/l upper and lower extremities  Skin: No rashes    MEDICATIONS:  MEDICATIONS  (STANDING):  amLODIPine   Tablet 2.5 milliGRAM(s) Oral daily  calcium carbonate    500 mG (Tums) Chewable 1 Tablet(s) Chew two times a day  enalapril 10 milliGRAM(s) Oral two times a day  ezetimibe 10 milliGRAM(s) Oral at bedtime  pantoprazole  Injectable 40 milliGRAM(s) IV Push two times a day  potassium chloride    Tablet ER 40 milliEquivalent(s) Oral every 4 hours  sodium chloride 0.9%. 1000 milliLiter(s) (75 mL/Hr) IV Continuous <Continuous>      LABS: All Labs Reviewed:                        7.4    5.66  )-----------( 322      ( 01 Nov 2024 08:16 )             22.7     11-01    138  |  103  |  12  ----------------------------<  90  3.0[L]   |  28  |  0.44[L]    Ca    8.3[L]      01 Nov 2024 08:16  Phos  2.4     11-01  Mg     1.3     11-01    TPro  7.1  /  Alb  3.4  /  TBili  0.2  /  DBili  x   /  AST  8[L]  /  ALT  15  /  AlkPhos  48  10-31    PT/INR - ( 31 Oct 2024 17:44 )   PT: 12.8 sec;   INR: 1.09 ratio         PTT - ( 31 Oct 2024 17:44 )  PTT:28.1 sec        Blood Culture:   lipid profile       RADIOLOGY:    EKG:    Telemetry:    ECHO:    CARDIAC CATH  Patient: KALEB HAMMOND           MRN: [id number]  Study Date: 09/30/2024   08:34 AM      Page 1 of 4          Diagnostic Findings:     Coronary Angiography   The coronary circulation is right dominant.      LM   Left main artery: Angiography shows mild atherosclerosis.      LAD   Proximal left anterior descending: Angiography shows mild  atherosclerosis. There is a 30 % stenosis. Mid left anterior  descending: There is a 30 % stenosis. The previously placed stent is a  drug-eluting stent and is patent.    CX   Circumflex: Angiography shows mild atherosclerosis.      RCA   Distal right coronary artery: Angiography shows mild atherosclerosis.  The previously placed stent is a drug-eluting stent and is  patent. Right posterior descending artery: There is a 30 % stenosis.  The previously placed stent is a drug-eluting stent and is  patent.      Left Heart Cath   Left ventricular function was assessed. Ejection fraction was visually  estimated by estimation. LV to AO pullback was  performed. LHC performed: Aortic valve crossed and left ventricular  pressure obtained.

## 2024-11-01 NOTE — CONSULT NOTE ADULT - ASSESSMENT
patient is low risk for colonoscopy from cardiac view and may proceed without further cardiac evaluation  -hold AC pending findings; repair issue prior to restarting AC  -patient is low risk for any procedure or surgery that may be needed to treat blood loss

## 2024-11-01 NOTE — SBIRT NOTE ADULT - NSSBIRTALCPOSREINDET_GEN_A_CORE
Pt reports to have a drink of wine with dinner, and at times, will have a shot of rum.  Denies need for resources/referrals. Education on healthy lifestyle provided.

## 2024-11-01 NOTE — CONSULT NOTE ADULT - ASSESSMENT
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        # Symptomatic acute anemia likely 2/2 GIB    - Hgb- 8.3 g/dl, dropped form 13.0 g/dl a month ago  - In ED Hgb- 7.1 g/dl--> s/p 1 unit of PRBC--> Hgb as on 11/1/24- 7.4 g/dl  - Most recent outpatient iron w/u revealed low Iron Sat and low normal ferritin  - Reports exertional SOB and black tarry stool.  - Etiology: likely GIB, compounded by AC (on hold now), underlying SHARMAINE  - GI w/u with EGD/Colonoscopy- 11/2023- with no acute findings, other than hiatal hernia & moderate diverticulosis in the left side of the colon.  - Follows outpatient with Domenico SILVA for SHARMAINE and has received IV Feraheme most recent received on 8/20/24  - GI consulted--> plan to scope today  - Likely need more transfusional support & IV Venofer after the GI procedure.  - Trend CBC      Heather Lowe DNP, FNP-C  Hematology/Oncology 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        # Symptomatic acute anemia likely 2/2 GIB    - Hgb- 8.3 g/dl, dropped form 13.0 g/dl a month ago  - In ED Hgb- 7.1 g/dl--> s/p 1 unit of PRBC--> Hgb as on 11/1/24- 7.4 g/dl  - Most recent outpatient iron w/u revealed low Iron Sat and low normal ferritin  - Reports exertional SOB and black tarry stool.  - Etiology: likely GIB, compounded by AC (on hold now), underlying SHARMAINE  - GI w/u with EGD/Colonoscopy- 11/2023- with no acute findings, other than hiatal hernia & moderate diverticulosis in the left side of the colon.  - Follows outpatient with Domenico SILVA for SHARMAINE and has received IV Feraheme most recent received on 8/20/24  - GI consulted--> plan to scope today  - Rec additional unit of PRBC & IV Venofer after the GI procedure.  - Trend CBC      Heather Lowe DNP, FNP-C  Hematology/Oncology [facility]  [phone]

## 2024-11-01 NOTE — H&P ADULT - ASSESSMENT
#Sympotomatic anemia   #Possible GI bleed   - Had a hemoglobin of 13.0 on 9/16/2024 which dropped to 8.3 on 10/28/2024  – Upon admission now with a hemoglobin of 7.1  – s/p 1 unit packed red blood cell in the ED  – Continue to monitor CBC every 6  – Transfuse for hemoglobin less than 8 (Given her cardiac history)  – Continue IV Protonix 40 mg twice daily  - Hold Xarelto   – Follow-up GI consult  – Follow-up cardio consult for likely clearance prior to any planned GI procedure    #Non-obstructive CAD   #HLD  - Underwent cardiac cath on 9/30/24: found to have mild diffuse atherosclerosis of LAD and RCA, with patent LAD/RCA stents.  – Holding patient's aspirin in light of GI bleed  - Continue Ezetimibe 10 mg at bedtime    #Hypertension  – Continue enalapril 10 mg twice daily  – Continue amlodipine 2.5 mg daily    #Hypokalemia   - Upon admission with K+ 2.4   - F/u AM BMP after supplementation     #DVT prophylaxis  – Holding Xarelto in light of GI bleed  – SCDs   #Sympotomatic anemia/APPLE  #Possible GI bleed   - Had a hemoglobin of 13.0 on 9/16/2024 which dropped to 8.3 on 10/28/2024  – Upon admission now with a hemoglobin of 7.1  – s/p 1 unit packed red blood cell in the ED  – Continue to monitor CBC every 6  – Transfuse for hemoglobin less than 8 (Given her cardiac history)  – Continue IV Protonix 40 mg twice daily  - Hold Xarelto   – Follow-up GI consult  – Follow-up cardio consult for likely clearance prior to any planned GI procedure  - Would like to order TTE - APPLE could be due to underlying pulmonary HTN     #Non-obstructive CAD   #HLD  - Underwent cardiac cath on 9/30/24: found to have mild diffuse atherosclerosis of LAD and RCA, with patent LAD/RCA stents.  – Holding patient's aspirin in light of GI bleed  - Continue Ezetimibe 10 mg at bedtime    #Hypertension  – Continue enalapril 10 mg twice daily  – Continue amlodipine 2.5 mg daily    #Hypokalemia   - Upon admission with K+ 2.4   - F/u AM BMP after supplementation     #DVT prophylaxis  – Holding Xarelto in light of GI bleed  – SCDs   Symptomatic anemia/APPLE  #Possible GI bleed   - Had a hemoglobin of 13.0 on 9/16/2024 which dropped to 8.3 on 10/28/2024  – Upon admission now with a hemoglobin of 7.1  – s/p 1 unit packed red blood cell in the ED  – Continue to monitor CBC every 6  – Transfuse for hemoglobin less than 8 (Given her cardiac history)  – Continue IV Protonix 40 mg twice daily  - Hold Xarelto   – Follow-up GI consult  – Follow-up cardio consult for likely clearance prior to any planned GI procedure  - Would like to order TTE - APPLE could be due to underlying pulmonary HTN     #Non-obstructive CAD   #HLD  - Underwent cardiac cath on 9/30/24: found to have mild diffuse atherosclerosis of LAD and RCA, with patent LAD/RCA stents.  – Holding patient's aspirin in light of GI bleed  - Continue Ezetimibe 10 mg at bedtime    #Hypertension  – Continue enalapril 10 mg twice daily  – Continue amlodipine 2.5 mg daily    #Hypokalemia   - Upon admission with K+ 2.4   - F/u AM BMP after supplementation     #DVT prophylaxis  – Holding Xarelto in light of GI bleed  – SCDs

## 2024-11-02 LAB
ANION GAP SERPL CALC-SCNC: 4 MMOL/L — LOW (ref 5–17)
BUN SERPL-MCNC: 10 MG/DL — SIGNIFICANT CHANGE UP (ref 7–23)
CALCIUM SERPL-MCNC: 8.4 MG/DL — LOW (ref 8.5–10.1)
CHLORIDE SERPL-SCNC: 108 MMOL/L — SIGNIFICANT CHANGE UP (ref 96–108)
CO2 SERPL-SCNC: 28 MMOL/L — SIGNIFICANT CHANGE UP (ref 22–31)
CREAT SERPL-MCNC: 0.44 MG/DL — LOW (ref 0.5–1.3)
EGFR: 100 ML/MIN/1.73M2 — SIGNIFICANT CHANGE UP
GLUCOSE SERPL-MCNC: 103 MG/DL — HIGH (ref 70–99)
HCT VFR BLD CALC: 23.6 % — LOW (ref 34.5–45)
HGB BLD-MCNC: 7.6 G/DL — LOW (ref 11.5–15.5)
MCHC RBC-ENTMCNC: 30.4 PG — SIGNIFICANT CHANGE UP (ref 27–34)
MCHC RBC-ENTMCNC: 32.2 G/DL — SIGNIFICANT CHANGE UP (ref 32–36)
MCV RBC AUTO: 94.4 FL — SIGNIFICANT CHANGE UP (ref 80–100)
PLATELET # BLD AUTO: 353 K/UL — SIGNIFICANT CHANGE UP (ref 150–400)
POTASSIUM SERPL-MCNC: 3.5 MMOL/L — SIGNIFICANT CHANGE UP (ref 3.5–5.3)
POTASSIUM SERPL-SCNC: 3.5 MMOL/L — SIGNIFICANT CHANGE UP (ref 3.5–5.3)
RBC # BLD: 2.5 M/UL — LOW (ref 3.8–5.2)
RBC # FLD: 15.5 % — HIGH (ref 10.3–14.5)
SODIUM SERPL-SCNC: 140 MMOL/L — SIGNIFICANT CHANGE UP (ref 135–145)
WBC # BLD: 5.69 K/UL — SIGNIFICANT CHANGE UP (ref 3.8–10.5)
WBC # FLD AUTO: 5.69 K/UL — SIGNIFICANT CHANGE UP (ref 3.8–10.5)

## 2024-11-02 PROCEDURE — 99233 SBSQ HOSP IP/OBS HIGH 50: CPT

## 2024-11-02 RX ADMIN — Medication 1 MILLIGRAM(S): at 20:44

## 2024-11-02 RX ADMIN — Medication 650 MILLIGRAM(S): at 09:37

## 2024-11-02 RX ADMIN — Medication 650 MILLIGRAM(S): at 08:24

## 2024-11-02 RX ADMIN — Medication 1 TABLET(S): at 08:24

## 2024-11-02 RX ADMIN — Medication 1 MILLIGRAM(S): at 08:24

## 2024-11-02 RX ADMIN — Medication 650 MILLIGRAM(S): at 20:44

## 2024-11-02 RX ADMIN — Medication 650 MILLIGRAM(S): at 21:37

## 2024-11-02 RX ADMIN — PANTOPRAZOLE SODIUM 40 MILLIGRAM(S): 40 TABLET, DELAYED RELEASE ORAL at 06:41

## 2024-11-02 RX ADMIN — EZETIMIBE 10 MILLIGRAM(S): 10 TABLET ORAL at 20:45

## 2024-11-02 RX ADMIN — Medication 1 TABLET(S): at 20:47

## 2024-11-02 RX ADMIN — Medication 10 MILLIGRAM(S): at 20:45

## 2024-11-02 NOTE — PROGRESS NOTE ADULT - ASSESSMENT
# Acute blood loss anemia secondary to suspected GI bleed in a setting of AC  - endoscopy 11/1  – s/p 1 unit packed red blood cell in the ED  – Transfuse for hemoglobin less than 8 (Given her cardiac history)  – Continue IV Protonix 40 mg twice daily  - Hold Xarelto   – Follow-up cardio consult for likely clearance prior to any planned GI procedure  - Would like to order TTE - APPLE could be due to underlying pulmonary HTN     #Non-obstructive CAD   #HLD  - Underwent cardiac cath on 9/30/24: found to have mild diffuse atherosclerosis of LAD and RCA, with patent LAD/RCA stents.  – Holding patient's aspirin in light of GI bleed  - Continue Ezetimibe 10 mg at bedtime    #Hypertension  – Continue enalapril 10 mg twice daily  – Continue amlodipine 2.5 mg daily    #Hypokalemia   - Upon admission with K+ 2.4   - F/u AM BMP after supplementation     #DVT prophylaxis  – Holding Xarelto in light of GI bleed  – SCDs

## 2024-11-03 LAB
ANION GAP SERPL CALC-SCNC: 5 MMOL/L — SIGNIFICANT CHANGE UP (ref 5–17)
BUN SERPL-MCNC: 9 MG/DL — SIGNIFICANT CHANGE UP (ref 7–23)
CALCIUM SERPL-MCNC: 9.2 MG/DL — SIGNIFICANT CHANGE UP (ref 8.5–10.1)
CHLORIDE SERPL-SCNC: 107 MMOL/L — SIGNIFICANT CHANGE UP (ref 96–108)
CO2 SERPL-SCNC: 27 MMOL/L — SIGNIFICANT CHANGE UP (ref 22–31)
CREAT SERPL-MCNC: 0.44 MG/DL — LOW (ref 0.5–1.3)
EGFR: 100 ML/MIN/1.73M2 — SIGNIFICANT CHANGE UP
GLUCOSE SERPL-MCNC: 102 MG/DL — HIGH (ref 70–99)
HCT VFR BLD CALC: 26.6 % — LOW (ref 34.5–45)
HGB BLD-MCNC: 8.4 G/DL — LOW (ref 11.5–15.5)
MCHC RBC-ENTMCNC: 29.7 PG — SIGNIFICANT CHANGE UP (ref 27–34)
MCHC RBC-ENTMCNC: 31.6 G/DL — LOW (ref 32–36)
MCV RBC AUTO: 94 FL — SIGNIFICANT CHANGE UP (ref 80–100)
PLATELET # BLD AUTO: 379 K/UL — SIGNIFICANT CHANGE UP (ref 150–400)
POTASSIUM SERPL-MCNC: 3.4 MMOL/L — LOW (ref 3.5–5.3)
POTASSIUM SERPL-SCNC: 3.4 MMOL/L — LOW (ref 3.5–5.3)
RBC # BLD: 2.83 M/UL — LOW (ref 3.8–5.2)
RBC # FLD: 14.6 % — HIGH (ref 10.3–14.5)
SODIUM SERPL-SCNC: 139 MMOL/L — SIGNIFICANT CHANGE UP (ref 135–145)
WBC # BLD: 4 K/UL — SIGNIFICANT CHANGE UP (ref 3.8–10.5)
WBC # FLD AUTO: 4 K/UL — SIGNIFICANT CHANGE UP (ref 3.8–10.5)

## 2024-11-03 PROCEDURE — 99232 SBSQ HOSP IP/OBS MODERATE 35: CPT | Mod: 25

## 2024-11-03 PROCEDURE — 99232 SBSQ HOSP IP/OBS MODERATE 35: CPT

## 2024-11-03 RX ORDER — RIVAROXABAN 20 MG/1
20 TABLET, FILM COATED ORAL DAILY
Refills: 0 | Status: DISCONTINUED | OUTPATIENT
Start: 2024-11-03 | End: 2024-11-04

## 2024-11-03 RX ORDER — POTASSIUM CHLORIDE 10 MEQ
40 TABLET, EXTENDED RELEASE ORAL ONCE
Refills: 0 | Status: COMPLETED | OUTPATIENT
Start: 2024-11-03 | End: 2024-11-03

## 2024-11-03 RX ADMIN — Medication 650 MILLIGRAM(S): at 09:30

## 2024-11-03 RX ADMIN — Medication 1 TABLET(S): at 20:33

## 2024-11-03 RX ADMIN — Medication 1 MILLIGRAM(S): at 20:33

## 2024-11-03 RX ADMIN — Medication 1 TABLET(S): at 08:48

## 2024-11-03 RX ADMIN — Medication 10 MILLIGRAM(S): at 20:33

## 2024-11-03 RX ADMIN — Medication 650 MILLIGRAM(S): at 08:48

## 2024-11-03 RX ADMIN — RIVAROXABAN 20 MILLIGRAM(S): 20 TABLET, FILM COATED ORAL at 12:12

## 2024-11-03 RX ADMIN — Medication 1 MILLIGRAM(S): at 08:50

## 2024-11-03 RX ADMIN — Medication 2.5 MILLIGRAM(S): at 08:48

## 2024-11-03 RX ADMIN — Medication 650 MILLIGRAM(S): at 20:33

## 2024-11-03 RX ADMIN — Medication 10 MILLIGRAM(S): at 08:48

## 2024-11-03 RX ADMIN — Medication 40 MILLIEQUIVALENT(S): at 12:13

## 2024-11-03 RX ADMIN — EZETIMIBE 10 MILLIGRAM(S): 10 TABLET ORAL at 20:35

## 2024-11-03 NOTE — PROGRESS NOTE ADULT - ASSESSMENT
# Acute blood loss anemia secondary to suspected GI bleed in a setting of AC  – s/p 1 unit packed red blood cell in the ED  – Transfuse for hemoglobin less than 8 (Given her cardiac history) has been stable since  - endoscopy 11/1 no acute findings - stopped ppi discussed restarting ac with gi, will watch today plans for outpt scope if no bleeding      #Non-obstructive CAD   #HLD  - Underwent cardiac cath on 9/30/24: found to have mild diffuse atherosclerosis of LAD and RCA, with patent LAD/RCA stents.  – Holding patient's aspirin in light of GI bleed  - Continue Ezetimibe 10 mg at bedtime    #Hypertension  – Continue enalapril 10 mg twice daily  – Continue amlodipine 2.5 mg daily    #Hypokalemia   - Upon admission with K+ 2.4   - F/u AM BMP after supplementation     #DVT prophylaxis  – restarted xarelto today

## 2024-11-03 NOTE — PROGRESS NOTE ADULT - REASON FOR ADMISSION
symptomatic anemia   GI bleed

## 2024-11-03 NOTE — PROGRESS NOTE ADULT - ASSESSMENT
74 year old female with history of AF on Xarelto (last dose 10/29), GERD, IBS, large hiatal hernia, HLD, breast cancer, iron deficiency anemia (on infusions) presenting to [facility] with one month history of melena, worsening dyspnea on exertion, activity intolerance and persistent reflux with vomiting CGE with Hgb 13 about a month ago now 7's. History EGD/colonoscopy here in hospital 11/2023 unremarkable with exception of large hiatal hernia and diverticula/hemorrhoids on colonoscopy prompting outpatient capsule study, again unremarkable. S/p EGD on 11/1 revealing normal esophagus with irregular Z-line at 32 cm and a large 8cm hiatal hernia and a few small fundic gland polyps and normal duodenum. Scant bile in stomach and duodenum without any evidence of old or fresh blood and no evidence of erosions.     #Acute on chronic iron deficiency anemia  #Reported melena and CGE, resolved    Recs:  - Continue regular diet  - Continue to monitor hemoglobin and transfuse if <7  - Patient wanting to refrain from inpatient colonoscopy. If patient not wanting inpatient colonoscopy and remains stable would recommend starting blood thinner and watching Hb today. If stable and no signs of bleeding we can consider outpatient colonoscopy with strict return instructions. However if signs of bleeding would recommend inpatient colonoscopy given EGD was unremarkable.  - Continue to monitor stool output and for any vomiting  - Recommend iron supplementation

## 2024-11-03 NOTE — PROGRESS NOTE ADULT - SUBJECTIVE AND OBJECTIVE BOX
Reason for Admission: symptomatic anemia   GI bleed  History of Present Illness:   Patient is a 74-year-old female with a PMH of Afib (on Xarelto), CAD s/p 2 stents, hiatal hernia with GERD, IBS, hypertension, HLD, breast cancer (ER positive and HER2 negative) and iron deficiency anemia (receives IV iron infusions) presenting to  ED on 10/31/24 for abnormal lab result.     Her hemoglobin level was 13.0 on 9/16/2024 and dropped to 8.3 on 10/28/2024. She reports having intermittent black tarry stools since the past month and has been increasingly  short of breath upon exertion. She has also been experiencing generalized fatigue.  She saw a new pulmonologist one day prior to admission (Dr. Delgado Mitchell) who was concerned she looked pale and very short of breath with minimal effort and advised her to come to the ED.     She recalls having an EGD/colonoscopy around November 2023 which she says did not reveal a source of bleeding. She had a capsule endoscopy in Feb 2024 and is scheduled to have an appointment with Dr. Landry on 11/7/24 to review the results (she has been having a hard time getting earlier appointments).     She reports she has been stressed lately due to her dog passing away and her 's diagnosis of a melanoma on the face. Her IBS has been acting up. She denies abdominal pain, vaginal bleeding, hematochezia, palpitations, and chest pain. The last time she took Xarelto was 2 days prior to admission.     anticipated endoscopy today    REVIEW OF SYSTEMS:  General: NAD, hemodynamically stable   HEENT:  Eyes:  No visual loss, blurred vision, double vision or yellow sclerae. Ears, Nose, Throat:  No hearing loss, sneezing, congestion, runny nose or sore throat.  SKIN:  No rash or itching.  CARDIOVASCULAR:  No chest pain, chest pressure or chest discomfort. No palpitations or edema.  RESPIRATORY:  No shortness of breath, cough or sputum.  GASTROINTESTINAL:  No anorexia, nausea, vomiting or diarrhea. No abdominal pain or blood.  NEUROLOGICAL:  No headache, dizziness, syncope, paralysis, ataxia, numbness or tingling in the extremities. No change in bowel or bladder control.  MUSCULOSKELETAL:  No muscle, back pain, joint pain or stiffness.  HEMATOLOGIC:  No anemia, bleeding or bruising.  LYMPHATICS:  No enlarged nodes. No history of splenectomy.  ENDOCRINOLOGIC:  No reports of sweating, cold or heat intolerance. No polyuria or polydipsia.  ALLERGIES:  No history of asthma, hives, eczema or rhinitis.    Physical Exam:   GENERAL APPEARANCE:  NAD, hemodynamically stable  T(C): 37.1 (11-01-24 @ 07:23), Max: 37.2 (10-31-24 @ 17:27)  HR: 83 (11-01-24 @ 07:23) (69 - 83)  BP: 155/80 (11-01-24 @ 07:23) (144/91 - 155/80)  RR: 18 (11-01-24 @ 00:30) (17 - 18)  SpO2: 96% (11-01-24 @ 07:23) (94% - 100%)  Wt(kg): --  HEENT:  Head is normocephalic    Skin:  Warm and dry without any rash   NECK:  Supple without lymphadenopathy.   HEART:  Regular rate and rhythm. normal S1 and S2, No M/R/G  LUNGS:  Good ins/exp effort, no W/R/R/C  ABDOMEN:  Soft, nontender, nondistended with good bowel sounds heard  EXTREMITIES:  Without cyanosis, clubbing or edema.   NEUROLOGICAL:  Gross nonfocal       CBC Full  -  ( 01 Nov 2024 08:16 )  WBC Count : 5.66 K/uL  RBC Count : 2.39 M/uL  Hemoglobin : 7.4 g/dL  Hematocrit : 22.7 %  Platelet Count - Automated : 322 K/uL  Mean Cell Volume : 95.0 fl  Mean Cell Hemoglobin : 31.0 pg  Mean Cell Hemoglobin Concentration : 32.6 g/dL  Auto Neutrophil # : x  Auto Lymphocyte # : x  Auto Monocyte # : x  Auto Eosinophil # : x  Auto Basophil # : x  Auto Neutrophil % : x  Auto Lymphocyte % : x  Auto Monocyte % : x  Auto Eosinophil % : x  Auto Basophil % : x    PT/INR - ( 31 Oct 2024 17:44 )   PT: 12.8 sec;   INR: 1.09 ratio         PTT - ( 31 Oct 2024 17:44 )  PTT:28.1 sec  Urinalysis Basic - ( 01 Nov 2024 12:44 )    Color: x / Appearance: x / SG: x / pH: x  Gluc: 91 mg/dL / Ketone: x  / Bili: x / Urobili: x   Blood: x / Protein: x / Nitrite: x   Leuk Esterase: x / RBC: x / WBC x   Sq Epi: x / Non Sq Epi: x / Bacteria: x      11-01    140  |  105  |  11  ----------------------------<  91  3.7   |  29  |  0.42[L]    Ca    8.4[L]      01 Nov 2024 12:44  Phos  2.4     11-01  Mg     1.3     11-01    TPro  7.1  /  Alb  3.4  /  TBili  0.2  /  DBili  x   /  AST  8[L]  /  ALT  15  /  AlkPhos  48  10-31      # Acute blood loss anemia secondary to suspected GI bleed in a setting of AC  - endoscopy today  – s/p 1 unit packed red blood cell in the ED  – Transfuse for hemoglobin less than 8 (Given her cardiac history)  – Continue IV Protonix 40 mg twice daily  - Hold Xarelto   – Follow-up cardio consult for likely clearance prior to any planned GI procedure  - Would like to order TTE - APPLE could be due to underlying pulmonary HTN     #Non-obstructive CAD   #HLD  - Underwent cardiac cath on 9/30/24: found to have mild diffuse atherosclerosis of LAD and RCA, with patent LAD/RCA stents.  – Holding patient's aspirin in light of GI bleed  - Continue Ezetimibe 10 mg at bedtime    #Hypertension  – Continue enalapril 10 mg twice daily  – Continue amlodipine 2.5 mg daily    #Hypokalemia   - Upon admission with K+ 2.4   - F/u AM BMP after supplementation     #DVT prophylaxis  – Holding Xarelto in light of GI bleed  – SCDs        
CC: Patient is a 74y old  Female who presents with a chief complaint of symptomatic anemia   GI bleed (03 Nov 2024 11:13)      INTERVAL EVENTS: FOREST    SUBJECTIVE / INTERVAL HPI: Patient seen and examined at bedside. Patient states she would like to be dc to vote - discussed watching today on xarelto if stable dc tomm patient agreeable, no black stool or vomiting since egd    ROS: negative unless otherwise stated above.    VITAL SIGNS:  Vital Signs Last 24 Hrs  T(C): 37.1 (03 Nov 2024 08:47), Max: 37.1 (03 Nov 2024 08:47)  T(F): 98.7 (03 Nov 2024 08:47), Max: 98.7 (03 Nov 2024 08:47)  HR: 93 (03 Nov 2024 08:47) (89 - 93)  BP: 131/76 (03 Nov 2024 08:47) (131/76 - 132/90)  BP(mean): --  RR: 18 (03 Nov 2024 08:47) (18 - 18)  SpO2: 92% (03 Nov 2024 08:47) (92% - 96%)    Parameters below as of 03 Nov 2024 08:47  Patient On (Oxygen Delivery Method): room air            PHYSICAL EXAM:    General: NAD  HEENT: MMM  Neck: supple  Cardiovascular: +S1/S2; RRR  Respiratory: CTA B/L; no W/R/R  Gastrointestinal: soft, NT/ND  Extremities: WWP; no edema, clubbing or cyanosis  Vascular: 2+ radial, DP/PT pulses B/L  Neurological: AAOx3; no focal deficits    MEDICATIONS:  MEDICATIONS  (STANDING):  amLODIPine   Tablet 2.5 milliGRAM(s) Oral daily  calcium carbonate    500 mG (Tums) Chewable 1 Tablet(s) Chew two times a day  enalapril 10 milliGRAM(s) Oral two times a day  ezetimibe 10 milliGRAM(s) Oral at bedtime  rivaroxaban 20 milliGRAM(s) Oral daily    MEDICATIONS  (PRN):  acetaminophen     Tablet .. 650 milliGRAM(s) Oral every 6 hours PRN Temp greater or equal to 38C (100.4F), Mild Pain (1 - 3)  ALPRAZolam 1 milliGRAM(s) Oral three times a day PRN anxiety  zolpidem 5 milliGRAM(s) Oral at bedtime PRN Insomnia  zolpidem 5 milliGRAM(s) Oral at bedtime PRN Insomnia      ALLERGIES:  Allergies    No Known Allergies    Intolerances        LABS:                        8.4    4.00  )-----------( 379      ( 03 Nov 2024 08:16 )             26.6     11-03    139  |  107  |  9   ----------------------------<  102[H]  3.4[L]   |  27  |  0.44[L]    Ca    9.2      03 Nov 2024 08:16        Urinalysis Basic - ( 03 Nov 2024 08:16 )    Color: x / Appearance: x / SG: x / pH: x  Gluc: 102 mg/dL / Ketone: x  / Bili: x / Urobili: x   Blood: x / Protein: x / Nitrite: x   Leuk Esterase: x / RBC: x / WBC x   Sq Epi: x / Non Sq Epi: x / Bacteria: x      CAPILLARY BLOOD GLUCOSE          RADIOLOGY & ADDITIONAL TESTS: Reviewed.
Patient is a 74y old  Female who presents with a chief complaint of symptomatic anemia   GI bleed (01 Nov 2024 14:11)      INTERVAL HPI/OVERNIGHT EVENTS: Patient seen and examined at bedside. No overnight events. Tolerating diet. Denies fever, chills, chest pain, shortness of breath, abdominal pain, nausea/vomiting, headache.    MEDICATIONS  (STANDING):  amLODIPine   Tablet 2.5 milliGRAM(s) Oral daily  calcium carbonate    500 mG (Tums) Chewable 1 Tablet(s) Chew two times a day  enalapril 10 milliGRAM(s) Oral two times a day  ezetimibe 10 milliGRAM(s) Oral at bedtime  pantoprazole  Injectable 40 milliGRAM(s) IV Push two times a day  sodium chloride 0.9%. 1000 milliLiter(s) (75 mL/Hr) IV Continuous <Continuous>    MEDICATIONS  (PRN):  acetaminophen     Tablet .. 650 milliGRAM(s) Oral every 6 hours PRN Temp greater or equal to 38C (100.4F), Mild Pain (1 - 3)  ALPRAZolam 1 milliGRAM(s) Oral three times a day PRN anxiety  zolpidem 5 milliGRAM(s) Oral at bedtime PRN Insomnia  zolpidem 5 milliGRAM(s) Oral at bedtime PRN Insomnia      Allergies    No Known Allergies    Intolerances        REVIEW OF SYSTEMS:  CONSTITUTIONAL: No fever or chills  HEENT:  No headache, no sore throat  RESPIRATORY: No cough, wheezing, or shortness of breath  CARDIOVASCULAR: No chest pain, palpitations  GASTROINTESTINAL: No abd pain, nausea, vomiting, or diarrhea  GENITOURINARY: No dysuria, frequency, or hematuria  NEUROLOGICAL: no focal weakness or dizziness  MUSCULOSKELETAL: no myalgias     Vital Signs Last 24 Hrs  T(C): 36.7 (02 Nov 2024 09:41), Max: 37.2 (01 Nov 2024 21:07)  T(F): 98 (02 Nov 2024 09:41), Max: 98.9 (01 Nov 2024 21:07)  HR: 53 (02 Nov 2024 09:41) (53 - 82)  BP: 103/73 (02 Nov 2024 09:41) (103/73 - 127/81)  BP(mean): --  RR: 18 (02 Nov 2024 09:41) (17 - 18)  SpO2: 91% (02 Nov 2024 09:41) (91% - 97%)    Parameters below as of 02 Nov 2024 09:41  Patient On (Oxygen Delivery Method): room air        PHYSICAL EXAM:  GENERAL: NAD  HEENT:  anicteric, moist mucous membranes  CHEST/LUNG:  CTA b/l, no rales, wheezes, or rhonchi  HEART:  RRR, S1, S2  ABDOMEN:  BS+, soft, nontender, nondistended  EXTREMITIES: no edema, cyanosis, or calf tenderness  NERVOUS SYSTEM: answers questions and follows commands appropriately    LABS:                        7.6    5.69  )-----------( 353      ( 02 Nov 2024 11:19 )             23.6     CBC Full  -  ( 02 Nov 2024 11:19 )  WBC Count : 5.69 K/uL  Hemoglobin : 7.6 g/dL  Hematocrit : 23.6 %  Platelet Count - Automated : 353 K/uL  Mean Cell Volume : 94.4 fl  Mean Cell Hemoglobin : 30.4 pg  Mean Cell Hemoglobin Concentration : 32.2 g/dL  Auto Neutrophil # : x  Auto Lymphocyte # : x  Auto Monocyte # : x  Auto Eosinophil # : x  Auto Basophil # : x  Auto Neutrophil % : x  Auto Lymphocyte % : x  Auto Monocyte % : x  Auto Eosinophil % : x  Auto Basophil % : x    02 Nov 2024 11:19    140    |  108    |  10     ----------------------------<  103    3.5     |  28     |  0.44     Ca    8.4        02 Nov 2024 11:19      PT/INR - ( 31 Oct 2024 17:44 )   PT: 12.8 sec;   INR: 1.09 ratio         PTT - ( 31 Oct 2024 17:44 )  PTT:28.1 sec  Urinalysis Basic - ( 02 Nov 2024 11:19 )    Color: x / Appearance: x / SG: x / pH: x  Gluc: 103 mg/dL / Ketone: x  / Bili: x / Urobili: x   Blood: x / Protein: x / Nitrite: x   Leuk Esterase: x / RBC: x / WBC x   Sq Epi: x / Non Sq Epi: x / Bacteria: x      CAPILLARY BLOOD GLUCOSE              RADIOLOGY & ADDITIONAL TESTS:    Personally reviewed.     Consultant(s) Notes Reviewed:  [x] YES  [ ] NO    
Patient is a 74y old  Female who presents with a chief complaint of symptomatic anemia   GI bleed (02 Nov 2024 14:36)    Tolerating diet  S/p EGD on Friday  Scant BM yesterday which was dark in color  No BRBPR or melena reported  Stable Hb  Denies abdominal pain, nausea, or vomiting  Pt wanting to go home    MEDICATIONS  (STANDING):  amLODIPine   Tablet 2.5 milliGRAM(s) Oral daily  calcium carbonate    500 mG (Tums) Chewable 1 Tablet(s) Chew two times a day  enalapril 10 milliGRAM(s) Oral two times a day  ezetimibe 10 milliGRAM(s) Oral at bedtime  potassium chloride   Powder 40 milliEquivalent(s) Oral once  rivaroxaban 20 milliGRAM(s) Oral daily    MEDICATIONS  (PRN):  acetaminophen     Tablet .. 650 milliGRAM(s) Oral every 6 hours PRN Temp greater or equal to 38C (100.4F), Mild Pain (1 - 3)  ALPRAZolam 1 milliGRAM(s) Oral three times a day PRN anxiety  zolpidem 5 milliGRAM(s) Oral at bedtime PRN Insomnia  zolpidem 5 milliGRAM(s) Oral at bedtime PRN Insomnia      Vital Signs Last 24 Hrs  T(C): 37.1 (03 Nov 2024 08:47), Max: 37.1 (03 Nov 2024 08:47)  T(F): 98.7 (03 Nov 2024 08:47), Max: 98.7 (03 Nov 2024 08:47)  HR: 93 (03 Nov 2024 08:47) (89 - 93)  BP: 131/76 (03 Nov 2024 08:47) (131/76 - 132/90)  BP(mean): --  RR: 18 (03 Nov 2024 08:47) (18 - 18)  SpO2: 92% (03 Nov 2024 08:47) (92% - 96%)    Parameters below as of 03 Nov 2024 08:47  Patient On (Oxygen Delivery Method): room air        PHYSICAL EXAM:    Constitutional: No acute distress, well-developed   HEENT: masked, good phonation, not icteric  Respiratory: clear to ascultation bilaterally, no wheezing  Cardiovascular: S1 and S2, regular rate and rhythm, no murmurs  Gastrointestinal: soft, non-tender, non-distended, +bowel sounds  Extremities: No peripheral edema  Neurological: A/O x 3  Psychiatric: Normal mood, normal affect  Skin: No rashes, not jaundiced    LABS:                        8.4    4.00  )-----------( 379      ( 03 Nov 2024 08:16 )             26.6     11-03    139  |  107  |  9   ----------------------------<  102[H]  3.4[L]   |  27  |  0.44[L]    Ca    9.2      03 Nov 2024 08:16            RADIOLOGY & ADDITIONAL STUDIES: no new imaging

## 2024-11-04 ENCOUNTER — TRANSCRIPTION ENCOUNTER (OUTPATIENT)
Age: 74
End: 2024-11-04

## 2024-11-04 VITALS
SYSTOLIC BLOOD PRESSURE: 103 MMHG | HEART RATE: 89 BPM | OXYGEN SATURATION: 93 % | DIASTOLIC BLOOD PRESSURE: 63 MMHG | RESPIRATION RATE: 18 BRPM | TEMPERATURE: 99 F

## 2024-11-04 LAB
ANION GAP SERPL CALC-SCNC: 5 MMOL/L — SIGNIFICANT CHANGE UP (ref 5–17)
BUN SERPL-MCNC: 13 MG/DL — SIGNIFICANT CHANGE UP (ref 7–23)
CALCIUM SERPL-MCNC: 9 MG/DL — SIGNIFICANT CHANGE UP (ref 8.5–10.1)
CHLORIDE SERPL-SCNC: 105 MMOL/L — SIGNIFICANT CHANGE UP (ref 96–108)
CO2 SERPL-SCNC: 31 MMOL/L — SIGNIFICANT CHANGE UP (ref 22–31)
CREAT SERPL-MCNC: 0.45 MG/DL — LOW (ref 0.5–1.3)
EGFR: 100 ML/MIN/1.73M2 — SIGNIFICANT CHANGE UP
GLUCOSE SERPL-MCNC: 92 MG/DL — SIGNIFICANT CHANGE UP (ref 70–99)
HCT VFR BLD CALC: 25.4 % — LOW (ref 34.5–45)
HGB BLD-MCNC: 8 G/DL — LOW (ref 11.5–15.5)
MCHC RBC-ENTMCNC: 30.1 PG — SIGNIFICANT CHANGE UP (ref 27–34)
MCHC RBC-ENTMCNC: 31.5 G/DL — LOW (ref 32–36)
MCV RBC AUTO: 95.5 FL — SIGNIFICANT CHANGE UP (ref 80–100)
PLATELET # BLD AUTO: 364 K/UL — SIGNIFICANT CHANGE UP (ref 150–400)
POTASSIUM SERPL-MCNC: 3.1 MMOL/L — LOW (ref 3.5–5.3)
POTASSIUM SERPL-SCNC: 3.1 MMOL/L — LOW (ref 3.5–5.3)
RBC # BLD: 2.66 M/UL — LOW (ref 3.8–5.2)
RBC # FLD: 14.8 % — HIGH (ref 10.3–14.5)
SODIUM SERPL-SCNC: 141 MMOL/L — SIGNIFICANT CHANGE UP (ref 135–145)
WBC # BLD: 3.66 K/UL — LOW (ref 3.8–10.5)
WBC # FLD AUTO: 3.66 K/UL — LOW (ref 3.8–10.5)

## 2024-11-04 PROCEDURE — 99232 SBSQ HOSP IP/OBS MODERATE 35: CPT

## 2024-11-04 RX ORDER — POTASSIUM CHLORIDE 10 MEQ
40 TABLET, EXTENDED RELEASE ORAL EVERY 4 HOURS
Refills: 0 | Status: DISCONTINUED | OUTPATIENT
Start: 2024-11-04 | End: 2024-11-04

## 2024-11-04 RX ADMIN — Medication 650 MILLIGRAM(S): at 09:12

## 2024-11-04 RX ADMIN — Medication 40 MILLIEQUIVALENT(S): at 12:25

## 2024-11-04 RX ADMIN — Medication 1 MILLIGRAM(S): at 09:12

## 2024-11-04 RX ADMIN — RIVAROXABAN 20 MILLIGRAM(S): 20 TABLET, FILM COATED ORAL at 12:24

## 2024-11-04 RX ADMIN — Medication 40 MILLIEQUIVALENT(S): at 09:16

## 2024-11-04 NOTE — DISCHARGE NOTE PROVIDER - NSDCFUSCHEDAPPT_GEN_ALL_CORE_FT
[place]  [facility]  GASTRO [address]  Scheduled Appointment: 11/07/2024    Hector Corona  [facility]  DERM [address]  Scheduled Appointment: 11/12/2024

## 2024-11-04 NOTE — DISCHARGE NOTE PROVIDER - CARE PROVIDER_API CALL
[place]  Gastroenterology  [address] [place] [id number]  Phone: [phone]  Fax: [phone]  Scheduled Appointment: 11/07/2024

## 2024-11-04 NOTE — DISCHARGE NOTE NURSING/CASE MANAGEMENT/SOCIAL WORK - PATIENT PORTAL LINK FT
You can access the FollowMyHealth Patient Portal offered by [facility] by registering at the following website: [email or web address]. By joining [facility]’s FollowMyHealth portal, you will also be able to view your health information using other applications (apps) compatible with our system.

## 2024-11-04 NOTE — DISCHARGE NOTE PROVIDER - NSDCMRMEDTOKEN_GEN_ALL_CORE_FT
amLODIPine 2.5 mg oral tablet: 1 tab(s) orally once a day  aspirin 81 mg oral delayed release tablet: 1 tab(s) orally once a day  Bystolic 10 mg oral tablet: 1 tab(s) orally once a day  calcium (as carbonate) 500 mg oral tablet: 1 tab(s) orally 2 times a day  chlordiazepoxide-clidinium 5 mg-2.5 mg oral capsule: 1 cap(s) orally 2 times a day  enalapril 10 mg oral tablet: 1 tab(s) orally 2 times a day  ezetimibe 10 mg oral tablet: 1 tab(s) orally once a day (at bedtime)  rivaroxaban 20 mg oral tablet: 1 tab(s) orally once a day (in the morning)  Vitamin D2 2000 intl units oral capsule: 1 cap(s) orally once a day (at bedtime)  Xanax 1 mg oral tablet: 1 tab(s) orally 3 times a day, As Needed; DO NOT TAKE with Narcotic pain medication  zolpidem 12.5 mg oral tablet, extended release: 1 tab(s) orally once a day (at bedtime) as needed for sleep

## 2024-11-04 NOTE — DISCHARGE NOTE NURSING/CASE MANAGEMENT/SOCIAL WORK - FINANCIAL ASSISTANCE
[facility] provides services at a reduced cost to those who are determined to be eligible through [facility]’s financial assistance program. Information regarding [facility]’s financial assistance program can be found by going to [email or web address] or by calling [phone].

## 2024-11-04 NOTE — DISCHARGE NOTE PROVIDER - HOSPITAL COURSE
#Discharge: do not delete  74-year-old female with a PMH of Afib (on Xarelto), CAD s/p 2 stents, hiatal hernia with GERD, IBS, hypertension, HLD, breast cancer (ER positive and HER2 negative) and iron deficiency anemia (receives IV iron infusions) presenting to  ED on 10/31/24 for abnormal lab result admitted for anemia s/p egd no acute bleed found restarted xarelto with stable hg stable for discharge with out patient follow up    Problem List/Main Diagnoses (system-based):   # Acute blood loss anemia secondary to suspected GI bleed in a setting of AC  – s/p 1 unit packed red blood cell in the ED  - endoscopy 11/1 no acute findings - stopped ppi discussed restarting ac with gi, restarted xarelto which patient tolerated well. can continue at home no more bleeding and hg lateral - stable for discharge with outpatient f/u      #Non-obstructive CAD   #HLD  - Underwent cardiac cath on 9/30/24: found to have mild diffuse atherosclerosis of LAD and RCA, with patent LAD/RCA stents.  – Continue aspirin   - Continue Ezetimibe 10 mg at bedtime    #Hypertension  – Continue enalapril 10 mg twice daily  – Continue amlodipine 2.5 mg daily        Inpatient treatment course: as above  New medications: none

## 2024-11-04 NOTE — DISCHARGE NOTE PROVIDER - NSDCCPCAREPLAN_GEN_ALL_CORE_FT
PRINCIPAL DISCHARGE DIAGNOSIS  Diagnosis: Symptomatic anemia  Assessment and Plan of Treatment: Anemia is the medical term for when a person has too few red blood cells. Red blood cells are the cells in your blood that carry oxygen. If you have too few red blood cells, your body does not get all the oxygen it needs. Most people with anemia have no symptoms. They find out they have it after their doctor does blood tests for another reason. People who do have symptoms might feel tired or weak, especially if they try to exercise or have headaches. You were admitted to the hospital because you were symptomatic from your anemia, You had testing done which found no active bleed, and we were able to resume your xarelto with no issues.      SECONDARY DISCHARGE DIAGNOSES  Diagnosis: GI bleed  Assessment and Plan of Treatment:

## 2024-11-06 ENCOUNTER — RESULT REVIEW (OUTPATIENT)
Age: 74
End: 2024-11-06

## 2024-11-06 ENCOUNTER — APPOINTMENT (OUTPATIENT)
Dept: HEMATOLOGY ONCOLOGY | Facility: CLINIC | Age: 74
End: 2024-11-06

## 2024-11-06 LAB
BASOPHILS # BLD AUTO: 0.08 K/UL — SIGNIFICANT CHANGE UP (ref 0–0.2)
BASOPHILS NFR BLD AUTO: 1.9 % — SIGNIFICANT CHANGE UP (ref 0–2)
EOSINOPHIL # BLD AUTO: 0.32 K/UL — SIGNIFICANT CHANGE UP (ref 0–0.5)
EOSINOPHIL NFR BLD AUTO: 7.5 % — HIGH (ref 0–6)
HCT VFR BLD CALC: 26.7 % — LOW (ref 34.5–45)
HGB BLD-MCNC: 8.7 G/DL — LOW (ref 11.5–15.5)
IMM GRANULOCYTES NFR BLD AUTO: 0.2 % — SIGNIFICANT CHANGE UP (ref 0–0.9)
LYMPHOCYTES # BLD AUTO: 1.58 K/UL — SIGNIFICANT CHANGE UP (ref 1–3.3)
LYMPHOCYTES # BLD AUTO: 37.3 % — SIGNIFICANT CHANGE UP (ref 13–44)
MCHC RBC-ENTMCNC: 30 PG — SIGNIFICANT CHANGE UP (ref 27–34)
MCHC RBC-ENTMCNC: 32.6 G/DL — SIGNIFICANT CHANGE UP (ref 32–36)
MCV RBC AUTO: 92.1 FL — SIGNIFICANT CHANGE UP (ref 80–100)
MONOCYTES # BLD AUTO: 0.44 K/UL — SIGNIFICANT CHANGE UP (ref 0–0.9)
MONOCYTES NFR BLD AUTO: 10.4 % — SIGNIFICANT CHANGE UP (ref 2–14)
NEUTROPHILS # BLD AUTO: 1.81 K/UL — SIGNIFICANT CHANGE UP (ref 1.8–7.4)
NEUTROPHILS NFR BLD AUTO: 42.7 % — LOW (ref 43–77)
NRBC # BLD: 0 /100 WBCS — SIGNIFICANT CHANGE UP (ref 0–0)
PLATELET # BLD AUTO: 430 K/UL — HIGH (ref 150–400)
RBC # BLD: 2.9 M/UL — LOW (ref 3.8–5.2)
RBC # FLD: 14.2 % — SIGNIFICANT CHANGE UP (ref 10.3–14.5)
WBC # BLD: 4.24 K/UL — SIGNIFICANT CHANGE UP (ref 3.8–10.5)
WBC # FLD AUTO: 4.24 K/UL — SIGNIFICANT CHANGE UP (ref 3.8–10.5)

## 2024-11-07 ENCOUNTER — APPOINTMENT (OUTPATIENT)
Dept: GASTROENTEROLOGY | Facility: CLINIC | Age: 74
End: 2024-11-07

## 2024-11-11 DIAGNOSIS — Z90.11 ACQUIRED ABSENCE OF RIGHT BREAST AND NIPPLE: ICD-10-CM

## 2024-11-11 DIAGNOSIS — I48.91 UNSPECIFIED ATRIAL FIBRILLATION: ICD-10-CM

## 2024-11-11 DIAGNOSIS — I25.10 ATHEROSCLEROTIC HEART DISEASE OF NATIVE CORONARY ARTERY WITHOUT ANGINA PECTORIS: ICD-10-CM

## 2024-11-11 DIAGNOSIS — I10 ESSENTIAL (PRIMARY) HYPERTENSION: ICD-10-CM

## 2024-11-11 DIAGNOSIS — K21.9 GASTRO-ESOPHAGEAL REFLUX DISEASE WITHOUT ESOPHAGITIS: ICD-10-CM

## 2024-11-11 DIAGNOSIS — K92.2 GASTROINTESTINAL HEMORRHAGE, UNSPECIFIED: ICD-10-CM

## 2024-11-11 DIAGNOSIS — D62 ACUTE POSTHEMORRHAGIC ANEMIA: ICD-10-CM

## 2024-11-11 DIAGNOSIS — K44.9 DIAPHRAGMATIC HERNIA WITHOUT OBSTRUCTION OR GANGRENE: ICD-10-CM

## 2024-11-11 DIAGNOSIS — E87.6 HYPOKALEMIA: ICD-10-CM

## 2024-11-11 DIAGNOSIS — Z79.01 LONG TERM (CURRENT) USE OF ANTICOAGULANTS: ICD-10-CM

## 2024-11-11 DIAGNOSIS — Z95.5 PRESENCE OF CORONARY ANGIOPLASTY IMPLANT AND GRAFT: ICD-10-CM

## 2024-11-11 DIAGNOSIS — Z85.3 PERSONAL HISTORY OF MALIGNANT NEOPLASM OF BREAST: ICD-10-CM

## 2024-11-11 DIAGNOSIS — E78.5 HYPERLIPIDEMIA, UNSPECIFIED: ICD-10-CM

## 2024-11-11 DIAGNOSIS — Z79.82 LONG TERM (CURRENT) USE OF ASPIRIN: ICD-10-CM

## 2024-11-12 ENCOUNTER — APPOINTMENT (OUTPATIENT)
Dept: DERMATOLOGY | Facility: CLINIC | Age: 74
End: 2024-11-12
Payer: MEDICARE

## 2024-11-12 DIAGNOSIS — D22.9 MELANOCYTIC NEVI, UNSPECIFIED: ICD-10-CM

## 2024-11-12 DIAGNOSIS — L81.4 OTHER MELANIN HYPERPIGMENTATION: ICD-10-CM

## 2024-11-12 DIAGNOSIS — L82.0 INFLAMED SEBORRHEIC KERATOSIS: ICD-10-CM

## 2024-11-12 PROCEDURE — 99213 OFFICE O/P EST LOW 20 MIN: CPT | Mod: 25

## 2024-11-12 PROCEDURE — 17110 DESTRUCTION B9 LES UP TO 14: CPT

## 2024-11-15 ENCOUNTER — APPOINTMENT (OUTPATIENT)
Dept: INFUSION THERAPY | Facility: CLINIC | Age: 74
End: 2024-11-15

## 2024-11-15 ENCOUNTER — RESULT REVIEW (OUTPATIENT)
Age: 74
End: 2024-11-15

## 2024-11-15 VITALS
TEMPERATURE: 97.1 F | WEIGHT: 170 LBS | DIASTOLIC BLOOD PRESSURE: 65 MMHG | HEART RATE: 85 BPM | SYSTOLIC BLOOD PRESSURE: 106 MMHG | BODY MASS INDEX: 31.28 KG/M2 | OXYGEN SATURATION: 97 % | HEIGHT: 62 IN

## 2024-11-15 LAB
BASOPHILS # BLD AUTO: 0.09 K/UL — SIGNIFICANT CHANGE UP (ref 0–0.2)
BASOPHILS NFR BLD AUTO: 1.7 % — SIGNIFICANT CHANGE UP (ref 0–2)
EOSINOPHIL # BLD AUTO: 0.45 K/UL — SIGNIFICANT CHANGE UP (ref 0–0.5)
EOSINOPHIL NFR BLD AUTO: 8.7 % — HIGH (ref 0–6)
HCT VFR BLD CALC: 30.9 % — LOW (ref 34.5–45)
HGB BLD-MCNC: 9.7 G/DL — LOW (ref 11.5–15.5)
IMM GRANULOCYTES NFR BLD AUTO: 0.4 % — SIGNIFICANT CHANGE UP (ref 0–0.9)
LYMPHOCYTES # BLD AUTO: 1.2 K/UL — SIGNIFICANT CHANGE UP (ref 1–3.3)
LYMPHOCYTES # BLD AUTO: 23.2 % — SIGNIFICANT CHANGE UP (ref 13–44)
MCHC RBC-ENTMCNC: 28.8 PG — SIGNIFICANT CHANGE UP (ref 27–34)
MCHC RBC-ENTMCNC: 31.4 G/DL — LOW (ref 32–36)
MCV RBC AUTO: 91.7 FL — SIGNIFICANT CHANGE UP (ref 80–100)
MONOCYTES # BLD AUTO: 0.44 K/UL — SIGNIFICANT CHANGE UP (ref 0–0.9)
MONOCYTES NFR BLD AUTO: 8.5 % — SIGNIFICANT CHANGE UP (ref 2–14)
NEUTROPHILS # BLD AUTO: 2.97 K/UL — SIGNIFICANT CHANGE UP (ref 1.8–7.4)
NEUTROPHILS NFR BLD AUTO: 57.5 % — SIGNIFICANT CHANGE UP (ref 43–77)
NRBC # BLD: 0 /100 WBCS — SIGNIFICANT CHANGE UP (ref 0–0)
PLATELET # BLD AUTO: 571 K/UL — HIGH (ref 150–400)
RBC # BLD: 3.37 M/UL — LOW (ref 3.8–5.2)
RBC # FLD: 15 % — HIGH (ref 10.3–14.5)
WBC # BLD: 5.17 K/UL — SIGNIFICANT CHANGE UP (ref 3.8–10.5)
WBC # FLD AUTO: 5.17 K/UL — SIGNIFICANT CHANGE UP (ref 3.8–10.5)

## 2024-11-15 NOTE — PROCEDURE
Procedure Note    Patient: Nyla Rodriguez  38 year old  female    MRN:  [id number]     Pre-op Dx:  Diarrhea, N/V, abdominal pain     Post-op Dx: Same     Procedure: Procedures:    * EGD, WITH BIOPSY    Surgeon(s): Jef Phillip MD  Phone Number: [phone]                       Surgeons and Role:     * Jef Phillip MD - Primary    Assistants: none    Assistant Tasks: none    Anesthesia Staff: CRNA: Veronica De Guzman CRNA  Anesthesiologist: Adam Gates DO     Anesthesia Type: MAC    Description and Findings:  Informed consent was obtained for the procedure, including sedation, risks of perforation, hemorrhage, adverse drug reaction and aspiration were discussed.  The patient was taken to the procedure room and placed in a left lateral decubitus position. Sedation performed by anesthesia.  The patient was monitored continuously with ECG tracing, pulse oximetry, blood pressure monitoring, and direct observations. A bite block guard was applied. After adequate sedation, the endoscope was passed through the mouth and the esophagus was intubated. The scope was then advanced to the second portion of the duodenum under direct visualization. Careful examination was done as the scope was withdrawn slowly.    Findings:  - Esophagus: Z line at 41 cm, irregular, but not > 1 cm to classify as olivarez's. There was a small nodule, that was removed with cold forceps  - Stomach: Appeared normal. Biopsies taken for Hpylori, given symptoms.   - Duodenum: Appeared normal, other than angulated duodenal sweep. Biopsies taken with cold forceps for celiac.    Recs  - Await pathology results. If unrevealing, and still with symptoms in a few weeks, plan UGI series and colonoscopy, for further assessment     Complications: None    Estimated Blood Loss:  Minimal    Implants: * No implants in log *     Specimens Removed:    Order Name Source Comment Collection Info Order Time   SURGICAL PATHOLOGY Small Intestine  Collected By:  Jef Phillip MD 11/15/2024  2:27 PM     How should test results be released to the patient's ""hart portal?   Manual release only          What is the reason for preventing automatic release?   Sharing the test result is likely to endanger the safety of the patient           Dictating Provider,   Jef Phillip MD  2:36 PM 11/15/2024   [FreeTextEntry1] : complete pulmonary function tests show mild obstructive lung disease. Diffusing capacity is impaired.This may indicative of anemia versus pulmonary vascular disease.

## 2024-11-21 ENCOUNTER — APPOINTMENT (OUTPATIENT)
Dept: GASTROENTEROLOGY | Facility: CLINIC | Age: 74
End: 2024-11-21
Payer: MEDICARE

## 2024-11-21 ENCOUNTER — NON-APPOINTMENT (OUTPATIENT)
Age: 74
End: 2024-11-21

## 2024-11-21 VITALS
BODY MASS INDEX: 31.28 KG/M2 | HEIGHT: 62 IN | DIASTOLIC BLOOD PRESSURE: 68 MMHG | WEIGHT: 170 LBS | SYSTOLIC BLOOD PRESSURE: 110 MMHG

## 2024-11-21 DIAGNOSIS — K44.9 DIAPHRAGMATIC HERNIA W/OUT OBSTRUCTION OR GANGRENE: ICD-10-CM

## 2024-11-21 DIAGNOSIS — K21.9 DIAPHRAGMATIC HERNIA W/OUT OBSTRUCTION OR GANGRENE: ICD-10-CM

## 2024-11-21 PROBLEM — Z09 FOLLOW-UP EXAM: Status: ACTIVE | Noted: 2024-11-21

## 2024-11-21 PROCEDURE — 99214 OFFICE O/P EST MOD 30 MIN: CPT

## 2024-11-21 RX ORDER — SODIUM SULFATE, POTASSIUM SULFATE AND MAGNESIUM SULFATE 1.6; 3.13; 17.5 G/177ML; G/177ML; G/177ML
17.5-3.13-1.6 SOLUTION ORAL
Qty: 2 | Refills: 0 | Status: ACTIVE | COMMUNITY
Start: 2024-11-21 | End: 1900-01-01

## 2024-11-22 ENCOUNTER — RESULT REVIEW (OUTPATIENT)
Age: 74
End: 2024-11-22

## 2024-11-22 ENCOUNTER — APPOINTMENT (OUTPATIENT)
Dept: INFUSION THERAPY | Facility: CLINIC | Age: 74
End: 2024-11-22

## 2024-11-22 VITALS
BODY MASS INDEX: 31.47 KG/M2 | WEIGHT: 171 LBS | HEART RATE: 89 BPM | DIASTOLIC BLOOD PRESSURE: 73 MMHG | OXYGEN SATURATION: 95 % | HEIGHT: 62 IN | TEMPERATURE: 97.3 F | SYSTOLIC BLOOD PRESSURE: 135 MMHG

## 2024-11-22 LAB
BASOPHILS # BLD AUTO: 0.06 K/UL — SIGNIFICANT CHANGE UP (ref 0–0.2)
BASOPHILS NFR BLD AUTO: 1.3 % — SIGNIFICANT CHANGE UP (ref 0–2)
EOSINOPHIL # BLD AUTO: 0.41 K/UL — SIGNIFICANT CHANGE UP (ref 0–0.5)
EOSINOPHIL NFR BLD AUTO: 8.6 % — HIGH (ref 0–6)
HCT VFR BLD CALC: 26.6 % — LOW (ref 34.5–45)
HGB BLD-MCNC: 8.2 G/DL — LOW (ref 11.5–15.5)
IMM GRANULOCYTES NFR BLD AUTO: 0.6 % — SIGNIFICANT CHANGE UP (ref 0–0.9)
LYMPHOCYTES # BLD AUTO: 0.98 K/UL — LOW (ref 1–3.3)
LYMPHOCYTES # BLD AUTO: 20.5 % — SIGNIFICANT CHANGE UP (ref 13–44)
MCHC RBC-ENTMCNC: 30 PG — SIGNIFICANT CHANGE UP (ref 27–34)
MCHC RBC-ENTMCNC: 30.8 G/DL — LOW (ref 32–36)
MCV RBC AUTO: 97.4 FL — SIGNIFICANT CHANGE UP (ref 80–100)
MONOCYTES # BLD AUTO: 0.41 K/UL — SIGNIFICANT CHANGE UP (ref 0–0.9)
MONOCYTES NFR BLD AUTO: 8.6 % — SIGNIFICANT CHANGE UP (ref 2–14)
NEUTROPHILS # BLD AUTO: 2.9 K/UL — SIGNIFICANT CHANGE UP (ref 1.8–7.4)
NEUTROPHILS NFR BLD AUTO: 60.4 % — SIGNIFICANT CHANGE UP (ref 43–77)
NRBC # BLD: 0 /100 WBCS — SIGNIFICANT CHANGE UP (ref 0–0)
PLATELET # BLD AUTO: 359 K/UL — SIGNIFICANT CHANGE UP (ref 150–400)
RBC # BLD: 2.73 M/UL — LOW (ref 3.8–5.2)
RBC # FLD: 21 % — HIGH (ref 10.3–14.5)
WBC # BLD: 4.79 K/UL — SIGNIFICANT CHANGE UP (ref 3.8–10.5)
WBC # FLD AUTO: 4.79 K/UL — SIGNIFICANT CHANGE UP (ref 3.8–10.5)

## 2024-11-29 ENCOUNTER — RESULT REVIEW (OUTPATIENT)
Age: 74
End: 2024-11-29

## 2024-11-29 ENCOUNTER — APPOINTMENT (OUTPATIENT)
Dept: INFUSION THERAPY | Facility: CLINIC | Age: 74
End: 2024-11-29

## 2024-11-29 ENCOUNTER — APPOINTMENT (OUTPATIENT)
Dept: HEMATOLOGY ONCOLOGY | Facility: CLINIC | Age: 74
End: 2024-11-29
Payer: MEDICARE

## 2024-11-29 VITALS
HEIGHT: 62 IN | BODY MASS INDEX: 32.03 KG/M2 | HEART RATE: 85 BPM | TEMPERATURE: 96.7 F | OXYGEN SATURATION: 93 % | DIASTOLIC BLOOD PRESSURE: 77 MMHG | WEIGHT: 174.06 LBS | SYSTOLIC BLOOD PRESSURE: 138 MMHG

## 2024-11-29 DIAGNOSIS — Z79.01 LONG TERM (CURRENT) USE OF ANTICOAGULANTS: ICD-10-CM

## 2024-11-29 DIAGNOSIS — K92.1 MELENA: ICD-10-CM

## 2024-11-29 DIAGNOSIS — D50.0 IRON DEFICIENCY ANEMIA SECONDARY TO BLOOD LOSS (CHRONIC): ICD-10-CM

## 2024-11-29 DIAGNOSIS — H69.93 UNSPECIFIED EUSTACHIAN TUBE DISORDER, BILATERAL: ICD-10-CM

## 2024-11-29 DIAGNOSIS — H60.543 ACUTE ECZEMATOID OTITIS EXTERNA, BILATERAL: ICD-10-CM

## 2024-11-29 DIAGNOSIS — D50.9 IRON DEFICIENCY ANEMIA, UNSPECIFIED: ICD-10-CM

## 2024-11-29 LAB
BASOPHILS # BLD AUTO: 0.09 K/UL — SIGNIFICANT CHANGE UP (ref 0–0.2)
BASOPHILS NFR BLD AUTO: 1.6 % — SIGNIFICANT CHANGE UP (ref 0–2)
EOSINOPHIL # BLD AUTO: 0.32 K/UL — SIGNIFICANT CHANGE UP (ref 0–0.5)
EOSINOPHIL NFR BLD AUTO: 5.7 % — SIGNIFICANT CHANGE UP (ref 0–6)
HCT VFR BLD CALC: 30 % — LOW (ref 34.5–45)
HGB BLD-MCNC: 9.2 G/DL — LOW (ref 11.5–15.5)
IMM GRANULOCYTES NFR BLD AUTO: 0.5 % — SIGNIFICANT CHANGE UP (ref 0–0.9)
LYMPHOCYTES # BLD AUTO: 0.81 K/UL — LOW (ref 1–3.3)
LYMPHOCYTES # BLD AUTO: 14.4 % — SIGNIFICANT CHANGE UP (ref 13–44)
MCHC RBC-ENTMCNC: 30.7 G/DL — LOW (ref 32–36)
MCHC RBC-ENTMCNC: 31.2 PG — SIGNIFICANT CHANGE UP (ref 27–34)
MCV RBC AUTO: 101.7 FL — HIGH (ref 80–100)
MONOCYTES # BLD AUTO: 0.47 K/UL — SIGNIFICANT CHANGE UP (ref 0–0.9)
MONOCYTES NFR BLD AUTO: 8.3 % — SIGNIFICANT CHANGE UP (ref 2–14)
NEUTROPHILS # BLD AUTO: 3.92 K/UL — SIGNIFICANT CHANGE UP (ref 1.8–7.4)
NEUTROPHILS NFR BLD AUTO: 69.5 % — SIGNIFICANT CHANGE UP (ref 43–77)
NRBC # BLD: 0 /100 WBCS — SIGNIFICANT CHANGE UP (ref 0–0)
PLATELET # BLD AUTO: 399 K/UL — SIGNIFICANT CHANGE UP (ref 150–400)
RBC # BLD: 2.95 M/UL — LOW (ref 3.8–5.2)
RBC # FLD: 24.6 % — HIGH (ref 10.3–14.5)
WBC # BLD: 5.64 K/UL — SIGNIFICANT CHANGE UP (ref 3.8–10.5)
WBC # FLD AUTO: 5.64 K/UL — SIGNIFICANT CHANGE UP (ref 3.8–10.5)

## 2024-11-29 PROCEDURE — 99214 OFFICE O/P EST MOD 30 MIN: CPT

## 2024-11-29 PROCEDURE — G2211 COMPLEX E/M VISIT ADD ON: CPT

## 2024-11-30 LAB
FOLATE SERPL-MCNC: 15.4 NG/ML — SIGNIFICANT CHANGE UP
VIT B12 SERPL-MCNC: 385 PG/ML — SIGNIFICANT CHANGE UP (ref 232–1245)

## 2024-12-02 DIAGNOSIS — Z51.11 ENCOUNTER FOR ANTINEOPLASTIC CHEMOTHERAPY: ICD-10-CM

## 2024-12-06 ENCOUNTER — OUTPATIENT (OUTPATIENT)
Dept: OUTPATIENT SERVICES | Facility: HOSPITAL | Age: 74
LOS: 1 days | Discharge: HOME | End: 2024-12-06

## 2024-12-06 DIAGNOSIS — D50.9 IRON DEFICIENCY ANEMIA, UNSPECIFIED: ICD-10-CM

## 2024-12-06 DIAGNOSIS — Z95.5 PRESENCE OF CORONARY ANGIOPLASTY IMPLANT AND GRAFT: Chronic | ICD-10-CM

## 2024-12-06 DIAGNOSIS — Z90.11 ACQUIRED ABSENCE OF RIGHT BREAST AND NIPPLE: Chronic | ICD-10-CM

## 2024-12-06 DIAGNOSIS — H26.9 UNSPECIFIED CATARACT: Chronic | ICD-10-CM

## 2024-12-13 ENCOUNTER — RESULT REVIEW (OUTPATIENT)
Age: 74
End: 2024-12-13

## 2024-12-13 ENCOUNTER — APPOINTMENT (OUTPATIENT)
Dept: HEMATOLOGY ONCOLOGY | Facility: CLINIC | Age: 74
End: 2024-12-13

## 2024-12-13 LAB
BASOPHILS # BLD AUTO: 0.08 K/UL — SIGNIFICANT CHANGE UP (ref 0–0.2)
BASOPHILS NFR BLD AUTO: 2 % — SIGNIFICANT CHANGE UP (ref 0–2)
EOSINOPHIL # BLD AUTO: 0.27 K/UL — SIGNIFICANT CHANGE UP (ref 0–0.5)
EOSINOPHIL NFR BLD AUTO: 6.9 % — HIGH (ref 0–6)
HCT VFR BLD CALC: 35.6 % — SIGNIFICANT CHANGE UP (ref 34.5–45)
HGB BLD-MCNC: 11.4 G/DL — LOW (ref 11.5–15.5)
IMM GRANULOCYTES NFR BLD AUTO: 0.3 % — SIGNIFICANT CHANGE UP (ref 0–0.9)
LYMPHOCYTES # BLD AUTO: 1.24 K/UL — SIGNIFICANT CHANGE UP (ref 1–3.3)
LYMPHOCYTES # BLD AUTO: 31.6 % — SIGNIFICANT CHANGE UP (ref 13–44)
MCHC RBC-ENTMCNC: 32 G/DL — SIGNIFICANT CHANGE UP (ref 32–36)
MCHC RBC-ENTMCNC: 32.7 PG — SIGNIFICANT CHANGE UP (ref 27–34)
MCV RBC AUTO: 102 FL — HIGH (ref 80–100)
MONOCYTES # BLD AUTO: 0.49 K/UL — SIGNIFICANT CHANGE UP (ref 0–0.9)
MONOCYTES NFR BLD AUTO: 12.5 % — SIGNIFICANT CHANGE UP (ref 2–14)
NEUTROPHILS # BLD AUTO: 1.83 K/UL — SIGNIFICANT CHANGE UP (ref 1.8–7.4)
NEUTROPHILS NFR BLD AUTO: 46.7 % — SIGNIFICANT CHANGE UP (ref 43–77)
NRBC # BLD: 0 /100 WBCS — SIGNIFICANT CHANGE UP (ref 0–0)
NRBC BLD-RTO: 0 /100 WBCS — SIGNIFICANT CHANGE UP (ref 0–0)
PLATELET # BLD AUTO: 322 K/UL — SIGNIFICANT CHANGE UP (ref 150–400)
RBC # BLD: 3.49 M/UL — LOW (ref 3.8–5.2)
RBC # FLD: 20.8 % — HIGH (ref 10.3–14.5)
WBC # BLD: 3.92 K/UL — SIGNIFICANT CHANGE UP (ref 3.8–10.5)
WBC # FLD AUTO: 3.92 K/UL — SIGNIFICANT CHANGE UP (ref 3.8–10.5)

## 2024-12-17 ENCOUNTER — APPOINTMENT (OUTPATIENT)
Dept: DERMATOLOGY | Facility: CLINIC | Age: 74
End: 2024-12-17
Payer: MEDICARE

## 2024-12-17 DIAGNOSIS — L81.4 OTHER MELANIN HYPERPIGMENTATION: ICD-10-CM

## 2024-12-17 DIAGNOSIS — L82.0 INFLAMED SEBORRHEIC KERATOSIS: ICD-10-CM

## 2024-12-17 DIAGNOSIS — D22.9 MELANOCYTIC NEVI, UNSPECIFIED: ICD-10-CM

## 2024-12-17 PROCEDURE — 17110 DESTRUCTION B9 LES UP TO 14: CPT

## 2024-12-17 PROCEDURE — 99213 OFFICE O/P EST LOW 20 MIN: CPT | Mod: 25

## 2024-12-30 NOTE — ED PROVIDER NOTE - SECONDARY DIAGNOSIS.
Physical Therapy Visit    Visit Type: Daily Treatment Note  Visit: 7  Referring Provider: Lalo Earl MD  Medical Diagnosis (from order): S89.81XA - Injury of articular cartilage of right knee  S89.91XA - Injury of right knee, initial encounter     SUBJECTIVE                                                                                                               Patient reports that walking down the stairs is still challenging due to feeling of quad weakness.  Functional Change: Knee AROM in supine 0-135 degrees    Pain / Symptoms  - Pain rating (out of 10): Current: 0       OBJECTIVE                                                                                                                               Treatment     Therapeutic Exercise  upright bike: 5 min, seat 7, level 5.0    Glute bridges with orange swiss ball 2 x 10    Partial squats with plinth posterior: x 10 reps with 7.5 lb KB, x 10 with 10 lbs KB  - appropriate hip-knee alignment    posterior slides in // bars (BUE on bars for support) x 10 reps ea  X 10 performed without UE support    3 way towel slides with single UE support on back of chair x 5 ea direction bilaterally  - increased difficulty with forwards direction    isometric wall squats: 3 x 30 seconds    Walk outs/in cable machine 10 lb resistance, 3 steps x 5 reps  - patient demonstrates difficulty performing retro steps with left due to lack of control on right    Anterior step down to heel tap from 6 inch step to 3 inch foam roll:  2 x 10 reps  - unable to perform to 2 in stool without hip drop compensation   - BUE support on rail        Neuromuscular Re-Education  2 in Foam pad ball toss with medium size ball-feet in full tandem x 3 minutes  - single leg stance x 1 min     full tandem on foam pad with eyes closed 2 x 30 sec each   - increased difficulty with right forwards, reaches for railing after about 8 seconds    Skilled input: as detailed above, verbal instruction/cues,  tactile instruction/cues and demonstration    Writer verbally educated and received verbal consent for hand placement, positioning of patient, and techniques to be performed today from patient for clothing adjustments for techniques, hand placement and palpation for techniques and therapist position for techniques as described above and how they are pertinent to the patient's plan of care.  Home Exercise Program  Access Code: [email or web address]  URL: [email or web address]  Date: 12/12/2024  Prepared by: Denis Zelaya    Exercises  - Seated Long Arc Quad  - 1 x daily - 7 x weekly - 2 sets - 10 reps  - Supine Active Straight Leg Raise  - 1 x daily - 7 x weekly - 2 sets - 10 reps  - Prone Terminal Knee Extension  - 1 x daily - 7 x weekly - 2 sets - 10 reps  - Supine Bridge with Resistance Band  - 1 x daily - 7 x weekly - 3 sets - 10 reps - 3-5 hold  - Sidelying Hip Abduction  - 1 x daily - 7 x weekly - 2-3 sets - 10 reps  - Mini Squat with Chair  - 1 x daily - 4-7 x weekly - 2-3 sets - 10 reps  - Side Stepping with Resistance at Feet  - 1 x daily - 4-7 x weekly - 3 sets - 1 reps - 20 feet each direction hold  - Wall Squat  - 1 x daily - 4-7 x weekly - 1 sets - 6 reps - 20 seconds hold      ASSESSMENT                                                                                                            Patient demonstrates improved stability surrounding the right knee allowing for progression to single leg proprioceptive training on this date. He demonstrates difficulty on compliant surface with eyes closed resulting in lateral loss of balance into railing.   Difficulty observed with stair step downs on this date resulting in hip drop compensation.     Patient will continue to benefit from skilled PT services to progress stability surrounding the right knee to allow for ambulation on uneven terrain and eccentric quad control required to perform repetitive 8 inch stairs.   Pain/symptoms after session  (out of 10): 0  Education:   - Results of above outlined education: Verbalizes understanding and Demonstrates understanding    PLAN                                                                                                                           Suggestions for next session as indicated: Progress per plan of care: proprioceptive training, trial double leg hopping as tolerated       Therapy procedure time and total treatment time can be found documented on the Time Entry flowsheet     Nausea and vomiting

## 2025-01-03 ENCOUNTER — APPOINTMENT (OUTPATIENT)
Dept: HEMATOLOGY ONCOLOGY | Facility: CLINIC | Age: 75
End: 2025-01-03

## 2025-01-03 ENCOUNTER — RESULT REVIEW (OUTPATIENT)
Age: 75
End: 2025-01-03

## 2025-01-03 LAB
BASOPHILS # BLD AUTO: 0.08 K/UL — SIGNIFICANT CHANGE UP (ref 0–0.2)
BASOPHILS NFR BLD AUTO: 1.7 % — SIGNIFICANT CHANGE UP (ref 0–2)
EOSINOPHIL # BLD AUTO: 0.37 K/UL — SIGNIFICANT CHANGE UP (ref 0–0.5)
EOSINOPHIL NFR BLD AUTO: 7.7 % — HIGH (ref 0–6)
ERYTHROCYTE [SEDIMENTATION RATE] IN BLOOD BY WESTERGREN METHOD: 27 MM/HR
FERRITIN SERPL-MCNC: 60 NG/ML
HCT VFR BLD CALC: 35.7 % — SIGNIFICANT CHANGE UP (ref 34.5–45)
HGB BLD-MCNC: 11.5 G/DL — SIGNIFICANT CHANGE UP (ref 11.5–15.5)
IMM GRANULOCYTES NFR BLD AUTO: 0.2 % — SIGNIFICANT CHANGE UP (ref 0–0.9)
IRON SATN MFR SERPL: 11 %
IRON SERPL-MCNC: 37 UG/DL
LYMPHOCYTES # BLD AUTO: 1.34 K/UL — SIGNIFICANT CHANGE UP (ref 1–3.3)
LYMPHOCYTES # BLD AUTO: 28 % — SIGNIFICANT CHANGE UP (ref 13–44)
MCHC RBC-ENTMCNC: 32.2 G/DL — SIGNIFICANT CHANGE UP (ref 32–36)
MCHC RBC-ENTMCNC: 32.5 PG — SIGNIFICANT CHANGE UP (ref 27–34)
MCV RBC AUTO: 100.8 FL — HIGH (ref 80–100)
MONOCYTES # BLD AUTO: 0.52 K/UL — SIGNIFICANT CHANGE UP (ref 0–0.9)
MONOCYTES NFR BLD AUTO: 10.9 % — SIGNIFICANT CHANGE UP (ref 2–14)
NEUTROPHILS # BLD AUTO: 2.46 K/UL — SIGNIFICANT CHANGE UP (ref 1.8–7.4)
NEUTROPHILS NFR BLD AUTO: 51.5 % — SIGNIFICANT CHANGE UP (ref 43–77)
NRBC # BLD: 0 /100 WBCS — SIGNIFICANT CHANGE UP (ref 0–0)
NRBC BLD-RTO: 0 /100 WBCS — SIGNIFICANT CHANGE UP (ref 0–0)
PLATELET # BLD AUTO: 344 K/UL — SIGNIFICANT CHANGE UP (ref 150–400)
RBC # BLD: 3.54 M/UL — LOW (ref 3.8–5.2)
RBC # FLD: 15.8 % — HIGH (ref 10.3–14.5)
TIBC SERPL-MCNC: 329 UG/DL
UIBC SERPL-MCNC: 291 UG/DL
VIT B12 SERPL-MCNC: 478 PG/ML
WBC # BLD: 4.78 K/UL — SIGNIFICANT CHANGE UP (ref 3.8–10.5)
WBC # FLD AUTO: 4.78 K/UL — SIGNIFICANT CHANGE UP (ref 3.8–10.5)

## 2025-01-07 ENCOUNTER — NON-APPOINTMENT (OUTPATIENT)
Age: 75
End: 2025-01-07

## 2025-01-10 ENCOUNTER — APPOINTMENT (OUTPATIENT)
Dept: INFUSION THERAPY | Facility: CLINIC | Age: 75
End: 2025-01-10

## 2025-01-10 VITALS
HEART RATE: 85 BPM | OXYGEN SATURATION: 91 % | DIASTOLIC BLOOD PRESSURE: 80 MMHG | TEMPERATURE: 97.7 F | BODY MASS INDEX: 31.65 KG/M2 | SYSTOLIC BLOOD PRESSURE: 132 MMHG | WEIGHT: 172 LBS | HEIGHT: 62 IN

## 2025-02-12 ENCOUNTER — RX RENEWAL (OUTPATIENT)
Age: 75
End: 2025-02-12

## 2025-02-13 ENCOUNTER — APPOINTMENT (OUTPATIENT)
Dept: OTOLARYNGOLOGY | Facility: CLINIC | Age: 75
End: 2025-02-13
Payer: MEDICARE

## 2025-02-13 ENCOUNTER — NON-APPOINTMENT (OUTPATIENT)
Age: 75
End: 2025-02-13

## 2025-02-13 ENCOUNTER — OUTPATIENT (OUTPATIENT)
Dept: OUTPATIENT SERVICES | Facility: HOSPITAL | Age: 75
LOS: 1 days | Discharge: HOME | End: 2025-02-13

## 2025-02-13 VITALS — BODY MASS INDEX: 31.65 KG/M2 | HEIGHT: 62 IN | WEIGHT: 172 LBS

## 2025-02-13 DIAGNOSIS — Z86.2 PERSONAL HISTORY OF DISEASES OF THE BLOOD AND BLOOD-FORMING ORGANS AND CERTAIN DISORDERS INVOLVING THE IMMUNE MECHANISM: ICD-10-CM

## 2025-02-13 DIAGNOSIS — H26.9 UNSPECIFIED CATARACT: Chronic | ICD-10-CM

## 2025-02-13 DIAGNOSIS — H61.23 IMPACTED CERUMEN, BILATERAL: ICD-10-CM

## 2025-02-13 DIAGNOSIS — H93.291 OTHER ABNORMAL AUDITORY PERCEPTIONS, RIGHT EAR: ICD-10-CM

## 2025-02-13 DIAGNOSIS — Z90.11 ACQUIRED ABSENCE OF RIGHT BREAST AND NIPPLE: Chronic | ICD-10-CM

## 2025-02-13 DIAGNOSIS — D50.9 IRON DEFICIENCY ANEMIA, UNSPECIFIED: ICD-10-CM

## 2025-02-13 DIAGNOSIS — H93.292 OTHER ABNORMAL AUDITORY PERCEPTIONS, LEFT EAR: ICD-10-CM

## 2025-02-13 DIAGNOSIS — Z95.5 PRESENCE OF CORONARY ANGIOPLASTY IMPLANT AND GRAFT: Chronic | ICD-10-CM

## 2025-02-13 DIAGNOSIS — J31.0 CHRONIC RHINITIS: ICD-10-CM

## 2025-02-13 PROCEDURE — 99213 OFFICE O/P EST LOW 20 MIN: CPT | Mod: 25

## 2025-02-13 PROCEDURE — 69210 REMOVE IMPACTED EAR WAX UNI: CPT

## 2025-02-13 RX ORDER — AZELASTINE HYDROCHLORIDE 137 UG/1
0.1 SPRAY, METERED NASAL TWICE DAILY
Qty: 1 | Refills: 5 | Status: ACTIVE | COMMUNITY
Start: 2025-02-13 | End: 1900-01-01

## 2025-02-14 ENCOUNTER — RESULT REVIEW (OUTPATIENT)
Age: 75
End: 2025-02-14

## 2025-02-14 ENCOUNTER — APPOINTMENT (OUTPATIENT)
Dept: HEMATOLOGY ONCOLOGY | Facility: CLINIC | Age: 75
End: 2025-02-14

## 2025-02-14 LAB
BASOPHILS # BLD AUTO: 0.07 K/UL — SIGNIFICANT CHANGE UP (ref 0–0.2)
BASOPHILS NFR BLD AUTO: 1.6 % — SIGNIFICANT CHANGE UP (ref 0–2)
EOSINOPHIL # BLD AUTO: 0.25 K/UL — SIGNIFICANT CHANGE UP (ref 0–0.5)
EOSINOPHIL NFR BLD AUTO: 5.9 % — SIGNIFICANT CHANGE UP (ref 0–6)
FERRITIN SERPL-MCNC: 56 NG/ML
HCT VFR BLD CALC: 38.3 % — SIGNIFICANT CHANGE UP (ref 34.5–45)
HGB BLD-MCNC: 12.2 G/DL — SIGNIFICANT CHANGE UP (ref 11.5–15.5)
IMM GRANULOCYTES NFR BLD AUTO: 0.2 % — SIGNIFICANT CHANGE UP (ref 0–0.9)
IRON SATN MFR SERPL: 11 %
IRON SERPL-MCNC: 39 UG/DL
LYMPHOCYTES # BLD AUTO: 1.38 K/UL — SIGNIFICANT CHANGE UP (ref 1–3.3)
LYMPHOCYTES # BLD AUTO: 32.5 % — SIGNIFICANT CHANGE UP (ref 13–44)
MCHC RBC-ENTMCNC: 31.9 G/DL — LOW (ref 32–36)
MCHC RBC-ENTMCNC: 32.4 PG — SIGNIFICANT CHANGE UP (ref 27–34)
MCV RBC AUTO: 101.6 FL — HIGH (ref 80–100)
MONOCYTES # BLD AUTO: 0.56 K/UL — SIGNIFICANT CHANGE UP (ref 0–0.9)
MONOCYTES NFR BLD AUTO: 13.2 % — SIGNIFICANT CHANGE UP (ref 2–14)
NEUTROPHILS # BLD AUTO: 1.98 K/UL — SIGNIFICANT CHANGE UP (ref 1.8–7.4)
NEUTROPHILS NFR BLD AUTO: 46.6 % — SIGNIFICANT CHANGE UP (ref 43–77)
NRBC BLD AUTO-RTO: 0 /100 WBCS — SIGNIFICANT CHANGE UP (ref 0–0)
PLATELET # BLD AUTO: 321 K/UL — SIGNIFICANT CHANGE UP (ref 150–400)
RBC # BLD: 3.77 M/UL — LOW (ref 3.8–5.2)
RBC # FLD: 14.2 % — SIGNIFICANT CHANGE UP (ref 10.3–14.5)
TIBC SERPL-MCNC: 340 UG/DL
UIBC SERPL-MCNC: 301 UG/DL
WBC # BLD: 4.25 K/UL — SIGNIFICANT CHANGE UP (ref 3.8–10.5)
WBC # FLD AUTO: 4.25 K/UL — SIGNIFICANT CHANGE UP (ref 3.8–10.5)

## 2025-02-15 LAB — ERYTHROCYTE [SEDIMENTATION RATE] IN BLOOD BY WESTERGREN METHOD: 34 MM/HR

## 2025-02-18 ENCOUNTER — NON-APPOINTMENT (OUTPATIENT)
Age: 75
End: 2025-02-18

## 2025-02-25 ENCOUNTER — APPOINTMENT (OUTPATIENT)
Dept: DERMATOLOGY | Facility: CLINIC | Age: 75
End: 2025-02-25
Payer: MEDICARE

## 2025-02-25 DIAGNOSIS — L81.4 OTHER MELANIN HYPERPIGMENTATION: ICD-10-CM

## 2025-02-25 DIAGNOSIS — L82.0 INFLAMED SEBORRHEIC KERATOSIS: ICD-10-CM

## 2025-02-25 DIAGNOSIS — D22.9 MELANOCYTIC NEVI, UNSPECIFIED: ICD-10-CM

## 2025-02-25 PROCEDURE — 17110 DESTRUCTION B9 LES UP TO 14: CPT

## 2025-02-25 PROCEDURE — 99213 OFFICE O/P EST LOW 20 MIN: CPT | Mod: 25

## 2025-02-27 ENCOUNTER — APPOINTMENT (OUTPATIENT)
Dept: INFUSION THERAPY | Facility: CLINIC | Age: 75
End: 2025-02-27

## 2025-03-03 ENCOUNTER — RX RENEWAL (OUTPATIENT)
Age: 75
End: 2025-03-03

## 2025-03-28 ENCOUNTER — RESULT REVIEW (OUTPATIENT)
Age: 75
End: 2025-03-28

## 2025-03-28 ENCOUNTER — APPOINTMENT (OUTPATIENT)
Dept: HEMATOLOGY ONCOLOGY | Facility: CLINIC | Age: 75
End: 2025-03-28

## 2025-03-28 LAB
BASOPHILS # BLD AUTO: 0.07 K/UL — SIGNIFICANT CHANGE UP (ref 0–0.2)
BASOPHILS NFR BLD AUTO: 1.2 % — SIGNIFICANT CHANGE UP (ref 0–2)
EOSINOPHIL # BLD AUTO: 0.29 K/UL — SIGNIFICANT CHANGE UP (ref 0–0.5)
EOSINOPHIL NFR BLD AUTO: 5.2 % — SIGNIFICANT CHANGE UP (ref 0–6)
FERRITIN SERPL-MCNC: 91 NG/ML
HCT VFR BLD CALC: 36.1 % — SIGNIFICANT CHANGE UP (ref 34.5–45)
HGB BLD-MCNC: 11.6 G/DL — SIGNIFICANT CHANGE UP (ref 11.5–15.5)
IMM GRANULOCYTES NFR BLD AUTO: 0.4 % — SIGNIFICANT CHANGE UP (ref 0–0.9)
IRON SATN MFR SERPL: 24 %
IRON SERPL-MCNC: 81 UG/DL
LYMPHOCYTES # BLD AUTO: 1.44 K/UL — SIGNIFICANT CHANGE UP (ref 1–3.3)
LYMPHOCYTES # BLD AUTO: 25.6 % — SIGNIFICANT CHANGE UP (ref 13–44)
MCHC RBC-ENTMCNC: 32.1 G/DL — SIGNIFICANT CHANGE UP (ref 32–36)
MCHC RBC-ENTMCNC: 32.9 PG — SIGNIFICANT CHANGE UP (ref 27–34)
MCV RBC AUTO: 102.3 FL — HIGH (ref 80–100)
MONOCYTES # BLD AUTO: 0.53 K/UL — SIGNIFICANT CHANGE UP (ref 0–0.9)
MONOCYTES NFR BLD AUTO: 9.4 % — SIGNIFICANT CHANGE UP (ref 2–14)
NEUTROPHILS # BLD AUTO: 3.27 K/UL — SIGNIFICANT CHANGE UP (ref 1.8–7.4)
NEUTROPHILS NFR BLD AUTO: 58.2 % — SIGNIFICANT CHANGE UP (ref 43–77)
NRBC BLD AUTO-RTO: 0 /100 WBCS — SIGNIFICANT CHANGE UP (ref 0–0)
PLATELET # BLD AUTO: 289 K/UL — SIGNIFICANT CHANGE UP (ref 150–400)
RBC # BLD: 3.53 M/UL — LOW (ref 3.8–5.2)
RBC # FLD: 15.9 % — HIGH (ref 10.3–14.5)
TIBC SERPL-MCNC: 338 UG/DL
UIBC SERPL-MCNC: 258 UG/DL
WBC # BLD: 5.62 K/UL — SIGNIFICANT CHANGE UP (ref 3.8–10.5)
WBC # FLD AUTO: 5.62 K/UL — SIGNIFICANT CHANGE UP (ref 3.8–10.5)

## 2025-03-29 LAB — ERYTHROCYTE [SEDIMENTATION RATE] IN BLOOD BY WESTERGREN METHOD: 20 MM/HR

## 2025-04-01 ENCOUNTER — APPOINTMENT (OUTPATIENT)
Dept: DERMATOLOGY | Facility: CLINIC | Age: 75
End: 2025-04-01

## 2025-04-01 DIAGNOSIS — L81.4 OTHER MELANIN HYPERPIGMENTATION: ICD-10-CM

## 2025-04-01 DIAGNOSIS — L82.0 INFLAMED SEBORRHEIC KERATOSIS: ICD-10-CM

## 2025-04-01 DIAGNOSIS — D22.9 MELANOCYTIC NEVI, UNSPECIFIED: ICD-10-CM

## 2025-04-01 DIAGNOSIS — R21 RASH AND OTHER NONSPECIFIC SKIN ERUPTION: ICD-10-CM

## 2025-04-01 PROCEDURE — 99213 OFFICE O/P EST LOW 20 MIN: CPT | Mod: 25

## 2025-04-01 PROCEDURE — 17110 DESTRUCTION B9 LES UP TO 14: CPT

## 2025-04-11 ENCOUNTER — OUTPATIENT (OUTPATIENT)
Dept: OUTPATIENT SERVICES | Facility: HOSPITAL | Age: 75
LOS: 1 days | Discharge: HOME | End: 2025-04-11

## 2025-04-11 DIAGNOSIS — Z95.5 PRESENCE OF CORONARY ANGIOPLASTY IMPLANT AND GRAFT: Chronic | ICD-10-CM

## 2025-04-11 DIAGNOSIS — H26.9 UNSPECIFIED CATARACT: Chronic | ICD-10-CM

## 2025-04-11 DIAGNOSIS — D50.9 IRON DEFICIENCY ANEMIA, UNSPECIFIED: ICD-10-CM

## 2025-04-18 ENCOUNTER — APPOINTMENT (OUTPATIENT)
Dept: INFUSION THERAPY | Facility: CLINIC | Age: 75
End: 2025-04-18

## 2025-04-18 VITALS
HEIGHT: 62 IN | BODY MASS INDEX: 31.65 KG/M2 | DIASTOLIC BLOOD PRESSURE: 89 MMHG | OXYGEN SATURATION: 95 % | TEMPERATURE: 97.8 F | HEART RATE: 75 BPM | SYSTOLIC BLOOD PRESSURE: 157 MMHG | WEIGHT: 172 LBS

## 2025-05-05 ENCOUNTER — APPOINTMENT (OUTPATIENT)
Dept: GASTROENTEROLOGY | Facility: AMBULATORY MEDICAL SERVICES | Age: 75
End: 2025-05-05
Payer: MEDICARE

## 2025-05-05 ENCOUNTER — RESULT REVIEW (OUTPATIENT)
Age: 75
End: 2025-05-05

## 2025-05-05 PROCEDURE — 45380 COLONOSCOPY AND BIOPSY: CPT

## 2025-05-13 ENCOUNTER — APPOINTMENT (OUTPATIENT)
Dept: DERMATOLOGY | Facility: CLINIC | Age: 75
End: 2025-05-13
Payer: MEDICARE

## 2025-05-13 DIAGNOSIS — L81.4 OTHER MELANIN HYPERPIGMENTATION: ICD-10-CM

## 2025-05-13 DIAGNOSIS — L82.0 INFLAMED SEBORRHEIC KERATOSIS: ICD-10-CM

## 2025-05-13 DIAGNOSIS — D22.9 MELANOCYTIC NEVI, UNSPECIFIED: ICD-10-CM

## 2025-05-13 PROCEDURE — 17110 DESTRUCTION B9 LES UP TO 14: CPT

## 2025-05-13 PROCEDURE — 99213 OFFICE O/P EST LOW 20 MIN: CPT | Mod: 25

## 2025-05-14 NOTE — CONSULT NOTE ADULT - NS ATTEND BILL GEN_ALL_CORE
Patient: Omega Jay Date: 2025   : 1961    64 year old male           HEPATOLOGY:      Referral: Martín Sorensen MD  Chief Complaints:   Chief Complaint   Patient presents with   • Follow-up   • Cirrhosis   • Office Visit         HPI:  64 year old male who comes to the clinic for follow up for alcoholic cirrhosis.    He has history of alcohol use 1.75 L of vodka weekly and decrease now to biweekly. Stopped drinking on 2023    He was using a supplement that was using for prostate at some point     US liver 22   Liver: The hepatic parenchymal echotexture is nodular and heterogenous. There is no biliary ductal dilation. The main portal vein is patent with hepatopetal flow. No mass is identified.     Had a fibroscan on 2023  Elastography: 13.3 kpa, 7% IQR  Fibrosis Stage: F3   CAP: 271 dB/m Steatosis Grade: S2  (indicates 34-66% steatosis)    MRI liver/elastography 3/15/24  Elevated liver stiffness (4.5 kPa, consistent with Stage 3-4 fibrosis)   Severe hepatic iron deposition   Mild hepatic steatosis (11%)    Reports feeling well.           PAST MEDICAL HISTORY:      HTN (hypertension)                                            Colon polyps                                                  Cirrhosis  (CMD)                                              Past Surgical History:   Procedure Laterality Date   • Colonoscopy diagnostic  04/10/2025    Tubular adenoma. Follow up in 5 years   • Colonoscopy w/ polypectomy  2017    Repeat in 5 years.  Due 3/2022.  Benign Polyp.   • Colonoscopy w/ polypectomy  2022    tubular adenomas x4, repeat 3 yrs   • Hernia repair         Family History   Problem Relation Age of Onset   • Heart disease Father        Social History:  Social History     Tobacco Use   • Smoking status: Former     Current packs/day: 0.00     Average packs/day: 1 pack/day for 20.0 years (20.0 ttl pk-yrs)     Types: Cigarettes     Start date: 1972     Quit date: 1992 
    Years since quittin.8   • Smokeless tobacco: Never   Substance Use Topics   • Alcohol use: Not Currently     Alcohol/week: 0.0 - 1.0 standard drinks of alcohol       MEDICATIONS:  Current Outpatient Medications   Medication Sig Dispense Refill   • Probiotic Product (PROBIOTIC DAILY PO) Take 1 Capful by mouth daily.     • albuterol 108 (90 Base) MCG/ACT inhaler Inhale 2 puffs into the lungs every 4 hours as needed for Wheezing (cough). 8.5 g 1   • lisinopril-hydroCHLOROthiazide (ZESTORETIC) 20-12.5 MG per tablet Take 2 tablets by mouth daily. 180 tablet 3   • aluminum chloride (DRYSOL) 20 % topical solution Apply topically nightly. (Patient taking differently: Apply topically nightly as needed.) 35 mL 3   • sildenafil (VIAGRA) 50 MG tablet Take 1 tablet po once, may repat after 2 hours if needed; no more than 2 doses per 24 hour period. 15 tablet 0   • Ascorbic Acid (VITAMIN C) 500 MG tablet Take 500 mg by mouth daily.       No current facility-administered medications for this visit.       ALLERGIES:  Allergies as of 2025   • (No Known Allergies)       Review of Systems     Constitutional: Negative for fever, activity change and fatigue.   HENT: Negative for congestion and sneezing.   Eyes: Negative for discharge and redness.   Respiratory: Negative for cough, shortness of breath and wheezing.   Cardiovascular: Negative for chest pain and palpitations.   Gastrointestinal: Negative for nausea, vomiting, abdominal pain, diarrhea, constipation and abdominal distention.   Genitourinary: Negative for dysuria and hematuria.   Musculoskeletal: Negative for back pain and joint swelling.   Skin: Negative for color change, pallor and rash.   Neurological: Negative for tremors and headaches.   Psychiatric/Behavioral: Negative for suicidal ideas and confusion. The patient is not nervous/anxious.       PHYSICAL EXAMINATION:    Visit Vitals  /68 (BP Location: LUE - Left upper extremity, Patient Position: 
Sitting, Cuff Size: Regular)   Pulse 83   Temp 97.5 °F (36.4 °C) (Temporal)   Ht 5' 10\" (1.778 m)   Wt 76.5 kg (168 lb 9.6 oz)   SpO2 98%   BMI 24.19 kg/m²     GENERAL:  Well-developed, well-nourished. In no apparent distress. Alert awake and oriented x 3.   HEENT:  Head is normocephalic, atraumatic.  Sclerae anicteric. PERRLA.   NECK:  Supple with no cervical or supraclavicular lymphadenopathy. No thyroid enlargement.   SKIN: Warm and dry without rashes or jaundice.  HEART:  S1, S2 normal with no murmurs  LUNGS:  clear to auscultation  ABDOMEN:  Soft, nondistended, nontender.  No hepatosplenomegaly. There are no masses. No ascites.  MUSCULOSKELETAL:  Negative for edema. No cyanosis or clubbing. No CVA tenderness or spinal deformity.  NEUROLOGIC: Alert, awake and oriented x 3 with out  asterixis or focal neurological deficit.  PSYCHIATRIC:  Mood and affect is appropriate.        Labs: Reviewed In EPIC  MELD 3.0: 7 at 5/14/2025 12:03 PM  MELD-Na: 7 at 5/14/2025 12:03 PM  Calculated from:  Serum Creatinine: 0.72 mg/dL (Using min of 1 mg/dL) at 5/14/2025 12:03 PM  Serum Sodium: 139 mmol/L (Using max of 137 mmol/L) at 5/14/2025 12:03 PM  Total Bilirubin: 0.6 mg/dL (Using min of 1 mg/dL) at 5/14/2025 12:03 PM  Serum Albumin: 3.5 g/dL at 5/14/2025 12:03 PM  INR(ratio): 1.1 at 5/14/2025 12:03 PM  Age at listing (hypothetical): 64 years  Sex: Male at 5/14/2025 12:03 PM          Radiology Findings:  Reviewed In EPIC        ASSESSMENT AND PLAN:    This is a 64 year old   male  with a history of alcohol use disorders complicated with cirrhosis and iron abnormalities  Recommend total abstinence. Sobered since 8/2023 but has had some relapse. His HFE is negative.    Liver Disease:    Alcoholic liver disease   MELD/Na as above. Reinforced importance of alcohol abstinence. Defer transplant evaluation d/t low MELD    Portal hypertension/Ascites:   NONE Reinforced 2 gram sodium diet.     Portal hypertension/ Varices:   EGD No 
needed    Portosystemic encephalopathy:   NONE.     HCC screening:   MRI liver 3/15/24 revealed no focal liver lesion.   He is due to repeat imaging will order a MRI/MRE        Recommendations    US liver with doppler  Fibroscan  Added ferritin  F/U 6 months MELD + AFP + PETH          Sushil King MD  Hepatology  5/14/2025      Total time 25 minutes, more than half spent counseling about the status of his liver disease and future management goal.  A copy of this report will be shared with Care team    
Attending to bill
Attending to bill

## 2025-05-21 ENCOUNTER — RESULT REVIEW (OUTPATIENT)
Age: 75
End: 2025-05-21

## 2025-05-21 ENCOUNTER — APPOINTMENT (OUTPATIENT)
Dept: HEMATOLOGY ONCOLOGY | Facility: CLINIC | Age: 75
End: 2025-05-21

## 2025-05-21 LAB
BASOPHILS # BLD AUTO: 0.1 K/UL — SIGNIFICANT CHANGE UP (ref 0–0.2)
BASOPHILS NFR BLD AUTO: 2.1 % — HIGH (ref 0–2)
EOSINOPHIL # BLD AUTO: 0.43 K/UL — SIGNIFICANT CHANGE UP (ref 0–0.5)
EOSINOPHIL NFR BLD AUTO: 9 % — HIGH (ref 0–6)
HCT VFR BLD CALC: 40.2 % — SIGNIFICANT CHANGE UP (ref 34.5–45)
HGB BLD-MCNC: 13 G/DL — SIGNIFICANT CHANGE UP (ref 11.5–15.5)
IMM GRANULOCYTES NFR BLD AUTO: 0.2 % — SIGNIFICANT CHANGE UP (ref 0–0.9)
LYMPHOCYTES # BLD AUTO: 1.68 K/UL — SIGNIFICANT CHANGE UP (ref 1–3.3)
LYMPHOCYTES # BLD AUTO: 35.1 % — SIGNIFICANT CHANGE UP (ref 13–44)
MCHC RBC-ENTMCNC: 31.3 PG — SIGNIFICANT CHANGE UP (ref 27–34)
MCHC RBC-ENTMCNC: 32.3 G/DL — SIGNIFICANT CHANGE UP (ref 32–36)
MCV RBC AUTO: 96.6 FL — SIGNIFICANT CHANGE UP (ref 80–100)
MONOCYTES # BLD AUTO: 0.49 K/UL — SIGNIFICANT CHANGE UP (ref 0–0.9)
MONOCYTES NFR BLD AUTO: 10.3 % — SIGNIFICANT CHANGE UP (ref 2–14)
NEUTROPHILS # BLD AUTO: 2.07 K/UL — SIGNIFICANT CHANGE UP (ref 1.8–7.4)
NEUTROPHILS NFR BLD AUTO: 43.3 % — SIGNIFICANT CHANGE UP (ref 43–77)
NRBC BLD AUTO-RTO: 0 /100 WBCS — SIGNIFICANT CHANGE UP (ref 0–0)
PLATELET # BLD AUTO: 296 K/UL — SIGNIFICANT CHANGE UP (ref 150–400)
RBC # BLD: 4.16 M/UL — SIGNIFICANT CHANGE UP (ref 3.8–5.2)
RBC # FLD: 14.7 % — HIGH (ref 10.3–14.5)
WBC # BLD: 4.78 K/UL — SIGNIFICANT CHANGE UP (ref 3.8–10.5)
WBC # FLD AUTO: 4.78 K/UL — SIGNIFICANT CHANGE UP (ref 3.8–10.5)

## 2025-06-02 ENCOUNTER — APPOINTMENT (OUTPATIENT)
Dept: INFUSION THERAPY | Facility: CLINIC | Age: 75
End: 2025-06-02

## 2025-06-02 VITALS — WEIGHT: 170 LBS | BODY MASS INDEX: 31.09 KG/M2

## 2025-06-02 VITALS
TEMPERATURE: 97.9 F | SYSTOLIC BLOOD PRESSURE: 122 MMHG | OXYGEN SATURATION: 94 % | HEART RATE: 87 BPM | DIASTOLIC BLOOD PRESSURE: 87 MMHG

## 2025-06-06 ENCOUNTER — APPOINTMENT (OUTPATIENT)
Dept: GASTROENTEROLOGY | Facility: CLINIC | Age: 75
End: 2025-06-06

## 2025-06-06 VITALS
WEIGHT: 170 LBS | SYSTOLIC BLOOD PRESSURE: 124 MMHG | DIASTOLIC BLOOD PRESSURE: 88 MMHG | BODY MASS INDEX: 31.28 KG/M2 | HEIGHT: 62 IN

## 2025-06-06 PROCEDURE — 99214 OFFICE O/P EST MOD 30 MIN: CPT

## 2025-06-06 RX ORDER — RIFAXIMIN 550 MG/1
550 TABLET ORAL
Qty: 42 | Refills: 0 | Status: ACTIVE | COMMUNITY
Start: 2025-06-06 | End: 1900-01-01

## 2025-06-09 ENCOUNTER — OUTPATIENT (OUTPATIENT)
Dept: OUTPATIENT SERVICES | Facility: HOSPITAL | Age: 75
LOS: 1 days | Discharge: HOME | End: 2025-06-09

## 2025-06-09 DIAGNOSIS — Z95.5 PRESENCE OF CORONARY ANGIOPLASTY IMPLANT AND GRAFT: Chronic | ICD-10-CM

## 2025-06-09 DIAGNOSIS — Z90.11 ACQUIRED ABSENCE OF RIGHT BREAST AND NIPPLE: Chronic | ICD-10-CM

## 2025-06-09 DIAGNOSIS — D50.9 IRON DEFICIENCY ANEMIA, UNSPECIFIED: ICD-10-CM

## 2025-06-09 DIAGNOSIS — H26.9 UNSPECIFIED CATARACT: Chronic | ICD-10-CM

## 2025-06-11 ENCOUNTER — APPOINTMENT (OUTPATIENT)
Dept: INFUSION THERAPY | Facility: CLINIC | Age: 75
End: 2025-06-11

## 2025-06-11 ENCOUNTER — APPOINTMENT (OUTPATIENT)
Dept: HEMATOLOGY ONCOLOGY | Facility: CLINIC | Age: 75
End: 2025-06-11
Payer: MEDICARE

## 2025-06-11 VITALS
BODY MASS INDEX: 31.28 KG/M2 | OXYGEN SATURATION: 93 % | SYSTOLIC BLOOD PRESSURE: 109 MMHG | TEMPERATURE: 98 F | DIASTOLIC BLOOD PRESSURE: 64 MMHG | HEART RATE: 88 BPM | WEIGHT: 170 LBS | HEIGHT: 62 IN

## 2025-06-11 PROBLEM — H93.291 ABNORMAL AUDITORY PERCEPTION OF RIGHT EAR: Status: RESOLVED | Noted: 2025-02-13 | Resolved: 2025-06-11

## 2025-06-11 PROBLEM — H90.3 SENSORINEURAL HEARING LOSS (SNHL) OF BOTH EARS: Status: RESOLVED | Noted: 2021-06-23 | Resolved: 2025-06-11

## 2025-06-11 PROBLEM — R06.00 DYSPNEA AND RESPIRATORY ABNORMALITY: Status: RESOLVED | Noted: 2021-10-08 | Resolved: 2025-06-11

## 2025-06-11 PROBLEM — S93.412A SPRAIN OF CALCANEOFIBULAR LIGAMENT OF LEFT ANKLE, INITIAL ENCOUNTER: Status: RESOLVED | Noted: 2022-11-14 | Resolved: 2025-06-11

## 2025-06-11 PROBLEM — M25.562 PAIN IN LEFT KNEE: Status: RESOLVED | Noted: 2022-11-14 | Resolved: 2025-06-11

## 2025-06-11 PROBLEM — M17.11 PRIMARY OSTEOARTHRITIS OF RIGHT KNEE: Status: RESOLVED | Noted: 2022-11-14 | Resolved: 2025-06-11

## 2025-06-11 PROBLEM — Z86.018 HISTORY OF MELANOCYTIC NEVUS, UNSPECIFIED LOCATION: Status: RESOLVED | Noted: 2024-03-05 | Resolved: 2025-06-11

## 2025-06-11 PROBLEM — S93.411A SPRAIN OF CALCANEOFIBULAR LIGAMENT OF RIGHT ANKLE, INITIAL ENCOUNTER: Status: RESOLVED | Noted: 2022-11-14 | Resolved: 2025-06-11

## 2025-06-11 PROBLEM — Z87.2 HISTORY OF SEBORRHEIC KERATOSIS: Status: RESOLVED | Noted: 2020-01-02 | Resolved: 2025-06-11

## 2025-06-11 PROBLEM — H93.8X3 SENSATION OF PLUGGED EAR ON BOTH SIDES: Status: RESOLVED | Noted: 2022-07-28 | Resolved: 2025-06-11

## 2025-06-11 PROBLEM — M25.561 PAIN IN RIGHT KNEE: Status: RESOLVED | Noted: 2022-11-14 | Resolved: 2025-06-11

## 2025-06-11 PROBLEM — M17.12 PRIMARY OSTEOARTHRITIS OF LEFT KNEE: Status: RESOLVED | Noted: 2022-11-14 | Resolved: 2025-06-11

## 2025-06-11 PROBLEM — H60.543 ECZEMA OF BOTH EXTERNAL EARS: Status: RESOLVED | Noted: 2022-07-28 | Resolved: 2025-06-11

## 2025-06-11 PROBLEM — Z87.19 HISTORY OF IRRITABLE BOWEL SYNDROME: Status: RESOLVED | Noted: 2020-06-24 | Resolved: 2025-06-11

## 2025-06-11 PROBLEM — H93.292 ABNORMAL AUDITORY PERCEPTION OF LEFT EAR: Status: RESOLVED | Noted: 2025-02-13 | Resolved: 2025-06-11

## 2025-06-11 PROCEDURE — 99214 OFFICE O/P EST MOD 30 MIN: CPT

## 2025-06-11 PROCEDURE — G2211 COMPLEX E/M VISIT ADD ON: CPT

## 2025-06-16 DIAGNOSIS — D50.0 IRON DEFICIENCY ANEMIA SECONDARY TO BLOOD LOSS (CHRONIC): ICD-10-CM

## 2025-06-18 PROBLEM — R19.4 ALTERED BOWEL HABITS: Status: ACTIVE | Noted: 2025-06-18

## 2025-06-20 ENCOUNTER — APPOINTMENT (OUTPATIENT)
Dept: BREAST CENTER | Facility: CLINIC | Age: 75
End: 2025-06-20
Payer: MEDICARE

## 2025-06-20 VITALS
HEART RATE: 96 BPM | DIASTOLIC BLOOD PRESSURE: 104 MMHG | BODY MASS INDEX: 31.28 KG/M2 | HEIGHT: 62 IN | WEIGHT: 170 LBS | SYSTOLIC BLOOD PRESSURE: 153 MMHG

## 2025-06-20 PROCEDURE — 99213 OFFICE O/P EST LOW 20 MIN: CPT

## 2025-07-01 ENCOUNTER — APPOINTMENT (OUTPATIENT)
Dept: DERMATOLOGY | Facility: CLINIC | Age: 75
End: 2025-07-01
Payer: MEDICARE

## 2025-07-01 PROBLEM — Z01.818 PREOP TESTING: Status: ACTIVE | Noted: 2025-07-01

## 2025-07-01 PROCEDURE — 99213 OFFICE O/P EST LOW 20 MIN: CPT | Mod: 25

## 2025-07-01 PROCEDURE — 17110 DESTRUCTION B9 LES UP TO 14: CPT

## 2025-07-02 ENCOUNTER — APPOINTMENT (OUTPATIENT)
Dept: MAMMOGRAPHY | Facility: CLINIC | Age: 75
End: 2025-07-02

## 2025-07-02 ENCOUNTER — OUTPATIENT (OUTPATIENT)
Dept: OUTPATIENT SERVICES | Facility: HOSPITAL | Age: 75
LOS: 1 days | End: 2025-07-02

## 2025-07-02 DIAGNOSIS — Z95.5 PRESENCE OF CORONARY ANGIOPLASTY IMPLANT AND GRAFT: Chronic | ICD-10-CM

## 2025-07-02 DIAGNOSIS — H26.9 UNSPECIFIED CATARACT: Chronic | ICD-10-CM

## 2025-07-02 DIAGNOSIS — Z85.3 PERSONAL HISTORY OF MALIGNANT NEOPLASM OF BREAST: ICD-10-CM

## 2025-07-02 DIAGNOSIS — Z90.11 ACQUIRED ABSENCE OF RIGHT BREAST AND NIPPLE: Chronic | ICD-10-CM

## 2025-07-08 NOTE — ASU PATIENT PROFILE, ADULT - TEACHING/LEARNING EDUCATIONAL LEVEL
Showering allowed/Stairs allowed/Walking - Indoors allowed/No heavy lifting/straining/Walking - Outdoors allowed/Follow Instructions Provided by your Surgical Team
high school

## 2025-07-09 ENCOUNTER — TRANSCRIPTION ENCOUNTER (OUTPATIENT)
Age: 75
End: 2025-07-09

## 2025-07-09 ENCOUNTER — OUTPATIENT (OUTPATIENT)
Dept: OUTPATIENT SERVICES | Facility: HOSPITAL | Age: 75
LOS: 1 days | Discharge: HOME | End: 2025-07-09
Payer: MEDICARE

## 2025-07-09 VITALS
HEART RATE: 72 BPM | WEIGHT: 171.08 LBS | TEMPERATURE: 100 F | DIASTOLIC BLOOD PRESSURE: 98 MMHG | HEIGHT: 62 IN | RESPIRATION RATE: 16 BRPM | OXYGEN SATURATION: 96 % | SYSTOLIC BLOOD PRESSURE: 146 MMHG

## 2025-07-09 VITALS
OXYGEN SATURATION: 94 % | HEART RATE: 74 BPM | RESPIRATION RATE: 18 BRPM | SYSTOLIC BLOOD PRESSURE: 165 MMHG | DIASTOLIC BLOOD PRESSURE: 84 MMHG

## 2025-07-09 DIAGNOSIS — R06.00 DYSPNEA, UNSPECIFIED: ICD-10-CM

## 2025-07-09 DIAGNOSIS — Z90.11 ACQUIRED ABSENCE OF RIGHT BREAST AND NIPPLE: Chronic | ICD-10-CM

## 2025-07-09 DIAGNOSIS — Z95.5 PRESENCE OF CORONARY ANGIOPLASTY IMPLANT AND GRAFT: Chronic | ICD-10-CM

## 2025-07-09 DIAGNOSIS — H26.9 UNSPECIFIED CATARACT: Chronic | ICD-10-CM

## 2025-07-09 DIAGNOSIS — I25.10 ATHEROSCLEROTIC HEART DISEASE OF NATIVE CORONARY ARTERY WITHOUT ANGINA PECTORIS: ICD-10-CM

## 2025-07-09 LAB
ANION GAP SERPL CALC-SCNC: 19 MMOL/L
BUN SERPL-MCNC: 14 MG/DL
CALCIUM SERPL-MCNC: 9.4 MG/DL
CHLORIDE SERPL-SCNC: 97 MMOL/L
CO2 SERPL-SCNC: 28 MMOL/L
CREAT SERPL-MCNC: 0.59 MG/DL
EGFRCR SERPLBLD CKD-EPI 2021: 93 ML/MIN/1.73M2
GLUCOSE SERPL-MCNC: 93 MG/DL
HCT VFR BLD CALC: 38.2 %
HGB BLD-MCNC: 12.2 G/DL
MCHC RBC-ENTMCNC: 31.9 G/DL
MCHC RBC-ENTMCNC: 32.9 PG
MCV RBC AUTO: 103 FL
PLATELET # BLD AUTO: 308 K/UL
POTASSIUM SERPL-SCNC: 3.9 MMOL/L
RBC # BLD: 3.71 M/UL
RBC # FLD: 18.8 %
SAO2 % BLD: 53.3 % — LOW (ref 60–90)
SAO2 % BLD: 62.1 % — SIGNIFICANT CHANGE UP (ref 60–90)
SAO2 % BLD: 65 % — SIGNIFICANT CHANGE UP (ref 60–90)
SAO2 % BLD: 70.8 % — SIGNIFICANT CHANGE UP (ref 60–90)
SAO2 % BLD: 77.7 % — SIGNIFICANT CHANGE UP (ref 60–90)
SODIUM SERPL-SCNC: 144 MMOL/L
WBC # FLD AUTO: 6.81 K/UL

## 2025-07-09 PROCEDURE — 93451 RIGHT HEART CATH: CPT | Mod: 26

## 2025-07-09 PROCEDURE — 99152 MOD SED SAME PHYS/QHP 5/>YRS: CPT

## 2025-07-09 PROCEDURE — 93010 ELECTROCARDIOGRAM REPORT: CPT

## 2025-07-09 NOTE — H&P ADULT - ASSESSMENT
75 y.o female with PMhx of HTN, HLD, GIB, anemia on iron infusion, valvular disease, CAD, s/p LAD, rPDA and RPLA stent presented to cardiology with c/o ongoing SOB for the last 2.5 years. Pt reports having easy to get SOB with minimum activity and having chest tightness. Pt states having iron infusion regularly for anemia, but still have SOB. Concerning of possible pulmonary HTN, Pt referred to RHC for further evaluation.

## 2025-07-09 NOTE — PACU DISCHARGE NOTE - COMMENTS
Pt aaox4, pt denies any symptoms. clean dry dressing intact over right groin, no s/sx of hematoma, swelling, or bleeding noted.  Right groin soft and mobile. IV removed, no s/sx of infection noted. pt ambulated well to bathroom prior to dc. Hospitality assisted pt to main lobby

## 2025-07-09 NOTE — BRIEF OPERATIVE NOTE - NSEVIDENCEINFORABS_GEN_ALL_CORE
08/01/18      Breonna Pena  [address]  [facility] [address]      Dear Breonna,      It was a pleasure to meet you on 8/1/18. We want to welcome you as a patient to [facility]. We know you have a choice of health-care providers, and we truly appreciate your decision to become our patient. We value our patients, so please let us know if there is ever anything we can do to make your visit more pleasurable.      Our goal is to treat every patient with respect, compassion, and professionalism. We have an excellent staff that truly desires to partner with you to improve your health.      At [facility], we believe in working together without patients to help you reach your health-care goals. We will guide and lead you, but by working together, we hope your relationship with us will be one you cherish for many years to come.      You may receive a patient satisfaction survey asking about your visit in the next few weeks. We would love to hear your feedback and would appreciate you taking time to complete the survey. This is a good way to let us know what you enjoyed about your visit or what we could have done better to make your visit more enjoyable.      Sincerely,      _________________________________     &        _________________________  RICH Neville, FNP-BC           Lilia Peters MA  Family Nurse Practitioner    Medical Assistant              [facility]  [address], [place] [address]    P: [phone], F: [phone]       No

## 2025-07-09 NOTE — ASU PREOP CHECKLIST - DNR CLARIFICATION FORM COMPLETED
Called patient's preferred [address] to follow up with prior auth request for mycolog cream. Medication is non formulary with patient's insurance. Pharmacist Hans states that preferred medication for insurance plan is Lotrisone. Reviewed with Dr. Daryl Hoover who agrees medication is appropriate. New script sent to pharmacy for lotrisone. done

## 2025-07-09 NOTE — H&P ADULT - NSHPPHYSICALEXAM_GEN_ALL_CORE
Vital Signs Last 24 Hrs  T(C): 37.6 (09 Jul 2025 14:04), Max: 37.6 (09 Jul 2025 14:04)  T(F): 99.7 (09 Jul 2025 14:04), Max: 99.7 (09 Jul 2025 14:04)  HR: 72 (09 Jul 2025 14:04) (72 - 72)  BP: 146/98 (09 Jul 2025 14:04) (146/98 - 146/98)  RR: 16 (09 Jul 2025 14:04) (16 - 16)  SpO2: 96% (09 Jul 2025 14:04) (96% - 96%)    Parameters below as of 09 Jul 2025 14:04  Patient On (Oxygen Delivery Method): room air        Constitutional: well developed, well nourished, no deformities and no acute distress  Neurological: Alert & Oriented x 3, HNEDERSON, no focal deficits  HEENT: NC/AT, PERRLA, EOMI,  Neck supple.  Respiratory: CTA B/L, No wheezing/crackles/rhonchi  Cardiovascular: (+) S1 & S2, RRR, No murmur/ rub/ gallop   Gastrointestinal: soft, Nontender, nondistended, (+) BS  Genitourinary: non distended bladder, voiding freely  Extremities: No pedal edema, No clubbing, No cyanosis, 1+  PT/ DP pulses   Skin:  normal skin color and pigmentation, no skin lesions

## 2025-07-09 NOTE — ASU DISCHARGE PLAN (ADULT/PEDIATRIC) - FINANCIAL ASSISTANCE
[facility] provides services at a reduced cost to those who are determined to be eligible through [facility]’s financial assistance program. Information regarding [facility]’s financial assistance program can be found by going to [email or web address] or by calling [phone].

## 2025-07-09 NOTE — H&P ADULT - NSHPLABSRESULTS_GEN_ALL_CORE
EKG (7/9/25): Afib at 82 bpm   +  < from: Cardiac Catheterization (09.30.24 @ 08:34) >  Diagnostic Findings:   Coronary Angiography   The coronary circulation is right dominant.      LM   Left main artery: Angiography shows mild atherosclerosis.      LAD   Proximal left anterior descending: Angiography shows mild  atherosclerosis. There is a 30 % stenosis. Mid left anterior  descending: There is a 30 % stenosis. The previously placed stent is a  drug-eluting stent and is patent.    CX   Circumflex: Angiography shows mild atherosclerosis.      RCA   Distal right coronary artery: Angiography shows mild atherosclerosis.  The previously placed stent is a drug-eluting stent and is  patent. Right posterior descending artery: There is a 30 % stenosis.  The previously placed stent is a drug-eluting stent and is  patent.      Left Heart Cath   Left ventricular function was assessed. Ejection fraction was visually  estimated by estimation. LV to AO pullback was  performed. LHC performed: Aortic valve crossed and left ventricular  pressure obtained.  < end of copied text >

## 2025-07-09 NOTE — H&P ADULT - PROBLEM SELECTOR PLAN 1
Plan for RHC  - TONI protocol pre hydration: No pre hydration, will check Rt heart pressure   - Procedure, its risks, alternatives, benefits and potential complications were discussed in detail. Risks include but not limited to bleeding, infection, allergy, renal failure requiring dialysis, stroke, vascular injury, pericardial tamponade, arrhythmias, MI and even death. Pt is agreeable and has consented for cardiac catheterization with possible stent placement and possible sedation and/ or analgesia.

## 2025-07-09 NOTE — H&P ADULT - NSHPREVIEWOFSYSTEMS_GEN_ALL_CORE
General: Pt denies recent weight loss/fever/chills  Neurological: denies numbness or  sensation loss  Cardiovascular: denies chest pain/palpitations/leg edema  Respiratory and Thorax: + SOB, + dry cough, denies wheezing  Gastrointestinal: denies abdominal pain/diarrhea/ nausea/ vomiting/ constipation/bloody stool  Genitourinary: denies urinary frequency/urgency/ dysuria/ hematuria   Musculoskeletal: denies joint pain or swelling/ restricted range of motion  Hematologic: denies abnormal bleeding

## 2025-07-09 NOTE — H&P ADULT - NSICDXPASTSURGICALHX_GEN_ALL_CORE_FT
PAST SURGICAL HISTORY:  Cataract, bilateral     H/O total mastectomy of right breast     S/P drug eluting coronary stent placement in 2008 and 2010 - total x4    
S/p multiple fall at home    - On AC, No LOC   - CXR: atraumatic   - CT head/ C-spine: no ICH, acute fx or traumatic malalignment  - PRN pain control   - PT consult   - Fall precaution

## 2025-07-09 NOTE — ASU DISCHARGE PLAN (ADULT/PEDIATRIC) - CARE PROVIDER_API CALL
Bradley Hogan  Cardiovascular Disease  [address], [address]  [place] [address]  Phone: [phone]  Fax: [phone]  Follow Up Time:

## 2025-07-09 NOTE — ASU DISCHARGE PLAN (ADULT/PEDIATRIC) - NS MD DC FALL RISK RISK
For information on Fall & Injury Prevention, visit: [email or web address] OR  [email or web address] OR  https://www.cdc.gov/steadi/patient.html

## 2025-07-09 NOTE — PROGRESS NOTE ADULT - SUBJECTIVE AND OBJECTIVE BOX
HPI:  75 y.o female with PMhx of HTN, HLD, GIB, anemia on iron infusion, valvular disease, CAD, s/p LAD, rPDA and RPLA stent presented to cardiology with c/o ongoing SOB for the last 2.5 years. Pt reports having easy to get SOB with minimum activity and having chest tightness. Pt states having iron infusion regularly for anemia, but still have SOB. Concerning of possible pulmonary HTN, Pt referred to RHC for further evaluation.  (09 Jul 2025 14:57)    Pt underwent RHC via Rt femoral venous access, revealed mild pulmonary HTN, no evidence of shunt (Rt heart pressure- RA:5, RV: 40/1/16, PA: 40/17/27, PCWP: 16)    ROS: denies chest pain/ pressure, SOB or palpitation     Vital Signs;  T(C): 37.6 (07-09-25 @ 14:04), Max: 37.6 (07-09-25 @ 14:04)  HR: 72 (07-09-25 @ 14:04) (72 - 72)  BP: 146/98 (07-09-25 @ 14:04) (146/98 - 146/98)  RR: 16 (07-09-25 @ 14:04) (16 - 16)  SpO2: 96% (07-09-25 @ 14:04) (96% - 96%)    PHYSICAL EXAM:  GENERAL: NAD, well-groomed, well-developed  HEENT - NC/AT, pupils equal and reactive to light,  ; Moist mucous membranes, Good dentition, No lesions  NECK: Supple, No JVD  CHEST/LUNG: Clear to auscultation bilaterally; No rales, rhonchi, wheezing  HEART: Regular rate and rhythm; No murmurs, rubs, or gallops  ABDOMEN: Soft, Nontender, Nondistended; Bowel sounds present  EXTREMITIES:  2+ Peripheral Pulses, No clubbing, cyanosis, or edema  NEURO:  No Focal deficits, sensory and motor intact  SKIN: No rashes or lesions  Access site: Rt femoral venous access (s/p manual compression) no hematoma or bleeding     Cardiac Cath: pending official report     Medications:  Home Medications:  amLODIPine 2.5 mg oral tablet: 1 tab(s) orally once a day (09 Jul 2025 14:01)  aspirin 81 mg oral delayed release tablet: 1 tab(s) orally once a day (09 Jul 2025 14:01)  Bystolic 10 mg oral tablet: 1 tab(s) orally once a day (09 Jul 2025 14:01)  calcium (as carbonate) 500 mg oral tablet: 1 tab(s) orally 2 times a day (09 Jul 2025 14:01)  chlordiazepoxide-clidinium 5 mg-2.5 mg oral capsule: 1 cap(s) orally 2 times a day (09 Jul 2025 14:01)  enalapril 10 mg oral tablet: 1 tab(s) orally 2 times a day (09 Jul 2025 14:01)  ezetimibe 10 mg oral tablet: 1 tab(s) orally once a day (at bedtime) (09 Jul 2025 14:01)  pantoprazole 40 mg oral delayed release tablet: 1 tab(s) orally once a day (09 Jul 2025 14:01)  rivaroxaban 20 mg oral tablet: 1 tab(s) orally once a day (in the morning) (09 Jul 2025 14:01)  Vitamin D2 2000 intl units oral capsule: 1 cap(s) orally once a day (at bedtime) (09 Jul 2025 14:01)  Xanax 1 mg oral tablet: 1 tab(s) orally 3 times a day, As Needed; DO NOT TAKE with Narcotic pain medication (09 Jul 2025 14:01)  zolpidem 12.5 mg oral tablet, extended release: 1 tab(s) orally once a day (at bedtime) as needed for sleep (09 Jul 2025 14:01)    Assessment/ Plan  75 y.o female with PMhx of HTN, HLD, GIB, anemia on iron infusion, valvular disease, CAD, s/p LAD, rPDA and RPLA stent presented to cardiology with c/o ongoing SOB for the last 2.5 years. Pt reports having easy to get SOB with minimum activity and having chest tightness. Pt states having iron infusion regularly for anemia, but still have SOB. Concerning of possible pulmonary HTN, Pt referred to RHC for further evaluation.   Pt underwent RHC via Rt femoral venous access, revealed mild pulmonary HTN, no evidence of shunt  - post IV hydration: no IVF required   - continue ASA 81 mg daily   - continue Bystolic 10 mg daily, Enalapril 10 mg daily, Amlodipine 2.5 mg daily   - resume rivaroxaban 20 mg in am (as home schedule)   - post procedure, outcome and follow up care reviewed with patient and Dr. Arvizu   - discharge home today   - follow up with Dr. Hogan in 1-2 weeks as an outpt     Discussed the plan with , Pt and Cath RN.

## 2025-07-09 NOTE — BRIEF OPERATIVE NOTE - OPERATION/FINDINGS
mild pulmonary HTN, no evidence of shunt (RA:5, RV:40//16, PA: 40//, PCWP: 16), Sat: RA: 69.2%, RV: 61.2%, IVC: 75.8%, SV.1%

## 2025-07-10 ENCOUNTER — INPATIENT (INPATIENT)
Facility: HOSPITAL | Age: 75
LOS: 1 days | Discharge: HOME HEALTH | DRG: 149 | End: 2025-07-12
Attending: STUDENT IN AN ORGANIZED HEALTH CARE EDUCATION/TRAINING PROGRAM | Admitting: HOSPITALIST
Payer: MEDICARE

## 2025-07-10 VITALS
DIASTOLIC BLOOD PRESSURE: 99 MMHG | WEIGHT: 171.96 LBS | HEART RATE: 80 BPM | HEIGHT: 62 IN | OXYGEN SATURATION: 100 % | TEMPERATURE: 98 F | RESPIRATION RATE: 18 BRPM | SYSTOLIC BLOOD PRESSURE: 174 MMHG

## 2025-07-10 DIAGNOSIS — Z95.5 PRESENCE OF CORONARY ANGIOPLASTY IMPLANT AND GRAFT: Chronic | ICD-10-CM

## 2025-07-10 DIAGNOSIS — I50.31 ACUTE DIASTOLIC (CONGESTIVE) HEART FAILURE: ICD-10-CM

## 2025-07-10 DIAGNOSIS — H26.9 UNSPECIFIED CATARACT: Chronic | ICD-10-CM

## 2025-07-10 DIAGNOSIS — Z90.11 ACQUIRED ABSENCE OF RIGHT BREAST AND NIPPLE: Chronic | ICD-10-CM

## 2025-07-10 PROCEDURE — 99285 EMERGENCY DEPT VISIT HI MDM: CPT

## 2025-07-11 ENCOUNTER — APPOINTMENT (OUTPATIENT)
Dept: HEMATOLOGY ONCOLOGY | Facility: CLINIC | Age: 75
End: 2025-07-11

## 2025-07-11 DIAGNOSIS — H81.90 UNSPECIFIED DISORDER OF VESTIBULAR FUNCTION, UNSPECIFIED EAR: ICD-10-CM

## 2025-07-11 LAB
ADD ON TEST-SPECIMEN IN LAB: SIGNIFICANT CHANGE UP
ADD ON TEST-SPECIMEN IN LAB: SIGNIFICANT CHANGE UP
ALBUMIN SERPL ELPH-MCNC: 3.4 G/DL — SIGNIFICANT CHANGE UP (ref 3.3–5)
ALP SERPL-CCNC: 71 U/L — SIGNIFICANT CHANGE UP (ref 40–120)
ALT FLD-CCNC: 16 U/L — SIGNIFICANT CHANGE UP (ref 12–78)
ANION GAP SERPL CALC-SCNC: 7 MMOL/L — SIGNIFICANT CHANGE UP (ref 5–17)
ANION GAP SERPL CALC-SCNC: 9 MMOL/L — SIGNIFICANT CHANGE UP (ref 5–17)
APPEARANCE UR: CLEAR — SIGNIFICANT CHANGE UP
APTT BLD: 34.3 SEC — SIGNIFICANT CHANGE UP (ref 26.1–36.8)
AST SERPL-CCNC: 17 U/L — SIGNIFICANT CHANGE UP (ref 15–37)
BASOPHILS # BLD AUTO: 0.06 K/UL — SIGNIFICANT CHANGE UP (ref 0–0.2)
BASOPHILS NFR BLD AUTO: 0.7 % — SIGNIFICANT CHANGE UP (ref 0–2)
BILIRUB SERPL-MCNC: 0.4 MG/DL — SIGNIFICANT CHANGE UP (ref 0.2–1.2)
BILIRUB UR-MCNC: NEGATIVE — SIGNIFICANT CHANGE UP
BUN SERPL-MCNC: 12 MG/DL — SIGNIFICANT CHANGE UP (ref 7–23)
BUN SERPL-MCNC: 7 MG/DL — SIGNIFICANT CHANGE UP (ref 7–23)
CALCIUM SERPL-MCNC: 7.9 MG/DL — LOW (ref 8.5–10.1)
CALCIUM SERPL-MCNC: 8.3 MG/DL — LOW (ref 8.5–10.1)
CHLORIDE SERPL-SCNC: 100 MMOL/L — SIGNIFICANT CHANGE UP (ref 96–108)
CHLORIDE SERPL-SCNC: 103 MMOL/L — SIGNIFICANT CHANGE UP (ref 96–108)
CO2 SERPL-SCNC: 29 MMOL/L — SIGNIFICANT CHANGE UP (ref 22–31)
CO2 SERPL-SCNC: 29 MMOL/L — SIGNIFICANT CHANGE UP (ref 22–31)
COLOR SPEC: YELLOW — SIGNIFICANT CHANGE UP
CREAT SERPL-MCNC: 0.5 MG/DL — SIGNIFICANT CHANGE UP (ref 0.5–1.3)
CREAT SERPL-MCNC: 0.59 MG/DL — SIGNIFICANT CHANGE UP (ref 0.5–1.3)
D DIMER BLD IA.RAPID-MCNC: 481 NG/ML DDU — HIGH
DIFF PNL FLD: NEGATIVE — SIGNIFICANT CHANGE UP
EGFR: 93 ML/MIN/1.73M2 — SIGNIFICANT CHANGE UP
EGFR: 93 ML/MIN/1.73M2 — SIGNIFICANT CHANGE UP
EGFR: 97 ML/MIN/1.73M2 — SIGNIFICANT CHANGE UP
EGFR: 97 ML/MIN/1.73M2 — SIGNIFICANT CHANGE UP
EOSINOPHIL # BLD AUTO: 0.13 K/UL — SIGNIFICANT CHANGE UP (ref 0–0.5)
EOSINOPHIL NFR BLD AUTO: 1.6 % — SIGNIFICANT CHANGE UP (ref 0–6)
GLUCOSE SERPL-MCNC: 103 MG/DL — HIGH (ref 70–99)
GLUCOSE SERPL-MCNC: 122 MG/DL — HIGH (ref 70–99)
GLUCOSE UR QL: NEGATIVE MG/DL — SIGNIFICANT CHANGE UP
HCT VFR BLD CALC: 39.7 % — SIGNIFICANT CHANGE UP (ref 34.5–45)
HGB BLD-MCNC: 12.8 G/DL — SIGNIFICANT CHANGE UP (ref 11.5–15.5)
IMM GRANULOCYTES # BLD AUTO: 0.03 K/UL — SIGNIFICANT CHANGE UP (ref 0–0.07)
IMM GRANULOCYTES NFR BLD AUTO: 0.4 % — SIGNIFICANT CHANGE UP (ref 0–0.9)
INR BLD: 1.45 RATIO — HIGH (ref 0.85–1.16)
KETONES UR QL: ABNORMAL MG/DL
LEUKOCYTE ESTERASE UR-ACNC: NEGATIVE — SIGNIFICANT CHANGE UP
LYMPHOCYTES # BLD AUTO: 0.91 K/UL — LOW (ref 1–3.3)
LYMPHOCYTES NFR BLD AUTO: 11 % — LOW (ref 13–44)
MAGNESIUM SERPL-MCNC: 1 MG/DL — CRITICAL LOW (ref 1.6–2.6)
MCHC RBC-ENTMCNC: 32.2 G/DL — SIGNIFICANT CHANGE UP (ref 32–36)
MCHC RBC-ENTMCNC: 33.1 PG — SIGNIFICANT CHANGE UP (ref 27–34)
MCV RBC AUTO: 102.6 FL — HIGH (ref 80–100)
MONOCYTES # BLD AUTO: 0.52 K/UL — SIGNIFICANT CHANGE UP (ref 0–0.9)
MONOCYTES NFR BLD AUTO: 6.3 % — SIGNIFICANT CHANGE UP (ref 2–14)
NEUTROPHILS # BLD AUTO: 6.61 K/UL — SIGNIFICANT CHANGE UP (ref 1.8–7.4)
NEUTROPHILS NFR BLD AUTO: 80 % — HIGH (ref 43–77)
NITRITE UR-MCNC: NEGATIVE — SIGNIFICANT CHANGE UP
NRBC # BLD AUTO: 0 K/UL — SIGNIFICANT CHANGE UP (ref 0–0)
NRBC # FLD: 0 K/UL — SIGNIFICANT CHANGE UP (ref 0–0)
NRBC BLD AUTO-RTO: 0 /100 WBCS — SIGNIFICANT CHANGE UP (ref 0–0)
PH UR: 8 — SIGNIFICANT CHANGE UP (ref 5–8)
PLATELET # BLD AUTO: 327 K/UL — SIGNIFICANT CHANGE UP (ref 150–400)
PMV BLD: 9.5 FL — SIGNIFICANT CHANGE UP (ref 7–13)
POTASSIUM SERPL-MCNC: 2.6 MMOL/L — CRITICAL LOW (ref 3.5–5.3)
POTASSIUM SERPL-MCNC: 3.1 MMOL/L — LOW (ref 3.5–5.3)
POTASSIUM SERPL-SCNC: 2.6 MMOL/L — CRITICAL LOW (ref 3.5–5.3)
POTASSIUM SERPL-SCNC: 3.1 MMOL/L — LOW (ref 3.5–5.3)
PROT SERPL-MCNC: 7.1 GM/DL — SIGNIFICANT CHANGE UP (ref 6–8.3)
PROT UR-MCNC: NEGATIVE MG/DL — SIGNIFICANT CHANGE UP
PROTHROM AB SERPL-ACNC: 17.1 SEC — HIGH (ref 9.9–13.4)
RBC # BLD: 3.87 M/UL — SIGNIFICANT CHANGE UP (ref 3.8–5.2)
RBC # FLD: 17.2 % — HIGH (ref 10.3–14.5)
SODIUM SERPL-SCNC: 138 MMOL/L — SIGNIFICANT CHANGE UP (ref 135–145)
SODIUM SERPL-SCNC: 139 MMOL/L — SIGNIFICANT CHANGE UP (ref 135–145)
SP GR SPEC: 1.03 — SIGNIFICANT CHANGE UP (ref 1–1.03)
TROPONIN I, HIGH SENSITIVITY RESULT: 12.51 NG/L — SIGNIFICANT CHANGE UP
TROPONIN I, HIGH SENSITIVITY RESULT: 9.62 NG/L — SIGNIFICANT CHANGE UP
UROBILINOGEN FLD QL: 0.2 MG/DL — SIGNIFICANT CHANGE UP (ref 0.2–1)
WBC # BLD: 8.26 K/UL — SIGNIFICANT CHANGE UP (ref 3.8–10.5)
WBC # FLD AUTO: 8.26 K/UL — SIGNIFICANT CHANGE UP (ref 3.8–10.5)

## 2025-07-11 PROCEDURE — 97116 GAIT TRAINING THERAPY: CPT | Mod: GP

## 2025-07-11 PROCEDURE — 83735 ASSAY OF MAGNESIUM: CPT

## 2025-07-11 PROCEDURE — 97163 PT EVAL HIGH COMPLEX 45 MIN: CPT | Mod: GP

## 2025-07-11 PROCEDURE — 84484 ASSAY OF TROPONIN QUANT: CPT

## 2025-07-11 PROCEDURE — 93010 ELECTROCARDIOGRAM REPORT: CPT

## 2025-07-11 PROCEDURE — 0042T: CPT

## 2025-07-11 PROCEDURE — 99223 1ST HOSP IP/OBS HIGH 75: CPT

## 2025-07-11 PROCEDURE — 70450 CT HEAD/BRAIN W/O DYE: CPT | Mod: 26,XU

## 2025-07-11 PROCEDURE — 71045 X-RAY EXAM CHEST 1 VIEW: CPT | Mod: 26

## 2025-07-11 PROCEDURE — 71045 X-RAY EXAM CHEST 1 VIEW: CPT

## 2025-07-11 PROCEDURE — 70498 CT ANGIOGRAPHY NECK: CPT | Mod: 26

## 2025-07-11 PROCEDURE — 70496 CT ANGIOGRAPHY HEAD: CPT | Mod: 26

## 2025-07-11 PROCEDURE — 85379 FIBRIN DEGRADATION QUANT: CPT

## 2025-07-11 PROCEDURE — 85027 COMPLETE CBC AUTOMATED: CPT

## 2025-07-11 PROCEDURE — 80048 BASIC METABOLIC PNL TOTAL CA: CPT

## 2025-07-11 PROCEDURE — 36415 COLL VENOUS BLD VENIPUNCTURE: CPT

## 2025-07-11 RX ORDER — AMLODIPINE BESYLATE 10 MG/1
5 TABLET ORAL DAILY
Refills: 0 | Status: DISCONTINUED | OUTPATIENT
Start: 2025-07-11 | End: 2025-07-12

## 2025-07-11 RX ORDER — CALCIUM CARBONATE 750 MG/1
1 TABLET ORAL EVERY 12 HOURS
Refills: 0 | Status: DISCONTINUED | OUTPATIENT
Start: 2025-07-11 | End: 2025-07-12

## 2025-07-11 RX ORDER — MECLIZINE HCL 12.5 MG
25 TABLET ORAL EVERY 8 HOURS
Refills: 0 | Status: DISCONTINUED | OUTPATIENT
Start: 2025-07-11 | End: 2025-07-12

## 2025-07-11 RX ORDER — MAGNESIUM, ALUMINUM HYDROXIDE 200-200 MG
30 TABLET,CHEWABLE ORAL EVERY 4 HOURS
Refills: 0 | Status: DISCONTINUED | OUTPATIENT
Start: 2025-07-11 | End: 2025-07-12

## 2025-07-11 RX ORDER — ALPRAZOLAM 0.5 MG
1 TABLET, EXTENDED RELEASE 24 HR ORAL THREE TIMES A DAY
Refills: 0 | Status: DISCONTINUED | OUTPATIENT
Start: 2025-07-11 | End: 2025-07-12

## 2025-07-11 RX ORDER — ASPIRIN 325 MG
81 TABLET ORAL DAILY
Refills: 0 | Status: DISCONTINUED | OUTPATIENT
Start: 2025-07-11 | End: 2025-07-12

## 2025-07-11 RX ORDER — ACETAMINOPHEN 500 MG/5ML
650 LIQUID (ML) ORAL ONCE
Refills: 0 | Status: COMPLETED | OUTPATIENT
Start: 2025-07-11 | End: 2025-07-11

## 2025-07-11 RX ORDER — LABETALOL HYDROCHLORIDE 200 MG/1
10 TABLET, FILM COATED ORAL ONCE
Refills: 0 | Status: DISCONTINUED | OUTPATIENT
Start: 2025-07-11 | End: 2025-07-11

## 2025-07-11 RX ORDER — MECLIZINE HCL 12.5 MG
25 TABLET ORAL ONCE
Refills: 0 | Status: COMPLETED | OUTPATIENT
Start: 2025-07-11 | End: 2025-07-11

## 2025-07-11 RX ORDER — ALPRAZOLAM 0.5 MG
1 TABLET, EXTENDED RELEASE 24 HR ORAL ONCE
Refills: 0 | Status: DISCONTINUED | OUTPATIENT
Start: 2025-07-11 | End: 2025-07-11

## 2025-07-11 RX ORDER — EZETIMIBE 10 MG/1
10 TABLET ORAL AT BEDTIME
Refills: 0 | Status: DISCONTINUED | OUTPATIENT
Start: 2025-07-11 | End: 2025-07-12

## 2025-07-11 RX ORDER — MAGNESIUM SULFATE 500 MG/ML
2 SYRINGE (ML) INJECTION ONCE
Refills: 0 | Status: COMPLETED | OUTPATIENT
Start: 2025-07-11 | End: 2025-07-11

## 2025-07-11 RX ORDER — AMLODIPINE BESYLATE 10 MG/1
2.5 TABLET ORAL DAILY
Refills: 0 | Status: DISCONTINUED | OUTPATIENT
Start: 2025-07-11 | End: 2025-07-11

## 2025-07-11 RX ORDER — NEBIVOLOL 2.5 MG/1
10 TABLET ORAL DAILY
Refills: 0 | Status: DISCONTINUED | OUTPATIENT
Start: 2025-07-11 | End: 2025-07-12

## 2025-07-11 RX ORDER — ENALAPRIL MALEATE 20 MG
10 TABLET ORAL
Refills: 0 | Status: DISCONTINUED | OUTPATIENT
Start: 2025-07-11 | End: 2025-07-12

## 2025-07-11 RX ORDER — ONDANSETRON HCL/PF 4 MG/2 ML
4 VIAL (ML) INJECTION EVERY 8 HOURS
Refills: 0 | Status: DISCONTINUED | OUTPATIENT
Start: 2025-07-11 | End: 2025-07-12

## 2025-07-11 RX ORDER — MELATONIN 5 MG
3 TABLET ORAL AT BEDTIME
Refills: 0 | Status: DISCONTINUED | OUTPATIENT
Start: 2025-07-11 | End: 2025-07-12

## 2025-07-11 RX ORDER — RIVAROXABAN 10 MG/1
20 TABLET, FILM COATED ORAL
Refills: 0 | Status: DISCONTINUED | OUTPATIENT
Start: 2025-07-11 | End: 2025-07-12

## 2025-07-11 RX ORDER — ACETAMINOPHEN 500 MG/5ML
650 LIQUID (ML) ORAL EVERY 6 HOURS
Refills: 0 | Status: DISCONTINUED | OUTPATIENT
Start: 2025-07-11 | End: 2025-07-12

## 2025-07-11 RX ADMIN — EZETIMIBE 10 MILLIGRAM(S): 10 TABLET ORAL at 21:46

## 2025-07-11 RX ADMIN — Medication 1 MILLIGRAM(S): at 15:29

## 2025-07-11 RX ADMIN — Medication 25 MILLIGRAM(S): at 15:29

## 2025-07-11 RX ADMIN — Medication 100 MILLIEQUIVALENT(S): at 03:31

## 2025-07-11 RX ADMIN — Medication 100 MILLIEQUIVALENT(S): at 02:13

## 2025-07-11 RX ADMIN — Medication 50 MILLILITER(S): at 12:19

## 2025-07-11 RX ADMIN — RIVAROXABAN 20 MILLIGRAM(S): 10 TABLET, FILM COATED ORAL at 17:28

## 2025-07-11 RX ADMIN — Medication 40 MILLIEQUIVALENT(S): at 02:10

## 2025-07-11 RX ADMIN — NEBIVOLOL 10 MILLIGRAM(S): 2.5 TABLET ORAL at 12:18

## 2025-07-11 RX ADMIN — Medication 650 MILLIGRAM(S): at 06:50

## 2025-07-11 RX ADMIN — AMLODIPINE BESYLATE 5 MILLIGRAM(S): 10 TABLET ORAL at 11:07

## 2025-07-11 RX ADMIN — Medication 10 MILLIGRAM(S): at 04:18

## 2025-07-11 RX ADMIN — Medication 100 MILLIEQUIVALENT(S): at 06:50

## 2025-07-11 RX ADMIN — Medication 40 MILLIGRAM(S): at 11:08

## 2025-07-11 RX ADMIN — CALCIUM CARBONATE 1 TABLET(S): 750 TABLET ORAL at 21:45

## 2025-07-11 RX ADMIN — Medication 25 GRAM(S): at 17:28

## 2025-07-11 RX ADMIN — Medication 40 MILLIEQUIVALENT(S): at 21:45

## 2025-07-11 RX ADMIN — Medication 81 MILLIGRAM(S): at 11:07

## 2025-07-11 RX ADMIN — Medication 25 MILLIGRAM(S): at 00:46

## 2025-07-11 RX ADMIN — Medication 650 MILLIGRAM(S): at 15:29

## 2025-07-11 RX ADMIN — Medication 1 MILLIGRAM(S): at 04:34

## 2025-07-11 RX ADMIN — Medication 10 MILLIGRAM(S): at 21:45

## 2025-07-12 ENCOUNTER — TRANSCRIPTION ENCOUNTER (OUTPATIENT)
Age: 75
End: 2025-07-12

## 2025-07-12 VITALS
RESPIRATION RATE: 18 BRPM | SYSTOLIC BLOOD PRESSURE: 127 MMHG | DIASTOLIC BLOOD PRESSURE: 84 MMHG | HEART RATE: 73 BPM | TEMPERATURE: 99 F | OXYGEN SATURATION: 93 %

## 2025-07-12 LAB
ADD ON TEST-SPECIMEN IN LAB: SIGNIFICANT CHANGE UP
ANION GAP SERPL CALC-SCNC: 5 MMOL/L — SIGNIFICANT CHANGE UP (ref 5–17)
BUN SERPL-MCNC: 7 MG/DL — SIGNIFICANT CHANGE UP (ref 7–23)
CALCIUM SERPL-MCNC: 8.2 MG/DL — LOW (ref 8.5–10.1)
CHLORIDE SERPL-SCNC: 105 MMOL/L — SIGNIFICANT CHANGE UP (ref 96–108)
CO2 SERPL-SCNC: 29 MMOL/L — SIGNIFICANT CHANGE UP (ref 22–31)
CREAT SERPL-MCNC: 0.41 MG/DL — LOW (ref 0.5–1.3)
EGFR: 101 ML/MIN/1.73M2 — SIGNIFICANT CHANGE UP
EGFR: 101 ML/MIN/1.73M2 — SIGNIFICANT CHANGE UP
GLUCOSE SERPL-MCNC: 98 MG/DL — SIGNIFICANT CHANGE UP (ref 70–99)
HCT VFR BLD CALC: 37.3 % — SIGNIFICANT CHANGE UP (ref 34.5–45)
HGB BLD-MCNC: 12 G/DL — SIGNIFICANT CHANGE UP (ref 11.5–15.5)
MAGNESIUM SERPL-MCNC: 1.7 MG/DL — SIGNIFICANT CHANGE UP (ref 1.6–2.6)
MCHC RBC-ENTMCNC: 32.2 G/DL — SIGNIFICANT CHANGE UP (ref 32–36)
MCHC RBC-ENTMCNC: 33.2 PG — SIGNIFICANT CHANGE UP (ref 27–34)
MCV RBC AUTO: 103.3 FL — HIGH (ref 80–100)
NRBC # BLD AUTO: 0 K/UL — SIGNIFICANT CHANGE UP (ref 0–0)
NRBC # FLD: 0 K/UL — SIGNIFICANT CHANGE UP (ref 0–0)
NRBC BLD AUTO-RTO: 0 /100 WBCS — SIGNIFICANT CHANGE UP (ref 0–0)
PLATELET # BLD AUTO: 303 K/UL — SIGNIFICANT CHANGE UP (ref 150–400)
PMV BLD: 9.9 FL — SIGNIFICANT CHANGE UP (ref 7–13)
POTASSIUM SERPL-MCNC: 3.1 MMOL/L — LOW (ref 3.5–5.3)
POTASSIUM SERPL-SCNC: 3.1 MMOL/L — LOW (ref 3.5–5.3)
RBC # BLD: 3.61 M/UL — LOW (ref 3.8–5.2)
RBC # FLD: 17.6 % — HIGH (ref 10.3–14.5)
SODIUM SERPL-SCNC: 139 MMOL/L — SIGNIFICANT CHANGE UP (ref 135–145)
WBC # BLD: 7.18 K/UL — SIGNIFICANT CHANGE UP (ref 3.8–10.5)
WBC # FLD AUTO: 7.18 K/UL — SIGNIFICANT CHANGE UP (ref 3.8–10.5)

## 2025-07-12 PROCEDURE — 99239 HOSP IP/OBS DSCHRG MGMT >30: CPT

## 2025-07-12 RX ORDER — MECLIZINE HCL 12.5 MG
1 TABLET ORAL
Qty: 42 | Refills: 0
Start: 2025-07-12 | End: 2025-07-25

## 2025-07-12 RX ORDER — MAGNESIUM SULFATE 500 MG/ML
2 SYRINGE (ML) INJECTION ONCE
Refills: 0 | Status: COMPLETED | OUTPATIENT
Start: 2025-07-12 | End: 2025-07-12

## 2025-07-12 RX ADMIN — Medication 650 MILLIGRAM(S): at 17:00

## 2025-07-12 RX ADMIN — Medication 25 GRAM(S): at 11:17

## 2025-07-12 RX ADMIN — Medication 10 MILLIGRAM(S): at 09:24

## 2025-07-12 RX ADMIN — RIVAROXABAN 20 MILLIGRAM(S): 10 TABLET, FILM COATED ORAL at 17:33

## 2025-07-12 RX ADMIN — Medication 650 MILLIGRAM(S): at 09:23

## 2025-07-12 RX ADMIN — NEBIVOLOL 10 MILLIGRAM(S): 2.5 TABLET ORAL at 09:24

## 2025-07-12 RX ADMIN — Medication 650 MILLIGRAM(S): at 16:08

## 2025-07-12 RX ADMIN — Medication 40 MILLIGRAM(S): at 05:45

## 2025-07-12 RX ADMIN — AMLODIPINE BESYLATE 5 MILLIGRAM(S): 10 TABLET ORAL at 09:24

## 2025-07-12 RX ADMIN — Medication 100 MILLIEQUIVALENT(S): at 16:06

## 2025-07-12 RX ADMIN — Medication 100 MILLIEQUIVALENT(S): at 14:43

## 2025-07-12 RX ADMIN — Medication 1 MILLIGRAM(S): at 16:08

## 2025-07-12 RX ADMIN — Medication 40 MILLIEQUIVALENT(S): at 11:16

## 2025-07-12 RX ADMIN — Medication 650 MILLIGRAM(S): at 01:30

## 2025-07-12 RX ADMIN — CALCIUM CARBONATE 1 TABLET(S): 750 TABLET ORAL at 09:24

## 2025-07-12 RX ADMIN — Medication 100 MILLIEQUIVALENT(S): at 13:17

## 2025-07-12 RX ADMIN — Medication 81 MILLIGRAM(S): at 09:24

## 2025-07-12 RX ADMIN — Medication 650 MILLIGRAM(S): at 10:20

## 2025-07-12 RX ADMIN — Medication 1 MILLIGRAM(S): at 09:23

## 2025-07-12 RX ADMIN — Medication 5 MILLIGRAM(S): at 01:30

## 2025-07-19 ENCOUNTER — INPATIENT (INPATIENT)
Facility: HOSPITAL | Age: 75
LOS: 3 days | Discharge: HOME HEALTH | DRG: 65 | End: 2025-07-23
Attending: STUDENT IN AN ORGANIZED HEALTH CARE EDUCATION/TRAINING PROGRAM | Admitting: EMERGENCY MEDICINE
Payer: MEDICARE

## 2025-07-19 VITALS
HEIGHT: 62 IN | OXYGEN SATURATION: 100 % | SYSTOLIC BLOOD PRESSURE: 132 MMHG | HEART RATE: 73 BPM | TEMPERATURE: 98 F | DIASTOLIC BLOOD PRESSURE: 79 MMHG | RESPIRATION RATE: 18 BRPM | WEIGHT: 154.98 LBS

## 2025-07-19 DIAGNOSIS — I63.9 CEREBRAL INFARCTION, UNSPECIFIED: ICD-10-CM

## 2025-07-19 DIAGNOSIS — Z95.5 PRESENCE OF CORONARY ANGIOPLASTY IMPLANT AND GRAFT: Chronic | ICD-10-CM

## 2025-07-19 DIAGNOSIS — Z90.11 ACQUIRED ABSENCE OF RIGHT BREAST AND NIPPLE: Chronic | ICD-10-CM

## 2025-07-19 DIAGNOSIS — H26.9 UNSPECIFIED CATARACT: Chronic | ICD-10-CM

## 2025-07-19 LAB
ALBUMIN SERPL ELPH-MCNC: 3 G/DL — LOW (ref 3.3–5)
ALP SERPL-CCNC: 98 U/L — SIGNIFICANT CHANGE UP (ref 40–120)
ALT FLD-CCNC: 30 U/L — SIGNIFICANT CHANGE UP (ref 12–78)
ANION GAP SERPL CALC-SCNC: 9 MMOL/L — SIGNIFICANT CHANGE UP (ref 5–17)
APTT BLD: 31.3 SEC — SIGNIFICANT CHANGE UP (ref 26.1–36.8)
AST SERPL-CCNC: 33 U/L — SIGNIFICANT CHANGE UP (ref 15–37)
BASOPHILS # BLD AUTO: 0.05 K/UL — SIGNIFICANT CHANGE UP (ref 0–0.2)
BASOPHILS NFR BLD AUTO: 0.4 % — SIGNIFICANT CHANGE UP (ref 0–2)
BILIRUB SERPL-MCNC: 0.4 MG/DL — SIGNIFICANT CHANGE UP (ref 0.2–1.2)
BUN SERPL-MCNC: 21 MG/DL — SIGNIFICANT CHANGE UP (ref 7–23)
CALCIUM SERPL-MCNC: 9.6 MG/DL — SIGNIFICANT CHANGE UP (ref 8.5–10.1)
CHLORIDE SERPL-SCNC: 99 MMOL/L — SIGNIFICANT CHANGE UP (ref 96–108)
CO2 SERPL-SCNC: 27 MMOL/L — SIGNIFICANT CHANGE UP (ref 22–31)
CREAT SERPL-MCNC: 1.19 MG/DL — SIGNIFICANT CHANGE UP (ref 0.5–1.3)
EGFR: 47 ML/MIN/1.73M2 — LOW
EGFR: 47 ML/MIN/1.73M2 — LOW
EOSINOPHIL # BLD AUTO: 0.05 K/UL — SIGNIFICANT CHANGE UP (ref 0–0.5)
EOSINOPHIL NFR BLD AUTO: 0.4 % — SIGNIFICANT CHANGE UP (ref 0–6)
GLUCOSE SERPL-MCNC: 140 MG/DL — HIGH (ref 70–99)
HCT VFR BLD CALC: 46.2 % — HIGH (ref 34.5–45)
HGB BLD-MCNC: 14.9 G/DL — SIGNIFICANT CHANGE UP (ref 11.5–15.5)
IMM GRANULOCYTES # BLD AUTO: 0.05 K/UL — SIGNIFICANT CHANGE UP (ref 0–0.07)
IMM GRANULOCYTES NFR BLD AUTO: 0.4 % — SIGNIFICANT CHANGE UP (ref 0–0.9)
INR BLD: 1.78 RATIO — HIGH (ref 0.85–1.16)
LYMPHOCYTES # BLD AUTO: 1.15 K/UL — SIGNIFICANT CHANGE UP (ref 1–3.3)
LYMPHOCYTES NFR BLD AUTO: 9.5 % — LOW (ref 13–44)
MCHC RBC-ENTMCNC: 32.3 G/DL — SIGNIFICANT CHANGE UP (ref 32–36)
MCHC RBC-ENTMCNC: 32.3 PG — SIGNIFICANT CHANGE UP (ref 27–34)
MCV RBC AUTO: 100 FL — SIGNIFICANT CHANGE UP (ref 80–100)
MONOCYTES # BLD AUTO: 1.19 K/UL — HIGH (ref 0–0.9)
MONOCYTES NFR BLD AUTO: 9.8 % — SIGNIFICANT CHANGE UP (ref 2–14)
NEUTROPHILS # BLD AUTO: 9.63 K/UL — HIGH (ref 1.8–7.4)
NEUTROPHILS NFR BLD AUTO: 79.5 % — HIGH (ref 43–77)
NRBC # BLD AUTO: 0 K/UL — SIGNIFICANT CHANGE UP (ref 0–0)
NRBC # FLD: 0 K/UL — SIGNIFICANT CHANGE UP (ref 0–0)
NRBC BLD AUTO-RTO: 0 /100 WBCS — SIGNIFICANT CHANGE UP (ref 0–0)
PLATELET # BLD AUTO: 325 K/UL — SIGNIFICANT CHANGE UP (ref 150–400)
PMV BLD: 11.1 FL — SIGNIFICANT CHANGE UP (ref 7–13)
POTASSIUM SERPL-MCNC: 3 MMOL/L — LOW (ref 3.5–5.3)
POTASSIUM SERPL-SCNC: 3 MMOL/L — LOW (ref 3.5–5.3)
PROT SERPL-MCNC: 7.5 GM/DL — SIGNIFICANT CHANGE UP (ref 6–8.3)
PROTHROM AB SERPL-ACNC: 20.4 SEC — HIGH (ref 9.9–13.4)
RBC # BLD: 4.62 M/UL — SIGNIFICANT CHANGE UP (ref 3.8–5.2)
RBC # FLD: 15.7 % — HIGH (ref 10.3–14.5)
SODIUM SERPL-SCNC: 135 MMOL/L — SIGNIFICANT CHANGE UP (ref 135–145)
TROPONIN I, HIGH SENSITIVITY RESULT: 70.08 NG/L — HIGH
WBC # BLD: 12.12 K/UL — HIGH (ref 3.8–10.5)
WBC # FLD AUTO: 12.12 K/UL — HIGH (ref 3.8–10.5)

## 2025-07-19 PROCEDURE — 82728 ASSAY OF FERRITIN: CPT

## 2025-07-19 PROCEDURE — 92610 EVALUATE SWALLOWING FUNCTION: CPT | Mod: GN

## 2025-07-19 PROCEDURE — 83036 HEMOGLOBIN GLYCOSYLATED A1C: CPT

## 2025-07-19 PROCEDURE — 93010 ELECTROCARDIOGRAM REPORT: CPT

## 2025-07-19 PROCEDURE — 93320 DOPPLER ECHO COMPLETE: CPT

## 2025-07-19 PROCEDURE — 80061 LIPID PANEL: CPT

## 2025-07-19 PROCEDURE — 70450 CT HEAD/BRAIN W/O DYE: CPT

## 2025-07-19 PROCEDURE — 97162 PT EVAL MOD COMPLEX 30 MIN: CPT | Mod: GP

## 2025-07-19 PROCEDURE — 92523 SPEECH SOUND LANG COMPREHEN: CPT | Mod: GN

## 2025-07-19 PROCEDURE — 83540 ASSAY OF IRON: CPT

## 2025-07-19 PROCEDURE — 84484 ASSAY OF TROPONIN QUANT: CPT

## 2025-07-19 PROCEDURE — 70498 CT ANGIOGRAPHY NECK: CPT | Mod: 26

## 2025-07-19 PROCEDURE — 70450 CT HEAD/BRAIN W/O DYE: CPT | Mod: 26,XU

## 2025-07-19 PROCEDURE — 93312 ECHO TRANSESOPHAGEAL: CPT

## 2025-07-19 PROCEDURE — 99285 EMERGENCY DEPT VISIT HI MDM: CPT

## 2025-07-19 PROCEDURE — 97530 THERAPEUTIC ACTIVITIES: CPT | Mod: GO

## 2025-07-19 PROCEDURE — 85027 COMPLETE CBC AUTOMATED: CPT

## 2025-07-19 PROCEDURE — 97166 OT EVAL MOD COMPLEX 45 MIN: CPT | Mod: GO

## 2025-07-19 PROCEDURE — 93306 TTE W/DOPPLER COMPLETE: CPT

## 2025-07-19 PROCEDURE — 70551 MRI BRAIN STEM W/O DYE: CPT

## 2025-07-19 PROCEDURE — 0042T: CPT

## 2025-07-19 PROCEDURE — 99222 1ST HOSP IP/OBS MODERATE 55: CPT

## 2025-07-19 PROCEDURE — 36415 COLL VENOUS BLD VENIPUNCTURE: CPT

## 2025-07-19 PROCEDURE — 97116 GAIT TRAINING THERAPY: CPT | Mod: GP

## 2025-07-19 PROCEDURE — 93325 DOPPLER ECHO COLOR FLOW MAPG: CPT

## 2025-07-19 PROCEDURE — 70496 CT ANGIOGRAPHY HEAD: CPT | Mod: 26

## 2025-07-19 PROCEDURE — 80048 BASIC METABOLIC PNL TOTAL CA: CPT

## 2025-07-19 RX ORDER — AMLODIPINE BESYLATE 10 MG/1
2.5 TABLET ORAL DAILY
Refills: 0 | Status: DISCONTINUED | OUTPATIENT
Start: 2025-07-19 | End: 2025-07-23

## 2025-07-19 RX ORDER — EZETIMIBE 10 MG/1
10 TABLET ORAL AT BEDTIME
Refills: 0 | Status: DISCONTINUED | OUTPATIENT
Start: 2025-07-19 | End: 2025-07-23

## 2025-07-19 RX ORDER — ENALAPRIL MALEATE 20 MG
10 TABLET ORAL
Refills: 0 | Status: DISCONTINUED | OUTPATIENT
Start: 2025-07-19 | End: 2025-07-23

## 2025-07-19 RX ORDER — MECLIZINE HCL 12.5 MG
12.5 TABLET ORAL ONCE
Refills: 0 | Status: DISCONTINUED | OUTPATIENT
Start: 2025-07-19 | End: 2025-07-23

## 2025-07-19 RX ORDER — CHLORDIAZEPOXIDE HCL 10 MG
5 CAPSULE ORAL
Refills: 0 | Status: DISCONTINUED | OUTPATIENT
Start: 2025-07-19 | End: 2025-07-19

## 2025-07-19 RX ORDER — ASPIRIN 325 MG
81 TABLET ORAL DAILY
Refills: 0 | Status: DISCONTINUED | OUTPATIENT
Start: 2025-07-19 | End: 2025-07-23

## 2025-07-19 RX ORDER — ATORVASTATIN CALCIUM 80 MG/1
80 TABLET, FILM COATED ORAL AT BEDTIME
Refills: 0 | Status: DISCONTINUED | OUTPATIENT
Start: 2025-07-19 | End: 2025-07-23

## 2025-07-19 RX ORDER — RIVAROXABAN 10 MG/1
20 TABLET, FILM COATED ORAL
Refills: 0 | Status: DISCONTINUED | OUTPATIENT
Start: 2025-07-20 | End: 2025-07-23

## 2025-07-19 RX ORDER — ALPRAZOLAM 0.5 MG
1 TABLET, EXTENDED RELEASE 24 HR ORAL THREE TIMES A DAY
Refills: 0 | Status: DISCONTINUED | OUTPATIENT
Start: 2025-07-19 | End: 2025-07-23

## 2025-07-19 RX ORDER — MECLIZINE HCL 12.5 MG
25 TABLET ORAL EVERY 8 HOURS
Refills: 0 | Status: DISCONTINUED | OUTPATIENT
Start: 2025-07-19 | End: 2025-07-23

## 2025-07-19 RX ADMIN — Medication 10 MILLIGRAM(S): at 21:49

## 2025-07-19 RX ADMIN — Medication 1000 MILLILITER(S): at 12:29

## 2025-07-19 RX ADMIN — Medication 1 MILLIGRAM(S): at 21:49

## 2025-07-19 RX ADMIN — ATORVASTATIN CALCIUM 80 MILLIGRAM(S): 80 TABLET, FILM COATED ORAL at 21:48

## 2025-07-19 RX ADMIN — EZETIMIBE 10 MILLIGRAM(S): 10 TABLET ORAL at 21:48

## 2025-07-19 RX ADMIN — Medication 40 MILLIEQUIVALENT(S): at 14:11

## 2025-07-20 LAB
A1C WITH ESTIMATED AVERAGE GLUCOSE RESULT: 5 % — SIGNIFICANT CHANGE UP (ref 4–5.6)
ANION GAP SERPL CALC-SCNC: 10 MMOL/L — SIGNIFICANT CHANGE UP (ref 5–17)
BUN SERPL-MCNC: 23 MG/DL — SIGNIFICANT CHANGE UP (ref 7–23)
CALCIUM SERPL-MCNC: 9.1 MG/DL — SIGNIFICANT CHANGE UP (ref 8.5–10.1)
CHLORIDE SERPL-SCNC: 101 MMOL/L — SIGNIFICANT CHANGE UP (ref 96–108)
CHOLEST SERPL-MCNC: 129 MG/DL — SIGNIFICANT CHANGE UP
CO2 SERPL-SCNC: 24 MMOL/L — SIGNIFICANT CHANGE UP (ref 22–31)
CREAT SERPL-MCNC: 0.68 MG/DL — SIGNIFICANT CHANGE UP (ref 0.5–1.3)
EGFR: 90 ML/MIN/1.73M2 — SIGNIFICANT CHANGE UP
EGFR: 90 ML/MIN/1.73M2 — SIGNIFICANT CHANGE UP
ESTIMATED AVERAGE GLUCOSE: 97 MG/DL — SIGNIFICANT CHANGE UP (ref 68–114)
FERRITIN SERPL-MCNC: 577 NG/ML — HIGH (ref 13–330)
GLUCOSE SERPL-MCNC: 132 MG/DL — HIGH (ref 70–99)
HCT VFR BLD CALC: 38.6 % — SIGNIFICANT CHANGE UP (ref 34.5–45)
HDLC SERPL-MCNC: 31 MG/DL — LOW
HGB BLD-MCNC: 13.1 G/DL — SIGNIFICANT CHANGE UP (ref 11.5–15.5)
IRON SATN MFR SERPL: 24 UG/DL — LOW (ref 30–160)
LDLC SERPL-MCNC: 73 MG/DL — SIGNIFICANT CHANGE UP
LIPID PNL WITH DIRECT LDL SERPL: 73 MG/DL — SIGNIFICANT CHANGE UP
MCHC RBC-ENTMCNC: 33.3 PG — SIGNIFICANT CHANGE UP (ref 27–34)
MCHC RBC-ENTMCNC: 33.9 G/DL — SIGNIFICANT CHANGE UP (ref 32–36)
MCV RBC AUTO: 98.2 FL — SIGNIFICANT CHANGE UP (ref 80–100)
NONHDLC SERPL-MCNC: 98 MG/DL — SIGNIFICANT CHANGE UP
NRBC # BLD AUTO: 0 K/UL — SIGNIFICANT CHANGE UP (ref 0–0)
NRBC # FLD: 0 K/UL — SIGNIFICANT CHANGE UP (ref 0–0)
NRBC BLD AUTO-RTO: 0 /100 WBCS — SIGNIFICANT CHANGE UP (ref 0–0)
PLATELET # BLD AUTO: 299 K/UL — SIGNIFICANT CHANGE UP (ref 150–400)
PMV BLD: 11 FL — SIGNIFICANT CHANGE UP (ref 7–13)
POTASSIUM SERPL-MCNC: 2.8 MMOL/L — CRITICAL LOW (ref 3.5–5.3)
POTASSIUM SERPL-SCNC: 2.8 MMOL/L — CRITICAL LOW (ref 3.5–5.3)
RBC # BLD: 3.93 M/UL — SIGNIFICANT CHANGE UP (ref 3.8–5.2)
RBC # FLD: 15.8 % — HIGH (ref 10.3–14.5)
SODIUM SERPL-SCNC: 135 MMOL/L — SIGNIFICANT CHANGE UP (ref 135–145)
TRIGL SERPL-MCNC: 141 MG/DL — SIGNIFICANT CHANGE UP
TROPONIN I, HIGH SENSITIVITY RESULT: 47.7 NG/L — SIGNIFICANT CHANGE UP
WBC # BLD: 9.99 K/UL — SIGNIFICANT CHANGE UP (ref 3.8–10.5)
WBC # FLD AUTO: 9.99 K/UL — SIGNIFICANT CHANGE UP (ref 3.8–10.5)

## 2025-07-20 PROCEDURE — 99223 1ST HOSP IP/OBS HIGH 75: CPT

## 2025-07-20 PROCEDURE — 99233 SBSQ HOSP IP/OBS HIGH 50: CPT

## 2025-07-20 RX ORDER — ACETAMINOPHEN 500 MG/5ML
650 LIQUID (ML) ORAL EVERY 6 HOURS
Refills: 0 | Status: DISCONTINUED | OUTPATIENT
Start: 2025-07-20 | End: 2025-07-23

## 2025-07-20 RX ADMIN — Medication 40 MILLIGRAM(S): at 10:28

## 2025-07-20 RX ADMIN — Medication 40 MILLIEQUIVALENT(S): at 10:27

## 2025-07-20 RX ADMIN — EZETIMIBE 10 MILLIGRAM(S): 10 TABLET ORAL at 21:20

## 2025-07-20 RX ADMIN — AMLODIPINE BESYLATE 2.5 MILLIGRAM(S): 10 TABLET ORAL at 10:27

## 2025-07-20 RX ADMIN — Medication 1 MILLIGRAM(S): at 12:53

## 2025-07-20 RX ADMIN — Medication 40 MILLIEQUIVALENT(S): at 18:55

## 2025-07-20 RX ADMIN — Medication 81 MILLIGRAM(S): at 10:28

## 2025-07-20 RX ADMIN — RIVAROXABAN 20 MILLIGRAM(S): 10 TABLET, FILM COATED ORAL at 18:55

## 2025-07-20 RX ADMIN — Medication 10 MILLIGRAM(S): at 21:21

## 2025-07-20 RX ADMIN — Medication 1 MILLIGRAM(S): at 21:21

## 2025-07-20 RX ADMIN — Medication 650 MILLIGRAM(S): at 21:20

## 2025-07-20 RX ADMIN — Medication 650 MILLIGRAM(S): at 22:23

## 2025-07-20 RX ADMIN — Medication 10 MILLIGRAM(S): at 10:27

## 2025-07-20 RX ADMIN — Medication 40 MILLIEQUIVALENT(S): at 12:59

## 2025-07-20 RX ADMIN — ATORVASTATIN CALCIUM 80 MILLIGRAM(S): 80 TABLET, FILM COATED ORAL at 21:20

## 2025-07-21 ENCOUNTER — RESULT REVIEW (OUTPATIENT)
Age: 75
End: 2025-07-21

## 2025-07-21 LAB
ANION GAP SERPL CALC-SCNC: 6 MMOL/L — SIGNIFICANT CHANGE UP (ref 5–17)
BUN SERPL-MCNC: 15 MG/DL — SIGNIFICANT CHANGE UP (ref 7–23)
CALCIUM SERPL-MCNC: 8.6 MG/DL — SIGNIFICANT CHANGE UP (ref 8.5–10.1)
CHLORIDE SERPL-SCNC: 106 MMOL/L — SIGNIFICANT CHANGE UP (ref 96–108)
CO2 SERPL-SCNC: 25 MMOL/L — SIGNIFICANT CHANGE UP (ref 22–31)
CREAT SERPL-MCNC: 0.55 MG/DL — SIGNIFICANT CHANGE UP (ref 0.5–1.3)
EGFR: 94 ML/MIN/1.73M2 — SIGNIFICANT CHANGE UP
EGFR: 94 ML/MIN/1.73M2 — SIGNIFICANT CHANGE UP
GLUCOSE SERPL-MCNC: 132 MG/DL — HIGH (ref 70–99)
POTASSIUM SERPL-MCNC: 3.8 MMOL/L — SIGNIFICANT CHANGE UP (ref 3.5–5.3)
POTASSIUM SERPL-SCNC: 3.8 MMOL/L — SIGNIFICANT CHANGE UP (ref 3.5–5.3)
SODIUM SERPL-SCNC: 137 MMOL/L — SIGNIFICANT CHANGE UP (ref 135–145)

## 2025-07-21 PROCEDURE — 99233 SBSQ HOSP IP/OBS HIGH 50: CPT

## 2025-07-21 PROCEDURE — 93306 TTE W/DOPPLER COMPLETE: CPT | Mod: 26

## 2025-07-21 PROCEDURE — 70551 MRI BRAIN STEM W/O DYE: CPT | Mod: 26

## 2025-07-21 PROCEDURE — 99232 SBSQ HOSP IP/OBS MODERATE 35: CPT

## 2025-07-21 RX ADMIN — Medication 40 MILLIGRAM(S): at 10:13

## 2025-07-21 RX ADMIN — Medication 10 MILLIGRAM(S): at 10:12

## 2025-07-21 RX ADMIN — ATORVASTATIN CALCIUM 80 MILLIGRAM(S): 80 TABLET, FILM COATED ORAL at 21:30

## 2025-07-21 RX ADMIN — Medication 650 MILLIGRAM(S): at 15:15

## 2025-07-21 RX ADMIN — RIVAROXABAN 20 MILLIGRAM(S): 10 TABLET, FILM COATED ORAL at 19:06

## 2025-07-21 RX ADMIN — Medication 1 MILLIGRAM(S): at 06:14

## 2025-07-21 RX ADMIN — Medication 1 MILLIGRAM(S): at 21:33

## 2025-07-21 RX ADMIN — Medication 10 MILLIGRAM(S): at 21:30

## 2025-07-21 RX ADMIN — AMLODIPINE BESYLATE 2.5 MILLIGRAM(S): 10 TABLET ORAL at 10:12

## 2025-07-21 RX ADMIN — Medication 650 MILLIGRAM(S): at 14:24

## 2025-07-21 RX ADMIN — Medication 81 MILLIGRAM(S): at 10:13

## 2025-07-21 RX ADMIN — EZETIMIBE 10 MILLIGRAM(S): 10 TABLET ORAL at 21:31

## 2025-07-21 RX ADMIN — Medication 650 MILLIGRAM(S): at 21:32

## 2025-07-22 ENCOUNTER — RESULT REVIEW (OUTPATIENT)
Age: 75
End: 2025-07-22

## 2025-07-22 LAB
ANION GAP SERPL CALC-SCNC: 7 MMOL/L — SIGNIFICANT CHANGE UP (ref 5–17)
BUN SERPL-MCNC: 13 MG/DL — SIGNIFICANT CHANGE UP (ref 7–23)
CALCIUM SERPL-MCNC: 8.7 MG/DL — SIGNIFICANT CHANGE UP (ref 8.5–10.1)
CHLORIDE SERPL-SCNC: 103 MMOL/L — SIGNIFICANT CHANGE UP (ref 96–108)
CO2 SERPL-SCNC: 25 MMOL/L — SIGNIFICANT CHANGE UP (ref 22–31)
CREAT SERPL-MCNC: 0.43 MG/DL — LOW (ref 0.5–1.3)
EGFR: 100 ML/MIN/1.73M2 — SIGNIFICANT CHANGE UP
EGFR: 100 ML/MIN/1.73M2 — SIGNIFICANT CHANGE UP
GLUCOSE SERPL-MCNC: 111 MG/DL — HIGH (ref 70–99)
HCT VFR BLD CALC: 35.6 % — SIGNIFICANT CHANGE UP (ref 34.5–45)
HGB BLD-MCNC: 11.8 G/DL — SIGNIFICANT CHANGE UP (ref 11.5–15.5)
MCHC RBC-ENTMCNC: 33.1 G/DL — SIGNIFICANT CHANGE UP (ref 32–36)
MCHC RBC-ENTMCNC: 33.1 PG — SIGNIFICANT CHANGE UP (ref 27–34)
MCV RBC AUTO: 99.7 FL — SIGNIFICANT CHANGE UP (ref 80–100)
NRBC # BLD AUTO: 0 K/UL — SIGNIFICANT CHANGE UP (ref 0–0)
NRBC # FLD: 0 K/UL — SIGNIFICANT CHANGE UP (ref 0–0)
NRBC BLD AUTO-RTO: 0 /100 WBCS — SIGNIFICANT CHANGE UP (ref 0–0)
PLATELET # BLD AUTO: 331 K/UL — SIGNIFICANT CHANGE UP (ref 150–400)
PMV BLD: 11 FL — SIGNIFICANT CHANGE UP (ref 7–13)
POTASSIUM SERPL-MCNC: 3.6 MMOL/L — SIGNIFICANT CHANGE UP (ref 3.5–5.3)
POTASSIUM SERPL-SCNC: 3.6 MMOL/L — SIGNIFICANT CHANGE UP (ref 3.5–5.3)
RBC # BLD: 3.57 M/UL — LOW (ref 3.8–5.2)
RBC # FLD: 16.1 % — HIGH (ref 10.3–14.5)
SODIUM SERPL-SCNC: 135 MMOL/L — SIGNIFICANT CHANGE UP (ref 135–145)
WBC # BLD: 9.08 K/UL — SIGNIFICANT CHANGE UP (ref 3.8–10.5)
WBC # FLD AUTO: 9.08 K/UL — SIGNIFICANT CHANGE UP (ref 3.8–10.5)

## 2025-07-22 PROCEDURE — 93312 ECHO TRANSESOPHAGEAL: CPT | Mod: 26

## 2025-07-22 PROCEDURE — 93325 DOPPLER ECHO COLOR FLOW MAPG: CPT | Mod: 26

## 2025-07-22 PROCEDURE — 99232 SBSQ HOSP IP/OBS MODERATE 35: CPT

## 2025-07-22 PROCEDURE — 93320 DOPPLER ECHO COMPLETE: CPT | Mod: 26

## 2025-07-22 PROCEDURE — 99233 SBSQ HOSP IP/OBS HIGH 50: CPT

## 2025-07-22 RX ADMIN — Medication 1 MILLIGRAM(S): at 22:07

## 2025-07-22 RX ADMIN — Medication 650 MILLIGRAM(S): at 22:07

## 2025-07-22 RX ADMIN — AMLODIPINE BESYLATE 2.5 MILLIGRAM(S): 10 TABLET ORAL at 11:10

## 2025-07-22 RX ADMIN — ATORVASTATIN CALCIUM 80 MILLIGRAM(S): 80 TABLET, FILM COATED ORAL at 22:06

## 2025-07-22 RX ADMIN — Medication 81 MILLIGRAM(S): at 11:10

## 2025-07-22 RX ADMIN — EZETIMIBE 10 MILLIGRAM(S): 10 TABLET ORAL at 22:07

## 2025-07-22 RX ADMIN — Medication 10 MILLIGRAM(S): at 22:06

## 2025-07-22 RX ADMIN — Medication 10 MILLIGRAM(S): at 11:10

## 2025-07-22 RX ADMIN — Medication 650 MILLIGRAM(S): at 12:00

## 2025-07-22 RX ADMIN — Medication 1 MILLIGRAM(S): at 11:12

## 2025-07-22 RX ADMIN — RIVAROXABAN 20 MILLIGRAM(S): 10 TABLET, FILM COATED ORAL at 18:16

## 2025-07-22 RX ADMIN — Medication 650 MILLIGRAM(S): at 11:17

## 2025-07-23 ENCOUNTER — TRANSCRIPTION ENCOUNTER (OUTPATIENT)
Age: 75
End: 2025-07-23

## 2025-07-23 VITALS
RESPIRATION RATE: 18 BRPM | TEMPERATURE: 98 F | HEART RATE: 114 BPM | SYSTOLIC BLOOD PRESSURE: 104 MMHG | DIASTOLIC BLOOD PRESSURE: 77 MMHG | OXYGEN SATURATION: 97 %

## 2025-07-23 PROCEDURE — 99239 HOSP IP/OBS DSCHRG MGMT >30: CPT

## 2025-07-23 PROCEDURE — 70450 CT HEAD/BRAIN W/O DYE: CPT | Mod: 26

## 2025-07-23 RX ORDER — ATORVASTATIN CALCIUM 80 MG/1
1 TABLET, FILM COATED ORAL
Qty: 30 | Refills: 0
Start: 2025-07-23 | End: 2025-08-21

## 2025-07-23 RX ADMIN — Medication 1 MILLIGRAM(S): at 10:38

## 2025-07-23 RX ADMIN — Medication 10 MILLIGRAM(S): at 10:27

## 2025-07-23 RX ADMIN — Medication 40 MILLIGRAM(S): at 06:07

## 2025-07-23 RX ADMIN — Medication 81 MILLIGRAM(S): at 10:28

## 2025-07-23 RX ADMIN — AMLODIPINE BESYLATE 2.5 MILLIGRAM(S): 10 TABLET ORAL at 10:28

## 2025-07-24 DIAGNOSIS — Z90.11 ACQUIRED ABSENCE OF RIGHT BREAST AND NIPPLE: ICD-10-CM

## 2025-07-24 DIAGNOSIS — I25.10 ATHEROSCLEROTIC HEART DISEASE OF NATIVE CORONARY ARTERY WITHOUT ANGINA PECTORIS: ICD-10-CM

## 2025-07-24 DIAGNOSIS — E83.42 HYPOMAGNESEMIA: ICD-10-CM

## 2025-07-24 DIAGNOSIS — R32 UNSPECIFIED URINARY INCONTINENCE: ICD-10-CM

## 2025-07-24 DIAGNOSIS — R51.9 HEADACHE, UNSPECIFIED: ICD-10-CM

## 2025-07-24 DIAGNOSIS — Z79.899 OTHER LONG TERM (CURRENT) DRUG THERAPY: ICD-10-CM

## 2025-07-24 DIAGNOSIS — E87.6 HYPOKALEMIA: ICD-10-CM

## 2025-07-24 DIAGNOSIS — R33.9 RETENTION OF URINE, UNSPECIFIED: ICD-10-CM

## 2025-07-24 DIAGNOSIS — I10 ESSENTIAL (PRIMARY) HYPERTENSION: ICD-10-CM

## 2025-07-24 DIAGNOSIS — H81.90 UNSPECIFIED DISORDER OF VESTIBULAR FUNCTION, UNSPECIFIED EAR: ICD-10-CM

## 2025-07-24 DIAGNOSIS — Z95.5 PRESENCE OF CORONARY ANGIOPLASTY IMPLANT AND GRAFT: ICD-10-CM

## 2025-07-24 DIAGNOSIS — Z79.82 LONG TERM (CURRENT) USE OF ASPIRIN: ICD-10-CM

## 2025-07-24 DIAGNOSIS — E78.5 HYPERLIPIDEMIA, UNSPECIFIED: ICD-10-CM

## 2025-07-24 DIAGNOSIS — R00.0 TACHYCARDIA, UNSPECIFIED: ICD-10-CM

## 2025-07-24 DIAGNOSIS — F41.9 ANXIETY DISORDER, UNSPECIFIED: ICD-10-CM

## 2025-07-25 PROBLEM — E78.5 HYPERLIPIDEMIA, UNSPECIFIED: Chronic | Status: ACTIVE | Noted: 2025-07-20

## 2025-07-28 ENCOUNTER — APPOINTMENT (OUTPATIENT)
Dept: OTOLARYNGOLOGY | Facility: CLINIC | Age: 75
End: 2025-07-28
Payer: MEDICARE

## 2025-07-28 ENCOUNTER — APPOINTMENT (OUTPATIENT)
Dept: HEMATOLOGY ONCOLOGY | Facility: CLINIC | Age: 75
End: 2025-07-28

## 2025-07-28 ENCOUNTER — RESULT REVIEW (OUTPATIENT)
Age: 75
End: 2025-07-28

## 2025-07-28 VITALS — HEIGHT: 62 IN | WEIGHT: 170 LBS | BODY MASS INDEX: 31.28 KG/M2

## 2025-07-28 DIAGNOSIS — H69.93 UNSPECIFIED EUSTACHIAN TUBE DISORDER, BILATERAL: ICD-10-CM

## 2025-07-28 DIAGNOSIS — Z86.73 PERSONAL HISTORY OF TRANSIENT ISCHEMIC ATTACK (TIA), AND CEREBRAL INFARCTION W/OUT RESIDUAL DEFICITS: ICD-10-CM

## 2025-07-28 DIAGNOSIS — R26.89 OTHER ABNORMALITIES OF GAIT AND MOBILITY: ICD-10-CM

## 2025-07-28 LAB
BASOPHILS # BLD AUTO: 0.06 K/UL — SIGNIFICANT CHANGE UP (ref 0–0.2)
BASOPHILS NFR BLD AUTO: 0.7 % — SIGNIFICANT CHANGE UP (ref 0–2)
EOSINOPHIL # BLD AUTO: 0.07 K/UL — SIGNIFICANT CHANGE UP (ref 0–0.5)
EOSINOPHIL NFR BLD AUTO: 0.8 % — SIGNIFICANT CHANGE UP (ref 0–6)
HCT VFR BLD CALC: 41.4 % — SIGNIFICANT CHANGE UP (ref 34.5–45)
HGB BLD-MCNC: 13.2 G/DL — SIGNIFICANT CHANGE UP (ref 11.5–15.5)
IMM GRANULOCYTES # BLD AUTO: 0.04 K/UL — SIGNIFICANT CHANGE UP (ref 0–0.07)
IMM GRANULOCYTES NFR BLD AUTO: 0.5 % — SIGNIFICANT CHANGE UP (ref 0–0.9)
LYMPHOCYTES # BLD AUTO: 1.34 K/UL — SIGNIFICANT CHANGE UP (ref 1–3.3)
LYMPHOCYTES NFR BLD AUTO: 15.7 % — SIGNIFICANT CHANGE UP (ref 13–44)
MCHC RBC-ENTMCNC: 31.8 PG — SIGNIFICANT CHANGE UP (ref 27–34)
MCHC RBC-ENTMCNC: 31.9 G/DL — LOW (ref 32–36)
MCV RBC AUTO: 99.8 FL — SIGNIFICANT CHANGE UP (ref 80–100)
MONOCYTES # BLD AUTO: 0.64 K/UL — SIGNIFICANT CHANGE UP (ref 0–0.9)
MONOCYTES NFR BLD AUTO: 7.5 % — SIGNIFICANT CHANGE UP (ref 2–14)
NEUTROPHILS # BLD AUTO: 6.37 K/UL — SIGNIFICANT CHANGE UP (ref 1.8–7.4)
NEUTROPHILS NFR BLD AUTO: 74.8 % — SIGNIFICANT CHANGE UP (ref 43–77)
NRBC # BLD AUTO: 0 K/UL — SIGNIFICANT CHANGE UP (ref 0–0)
NRBC # FLD: 0 K/UL — SIGNIFICANT CHANGE UP (ref 0–0)
NRBC BLD AUTO-RTO: 0 /100 WBCS — SIGNIFICANT CHANGE UP (ref 0–0)
PLATELET # BLD AUTO: 519 K/UL — HIGH (ref 150–400)
PMV BLD: 10.1 FL — SIGNIFICANT CHANGE UP (ref 7–13)
RBC # BLD: 4.15 M/UL — SIGNIFICANT CHANGE UP (ref 3.8–5.2)
RBC # FLD: 15.1 % — HIGH (ref 10.3–14.5)
WBC # BLD: 8.52 K/UL — SIGNIFICANT CHANGE UP (ref 3.8–10.5)
WBC # FLD AUTO: 8.52 K/UL — SIGNIFICANT CHANGE UP (ref 3.8–10.5)

## 2025-07-28 PROCEDURE — 92567 TYMPANOMETRY: CPT

## 2025-07-28 PROCEDURE — 99213 OFFICE O/P EST LOW 20 MIN: CPT | Mod: 25

## 2025-07-28 PROCEDURE — G0268 REMOVAL OF IMPACTED WAX MD: CPT

## 2025-07-28 PROCEDURE — 92557 COMPREHENSIVE HEARING TEST: CPT

## 2025-07-28 RX ORDER — FLUTICASONE PROPIONATE 50 UG/1
50 SPRAY NASAL
Qty: 16 | Refills: 5 | Status: ACTIVE | COMMUNITY
Start: 2025-07-28 | End: 1900-01-01

## 2025-08-01 DIAGNOSIS — Z86.73 PERSONAL HISTORY OF TRANSIENT ISCHEMIC ATTACK (TIA), AND CEREBRAL INFARCTION WITHOUT RESIDUAL DEFICITS: ICD-10-CM

## 2025-08-01 DIAGNOSIS — I10 ESSENTIAL (PRIMARY) HYPERTENSION: ICD-10-CM

## 2025-08-01 DIAGNOSIS — Z79.01 LONG TERM (CURRENT) USE OF ANTICOAGULANTS: ICD-10-CM

## 2025-08-01 DIAGNOSIS — E78.5 HYPERLIPIDEMIA, UNSPECIFIED: ICD-10-CM

## 2025-08-01 DIAGNOSIS — I25.2 OLD MYOCARDIAL INFARCTION: ICD-10-CM

## 2025-08-01 DIAGNOSIS — I63.9 CEREBRAL INFARCTION, UNSPECIFIED: ICD-10-CM

## 2025-08-01 DIAGNOSIS — D64.9 ANEMIA, UNSPECIFIED: ICD-10-CM

## 2025-08-01 DIAGNOSIS — T45.516A UNDERDOSING OF ANTICOAGULANTS, INITIAL ENCOUNTER: ICD-10-CM

## 2025-08-01 DIAGNOSIS — D72.829 ELEVATED WHITE BLOOD CELL COUNT, UNSPECIFIED: ICD-10-CM

## 2025-08-01 DIAGNOSIS — I48.0 PAROXYSMAL ATRIAL FIBRILLATION: ICD-10-CM

## 2025-08-01 DIAGNOSIS — Z85.3 PERSONAL HISTORY OF MALIGNANT NEOPLASM OF BREAST: ICD-10-CM

## 2025-08-01 DIAGNOSIS — Z90.11 ACQUIRED ABSENCE OF RIGHT BREAST AND NIPPLE: ICD-10-CM

## 2025-08-01 DIAGNOSIS — Z95.5 PRESENCE OF CORONARY ANGIOPLASTY IMPLANT AND GRAFT: ICD-10-CM

## 2025-08-01 DIAGNOSIS — I25.10 ATHEROSCLEROTIC HEART DISEASE OF NATIVE CORONARY ARTERY WITHOUT ANGINA PECTORIS: ICD-10-CM

## 2025-08-01 DIAGNOSIS — G81.91 HEMIPLEGIA, UNSPECIFIED AFFECTING RIGHT DOMINANT SIDE: ICD-10-CM

## 2025-08-01 DIAGNOSIS — Z79.82 LONG TERM (CURRENT) USE OF ASPIRIN: ICD-10-CM

## 2025-08-01 DIAGNOSIS — R29.700 NIHSS SCORE 0: ICD-10-CM

## 2025-08-01 DIAGNOSIS — E87.6 HYPOKALEMIA: ICD-10-CM

## 2025-08-12 ENCOUNTER — APPOINTMENT (OUTPATIENT)
Dept: DERMATOLOGY | Facility: CLINIC | Age: 75
End: 2025-08-12
Payer: MEDICARE

## 2025-08-12 DIAGNOSIS — L82.0 INFLAMED SEBORRHEIC KERATOSIS: ICD-10-CM

## 2025-08-12 DIAGNOSIS — D22.9 MELANOCYTIC NEVI, UNSPECIFIED: ICD-10-CM

## 2025-08-12 DIAGNOSIS — Z12.83 ENCOUNTER FOR SCREENING FOR MALIGNANT NEOPLASM OF SKIN: ICD-10-CM

## 2025-08-12 DIAGNOSIS — L81.4 OTHER MELANIN HYPERPIGMENTATION: ICD-10-CM

## 2025-08-12 PROCEDURE — 99213 OFFICE O/P EST LOW 20 MIN: CPT | Mod: 25

## 2025-08-12 PROCEDURE — 17110 DESTRUCTION B9 LES UP TO 14: CPT

## 2025-08-14 ENCOUNTER — APPOINTMENT (OUTPATIENT)
Facility: CLINIC | Age: 75
End: 2025-08-14
Payer: MEDICARE

## 2025-08-14 VITALS — WEIGHT: 170 LBS | HEIGHT: 62 IN | BODY MASS INDEX: 31.28 KG/M2

## 2025-08-14 DIAGNOSIS — Z96.651 PRESENCE OF RIGHT ARTIFICIAL KNEE JOINT: ICD-10-CM

## 2025-08-14 PROCEDURE — 99213 OFFICE O/P EST LOW 20 MIN: CPT

## 2025-08-14 PROCEDURE — 73564 X-RAY EXAM KNEE 4 OR MORE: CPT | Mod: RT

## 2025-08-15 RX ORDER — TRAMADOL HYDROCHLORIDE 50 MG/1
50 TABLET, COATED ORAL
Qty: 42 | Refills: 0 | Status: ACTIVE | COMMUNITY
Start: 2025-08-15 | End: 1900-01-01

## 2025-08-18 ENCOUNTER — APPOINTMENT (OUTPATIENT)
Dept: MAMMOGRAPHY | Facility: CLINIC | Age: 75
End: 2025-08-18

## 2025-08-18 ENCOUNTER — APPOINTMENT (OUTPATIENT)
Dept: ULTRASOUND IMAGING | Facility: CLINIC | Age: 75
End: 2025-08-18

## 2025-08-22 ENCOUNTER — APPOINTMENT (OUTPATIENT)
Dept: HEMATOLOGY ONCOLOGY | Facility: CLINIC | Age: 75
End: 2025-08-22

## 2025-08-22 ENCOUNTER — RESULT REVIEW (OUTPATIENT)
Age: 75
End: 2025-08-22

## 2025-08-25 LAB
ERYTHROCYTE [SEDIMENTATION RATE] IN BLOOD BY WESTERGREN METHOD: 43 MM/HR
FERRITIN SERPL-MCNC: 89 NG/ML
IRON SATN MFR SERPL: 28 %
IRON SERPL-MCNC: 90 UG/DL
TIBC SERPL-MCNC: 326 UG/DL
UIBC SERPL-MCNC: 236 UG/DL

## 2025-09-03 ENCOUNTER — APPOINTMENT (OUTPATIENT)
Dept: MAMMOGRAPHY | Facility: CLINIC | Age: 75
End: 2025-09-03
Payer: MEDICARE

## 2025-09-03 ENCOUNTER — OUTPATIENT (OUTPATIENT)
Dept: OUTPATIENT SERVICES | Facility: HOSPITAL | Age: 75
LOS: 1 days | End: 2025-09-03
Payer: MEDICARE

## 2025-09-03 ENCOUNTER — RESULT REVIEW (OUTPATIENT)
Age: 75
End: 2025-09-03

## 2025-09-03 DIAGNOSIS — Z95.5 PRESENCE OF CORONARY ANGIOPLASTY IMPLANT AND GRAFT: Chronic | ICD-10-CM

## 2025-09-03 DIAGNOSIS — Z90.11 ACQUIRED ABSENCE OF RIGHT BREAST AND NIPPLE: Chronic | ICD-10-CM

## 2025-09-03 DIAGNOSIS — H26.9 UNSPECIFIED CATARACT: Chronic | ICD-10-CM

## 2025-09-03 DIAGNOSIS — Z85.3 PERSONAL HISTORY OF MALIGNANT NEOPLASM OF BREAST: ICD-10-CM

## 2025-09-03 PROCEDURE — 77067 SCR MAMMO BI INCL CAD: CPT | Mod: 26,LT,52

## 2025-09-03 PROCEDURE — 77063 BREAST TOMOSYNTHESIS BI: CPT | Mod: 26,52

## 2025-09-03 PROCEDURE — 77063 BREAST TOMOSYNTHESIS BI: CPT

## 2025-09-03 PROCEDURE — 77067 SCR MAMMO BI INCL CAD: CPT

## 2025-09-04 ENCOUNTER — APPOINTMENT (OUTPATIENT)
Dept: PHYSICAL MEDICINE AND REHAB | Facility: CLINIC | Age: 75
End: 2025-09-04
Payer: MEDICARE

## 2025-09-04 VITALS
DIASTOLIC BLOOD PRESSURE: 89 MMHG | HEIGHT: 63 IN | HEART RATE: 122 BPM | WEIGHT: 165 LBS | OXYGEN SATURATION: 95 % | SYSTOLIC BLOOD PRESSURE: 137 MMHG | BODY MASS INDEX: 29.23 KG/M2

## 2025-09-04 DIAGNOSIS — Z96.651 PRESENCE OF RIGHT ARTIFICIAL KNEE JOINT: ICD-10-CM

## 2025-09-04 DIAGNOSIS — Z96.651 PAIN IN RIGHT KNEE: ICD-10-CM

## 2025-09-04 DIAGNOSIS — G89.28 OTHER CHRONIC POSTPROCEDURAL PAIN: ICD-10-CM

## 2025-09-04 DIAGNOSIS — G89.29 PAIN IN RIGHT KNEE: ICD-10-CM

## 2025-09-04 DIAGNOSIS — M79.2 NEURALGIA AND NEURITIS, UNSPECIFIED: ICD-10-CM

## 2025-09-04 DIAGNOSIS — M25.561 PAIN IN RIGHT KNEE: ICD-10-CM

## 2025-09-04 DIAGNOSIS — M67.969 UNSPECIFIED DISORDER OF SYNOVIUM AND TENDON, UNSPECIFIED LOWER LEG: ICD-10-CM

## 2025-09-04 PROCEDURE — 76882 US LMTD JT/FCL EVL NVASC XTR: CPT | Mod: RT

## 2025-09-04 PROCEDURE — 99204 OFFICE O/P NEW MOD 45 MIN: CPT | Mod: 25

## 2025-09-06 PROBLEM — G89.28 CHRONIC POST-OPERATIVE PAIN: Status: ACTIVE | Noted: 2025-09-06

## 2025-09-06 PROBLEM — M79.2 LEG NEURALGIA: Status: ACTIVE | Noted: 2025-09-06

## 2025-09-06 PROBLEM — M25.561 CHRONIC KNEE PAIN AFTER TOTAL REPLACEMENT OF RIGHT KNEE JOINT: Status: ACTIVE | Noted: 2025-09-06

## 2025-09-06 PROBLEM — M67.969 TENDINOPATHY OF PATELLA: Status: ACTIVE | Noted: 2025-09-06

## 2025-09-10 ENCOUNTER — APPOINTMENT (OUTPATIENT)
Dept: INFUSION THERAPY | Facility: CLINIC | Age: 75
End: 2025-09-10

## 2025-09-12 ENCOUNTER — RESULT REVIEW (OUTPATIENT)
Age: 75
End: 2025-09-12

## 2025-09-15 ENCOUNTER — RESULT REVIEW (OUTPATIENT)
Age: 75
End: 2025-09-15

## 2025-09-15 ENCOUNTER — NON-APPOINTMENT (OUTPATIENT)
Age: 75
End: 2025-09-15

## 2025-09-16 ENCOUNTER — NON-APPOINTMENT (OUTPATIENT)
Age: 75
End: 2025-09-16